# Patient Record
Sex: FEMALE | Race: WHITE | Employment: UNEMPLOYED | URBAN - METROPOLITAN AREA
[De-identification: names, ages, dates, MRNs, and addresses within clinical notes are randomized per-mention and may not be internally consistent; named-entity substitution may affect disease eponyms.]

---

## 2017-06-01 ENCOUNTER — HOSPITAL ENCOUNTER (OUTPATIENT)
Dept: PREADMISSION TESTING | Age: 57
Discharge: HOME | End: 2017-06-01
Payer: COMMERCIAL

## 2017-06-01 ENCOUNTER — HOSPITAL ENCOUNTER (OUTPATIENT)
Dept: GENERAL RADIOLOGY | Age: 57
Discharge: HOME | End: 2017-06-01
Attending: OBSTETRICS & GYNECOLOGY
Payer: COMMERCIAL

## 2017-06-01 ENCOUNTER — ANESTHESIA EVENT (OUTPATIENT)
Dept: SURGERY | Age: 57
End: 2017-06-01
Payer: COMMERCIAL

## 2017-06-01 VITALS
SYSTOLIC BLOOD PRESSURE: 123 MMHG | HEIGHT: 67 IN | TEMPERATURE: 97.9 F | BODY MASS INDEX: 33.43 KG/M2 | WEIGHT: 213 LBS | DIASTOLIC BLOOD PRESSURE: 79 MMHG | HEART RATE: 102 BPM

## 2017-06-01 LAB
ALBUMIN SERPL BCP-MCNC: 3.9 G/DL (ref 3.5–5)
ALBUMIN/GLOB SERPL: 0.9 {RATIO} (ref 1.1–2.2)
ALP SERPL-CCNC: 135 U/L (ref 45–117)
ALT SERPL-CCNC: 28 U/L (ref 12–78)
ANION GAP BLD CALC-SCNC: 9 MMOL/L (ref 5–15)
AST SERPL W P-5'-P-CCNC: 16 U/L (ref 15–37)
ATRIAL RATE: 81 BPM
BASOPHILS # BLD AUTO: 0 K/UL (ref 0–0.1)
BASOPHILS # BLD: 0 % (ref 0–1)
BILIRUB SERPL-MCNC: 0.3 MG/DL (ref 0.2–1)
BUN SERPL-MCNC: 9 MG/DL (ref 6–20)
BUN/CREAT SERPL: 11 (ref 12–20)
CALCIUM SERPL-MCNC: 9.6 MG/DL (ref 8.5–10.1)
CALCULATED P AXIS, ECG09: 56 DEGREES
CALCULATED R AXIS, ECG10: 66 DEGREES
CALCULATED T AXIS, ECG11: 43 DEGREES
CHLORIDE SERPL-SCNC: 104 MMOL/L (ref 97–108)
CO2 SERPL-SCNC: 25 MMOL/L (ref 21–32)
CREAT SERPL-MCNC: 0.79 MG/DL (ref 0.55–1.02)
DIAGNOSIS, 93000: NORMAL
EOSINOPHIL # BLD: 0.2 K/UL (ref 0–0.4)
EOSINOPHIL NFR BLD: 4 % (ref 0–7)
ERYTHROCYTE [DISTWIDTH] IN BLOOD BY AUTOMATED COUNT: 13.1 % (ref 11.5–14.5)
GLOBULIN SER CALC-MCNC: 4.3 G/DL (ref 2–4)
GLUCOSE SERPL-MCNC: 94 MG/DL (ref 65–100)
HCT VFR BLD AUTO: 38.6 % (ref 35–47)
HGB BLD-MCNC: 13.1 G/DL (ref 11.5–16)
LYMPHOCYTES # BLD AUTO: 38 % (ref 12–49)
LYMPHOCYTES # BLD: 2 K/UL (ref 0.8–3.5)
MCH RBC QN AUTO: 31.5 PG (ref 26–34)
MCHC RBC AUTO-ENTMCNC: 33.9 G/DL (ref 30–36.5)
MCV RBC AUTO: 92.8 FL (ref 80–99)
MONOCYTES # BLD: 0.8 K/UL (ref 0–1)
MONOCYTES NFR BLD AUTO: 14 % (ref 5–13)
NEUTS SEG # BLD: 2.3 K/UL (ref 1.8–8)
NEUTS SEG NFR BLD AUTO: 44 % (ref 32–75)
P-R INTERVAL, ECG05: 130 MS
PLATELET # BLD AUTO: 286 K/UL (ref 150–400)
POTASSIUM SERPL-SCNC: 4 MMOL/L (ref 3.5–5.1)
PROT SERPL-MCNC: 8.2 G/DL (ref 6.4–8.2)
Q-T INTERVAL, ECG07: 382 MS
QRS DURATION, ECG06: 102 MS
QTC CALCULATION (BEZET), ECG08: 443 MS
RBC # BLD AUTO: 4.16 M/UL (ref 3.8–5.2)
SODIUM SERPL-SCNC: 138 MMOL/L (ref 136–145)
VENTRICULAR RATE, ECG03: 81 BPM
WBC # BLD AUTO: 5.2 K/UL (ref 3.6–11)

## 2017-06-01 PROCEDURE — 85025 COMPLETE CBC W/AUTO DIFF WBC: CPT | Performed by: OBSTETRICS & GYNECOLOGY

## 2017-06-01 PROCEDURE — 71020 XR CHEST PA LAT: CPT

## 2017-06-01 PROCEDURE — 36415 COLL VENOUS BLD VENIPUNCTURE: CPT | Performed by: OBSTETRICS & GYNECOLOGY

## 2017-06-01 PROCEDURE — 80053 COMPREHEN METABOLIC PANEL: CPT | Performed by: OBSTETRICS & GYNECOLOGY

## 2017-06-01 PROCEDURE — 93005 ELECTROCARDIOGRAM TRACING: CPT

## 2017-06-01 NOTE — PERIOP NOTES
PATIENT GIVEN SURGICAL SITE INFECTION FAQ HANDOUT AND HAND WASHING TIP SHEET. PREOP INSTRUCTIONS REVIEWED AND PATIENT VERBALIZES UNDERSTANDING OF INSTRUCTIONS. PATIENT HAS BEEN GIVEN THE OPPORTUNITY TO ASK ADDITIONAL QUESTIONS. GAVE 2 PACKAGES OF CHG WIPES AND INSTRUCTIONS. PATIENT VERBALIZED UNDERSTANDING.     SENT PATIENT TO [facility] FOR CHEST XRAY

## 2017-06-02 ENCOUNTER — HOSPITAL ENCOUNTER (OUTPATIENT)
Age: 57
Setting detail: OUTPATIENT SURGERY
Discharge: HOME | End: 2017-06-02
Attending: OBSTETRICS & GYNECOLOGY | Admitting: OBSTETRICS & GYNECOLOGY
Payer: COMMERCIAL

## 2017-06-02 ENCOUNTER — ANESTHESIA (OUTPATIENT)
Dept: SURGERY | Age: 57
End: 2017-06-02
Payer: COMMERCIAL

## 2017-06-02 VITALS
TEMPERATURE: 98.3 F | WEIGHT: 213 LBS | HEART RATE: 87 BPM | HEIGHT: 67 IN | SYSTOLIC BLOOD PRESSURE: 128 MMHG | OXYGEN SATURATION: 97 % | BODY MASS INDEX: 33.43 KG/M2 | DIASTOLIC BLOOD PRESSURE: 84 MMHG | RESPIRATION RATE: 18 BRPM

## 2017-06-02 PROCEDURE — 77030010507 HC ADH SKN DERMBND J&J -B: Performed by: OBSTETRICS & GYNECOLOGY

## 2017-06-02 PROCEDURE — 76060000031 HC ANESTHESIA FIRST 0.5 HR: Performed by: OBSTETRICS & GYNECOLOGY

## 2017-06-02 PROCEDURE — 74011250636 HC RX REV CODE- 250/636: Performed by: ANESTHESIOLOGY

## 2017-06-02 PROCEDURE — 76010000154 HC OR TIME FIRST 0.5 HR: Performed by: OBSTETRICS & GYNECOLOGY

## 2017-06-02 PROCEDURE — 74011000250 HC RX REV CODE- 250: Performed by: OBSTETRICS & GYNECOLOGY

## 2017-06-02 PROCEDURE — 74011000250 HC RX REV CODE- 250

## 2017-06-02 PROCEDURE — 74011000258 HC RX REV CODE- 258

## 2017-06-02 PROCEDURE — 76210000021 HC REC RM PH II 0.5 TO 1 HR: Performed by: OBSTETRICS & GYNECOLOGY

## 2017-06-02 PROCEDURE — 74011250636 HC RX REV CODE- 250/636

## 2017-06-02 PROCEDURE — 77030011640 HC PAD GRND REM COVD -A: Performed by: OBSTETRICS & GYNECOLOGY

## 2017-06-02 PROCEDURE — 76210000006 HC OR PH I REC 0.5 TO 1 HR: Performed by: OBSTETRICS & GYNECOLOGY

## 2017-06-02 PROCEDURE — 77030031139 HC SUT VCRL2 J&J -A: Performed by: OBSTETRICS & GYNECOLOGY

## 2017-06-02 PROCEDURE — 77030002933 HC SUT MCRYL J&J -A: Performed by: OBSTETRICS & GYNECOLOGY

## 2017-06-02 RX ORDER — MIDAZOLAM HYDROCHLORIDE 1 MG/ML
INJECTION, SOLUTION INTRAMUSCULAR; INTRAVENOUS AS NEEDED
Status: DISCONTINUED | OUTPATIENT
Start: 2017-06-02 | End: 2017-06-02 | Stop reason: HOSPADM

## 2017-06-02 RX ORDER — MORPHINE SULFATE 10 MG/ML
2 INJECTION, SOLUTION INTRAMUSCULAR; INTRAVENOUS
Status: DISCONTINUED | OUTPATIENT
Start: 2017-06-02 | End: 2017-06-02 | Stop reason: HOSPADM

## 2017-06-02 RX ORDER — FENTANYL CITRATE 50 UG/ML
25 INJECTION, SOLUTION INTRAMUSCULAR; INTRAVENOUS
Status: DISCONTINUED | OUTPATIENT
Start: 2017-06-02 | End: 2017-06-02 | Stop reason: HOSPADM

## 2017-06-02 RX ORDER — LIDOCAINE HYDROCHLORIDE 10 MG/ML
0.1 INJECTION, SOLUTION EPIDURAL; INFILTRATION; INTRACAUDAL; PERINEURAL AS NEEDED
Status: DISCONTINUED | OUTPATIENT
Start: 2017-06-02 | End: 2017-06-02 | Stop reason: HOSPADM

## 2017-06-02 RX ORDER — ONDANSETRON 2 MG/ML
4 INJECTION INTRAMUSCULAR; INTRAVENOUS AS NEEDED
Status: DISCONTINUED | OUTPATIENT
Start: 2017-06-02 | End: 2017-06-02 | Stop reason: HOSPADM

## 2017-06-02 RX ORDER — DEXAMETHASONE SODIUM PHOSPHATE 100 MG/10ML
INJECTION INTRAMUSCULAR; INTRAVENOUS AS NEEDED
Status: DISCONTINUED | OUTPATIENT
Start: 2017-06-02 | End: 2017-06-02 | Stop reason: HOSPADM

## 2017-06-02 RX ORDER — FENTANYL CITRATE 50 UG/ML
INJECTION, SOLUTION INTRAMUSCULAR; INTRAVENOUS AS NEEDED
Status: DISCONTINUED | OUTPATIENT
Start: 2017-06-02 | End: 2017-06-02 | Stop reason: HOSPADM

## 2017-06-02 RX ORDER — PROPOFOL 10 MG/ML
INJECTION, EMULSION INTRAVENOUS AS NEEDED
Status: DISCONTINUED | OUTPATIENT
Start: 2017-06-02 | End: 2017-06-02 | Stop reason: HOSPADM

## 2017-06-02 RX ORDER — SODIUM CHLORIDE 0.9 % (FLUSH) 0.9 %
5-10 SYRINGE (ML) INJECTION AS NEEDED
Status: DISCONTINUED | OUTPATIENT
Start: 2017-06-02 | End: 2017-06-02 | Stop reason: HOSPADM

## 2017-06-02 RX ORDER — SODIUM CHLORIDE 0.9 % (FLUSH) 0.9 %
5-10 SYRINGE (ML) INJECTION EVERY 8 HOURS
Status: DISCONTINUED | OUTPATIENT
Start: 2017-06-02 | End: 2017-06-02 | Stop reason: HOSPADM

## 2017-06-02 RX ORDER — HYDROMORPHONE HYDROCHLORIDE 1 MG/ML
0.2 INJECTION, SOLUTION INTRAMUSCULAR; INTRAVENOUS; SUBCUTANEOUS
Status: DISCONTINUED | OUTPATIENT
Start: 2017-06-02 | End: 2017-06-02 | Stop reason: HOSPADM

## 2017-06-02 RX ORDER — ONDANSETRON 2 MG/ML
INJECTION INTRAMUSCULAR; INTRAVENOUS AS NEEDED
Status: DISCONTINUED | OUTPATIENT
Start: 2017-06-02 | End: 2017-06-02 | Stop reason: HOSPADM

## 2017-06-02 RX ORDER — SODIUM CHLORIDE, SODIUM LACTATE, POTASSIUM CHLORIDE, CALCIUM CHLORIDE 600; 310; 30; 20 MG/100ML; MG/100ML; MG/100ML; MG/100ML
INJECTION, SOLUTION INTRAVENOUS
Status: DISCONTINUED | OUTPATIENT
Start: 2017-06-02 | End: 2017-06-02 | Stop reason: HOSPADM

## 2017-06-02 RX ORDER — OXYCODONE AND ACETAMINOPHEN 5; 325 MG/1; MG/1
1 TABLET ORAL
Qty: 20 TAB | Refills: 0 | Status: ON HOLD | OUTPATIENT
Start: 2017-06-02 | End: 2018-07-30

## 2017-06-02 RX ORDER — SODIUM CHLORIDE, SODIUM LACTATE, POTASSIUM CHLORIDE, CALCIUM CHLORIDE 600; 310; 30; 20 MG/100ML; MG/100ML; MG/100ML; MG/100ML
50 INJECTION, SOLUTION INTRAVENOUS CONTINUOUS
Status: DISCONTINUED | OUTPATIENT
Start: 2017-06-02 | End: 2017-06-02 | Stop reason: HOSPADM

## 2017-06-02 RX ADMIN — SODIUM CHLORIDE, SODIUM LACTATE, POTASSIUM CHLORIDE, AND CALCIUM CHLORIDE 50 ML/HR: 600; 310; 30; 20 INJECTION, SOLUTION INTRAVENOUS at 07:46

## 2017-06-02 RX ADMIN — SODIUM CHLORIDE, SODIUM LACTATE, POTASSIUM CHLORIDE, CALCIUM CHLORIDE: 600; 310; 30; 20 INJECTION, SOLUTION INTRAVENOUS at 08:00

## 2017-06-02 RX ADMIN — MIDAZOLAM HYDROCHLORIDE 2 MG: 1 INJECTION, SOLUTION INTRAMUSCULAR; INTRAVENOUS at 08:25

## 2017-06-02 RX ADMIN — DEXAMETHASONE SODIUM PHOSPHATE 4 MG: 100 INJECTION INTRAMUSCULAR; INTRAVENOUS at 08:46

## 2017-06-02 RX ADMIN — PROPOFOL 50 MG: 10 INJECTION, EMULSION INTRAVENOUS at 08:49

## 2017-06-02 RX ADMIN — ONDANSETRON 4 MG: 2 INJECTION INTRAMUSCULAR; INTRAVENOUS at 08:46

## 2017-06-02 RX ADMIN — FENTANYL CITRATE 100 MCG: 50 INJECTION, SOLUTION INTRAMUSCULAR; INTRAVENOUS at 08:35

## 2017-06-02 RX ADMIN — PROPOFOL 50 MG: 10 INJECTION, EMULSION INTRAVENOUS at 08:37

## 2017-06-02 RX ADMIN — PROPOFOL 50 MG: 10 INJECTION, EMULSION INTRAVENOUS at 08:40

## 2017-06-02 RX ADMIN — PROPOFOL 50 MG: 10 INJECTION, EMULSION INTRAVENOUS at 08:45

## 2017-06-02 RX ADMIN — PROPOFOL 50 MG: 10 INJECTION, EMULSION INTRAVENOUS at 08:35

## 2017-06-02 RX ADMIN — PROPOFOL 30 MG: 10 INJECTION, EMULSION INTRAVENOUS at 08:54

## 2017-06-02 NOTE — H&P
[address] [place] [address] Mathias Moritz [address]52, 0387 [facility]  [phone]  MD Frank Marino MD Jonny Kidd, NP        Patient ID:  Name:  Bharat Nicole  MRN:  [id number]  :  1960/56 y.o. Date:  2017      HISTORY OF PRESENT ILLNESS:  Bharat Nicole is a  female with a diagnosis of stage IV UPSC. She underwent a TLH, BSO, with lymphadenectomy in 2014. She was found to have evidence of intraperitoneal disease with implants in the cul de sac, but no evidence of tyler metastases. She was treated with adjuvant chemotherapy with Taxol and Carboplatin. She then received pelvic radiation therapy.     She presents today for follow-up. She is doing well and is without complaints. She denies any vaginal bleeding or discharge, any pelvic or abdominal pain, or any changes in her bowel or bladder habits.  Her last CA-125 and pap smear were normal.      Problem List:  Patient Active Problem List    Diagnosis Date Noted    Endometrial cancer ([facility] [address] 2014     PMH:  Past Medical History:   Diagnosis Date    Arthritis     Cancer ([facility] [address]     uterine    Chronic pain     LOWER BACK    Depression     Nausea & vomiting     PMB (postmenopausal bleeding) 2014      PSH:  Past Surgical History:   Procedure Laterality Date    HX COLONOSCOPY      HX GYN  14    TLH/BSO/lymph node dissection    HX ORTHOPAEDIC Right      LITTLE toe sx    HX VASCULAR ACCESS Left     PORTACATH - LEFT CHEST      Social History:  Social History   Substance Use Topics    Smoking status: Former Smoker     Packs/day: 0.50     Years: 7.00     Types: Cigarettes     Quit date: 1999    Smokeless tobacco: Never Used    Alcohol use 0.6 oz/week     1 Glasses of wine per week      Comment: rarely      Family History:  Family History   Problem Relation Age of Onset    Cancer Mother 61     liver metastatic disease, unknown primary    Stroke Mother BRAIN ANEURYSM    Cancer Maternal Uncle 61     bone    Cancer Maternal Grandmother 61     unknown primary    Anesth Problems Neg Hx       Medications: (reviewed)  No current facility-administered medications for this encounter. Allergies: (reviewed)  Allergies   Allergen Reactions    Hydrocodone Nausea Only    Oxycodone Nausea and Vomiting          OBJECTIVE:    Physical Exam:  VITAL SIGNS: There were no vitals filed for this visit. There is no height or weight on file to calculate BMI. GENERAL FLOWER: Conversant, alert, oriented. No acute distress. HEENT: HEENT. No thyroid enlargement. No JVD. Neck: Supple without restrictions. RESPIRATORY: Clear to auscultation and percussion to the bases. No CVAT. CARDIOVASC: RRR without murmur/rub. GASTROINT: soft, non-tender, without masses or organomegaly   MUSCULOSKEL: no joint tenderness, deformity or swelling   EXTREMITIES: extremities normal, atraumatic, no cyanosis or edema   PELVIC: Deferred   RECTAL: Deferred   DOMINGUEZ SURVEY: No suspicious lymphadenopathy or edema noted. NEURO: Grossly intact. No acute deficit.          Lab Results   Component Value Date/Time    WBC 5.2 06/01/2017 11:30 AM    HGB 13.1 06/01/2017 11:30 AM    HCT 38.6 06/01/2017 11:30 AM    PLATELET 107 22/87/0477 11:30 AM    MCV 92.8 06/01/2017 11:30 AM     Lab Results   Component Value Date/Time    Sodium 138 06/01/2017 11:30 AM    Potassium 4.0 06/01/2017 11:30 AM    Chloride 104 06/01/2017 11:30 AM    CO2 25 06/01/2017 11:30 AM    Anion gap 9 06/01/2017 11:30 AM    Glucose 94 06/01/2017 11:30 AM    BUN 9 06/01/2017 11:30 AM    Creatinine 0.79 06/01/2017 11:30 AM    BUN/Creatinine ratio 11 06/01/2017 11:30 AM    GFR est AA >60 06/01/2017 11:30 AM    GFR est non-AA >60 06/01/2017 11:30 AM    Calcium 9.6 06/01/2017 11:30 AM         IMPRESSION/PLAN:  62 yo female with a history of uterine papillary serous carcinoma, s/p surgical resection, followed by systemic chemotherapy and pelvic radiation. She has no evidence of disease at this time and desires removal of her IV port. She was counseled on the risks and benefits of the procedure, as well as the possibility of needing another port placed in the future if she experiences recurrent disease.       Signed By: Sonja Sarmiento MD     6/2/2017/7:15 AM

## 2017-06-02 NOTE — IP AVS SNAPSHOT
[address] 
[phone] Patient: Iraj Jean Baptiste MRN: [id number] OBF:4/44/[birth year]25 You are allergic to the following Allergen Reactions Hydrocodone Nausea Only Oxycodone Nausea and Vomiting Recent Documentation Height Weight BMI OB Status Smoking Status 1.702 m 96.6 kg 33.36 kg/m2 Hysterectomy Former Smoker Emergency Contacts Name Discharge Info Relation Home Work Mobile [address] CAREGIVER [3] Spouse [3] [phone] About your hospitalization You were admitted on:  June 2, 2017 You last received care in the:  [facility] PACU You were discharged on:  June 2, 2017 Unit phone number:  [phone] Why you were hospitalized Your primary diagnosis was:  Not on File Providers Seen During Your Hospitalizations Provider Role Specialty Primary office phone Milla Chapman MD Attending Provider Gynecologic Oncology [phone] Your Primary Care Physician (PCP) Primary Care Physician Office Phone Office Fax [address] [phone] Follow-up Information Follow up With Details Comments Contact Info Jaci Cagle MD   [address] [place] [address] 
[phone] Milla Chapman MD  GO TO YOUR SCHEDULED APPOINTMENT [address] [place] [address] 
[phone] Current Discharge Medication List  
  
START taking these medications Dose & Instructions Dispensing Information Comments Morning Noon Evening Bedtime  
 oxyCODONE-acetaminophen 5-325 mg per tablet Commonly known as:  PERCOCET Your last dose was: Your next dose is:    
   
   
 Dose:  1 Tab Take 1 Tab by mouth every four (4) hours as needed for Pain. Max Daily Amount: 6 Tabs. Quantity:  20 Tab Refills:  0 CONTINUE these medications which have NOT CHANGED Dose & Instructions Dispensing Information Comments Morning Noon Evening Bedtime  
 ibuprofen 200 mg tablet Commonly known as:  ADVIL Your last dose was: Your next dose is:    
   
   
 Dose:  800 mg Take 4 tablets by mouth every eight (8) hours as needed. Quantity:  90 tablet Refills:  3 Where to Get Your Medications Information on where to get these meds will be given to you by the nurse or doctor. ! Ask your nurse or doctor about these medications  
  oxyCODONE-acetaminophen 5-325 mg per tablet Discharge Instructions Dr Miguel Robles Discharge Instructions: MAY SHOWER TOMORROW. USE SOUP AND WATER AND PAT YOUR INCISION DRY. DO NOT USE ANY OINTMENTS OR LOTIONS ON THE INCISION SITE. NO BATHS, POOLS OR HOT TUBS UNTIL INCISION HAS HEALED. GO TO YOU SCHEDULED APPOINTMENT WITH DR. KNIGHT. Learning About a Central Venous Catheter What is a central venous catheter? A central venous catheter (CVC), also called a central line, is a long, thin, flexible tube used to give medicines, fluids, nutrients, or blood products over a long period of time, usually several weeks or more. It can also be used to do blood tests. The catheter makes doing these things more comfortable for you because they are put directly into the catheter. You are not stuck with a needle every time. The CVC is inserted in your arm, chest, or neck. It is put through the skin and into a large vein. The catheter is threaded through this vein until it reaches a large vein near the heart. In most cases, the other end of the catheterthe end used to give medicinessticks out of the skin. Some of the common CVCs that are used outside the hospital or for longer periods of time include: · A peripherally inserted central catheter, or PICC line (say \"pick\"), which usually goes into a vein in your arm. · A tunneled catheter, which is surgically inserted into a vein in the neck or chest. 
· An implanted port, which is similar to a tunneled catheter but is left entirely under the skin. Medicines are injected through a \"port\" placed under the skin. Once your CVC is in place, you may stay in the hospital to receive your medicines, or you may get your medicines at home. What problems can occur? Possible problems with a central venous catheter include: · Bleeding. This may happen when the doctor inserts the catheter. It is usually mild and will stop by itself. · Infection. If infection occurs, you will need antibiotics or the catheter will be removed. · Blockage or kinking of the catheter. Regular flushing of the catheter helps reduce blockage. A kinked catheter must be repositioned or replaced. · Pain. You may experience pain at the place where the catheter is inserted or where it lies under your skin. · Collapsed lung (pneumothorax). This is rare. It is most likely to happen during placement of a catheter in the chest. 
· Shifting of the catheter. A catheter that has moved out of place can sometimes be repositioned. If this does not work, it must be replaced. What happens when you get a central venous catheter? Your catheter will be inserted by your doctor. Your doctor may use ultrasound to locate the blood vessel and help with the placement of the catheter. When you are in the hospital, a nursing team will take care of you and your CVC. Insertion and care of the CVC Your nursing team will: · Check the catheter site and dressing regularly. How often this is done depends on the situation. · Wash their hands before and after handling the catheter. · Replace the catheter when needed. · Clean or replace the catheter components when needed. Changing the dressing Your team will: · Use clean and proper materials for the dressing, which covers the catheter site. · Clean the insertion site and area whenever they change the dressing. · Replace the dressing when it is damp, loose, or dirty. It will be changed at least once a week. At home If you go home with a CVC, your team will give you detailed instructions on how to care for it. In general: · Always wash your hands before you touch your CVC. Make sure anyone who touches the catheter also washes his or her hands. · Try to keep the exit site dry. This can help prevent infection. When you shower, cover the site with waterproof material, such as plastic wrap. Be sure you cover both the exit site and the central line cap(s). · Fasten or tape the central line to your body to prevent it from pulling or dangling. Avoid bending or crimping your central line. And wear clothing that doesn't rub or pull on your central line. Follow-up care is a key part of your treatment and safety. Be sure to make and go to all appointments, and call your doctor if you are having problems. It's also a good idea to know your test results and keep a list of the medicines you take. Where can you learn more? Go to http://francis-lima.info/. Enter I714 in the search box to learn more about \"Learning About a Central Venous Catheter. \" Current as of: May 27, 2016 Content Version: 11.2 © 5996-1763 [employer]. Care instructions adapted under license by "[employer] (which disclaims liability or warranty for this information). If you have questions about a medical condition or this instruction, always ask your healthcare professional. [address] any warranty or liability for your use of this information. ______________________________________________________________________ Anesthesia Discharge Instructions After general anesthesia or intervenous sedation, for 24 hours or while taking prescription Narcotics: · Limit your activities · Do not drive or operate hazardous machinery · If you have not urinated within 8 hours after discharge, please contact your surgeon on call. · Do not make important personal or business decisions · Do not drink alcoholic beverages Report the following to your surgeon: 
· Excessive pain, swelling, redness or odor of or around the surgical area · Temperature over 100.5 degrees · Nausea and vomiting lasting longer than 4 hours or if unable to take medication · Any signs of decreased circulation or nerve impairment to extremity:  Change in color, persistent numbness, tingling, coldness or increased pain. · Any questions Discharge Instructions Attachments/References MEFS - NARCOTIC-ANALGESIC/ACETAMINOPHEN (PERCOCET, Lynnda Levo, LORCET HD, LORTAB 10/325) - (BY MOUTH) (ENGLISH) Discharge Orders None Introducing [facility] SERVICES! Dear Judie Mack: 
Thank you for requesting a [employer] account. Our records indicate that you already have an active [employer] account. You can access your account anytime at [email or web address] [email or web address] Did you know that you can access your hospital and ER discharge instructions at any time in [employer]? You can also review all of your test results from your hospital stay or ER visit. Additional Information If you have questions, please visit the Frequently Asked Questions section of the [employer] website at [email or web address] [employer]/. Remember, [employer] is NOT to be used for urgent needs. For medical emergencies, dial 911. Now available from your iPhone and Android! General Information Please provide this summary of care documentation to your next provider. Patient Signature:  ____________________________________________________________ Date:  ____________________________________________________________  
  
Cristobal Hodges  Provider Signature: ____________________________________________________________ Date:  ____________________________________________________________ More Information Narcotic-Analgesic/Acetaminophen (Percocet, Norco, Lorcet HD, Lortab 10/325) - (By mouth) Why this medicine is used:  
Relieves pain. Contact a nurse or doctor right away if you have: 
· Extreme weakness, shallow breathing, slow heartbeat · Severe confusion, lightheadedness, dizziness, fainting · Yellow skin or eyes, dark urine or pale stools · Severe constipation, severe stomach pain, nausea, vomiting, loss of appetite · Sweating or cold, clammy skin Common side effects: · Mild constipation, nausea, vomiting · Sleepiness, tiredness · Itching, rash © 2017 [address] Information is for End User's use only and may not be sold, redistributed or otherwise used for commercial purposes.

## 2017-06-02 NOTE — OP NOTES
Gynecologic Oncology Operative Report    Wayland Fabry  6/2/2017    Pre-operative diagnosis:   1) Endometrial CA      2) No longer requires venous access for chemotherapy     Post-operative diagnosis: 1) Endometrial CA      2) No longer requires venous access for chemotherapy    Procedure:  IV port removal    Surgeon:  Ninoska Bradford MD    Assistant:  N/A    Anesthesia:  MAC, plus local    EBL:  Minimal    Complications:  None    Operative indications:  60 yo WF who has completed chemotherapy and has no evidence of disease. Desires IV port removal.    Procedure in detail:  After the risks, benefits, indications, and alternatives of the procedure were discussed with the patient and informed consent was obtained, the patient was taken to the operating room. She was then correctly identified, administered IV sedation per anesthesia, and then prepped and draped in the supine position in the usual fashion. Her arms were also tucked at each side. The anatomy of the anterior thoracic wall was noted. 1% lidocaine without epinephrine was then injected along the previous incision overlying the port. A skin incision was then made transversely with a #15-blade scalpel in the same location. The incision was then carried down to the the level of the port with electro-cautery. The port was then grasped with a Kocher clamp and manipulated so that the four Proline sutures securing it in place could be cut and removed. The port and catheter were then removed. The catheter tunnel was ligated with a figure-of-eight 3-0 Vicryl suture. The port pocket was then irrigated and made hemostatic with electro-cautery. The incision was then closed in two layers. The subcutaneous fat was reapproximated with a running 3-0 Vicryl. The skin was then reapproximated with a 4-0 Monocryl in a running subcuticular fashion. Steri-strips were then applied, followed by a sterile pressure dressing.   The patient was then awakened from anesthesia and taken to the recovery room in stable condition. All instrument, sponge, and needle counts were correct.       Arpita Cheng MD  6/2/2017  8:57 AM

## 2017-06-02 NOTE — ANESTHESIA PREPROCEDURE EVALUATION
Anesthetic History     PONV          Review of Systems / Medical History  Patient summary reviewed, nursing notes reviewed and pertinent labs reviewed    Pulmonary            Asthma        Neuro/Psych              Cardiovascular                  Exercise tolerance: >4 METS  Comments: Normal sinus rhythm   Normal ECG   When compared with ECG of 19-AUG-2014 16:11,   No significant change was found   Confirmed by Mitzy Jesus MD, [employer] ([id number]) on 6/1/2017 4:02:23 PM    GI/Hepatic/Renal  Within defined limits              Endo/Other        Arthritis     Other Findings   Comments: Endometrial cancer         Physical Exam    Airway  Mallampati: I  TM Distance: > 6 cm  Neck ROM: normal range of motion   Mouth opening: Normal     Cardiovascular    Rhythm: regular  Rate: normal         Dental    Dentition: Edentulous     Pulmonary  Breath sounds clear to auscultation               Abdominal  Abdominal exam normal       Other Findings            Anesthetic Plan    ASA: 2  Anesthesia type: general            Anesthetic plan and risks discussed with: Patient

## 2017-06-02 NOTE — ROUTINE PROCESS
Patient: Bobby Almazan MRN: [id number]  SSN: [id number]   YOB: 1960  Age: 61 y.o. Sex: female     Patient is status post Procedure(s):  PORT A CATH  REMOVAL. Surgeon(s) and Role:     * Mahsa Ackerman MD - Primary                  Venous Access Device 09/29/14 Upper chest (subclavicular area), left (Active)      Peripheral IV 08/22/14 Left Wrist (Active)       Peripheral IV 06/02/17 Left Wrist (Active)   Site Assessment Clean, dry, & intact 6/2/2017  7:44 AM   Phlebitis Assessment 0 6/2/2017  7:44 AM   Infiltration Assessment 0 6/2/2017  7:44 AM   Dressing Status Clean, dry, & intact; New 6/2/2017  7:44 AM   Dressing Type Tape;Transparent 6/2/2017  7:44 AM   Hub Color/Line Status Pink 6/2/2017  7:44 AM            Airway - Endotracheal Tube 09/29/14 (Active)                   Dressing:  Wound Chest Left-DRESSING TYPE: Topical skin adhesive/glue (06/02/17 0700)

## 2017-06-02 NOTE — DISCHARGE INSTRUCTIONS
Dr Jennings [facility] Discharge Instructions:    Sarah Levine. USE SOUP AND WATER AND PAT YOUR INCISION DRY. DO NOT USE ANY OINTMENTS OR LOTIONS ON THE INCISION SITE. NO BATHS, POOLS OR HOT TUBS UNTIL INCISION HAS HEALED. GO TO YOU SCHEDULED APPOINTMENT WITH DR. KNIGHT.         ______________________________________________________________________    Anesthesia Discharge Instructions    After general anesthesia or intervenous sedation, for 24 hours or while taking prescription Narcotics:  · Limit your activities  · Do not drive or operate hazardous machinery  · If you have not urinated within 8 hours after discharge, please contact your surgeon on call. · Do not make important personal or business decisions  · Do not drink alcoholic beverages    Report the following to your surgeon:  · Excessive pain, swelling, redness or odor of or around the surgical area  · Temperature over 100.5 degrees  · Nausea and vomiting lasting longer than 4 hours or if unable to take medication  · Any signs of decreased circulation or nerve impairment to extremity:  Change in color, persistent numbness, tingling, coldness or increased pain.   · Any questions

## 2017-06-02 NOTE — ANESTHESIA POSTPROCEDURE EVALUATION
Post-Anesthesia Evaluation and Assessment    Patient: Bharat Nicole MRN: [id number]  SSN: [id number]    YOB: 1960  Age: 61 y.o. Sex: female       Cardiovascular Function/Vital Signs  Visit Vitals    /75    Pulse 85    Temp 36.4 °C (97.6 °F)    Resp 25    Ht 5' 7\" (1.702 m)    Wt 96.6 kg (213 lb)    SpO2 95%    BMI 33.36 kg/m2       Patient is status post general anesthesia for Procedure(s):  PORT A CATH  REMOVAL. Nausea/Vomiting: None    Postoperative hydration reviewed and adequate. Pain:  Pain Scale 1: Numeric (0 - 10) (06/02/17 0943)  Pain Intensity 1: 0 (06/02/17 0943)   Managed    Neurological Status:   Neuro (WDL): Within Defined Limits (06/02/17 6618)  Neuro  Neurologic State: Alert; Appropriate for age (06/02/17 5936)  Orientation Level: Oriented X4 (06/02/17 0908)  Cognition: Follows commands (06/02/17 0908)  Speech: Appropriate for age;Clear (06/02/17 0908)  LUE Motor Response: Purposeful (06/02/17 0908)  LLE Motor Response: Purposeful (06/02/17 0908)  RUE Motor Response: Purposeful (06/02/17 0908)  RLE Motor Response: Purposeful (06/02/17 0908)   At baseline    Mental Status and Level of Consciousness: Arousable    Pulmonary Status:   O2 Device: Room air (06/02/17 0943)   Adequate oxygenation and airway patent    Complications related to anesthesia: None    Post-anesthesia assessment completed.  No concerns    Signed By: Faheem Irving MD     June 2, 2017

## 2017-06-02 NOTE — BRIEF OP NOTE
BRIEF OPERATIVE NOTE    Date of Procedure: 6/2/2017   Preoperative Diagnosis: ENDOMETRIAL CANCER   Postoperative Diagnosis: ENDOMETRIAL CANCER     Procedure(s):  PORT A CATH  REMOVAL  Surgeon(s) and Role:     * Memo Thacker MD - Primary  Surgical Staff:  Circ-1: Pat Caballero RN  Scrub Tech-1: Adilene Stiles  Event Time In   Incision Start 5718   Incision Close 9686     Anesthesia: General   Estimated Blood Loss: <5 cc  Specimens: * No specimens in log *   Findings: Normal appearing port.    Complications: None    Karena Diallo MD

## 2017-06-02 NOTE — PERIOP NOTES
PATIENT WAS ASKED ABOUT PERCOCET. SHE SAID SHE WAS OK WITH TAKING THAT. THAT IT SOMETIMES MADE HER FEEL \"YUCKY\".

## 2018-01-01 ENCOUNTER — OFFICE VISIT (OUTPATIENT)
Dept: GYNECOLOGY | Age: 58
End: 2018-01-01

## 2018-01-01 ENCOUNTER — APPOINTMENT (OUTPATIENT)
Dept: INFUSION THERAPY | Age: 58
End: 2018-01-01
Payer: COMMERCIAL

## 2018-01-01 ENCOUNTER — HOSPITAL ENCOUNTER (OUTPATIENT)
Dept: INFUSION THERAPY | Age: 58
Discharge: HOME | End: 2018-12-04
Payer: COMMERCIAL

## 2018-01-01 ENCOUNTER — TELEPHONE (OUTPATIENT)
Dept: ONCOLOGY | Age: 58
End: 2018-01-01

## 2018-01-01 ENCOUNTER — HOSPITAL ENCOUNTER (OUTPATIENT)
Dept: CT IMAGING | Age: 58
Discharge: HOME | End: 2018-11-26
Attending: OBSTETRICS & GYNECOLOGY
Payer: MEDICARE

## 2018-01-01 ENCOUNTER — HOSPITAL ENCOUNTER (OUTPATIENT)
Dept: INFUSION THERAPY | Age: 58
Discharge: HOME | End: 2018-11-06
Payer: COMMERCIAL

## 2018-01-01 VITALS
BODY MASS INDEX: 31.99 KG/M2 | WEIGHT: 203.8 LBS | SYSTOLIC BLOOD PRESSURE: 129 MMHG | HEART RATE: 84 BPM | HEIGHT: 67 IN | RESPIRATION RATE: 16 BRPM | OXYGEN SATURATION: 97 % | DIASTOLIC BLOOD PRESSURE: 76 MMHG | TEMPERATURE: 98.1 F

## 2018-01-01 VITALS
WEIGHT: 207.8 LBS | HEART RATE: 101 BPM | BODY MASS INDEX: 32.62 KG/M2 | DIASTOLIC BLOOD PRESSURE: 76 MMHG | SYSTOLIC BLOOD PRESSURE: 147 MMHG | HEIGHT: 67 IN

## 2018-01-01 VITALS
TEMPERATURE: 98.6 F | OXYGEN SATURATION: 97 % | HEIGHT: 67 IN | SYSTOLIC BLOOD PRESSURE: 141 MMHG | RESPIRATION RATE: 16 BRPM | WEIGHT: 205.8 LBS | HEART RATE: 88 BPM | DIASTOLIC BLOOD PRESSURE: 82 MMHG | BODY MASS INDEX: 32.3 KG/M2

## 2018-01-01 VITALS — WEIGHT: 206.8 LBS | HEIGHT: 67 IN | BODY MASS INDEX: 32.46 KG/M2

## 2018-01-01 VITALS
SYSTOLIC BLOOD PRESSURE: 156 MMHG | BODY MASS INDEX: 31.71 KG/M2 | HEART RATE: 93 BPM | HEIGHT: 67 IN | WEIGHT: 202 LBS | DIASTOLIC BLOOD PRESSURE: 84 MMHG

## 2018-01-01 DIAGNOSIS — C54.1 CANCER OF ENDOMETRIUM (HCC): ICD-10-CM

## 2018-01-01 DIAGNOSIS — C54.1 ENDOMETRIAL CANCER (HCC): Primary | ICD-10-CM

## 2018-01-01 DIAGNOSIS — C54.1 ENDOMETRIAL CANCER (HCC): ICD-10-CM

## 2018-01-01 DIAGNOSIS — R59.0 MEDIASTINAL LYMPHADENOPATHY: Primary | ICD-10-CM

## 2018-01-01 LAB
ALBUMIN SERPL-MCNC: 4.2 G/DL (ref 3.5–5.5)
ALBUMIN SERPL-MCNC: 4.2 G/DL (ref 3.5–5.5)
ALBUMIN/GLOB SERPL: 1.4 {RATIO} (ref 1.2–2.2)
ALBUMIN/GLOB SERPL: 1.4 {RATIO} (ref 1.2–2.2)
ALP SERPL-CCNC: 77 IU/L (ref 39–117)
ALP SERPL-CCNC: 83 IU/L (ref 39–117)
ALT SERPL-CCNC: 20 IU/L (ref 0–32)
ALT SERPL-CCNC: 9 IU/L (ref 0–32)
AST SERPL-CCNC: 15 IU/L (ref 0–40)
AST SERPL-CCNC: 18 IU/L (ref 0–40)
BACTERIA SPEC CULT: NORMAL
BASOPHILS # BLD AUTO: 0 X10E3/UL (ref 0–0.2)
BASOPHILS # BLD AUTO: 0 X10E3/UL (ref 0–0.2)
BASOPHILS NFR BLD AUTO: 1 %
BASOPHILS NFR BLD AUTO: 1 %
BILIRUB SERPL-MCNC: 0.2 MG/DL (ref 0–1.2)
BILIRUB SERPL-MCNC: <0.2 MG/DL (ref 0–1.2)
BUN SERPL-MCNC: 10 MG/DL (ref 6–24)
BUN SERPL-MCNC: 12 MG/DL (ref 6–24)
BUN/CREAT SERPL: 13 (ref 9–23)
BUN/CREAT SERPL: 14 (ref 9–23)
CALCIUM SERPL-MCNC: 9.4 MG/DL (ref 8.7–10.2)
CALCIUM SERPL-MCNC: 9.4 MG/DL (ref 8.7–10.2)
CANCER AG125 SERPL-ACNC: 101.1 U/ML (ref 0–38.1)
CANCER AG125 SERPL-ACNC: 132 U/ML (ref 0–38.1)
CC UR VC: NORMAL
CHLORIDE SERPL-SCNC: 102 MMOL/L (ref 96–106)
CHLORIDE SERPL-SCNC: 103 MMOL/L (ref 96–106)
CO2 SERPL-SCNC: 21 MMOL/L (ref 20–29)
CO2 SERPL-SCNC: 24 MMOL/L (ref 20–29)
CREAT SERPL-MCNC: 0.76 MG/DL (ref 0.57–1)
CREAT SERPL-MCNC: 0.84 MG/DL (ref 0.57–1)
EOSINOPHIL # BLD AUTO: 0.1 X10E3/UL (ref 0–0.4)
EOSINOPHIL # BLD AUTO: 0.1 X10E3/UL (ref 0–0.4)
EOSINOPHIL NFR BLD AUTO: 2 %
EOSINOPHIL NFR BLD AUTO: 2 %
ERYTHROCYTE [DISTWIDTH] IN BLOOD BY AUTOMATED COUNT: 15.7 % (ref 12.3–15.4)
ERYTHROCYTE [DISTWIDTH] IN BLOOD BY AUTOMATED COUNT: 16.6 % (ref 12.3–15.4)
GLOBULIN SER CALC-MCNC: 3 G/DL (ref 1.5–4.5)
GLOBULIN SER CALC-MCNC: 3.1 G/DL (ref 1.5–4.5)
GLUCOSE SERPL-MCNC: 102 MG/DL (ref 65–99)
GLUCOSE SERPL-MCNC: 89 MG/DL (ref 65–99)
HCT VFR BLD AUTO: 36.1 % (ref 34–46.6)
HCT VFR BLD AUTO: 36.3 % (ref 34–46.6)
HGB BLD-MCNC: 11.7 G/DL (ref 11.1–15.9)
HGB BLD-MCNC: 12.2 G/DL (ref 11.1–15.9)
IMM GRANULOCYTES # BLD: 0 X10E3/UL (ref 0–0.1)
IMM GRANULOCYTES # BLD: 0 X10E3/UL (ref 0–0.1)
IMM GRANULOCYTES NFR BLD: 0 %
IMM GRANULOCYTES NFR BLD: 0 %
LYMPHOCYTES # BLD AUTO: 1.4 X10E3/UL (ref 0.7–3.1)
LYMPHOCYTES # BLD AUTO: 1.8 X10E3/UL (ref 0.7–3.1)
LYMPHOCYTES NFR BLD AUTO: 31 %
LYMPHOCYTES NFR BLD AUTO: 33 %
MAGNESIUM SERPL-MCNC: 1.8 MG/DL (ref 1.6–2.3)
MAGNESIUM SERPL-MCNC: 1.9 MG/DL (ref 1.6–2.3)
MCH RBC QN AUTO: 31.3 PG (ref 26.6–33)
MCH RBC QN AUTO: 31.7 PG (ref 26.6–33)
MCHC RBC AUTO-ENTMCNC: 32.4 G/DL (ref 31.5–35.7)
MCHC RBC AUTO-ENTMCNC: 33.6 G/DL (ref 31.5–35.7)
MCV RBC AUTO: 93 FL (ref 79–97)
MCV RBC AUTO: 98 FL (ref 79–97)
MONOCYTES # BLD AUTO: 0.9 X10E3/UL (ref 0.1–0.9)
MONOCYTES # BLD AUTO: 1 X10E3/UL (ref 0.1–0.9)
MONOCYTES NFR BLD AUTO: 18 %
MONOCYTES NFR BLD AUTO: 21 %
MORPHOLOGY BLD-IMP: ABNORMAL
NEUTROPHILS # BLD AUTO: 2 X10E3/UL (ref 1.4–7)
NEUTROPHILS # BLD AUTO: 2.5 X10E3/UL (ref 1.4–7)
NEUTROPHILS NFR BLD AUTO: 45 %
NEUTROPHILS NFR BLD AUTO: 46 %
PLATELET # BLD AUTO: 252 X10E3/UL (ref 150–379)
PLATELET # BLD AUTO: 269 X10E3/UL (ref 150–379)
POTASSIUM SERPL-SCNC: 4.4 MMOL/L (ref 3.5–5.2)
POTASSIUM SERPL-SCNC: 4.5 MMOL/L (ref 3.5–5.2)
PROT SERPL-MCNC: 7.2 G/DL (ref 6–8.5)
PROT SERPL-MCNC: 7.3 G/DL (ref 6–8.5)
RBC # BLD AUTO: 3.69 X10E6/UL (ref 3.77–5.28)
RBC # BLD AUTO: 3.9 X10E6/UL (ref 3.77–5.28)
SERVICE CMNT-IMP: NORMAL
SODIUM SERPL-SCNC: 139 MMOL/L (ref 134–144)
SODIUM SERPL-SCNC: 140 MMOL/L (ref 134–144)
WBC # BLD AUTO: 4.4 X10E3/UL (ref 3.4–10.8)
WBC # BLD AUTO: 5.5 X10E3/UL (ref 3.4–10.8)

## 2018-01-01 PROCEDURE — 96375 TX/PRO/DX INJ NEW DRUG ADDON: CPT

## 2018-01-01 PROCEDURE — 77030012965 HC NDL HUBR BBMI -A

## 2018-01-01 PROCEDURE — 74011000258 HC RX REV CODE- 258: Performed by: OBSTETRICS & GYNECOLOGY

## 2018-01-01 PROCEDURE — 74011636320 HC RX REV CODE- 636/320

## 2018-01-01 PROCEDURE — 74177 CT ABD & PELVIS W/CONTRAST: CPT

## 2018-01-01 PROCEDURE — 74011250636 HC RX REV CODE- 250/636: Performed by: OBSTETRICS & GYNECOLOGY

## 2018-01-01 PROCEDURE — 71260 CT THORAX DX C+: CPT

## 2018-01-01 PROCEDURE — 74011000258 HC RX REV CODE- 258: Performed by: RADIOLOGY

## 2018-01-01 PROCEDURE — 74011000250 HC RX REV CODE- 250: Performed by: OBSTETRICS & GYNECOLOGY

## 2018-01-01 PROCEDURE — 96413 CHEMO IV INFUSION 1 HR: CPT

## 2018-01-01 RX ORDER — ONDANSETRON 2 MG/ML
8 INJECTION INTRAMUSCULAR; INTRAVENOUS ONCE
Status: COMPLETED | OUTPATIENT
Start: 2018-01-01 | End: 2018-01-01

## 2018-01-01 RX ORDER — HEPARIN 100 UNIT/ML
300-500 SYRINGE INTRAVENOUS AS NEEDED
Status: ACTIVE | OUTPATIENT
Start: 2018-01-01 | End: 2018-01-01

## 2018-01-01 RX ORDER — SODIUM CHLORIDE 9 MG/ML
500 INJECTION, SOLUTION INTRAVENOUS CONTINUOUS
Status: CANCELLED | OUTPATIENT
Start: 2019-01-01

## 2018-01-01 RX ORDER — ACETAMINOPHEN 325 MG/1
650 TABLET ORAL AS NEEDED
Status: CANCELLED
Start: 2018-01-01

## 2018-01-01 RX ORDER — HEPARIN 100 UNIT/ML
300-500 SYRINGE INTRAVENOUS AS NEEDED
Status: CANCELLED
Start: 2018-01-01

## 2018-01-01 RX ORDER — ONDANSETRON 2 MG/ML
8 INJECTION INTRAMUSCULAR; INTRAVENOUS AS NEEDED
Status: CANCELLED | OUTPATIENT
Start: 2019-01-01

## 2018-01-01 RX ORDER — ACETAMINOPHEN 325 MG/1
650 TABLET ORAL AS NEEDED
Status: CANCELLED
Start: 2019-01-01

## 2018-01-01 RX ORDER — ALBUTEROL SULFATE 0.83 MG/ML
2.5 SOLUTION RESPIRATORY (INHALATION) AS NEEDED
Status: CANCELLED
Start: 2018-01-01

## 2018-01-01 RX ORDER — DEXTROSE MONOHYDRATE 50 MG/ML
500 INJECTION, SOLUTION INTRAVENOUS CONTINUOUS
Status: DISPENSED | OUTPATIENT
Start: 2018-01-01 | End: 2018-01-01

## 2018-01-01 RX ORDER — EPINEPHRINE 1 MG/ML
0.3 INJECTION, SOLUTION, CONCENTRATE INTRAVENOUS AS NEEDED
Status: CANCELLED | OUTPATIENT
Start: 2019-01-01

## 2018-01-01 RX ORDER — SODIUM CHLORIDE 0.9 % (FLUSH) 0.9 %
10 SYRINGE (ML) INJECTION AS NEEDED
Status: CANCELLED
Start: 2018-01-01

## 2018-01-01 RX ORDER — SODIUM CHLORIDE 0.9 % (FLUSH) 0.9 %
10 SYRINGE (ML) INJECTION AS NEEDED
Status: ACTIVE | OUTPATIENT
Start: 2018-01-01 | End: 2018-01-01

## 2018-01-01 RX ORDER — ONDANSETRON 2 MG/ML
8 INJECTION INTRAMUSCULAR; INTRAVENOUS AS NEEDED
Status: CANCELLED | OUTPATIENT
Start: 2018-01-01

## 2018-01-01 RX ORDER — HYDROCORTISONE SODIUM SUCCINATE 100 MG/2ML
100 INJECTION, POWDER, FOR SOLUTION INTRAMUSCULAR; INTRAVENOUS AS NEEDED
Status: CANCELLED | OUTPATIENT
Start: 2019-01-01

## 2018-01-01 RX ORDER — DEXTROSE MONOHYDRATE 50 MG/ML
500 INJECTION, SOLUTION INTRAVENOUS CONTINUOUS
Status: CANCELLED | OUTPATIENT
Start: 2018-01-01

## 2018-01-01 RX ORDER — SODIUM CHLORIDE 9 MG/ML
10 INJECTION INTRAMUSCULAR; INTRAVENOUS; SUBCUTANEOUS AS NEEDED
Status: ACTIVE | OUTPATIENT
Start: 2018-01-01 | End: 2018-01-01

## 2018-01-01 RX ORDER — ONDANSETRON 2 MG/ML
8 INJECTION INTRAMUSCULAR; INTRAVENOUS ONCE
Status: CANCELLED | OUTPATIENT
Start: 2019-01-01 | End: 2019-01-01

## 2018-01-01 RX ORDER — EPINEPHRINE 1 MG/ML
0.3 INJECTION, SOLUTION, CONCENTRATE INTRAVENOUS AS NEEDED
Status: CANCELLED | OUTPATIENT
Start: 2018-01-01

## 2018-01-01 RX ORDER — DIPHENHYDRAMINE HYDROCHLORIDE 50 MG/ML
50 INJECTION, SOLUTION INTRAMUSCULAR; INTRAVENOUS AS NEEDED
Status: CANCELLED
Start: 2018-01-01

## 2018-01-01 RX ORDER — SODIUM CHLORIDE 9 MG/ML
500 INJECTION, SOLUTION INTRAVENOUS CONTINUOUS
Status: CANCELLED | OUTPATIENT
Start: 2018-01-01

## 2018-01-01 RX ORDER — SODIUM CHLORIDE 9 MG/ML
10 INJECTION INTRAMUSCULAR; INTRAVENOUS; SUBCUTANEOUS AS NEEDED
Status: CANCELLED | OUTPATIENT
Start: 2018-01-01

## 2018-01-01 RX ORDER — DIPHENHYDRAMINE HYDROCHLORIDE 50 MG/ML
50 INJECTION, SOLUTION INTRAMUSCULAR; INTRAVENOUS AS NEEDED
Status: CANCELLED
Start: 2019-01-01

## 2018-01-01 RX ORDER — ONDANSETRON 2 MG/ML
8 INJECTION INTRAMUSCULAR; INTRAVENOUS ONCE
Status: CANCELLED | OUTPATIENT
Start: 2018-01-01 | End: 2018-01-01

## 2018-01-01 RX ORDER — SODIUM CHLORIDE 0.9 % (FLUSH) 0.9 %
10 SYRINGE (ML) INJECTION
Status: COMPLETED | OUTPATIENT
Start: 2018-01-01 | End: 2018-01-01

## 2018-01-01 RX ORDER — HEPARIN 100 UNIT/ML
300-500 SYRINGE INTRAVENOUS AS NEEDED
Status: CANCELLED
Start: 2019-01-01

## 2018-01-01 RX ORDER — HYDROCORTISONE SODIUM SUCCINATE 100 MG/2ML
100 INJECTION, POWDER, FOR SOLUTION INTRAMUSCULAR; INTRAVENOUS AS NEEDED
Status: CANCELLED | OUTPATIENT
Start: 2018-01-01

## 2018-01-01 RX ORDER — SODIUM CHLORIDE 9 MG/ML
10 INJECTION INTRAMUSCULAR; INTRAVENOUS; SUBCUTANEOUS AS NEEDED
Status: CANCELLED | OUTPATIENT
Start: 2019-01-01

## 2018-01-01 RX ORDER — SODIUM CHLORIDE 0.9 % (FLUSH) 0.9 %
10 SYRINGE (ML) INJECTION AS NEEDED
Status: CANCELLED
Start: 2019-01-01

## 2018-01-01 RX ORDER — DEXTROSE MONOHYDRATE 50 MG/ML
500 INJECTION, SOLUTION INTRAVENOUS CONTINUOUS
Status: CANCELLED | OUTPATIENT
Start: 2019-01-01

## 2018-01-01 RX ORDER — ALBUTEROL SULFATE 0.83 MG/ML
2.5 SOLUTION RESPIRATORY (INHALATION) AS NEEDED
Status: CANCELLED
Start: 2019-01-01

## 2018-01-01 RX ADMIN — DOXORUBICIN HYDROCHLORIDE 86.8 MG: 2 INJECTION, SUSPENSION, LIPOSOMAL INTRAVENOUS at 11:18

## 2018-01-01 RX ADMIN — SODIUM CHLORIDE, PRESERVATIVE FREE 500 UNITS: 5 INJECTION INTRAVENOUS at 12:26

## 2018-01-01 RX ADMIN — Medication 10 ML: at 13:03

## 2018-01-01 RX ADMIN — Medication 10 ML: at 12:26

## 2018-01-01 RX ADMIN — DEXTROSE MONOHYDRATE 250 ML: 5 INJECTION, SOLUTION INTRAVENOUS at 10:23

## 2018-01-01 RX ADMIN — DEXTROSE MONOHYDRATE 250 ML: 5 INJECTION, SOLUTION INTRAVENOUS at 10:18

## 2018-01-01 RX ADMIN — ONDANSETRON 8 MG: 2 INJECTION INTRAMUSCULAR; INTRAVENOUS at 10:31

## 2018-01-01 RX ADMIN — DOXORUBICIN HYDROCHLORIDE 86.8 MG: 2 INJECTION, SUSPENSION, LIPOSOMAL INTRAVENOUS at 11:56

## 2018-01-01 RX ADMIN — Medication 10 ML: at 11:20

## 2018-01-01 RX ADMIN — SODIUM CHLORIDE 10 ML: 9 INJECTION INTRAMUSCULAR; INTRAVENOUS; SUBCUTANEOUS at 10:18

## 2018-01-01 RX ADMIN — SODIUM CHLORIDE 10 ML: 9 INJECTION, SOLUTION INTRAMUSCULAR; INTRAVENOUS; SUBCUTANEOUS at 10:20

## 2018-01-01 RX ADMIN — SODIUM CHLORIDE, PRESERVATIVE FREE 500 UNITS: 5 INJECTION INTRAVENOUS at 13:03

## 2018-01-01 RX ADMIN — IOPAMIDOL 100 ML: 755 INJECTION, SOLUTION INTRAVENOUS at 12:00

## 2018-01-01 RX ADMIN — SODIUM CHLORIDE 100 ML: 900 INJECTION, SOLUTION INTRAVENOUS at 11:20

## 2018-01-01 RX ADMIN — ONDANSETRON 8 MG: 2 INJECTION, SOLUTION INTRAMUSCULAR; INTRAVENOUS at 11:39

## 2018-05-14 ENCOUNTER — TELEPHONE (OUTPATIENT)
Dept: GYNECOLOGY | Age: 58
End: 2018-05-14

## 2018-05-14 NOTE — TELEPHONE ENCOUNTER
Patient last seen June 2017 for port removal with no follow up. She's wanting to know should she be seen?

## 2018-06-26 ENCOUNTER — OFFICE VISIT (OUTPATIENT)
Dept: GYNECOLOGY | Age: 58
End: 2018-06-26

## 2018-06-26 ENCOUNTER — HOSPITAL ENCOUNTER (OUTPATIENT)
Dept: LAB | Age: 58
Discharge: HOME | End: 2018-06-26
Payer: COMMERCIAL

## 2018-06-26 VITALS
HEART RATE: 81 BPM | DIASTOLIC BLOOD PRESSURE: 69 MMHG | HEIGHT: 67 IN | WEIGHT: 222.6 LBS | BODY MASS INDEX: 34.94 KG/M2 | SYSTOLIC BLOOD PRESSURE: 126 MMHG

## 2018-06-26 DIAGNOSIS — C54.1 ENDOMETRIAL CANCER (HCC): Primary | ICD-10-CM

## 2018-06-26 PROCEDURE — 88175 CYTOPATH C/V AUTO FLUID REDO: CPT | Performed by: OBSTETRICS & GYNECOLOGY

## 2018-06-26 PROCEDURE — 87624 HPV HI-RISK TYP POOLED RSLT: CPT | Performed by: OBSTETRICS & GYNECOLOGY

## 2018-06-26 NOTE — PROGRESS NOTES
[address], [address], [address]  [phone]  MD Junito Saunders MD [place], [place]  Office Visit    Patient ID:  Name: Daryl Bro  MRN: [id number]  : 1960/58 y.o. Visit date: 2018    Primary Diagnosis:     ICD-10-CM ICD-9-CM    1. Endometrial cancer (HCC) C54.1 182.0 CANCER ANTIGEN 125      PAP, IG, RFX HPV ASCUS ([id number])        Problem List:    Patient Active Problem List   Diagnosis Code    [facility] ([facility] C54.1       INTERVAL HISTORY:  Daryl Bro is a  female with a diagnosis of stage IV UPSC. She underwent a TLH, BSO, with lymphadenectomy in 2014. She was found to have evidence of intraperitoneal disease with implants in the cul de sac, but no evidence of tyler metastases. She was treated with adjuvant chemotherapy with Taxol and Carboplatin. She then received pelvic radiation therapy. She presents today for follow-up. She hasn't followed up since 2016. She is doing well and is without complaints. She denies any vaginal bleeding or discharge, any pelvic or abdominal pain, or any changes in her bowel or bladder habits. Her last CA-125 and pap smear were normal.      PMH:  Past Medical History:   Diagnosis Date    Arthritis     Cancer ([facility]     uterine    Chronic pain     LOWER BACK    Depression     Nausea & vomiting     PMB (postmenopausal bleeding) 2014     PSH:  Past Surgical History:   Procedure Laterality Date    HX COLONOSCOPY      HX GYN  14    TLH/BSO/lymph node dissection    HX ORTHOPAEDIC Right      LITTLE toe sx    HX VASCULAR ACCESS Left     PORTACATH - LEFT CHEST     SOC:  Social History     Social History    Marital status:      Spouse name: N/A    Number of children: N/A    Years of education: N/A     Occupational History    Not on file.      Social History Main Topics    Smoking status: Former Smoker Packs/day: 0.50     Years: 7.00     Types: Cigarettes     Quit date: 8/19/1999    Smokeless tobacco: Never Used    Alcohol use 0.6 oz/week     1 Glasses of wine per week      Comment: rarely    Drug use: No    Sexual activity: No     Other Topics Concern    Not on file     Social History Narrative     Family History  Family History   Problem Relation Age of Onset    Cancer Mother 61     liver metastatic disease, unknown primary    Stroke Mother      BRAIN ANEURYSM    Cancer Maternal Uncle 61     bone    Cancer Maternal Grandmother 61     unknown primary    Anesth Problems Neg Hx      Medications: (reviewed)  Current Outpatient Prescriptions on File Prior to Visit   Medication Sig Dispense Refill    oxyCODONE-acetaminophen (PERCOCET) 5-325 mg per tablet Take 1 Tab by mouth every four (4) hours as needed for Pain. Max Daily Amount: 6 Tabs. 20 Tab 0    ibuprofen (ADVIL) 200 mg tablet Take 4 tablets by mouth every eight (8) hours as needed. 90 tablet 3     No current facility-administered medications on file prior to visit. Allergies: (reviewed)  Allergies   Allergen Reactions    Hydrocodone Nausea Only    Oxycodone Nausea and Vomiting       ROS:  Negative     OBJECTIVE:    PHYSICAL EXAM  VITAL SIGNS: Vitals:    06/26/18 0957   BP: 126/69   Pulse: 81   Weight: 222 lb 9.6 oz (101 kg)   Height: 5' 7.01\" (1.702 m)     Body mass index is 34.86 kg/(m^2). GENERAL FLOWER: Conversant, alert, oriented. NAD   HEENT: Normocephalic. Oropharynx clear. Neck: Trachea midline, no JVD, no thyroid enlargement   RESPIRATORY: Lung fields clear to auscultation and percussion. Adequate respiratory effort   CARDIOVASC: RRR without murmur   GASTROINT: soft, non-tender, without masses or organomegaly. No hernia noted   MUSCULOSKEL: FROM. No focal tenderness. EXTREMITIES: extremities normal, atraumatic, no cyanosis or edema. Pulses appreciated. PELVIC: Normal external genitalia. Normal vagina.   Uterus, cervix, and adnexa absent. No masses. RECTAL: Deferred   DOMINGUEZ SURVEY: Negative throughout---neck, axillae, inguina. NEURO: Grossly intact, no acute deficit. Oriented. Not depressed. Not agitated. CT of the chest/abdomen/pelvis (2/16/15)  CHEST:  There is a left subclavian Port-A-Cath with tip in the superior vena cava. Lungs: The lungs are clear of mass, nodule, airspace disease or edema. Pleura: There is no pleural effusion or pneumothorax. Aorta: The aorta enhances normally with no evidence of aneurysm or   dissection. Mediastinum: There is no mediastinal or hilar adenopathy or mass. Bones and soft tissues: The bones and soft tissues of the chest wall are   normal.  ABDOMEN:  Liver: The liver is normal in size and contour. There is a small 12 x 11 mm   cyst in the right lobe (segment 6). No other focal abnormalities are   identified. Gallbladder and bile ducts: There is no calcified gallstone or biliary   dilatation. Spleen: No abnormality. Pancreas: No abnormality. Adrenal glands: The right adrenal gland measures 4 x 2.2 cm and measures 33   Hounsfield units on the dynamic contrast-enhanced images and 10 Hounsfield   units on the delayed images. There is a small 13 x 13 mm left adrenal nodule   which measures 74 Hounsfield units on the dynamic contrast-enhanced images   and 32 Hounsfield units on the delayed images. These are consistent with   adenomas. Kidneys: There is a small 10 mm left lower pole renal cyst. There is no   parenchymal abnormality of the right kidney. The delayed images demonstrate   bilateral excretion of contrast into the renal collecting systems and   proximal ureters with no defect or obstruction. PELVIS:  Reproductive organs: The uterus is absent. Bladder: No abnormality. The delayed images demonstrate excretion of   contrast into the distal ureters and bladder with no filling defect or   obstruction.    There is a 3.2 x 3.1 x 4 cm cystic structure adjacent to the right femoral vessels. BOWEL AND MESENTERY: The small bowel is normal. There is no mesenteric mass   or adenopathy. The appendix . PERITONEUM: There is no ascites or free intraperitoneal air. RETROPERITONEUM: The aorta is minimally atherosclerotic and tapers without   evidence of aneurysm. There is no retroperitoneal adenopathy or mass. There   is no pelvic mass or adenopathy. BONES AND SOFT TISSUES: There are degenerative disc changes at L5-S1. IMPRESSION:   1. Status post hysterectomy. 2. No evidence of recurrent or metastatic disease within the abdomen or   pelvis. 3. Cystic structure adjacent to the right femoral vessels possibly a   lymphocele. 4. Small hepatic cyst.  5. Small left renal cyst.  6. Bilateral adrenal masses consistent with adenomas. 7. Mild atherosclerosis of the aorta. 8. L5-S1 degenerative disc disease. IMPRESSION AND PLAN:  Parth Ramos has a history of uterine papillary serous carcinoma. She is s/p surgical resection and was recommended adjuvant chemotherapy with Taxol and Carboplatin and she completed 6 cycles. She then received pelvic radiation therapy. She is doing well and has no evidence of disease at this time. We will repeat a CA-125 today.       Dennis Morales MD  6/26/2018/2:54 PM

## 2018-06-26 NOTE — PROGRESS NOTES
Check up. Last follow up was 4/2016. Patient states no abnormal spotting or bleeding. Pt c/o pain in her lower abdomen at times. 1. Have you been to the ER, urgent care clinic since your last visit? Hospitalized since your last visit? No    2. Have you seen or consulted any other health care providers outside of the [address] [place] [address] since your last visit? Include any pap smears or colon screening. Pt has been treated for respiratory infections.

## 2018-06-27 LAB — CANCER AG125 SERPL-ACNC: 138.8 U/ML (ref 0–38.1)

## 2018-06-29 DIAGNOSIS — C54.1 ENDOMETRIAL CANCER (HCC): Primary | ICD-10-CM

## 2018-07-02 ENCOUNTER — TELEPHONE (OUTPATIENT)
Dept: GYNECOLOGY | Age: 58
End: 2018-07-02

## 2018-07-02 NOTE — TELEPHONE ENCOUNTER
The patient needs a ct scan due to elevated CA-125. I have called the pt and left a message to call me.

## 2018-07-02 NOTE — TELEPHONE ENCOUNTER
Called the pt back and advised of the elevated CA-125. Meldon Osler is recommending a ct scan to rule out recurrent disease. I advised her that Page will be calling her back to schedule.

## 2018-07-05 ENCOUNTER — TELEPHONE (OUTPATIENT)
Dept: GYNECOLOGY | Age: 58
End: 2018-07-05

## 2018-07-05 NOTE — TELEPHONE ENCOUNTER
Called the pt, advised of atypical pap results and not suspicious for carcinoma. Pt is going for her ct scan tomorrow and is scheduled for follow up next week.

## 2018-07-06 ENCOUNTER — HOSPITAL ENCOUNTER (OUTPATIENT)
Dept: CT IMAGING | Age: 58
Discharge: HOME | End: 2018-07-06
Attending: OBSTETRICS & GYNECOLOGY
Payer: COMMERCIAL

## 2018-07-06 DIAGNOSIS — C54.1 ENDOMETRIAL CANCER (HCC): ICD-10-CM

## 2018-07-06 PROCEDURE — 71260 CT THORAX DX C+: CPT

## 2018-07-06 PROCEDURE — 74011636320 HC RX REV CODE- 636/320: Performed by: OBSTETRICS & GYNECOLOGY

## 2018-07-06 PROCEDURE — 74011000258 HC RX REV CODE- 258: Performed by: OBSTETRICS & GYNECOLOGY

## 2018-07-06 PROCEDURE — 74177 CT ABD & PELVIS W/CONTRAST: CPT

## 2018-07-06 RX ORDER — SODIUM CHLORIDE 0.9 % (FLUSH) 0.9 %
10 SYRINGE (ML) INJECTION
Status: COMPLETED | OUTPATIENT
Start: 2018-07-06 | End: 2018-07-06

## 2018-07-06 RX ADMIN — Medication 10 ML: at 11:10

## 2018-07-06 RX ADMIN — SODIUM CHLORIDE 100 ML: 900 INJECTION, SOLUTION INTRAVENOUS at 11:10

## 2018-07-06 RX ADMIN — IOPAMIDOL 100 ML: 755 INJECTION, SOLUTION INTRAVENOUS at 11:10

## 2018-07-06 RX ADMIN — IOHEXOL 50 ML: 240 INJECTION, SOLUTION INTRATHECAL; INTRAVASCULAR; INTRAVENOUS; ORAL at 11:10

## 2018-07-12 ENCOUNTER — OFFICE VISIT (OUTPATIENT)
Dept: GYNECOLOGY | Age: 58
End: 2018-07-12

## 2018-07-12 VITALS
DIASTOLIC BLOOD PRESSURE: 74 MMHG | SYSTOLIC BLOOD PRESSURE: 137 MMHG | WEIGHT: 223.6 LBS | HEART RATE: 94 BPM | BODY MASS INDEX: 35.09 KG/M2 | HEIGHT: 67 IN

## 2018-07-12 DIAGNOSIS — C54.1 ENDOMETRIAL CANCER (HCC): Primary | ICD-10-CM

## 2018-07-12 PROBLEM — E66.01 SEVERE OBESITY (BMI 35.0-39.9): Status: ACTIVE | Noted: 2018-07-12

## 2018-07-12 NOTE — PROGRESS NOTES
[address] [place] [address] [address], [address] [facility]  [phone]  MD Winifred Dumont MD  [place], [place]  Office Visit    Patient ID:  Name: Tacos Beaulieu  MRN: [id number]  : 1960/58 y.o. Visit date: 2018    Primary Diagnosis:     ICD-10-CM ICD-9-CM    1. Endometrial cancer (HCC) C54.1 182.0 PET/CT TUMOR IMAGE SKULL THIGH (SUB)        Problem List:    Patient Active Problem List   Diagnosis Code    Endometrial cancer ([facility] C54.1    Severe obesity (BMI 35.0-39.9) ([facility] E66.01       INTERVAL HISTORY:  Tacos Beaulieu is a  female with a diagnosis of stage IV UPSC. She underwent a TLH, BSO, with lymphadenectomy in 2014. She was found to have evidence of intraperitoneal disease with implants in the cul de sac, but no evidence of tyler metastases. She was treated with adjuvant chemotherapy with Taxol and Carboplatin. She then received pelvic radiation therapy. She presented recently for follow-up. Prior to that she hadn't followed up since 2016. She is doing well and is without complaints. She denies any vaginal bleeding or discharge, any pelvic or abdominal pain, or any changes in her bowel or bladder habits. Her last  pap smear was normal.  Her most recent CA-125 was up to 138. I ordered a CT of the chest/abdomen/pelvis to evaluate for recurrence.         PMH:  Past Medical History:   Diagnosis Date    Arthritis     Cancer ([facility]     uterine    Chronic pain     LOWER BACK    Depression     Nausea & vomiting     PMB (postmenopausal bleeding) 2014     PSH:  Past Surgical History:   Procedure Laterality Date    HX COLONOSCOPY      HX GYN  14    TLH/BSO/lymph node dissection    HX ORTHOPAEDIC Right      LITTLE toe sx    HX VASCULAR ACCESS Left     PORTACATH - LEFT CHEST     SOC:  Social History     Social History    Marital status:      Spouse name: N/A    Number of children: N/A    Years of education: N/A     Occupational History    Not on file. Social History Main Topics    Smoking status: Former Smoker     Packs/day: 0.50     Years: 7.00     Types: Cigarettes     Quit date: 8/19/1999    Smokeless tobacco: Never Used    Alcohol use 0.6 oz/week     1 Glasses of wine per week      Comment: rarely    Drug use: No    Sexual activity: No     Other Topics Concern    Not on file     Social History Narrative     Family History  Family History   Problem Relation Age of Onset    Cancer Mother 61     liver metastatic disease, unknown primary    Stroke Mother      BRAIN ANEURYSM    Cancer Maternal Uncle 61     bone    Cancer Maternal Grandmother 61     unknown primary    Anesth Problems Neg Hx      Medications: (reviewed)  Current Outpatient Prescriptions on File Prior to Visit   Medication Sig Dispense Refill    oxyCODONE-acetaminophen (PERCOCET) 5-325 mg per tablet Take 1 Tab by mouth every four (4) hours as needed for Pain. Max Daily Amount: 6 Tabs. 20 Tab 0    ibuprofen (ADVIL) 200 mg tablet Take 4 tablets by mouth every eight (8) hours as needed. 90 tablet 3     No current facility-administered medications on file prior to visit. Allergies: (reviewed)  Allergies   Allergen Reactions    Hydrocodone Nausea Only    Oxycodone Nausea and Vomiting       ROS:  Negative     OBJECTIVE:    PHYSICAL EXAM  VITAL SIGNS: Vitals:    07/12/18 1423   BP: 137/74   Pulse: 94   Weight: 223 lb 9.6 oz (101.4 kg)   Height: 5' 7\" (1.702 m)     Body mass index is 35.02 kg/(m^2). GENERAL FLOWER: Conversant, alert, oriented. NAD   HEENT: Normocephalic. Oropharynx clear. Neck: Trachea midline, no JVD, no thyroid enlargement   RESPIRATORY: Lung fields clear to auscultation and percussion. Adequate respiratory effort   CARDIOVASC: RRR without murmur   GASTROINT: soft, non-tender, without masses or organomegaly. No hernia noted   MUSCULOSKEL: FROM. No focal tenderness.    EXTREMITIES: extremities normal, atraumatic, no cyanosis or edema. Pulses appreciated. PELVIC: Normal external genitalia. Normal vagina. Uterus, cervix, and adnexa absent. No masses. RECTAL: Deferred   DOMINGUEZ SURVEY: Negative throughout---neck, axillae, inguina. NEURO: Grossly intact, no acute deficit. Oriented. Not depressed. Not agitated. CT of the chest/abdomen/pelvis (7/6/18)  THYROID: No nodule. MEDIASTINUM: Mediastinal lymph nodes are stable.     There is a left diaphragmatic lymph node measuring 1.7 cm which has increased in  size.     DELORES: No mass or lymphadenopathy. THORACIC AORTA: No dissection or aneurysm. MAIN PULMONARY ARTERY: Normal in caliber. TRACHEA/BRONCHI: Patent. ESOPHAGUS: No wall thickening or dilatation. HEART: Normal in size. PLEURA: No effusion or pneumothorax. LUNGS: No nodule, mass, or airspace disease. LIVER: 1.5 cm cyst in the liver is stable. GALLBLADDER: Unremarkable. SPLEEN: There is a mass in the splenic hilus infiltrating the spleen measuring  5.6 x 2 cm and there is an additional mass in the lower pole of the spleen  measuring 2.7 x 1.9 cm and these are new.     There are peritoneal implants in the left upper quadrant with the largest  measuring 3.6 x 1.6 cm.      There is a 0.9 cm implant along the splenic flexure.      There is a 1.9 cm implant gastrocolic ligament.     There is a 1.9 cm nodule posterior to the body of the pancreas which is  unchanged.     PANCREAS: No mass or ductal dilatation. ADRENALS: 3.7 x 2 cm low-density nodule in the right adrenal gland is stable. 1.1 cm nodule left adrenal gland is stable. KIDNEYS: No mass, calculus, or hydronephrosis. There is a small cyst in the left  kidney  STOMACH: Unremarkable. SMALL BOWEL: No dilatation or wall thickening. COLON: No dilatation or wall thickening. APPENDIX: Unremarkable. PERITONEUM: No ascites or pneumoperitoneum. RETROPERITONEUM: No aortic aneurysm is identified.   REPRODUCTIVE ORGANS: Patient status post hysterectomy and bilateral  salpingo-oophorectomy.     There is 1.3 cm left pelvic sidewall nodule. URINARY BLADDER: No mass or calculus. BONES: No destructive bone lesion. ADDITIONAL COMMENTS: N/A     IMPRESSION:  1. CT of the chest demonstrates a 1.7 cm left diaphragmatic lymph node which has  increased in size. 2. CT of the abdomen and pelvis demonstrates a mass in the splenic hilus which  is infiltrating the spleen. There is also peritoneal implants in the left upper  quadrant. There is a peritoneal implant splenic flexure and gastrocolic ligament  and these are new findings. There is a 1.4 cm left pelvic sidewall nodule which is new. These findings are suspicious for current/metastatic disease. 3. Bilateral adrenal nodules are stable. Nodule posterior to this pancreatic  body is stable. IMPRESSION AND PLAN:  Elta Halsted has a history of uterine papillary serous carcinoma. She is s/p surgical resection and was recommended adjuvant chemotherapy with Taxol and Carboplatin and she completed 6 cycles. She then received pelvic radiation therapy. Her CA-125 is now elevated. Her CT suggests metastatic disease. I explained the images to her. I am going to send her for a PET to get a better feeling for her disease volume. I don't think she is a candidate for surgery. I briefly discussed chemotherapy, specifically Doxil, with her. We will discuss it more at her next visit when we review her PET/CT.       Michelle Valadez MD  7/12/2018/2:54 PM

## 2018-07-12 NOTE — PROGRESS NOTES
CT scan results, pt complains of fatigue, exhaustion, nausea and abdominal cramping    1. Have you been to the ER, urgent care clinic since your last visit? Hospitalized since your last visit?  no    2. Have you seen or consulted any other health care providers outside of the [address] [place] [address] since your last visit? Include any pap smears or colon screening.    no

## 2018-07-17 ENCOUNTER — HOSPITAL ENCOUNTER (OUTPATIENT)
Dept: PET IMAGING | Age: 58
Discharge: HOME | End: 2018-07-17
Attending: OBSTETRICS & GYNECOLOGY
Payer: COMMERCIAL

## 2018-07-17 VITALS — WEIGHT: 223 LBS | BODY MASS INDEX: 35 KG/M2 | HEIGHT: 67 IN

## 2018-07-17 DIAGNOSIS — C54.1 ENDOMETRIAL CANCER (HCC): ICD-10-CM

## 2018-07-17 PROCEDURE — A9552 F18 FDG: HCPCS

## 2018-07-17 RX ORDER — SODIUM CHLORIDE 0.9 % (FLUSH) 0.9 %
10 SYRINGE (ML) INJECTION
Status: COMPLETED | OUTPATIENT
Start: 2018-07-17 | End: 2018-07-17

## 2018-07-17 RX ADMIN — Medication 10 ML: at 08:05

## 2018-07-24 ENCOUNTER — OFFICE VISIT (OUTPATIENT)
Dept: GYNECOLOGY | Age: 58
End: 2018-07-24

## 2018-07-24 VITALS
BODY MASS INDEX: 34.53 KG/M2 | HEART RATE: 95 BPM | HEIGHT: 67 IN | WEIGHT: 220 LBS | SYSTOLIC BLOOD PRESSURE: 146 MMHG | DIASTOLIC BLOOD PRESSURE: 76 MMHG

## 2018-07-24 DIAGNOSIS — C54.1 ENDOMETRIAL CANCER (HCC): Primary | ICD-10-CM

## 2018-07-24 DIAGNOSIS — E66.01 SEVERE OBESITY (BMI 35.0-39.9): ICD-10-CM

## 2018-07-24 NOTE — PROGRESS NOTES
Lab req printed to give to pt to have labs drawn prior to 7/30/18 to use for chemotherapy and port insertion per vo Dr. Swapnil Bell today at [phone].

## 2018-07-24 NOTE — PROGRESS NOTES
2D echo ordered per vo Dr. Violet Patten today at [phone] to evaluate EJ prior to starting Doxil.

## 2018-07-24 NOTE — PROGRESS NOTES
[address] [place] [address] [address]ias Mor[address], [address]  [phone]  MD Luis Simmons MD  [place], [place]  Office Visit    Patient ID:  Name: Lavern Cook  MRN: [id number]  : 1960/58 y.o. Visit date: 2018    Primary Diagnosis:     ICD-10-CM ICD-9-CM    1. Endometrial cancer ([facility] C54.1 182.0         Problem List:    Patient Active Problem List   Diagnosis Code    Endometrial cancer ([address] C54.1    Severe obesity (BMI 35.0-39.9) ([facility] E66.01       INTERVAL HISTORY:  Lavern Cook is a  female with a diagnosis of stage IV UPSC. She underwent a TLH, BSO, with lymphadenectomy in 2014. She was found to have evidence of intraperitoneal disease with implants in the cul de sac, but no evidence of tyler metastases. She was treated with adjuvant chemotherapy with Taxol and Carboplatin. She then received pelvic radiation therapy. She presented recently for follow-up. Prior to that she hadn't followed up since 2016. She is doing well and is without complaints. She denies any vaginal bleeding or discharge, any pelvic or abdominal pain, or any changes in her bowel or bladder habits. Her last  pap smear was normal.  Her most recent CA-125 was up to 138. I ordered a CT of the chest/abdomen/pelvis to evaluate for recurrence. It demonstrated peritoneal carcinomatosis. I then sent her for a PET/CT to better evaluate.       PMH:  Past Medical History:   Diagnosis Date    Arthritis     Cancer ([address]     uterine    Chronic pain     LOWER BACK    Depression     Nausea & vomiting     PMB (postmenopausal bleeding) 2014     PSH:  Past Surgical History:   Procedure Laterality Date    HX COLONOSCOPY      HX GYN  14    TLH/BSO/lymph node dissection    HX ORTHOPAEDIC Right      LITTLE toe sx    HX VASCULAR ACCESS Left     PORTACATH - LEFT CHEST     SOC:  Social History     Social History    Marital status:      Spouse name: N/A    Number of children: N/A    Years of education: N/A     Occupational History    Not on file. Social History Main Topics    Smoking status: Former Smoker     Packs/day: 0.50     Years: 7.00     Types: Cigarettes     Quit date: 8/19/1999    Smokeless tobacco: Never Used    Alcohol use 0.6 oz/week     1 Glasses of wine per week      Comment: rarely    Drug use: No    Sexual activity: No     Other Topics Concern    Not on file     Social History Narrative     Family History  Family History   Problem Relation Age of Onset    Cancer Mother 61     liver metastatic disease, unknown primary    Stroke Mother      BRAIN ANEURYSM    Cancer Maternal Uncle 61     bone    Cancer Maternal Grandmother 61     unknown primary    Anesth Problems Neg Hx      Medications: (reviewed)  Current Outpatient Prescriptions on File Prior to Visit   Medication Sig Dispense Refill    ibuprofen (ADVIL) 200 mg tablet Take 4 tablets by mouth every eight (8) hours as needed. 90 tablet 3    oxyCODONE-acetaminophen (PERCOCET) 5-325 mg per tablet Take 1 Tab by mouth every four (4) hours as needed for Pain. Max Daily Amount: 6 Tabs. 20 Tab 0     No current facility-administered medications on file prior to visit. Allergies: (reviewed)  Allergies   Allergen Reactions    Hydrocodone Nausea Only    Oxycodone Nausea and Vomiting       ROS:  Negative     OBJECTIVE:    PHYSICAL EXAM  VITAL SIGNS: Vitals:    07/24/18 1442   BP: 146/76   Pulse: 95   Weight: 220 lb (99.8 kg)   Height: 5' 7.01\" (1.702 m)     Body mass index is 34.45 kg/(m^2). GENERAL FLOWER: Conversant, alert, oriented. NAD   HEENT: Normocephalic. Oropharynx clear. Neck: Trachea midline, no JVD, no thyroid enlargement   RESPIRATORY: Lung fields clear to auscultation and percussion. Adequate respiratory effort   CARDIOVASC: RRR without murmur   GASTROINT: soft, non-tender, without masses or organomegaly.  No hernia noted MUSCULOSKEL: FROM. No focal tenderness. EXTREMITIES: extremities normal, atraumatic, no cyanosis or edema. Pulses appreciated. PELVIC: Deferred   RECTAL: Deferred   DOMINGUEZ SURVEY: Negative throughout---neck, axillae, inguina. NEURO: Grossly intact, no acute deficit. Oriented. Not depressed. Not agitated. CT of the chest/abdomen/pelvis (7/6/18)  THYROID: No nodule. MEDIASTINUM: Mediastinal lymph nodes are stable.     There is a left diaphragmatic lymph node measuring 1.7 cm which has increased in  size.     DELORES: No mass or lymphadenopathy. THORACIC AORTA: No dissection or aneurysm. MAIN PULMONARY ARTERY: Normal in caliber. TRACHEA/BRONCHI: Patent. ESOPHAGUS: No wall thickening or dilatation. HEART: Normal in size. PLEURA: No effusion or pneumothorax. LUNGS: No nodule, mass, or airspace disease. LIVER: 1.5 cm cyst in the liver is stable. GALLBLADDER: Unremarkable. SPLEEN: There is a mass in the splenic hilus infiltrating the spleen measuring  5.6 x 2 cm and there is an additional mass in the lower pole of the spleen  measuring 2.7 x 1.9 cm and these are new.     There are peritoneal implants in the left upper quadrant with the largest  measuring 3.6 x 1.6 cm.      There is a 0.9 cm implant along the splenic flexure.      There is a 1.9 cm implant gastrocolic ligament.     There is a 1.9 cm nodule posterior to the body of the pancreas which is  unchanged.     PANCREAS: No mass or ductal dilatation. ADRENALS: 3.7 x 2 cm low-density nodule in the right adrenal gland is stable. 1.1 cm nodule left adrenal gland is stable. KIDNEYS: No mass, calculus, or hydronephrosis. There is a small cyst in the left  kidney  STOMACH: Unremarkable. SMALL BOWEL: No dilatation or wall thickening. COLON: No dilatation or wall thickening. APPENDIX: Unremarkable. PERITONEUM: No ascites or pneumoperitoneum. RETROPERITONEUM: No aortic aneurysm is identified.   REPRODUCTIVE ORGANS: Patient status post hysterectomy and bilateral  salpingo-oophorectomy.     There is 1.3 cm left pelvic sidewall nodule. URINARY BLADDER: No mass or calculus. BONES: No destructive bone lesion. ADDITIONAL COMMENTS: N/A     IMPRESSION:  1. CT of the chest demonstrates a 1.7 cm left diaphragmatic lymph node which has  increased in size. 2. CT of the abdomen and pelvis demonstrates a mass in the splenic hilus which  is infiltrating the spleen. There is also peritoneal implants in the left upper  quadrant. There is a peritoneal implant splenic flexure and gastrocolic ligament  and these are new findings. There is a 1.4 cm left pelvic sidewall nodule which is new. These findings are suspicious for current/metastatic disease. 3. Bilateral adrenal nodules are stable. Nodule posterior to this pancreatic  body is stable. PET/CT (7/17/18)  HEAD/NECK: No apparent foci of abnormal hypermetabolism. Cerebral evaluation is  limited by normal intense activity.     CHEST: No foci of abnormal hypermetabolism.     ABDOMEN/PELVIS: There is significant increased tracer activity in the splenic  hilum and in the perisplenic region. Hypermetabolic left cardiophrenic soft  tissue nodule is noted. There are small bilateral hypermetabolic internal iliac  lymph nodes and a hypermetabolic left obturator lymph node.     SKELETON: No foci of abnormal hypermetabolism in the axial and visualized  appendicular skeleton.     IMPRESSION: Hypermetabolic peritoneal carcinomatosis predominantly in the left  upper quadrant. Small but hypermetabolic bilateral internal iliac and left  obturator lymph nodes.         IMPRESSION AND PLAN:  Larissa Sheppard has a history of uterine papillary serous carcinoma. She is s/p surgical resection and was recommended adjuvant chemotherapy with Taxol and Carboplatin and she completed 6 cycles. She then received pelvic radiation therapy. Her CA-125 is now elevated. Her CT suggested metastatic disease.   I sent her for a PET/CT to get a better feeling for her disease volume. She is not a candidate for surgery. I discussed chemotherapy, specifically Doxil, plus the addition of Avastin. She will also need an IV port. We will schedule that ASAP. She was counseled on the toxicities of treatment and she wishes to proceed.         Annika Lewis MD  7/24/2018/2:54 PM

## 2018-07-27 ENCOUNTER — TELEPHONE (OUTPATIENT)
Dept: GYNECOLOGY | Age: 58
End: 2018-07-27

## 2018-07-30 ENCOUNTER — ANESTHESIA (OUTPATIENT)
Dept: SURGERY | Age: 58
End: 2018-07-30
Payer: COMMERCIAL

## 2018-07-30 ENCOUNTER — ANESTHESIA EVENT (OUTPATIENT)
Dept: SURGERY | Age: 58
End: 2018-07-30
Payer: COMMERCIAL

## 2018-07-30 ENCOUNTER — APPOINTMENT (OUTPATIENT)
Dept: GENERAL RADIOLOGY | Age: 58
End: 2018-07-30
Attending: OBSTETRICS & GYNECOLOGY
Payer: COMMERCIAL

## 2018-07-30 ENCOUNTER — HOSPITAL ENCOUNTER (OUTPATIENT)
Age: 58
Setting detail: OUTPATIENT SURGERY
Discharge: HOME | End: 2018-07-30
Attending: OBSTETRICS & GYNECOLOGY | Admitting: OBSTETRICS & GYNECOLOGY
Payer: COMMERCIAL

## 2018-07-30 VITALS
DIASTOLIC BLOOD PRESSURE: 61 MMHG | HEIGHT: 67 IN | TEMPERATURE: 96.9 F | OXYGEN SATURATION: 96 % | WEIGHT: 220 LBS | BODY MASS INDEX: 34.53 KG/M2 | SYSTOLIC BLOOD PRESSURE: 120 MMHG | HEART RATE: 70 BPM | RESPIRATION RATE: 14 BRPM

## 2018-07-30 DIAGNOSIS — G89.18 ACUTE POSTOPERATIVE PAIN: Primary | ICD-10-CM

## 2018-07-30 LAB
ALBUMIN SERPL-MCNC: 3.6 G/DL (ref 3.5–5)
ALBUMIN/GLOB SERPL: 0.8 {RATIO} (ref 1.1–2.2)
ALP SERPL-CCNC: 107 U/L (ref 45–117)
ALT SERPL-CCNC: 27 U/L (ref 12–78)
ANION GAP SERPL CALC-SCNC: 8 MMOL/L (ref 5–15)
AST SERPL-CCNC: 16 U/L (ref 15–37)
BASOPHILS # BLD: 0 K/UL (ref 0–0.1)
BASOPHILS NFR BLD: 1 % (ref 0–1)
BILIRUB SERPL-MCNC: 0.3 MG/DL (ref 0.2–1)
BUN SERPL-MCNC: 8 MG/DL (ref 6–20)
BUN/CREAT SERPL: 11 (ref 12–20)
CALCIUM SERPL-MCNC: 9.3 MG/DL (ref 8.5–10.1)
CANCER AG125 SERPL-ACNC: 166 U/ML (ref 1.5–35)
CHLORIDE SERPL-SCNC: 106 MMOL/L (ref 97–108)
CO2 SERPL-SCNC: 25 MMOL/L (ref 21–32)
CREAT SERPL-MCNC: 0.74 MG/DL (ref 0.55–1.02)
DIFFERENTIAL METHOD BLD: ABNORMAL
EOSINOPHIL # BLD: 0.2 K/UL (ref 0–0.4)
EOSINOPHIL NFR BLD: 3 % (ref 0–7)
ERYTHROCYTE [DISTWIDTH] IN BLOOD BY AUTOMATED COUNT: 12.8 % (ref 11.5–14.5)
GLOBULIN SER CALC-MCNC: 4.3 G/DL (ref 2–4)
GLUCOSE SERPL-MCNC: 84 MG/DL (ref 65–100)
HCT VFR BLD AUTO: 36.5 % (ref 35–47)
HGB BLD-MCNC: 11.9 G/DL (ref 11.5–16)
IMM GRANULOCYTES # BLD: 0 K/UL (ref 0–0.04)
IMM GRANULOCYTES NFR BLD AUTO: 0 % (ref 0–0.5)
LYMPHOCYTES # BLD: 1.8 K/UL (ref 0.8–3.5)
LYMPHOCYTES NFR BLD: 25 % (ref 12–49)
MCH RBC QN AUTO: 31.2 PG (ref 26–34)
MCHC RBC AUTO-ENTMCNC: 32.6 G/DL (ref 30–36.5)
MCV RBC AUTO: 95.8 FL (ref 80–99)
MONOCYTES # BLD: 1 K/UL (ref 0–1)
MONOCYTES NFR BLD: 14 % (ref 5–13)
NEUTS SEG # BLD: 4 K/UL (ref 1.8–8)
NEUTS SEG NFR BLD: 57 % (ref 32–75)
NRBC # BLD: 0 K/UL (ref 0–0.01)
NRBC BLD-RTO: 0 PER 100 WBC
PLATELET # BLD AUTO: 264 K/UL (ref 150–400)
PMV BLD AUTO: 10.8 FL (ref 8.9–12.9)
POTASSIUM SERPL-SCNC: 4 MMOL/L (ref 3.5–5.1)
PROT SERPL-MCNC: 7.9 G/DL (ref 6.4–8.2)
RBC # BLD AUTO: 3.81 M/UL (ref 3.8–5.2)
SODIUM SERPL-SCNC: 139 MMOL/L (ref 136–145)
WBC # BLD AUTO: 7.1 K/UL (ref 3.6–11)

## 2018-07-30 PROCEDURE — 71045 X-RAY EXAM CHEST 1 VIEW: CPT

## 2018-07-30 PROCEDURE — 76060000032 HC ANESTHESIA 0.5 TO 1 HR: Performed by: OBSTETRICS & GYNECOLOGY

## 2018-07-30 PROCEDURE — 77030020782 HC GWN BAIR PAWS FLX 3M -B

## 2018-07-30 PROCEDURE — 74011000250 HC RX REV CODE- 250

## 2018-07-30 PROCEDURE — 76210000016 HC OR PH I REC 1 TO 1.5 HR: Performed by: OBSTETRICS & GYNECOLOGY

## 2018-07-30 PROCEDURE — 85025 COMPLETE CBC W/AUTO DIFF WBC: CPT | Performed by: OBSTETRICS & GYNECOLOGY

## 2018-07-30 PROCEDURE — 74011250636 HC RX REV CODE- 250/636: Performed by: ANESTHESIOLOGY

## 2018-07-30 PROCEDURE — 77030002933 HC SUT MCRYL J&J -A: Performed by: OBSTETRICS & GYNECOLOGY

## 2018-07-30 PROCEDURE — 74011250636 HC RX REV CODE- 250/636

## 2018-07-30 PROCEDURE — 86304 IMMUNOASSAY TUMOR CA 125: CPT | Performed by: OBSTETRICS & GYNECOLOGY

## 2018-07-30 PROCEDURE — 76010000138 HC OR TIME 0.5 TO 1 HR: Performed by: OBSTETRICS & GYNECOLOGY

## 2018-07-30 PROCEDURE — C1788 PORT, INDWELLING, IMP: HCPCS | Performed by: OBSTETRICS & GYNECOLOGY

## 2018-07-30 PROCEDURE — 77030011640 HC PAD GRND REM COVD -A: Performed by: OBSTETRICS & GYNECOLOGY

## 2018-07-30 PROCEDURE — 74011250636 HC RX REV CODE- 250/636: Performed by: OBSTETRICS & GYNECOLOGY

## 2018-07-30 PROCEDURE — 76210000020 HC REC RM PH II FIRST 0.5 HR: Performed by: OBSTETRICS & GYNECOLOGY

## 2018-07-30 PROCEDURE — 80053 COMPREHEN METABOLIC PANEL: CPT | Performed by: OBSTETRICS & GYNECOLOGY

## 2018-07-30 PROCEDURE — 77030020256 HC SOL INJ NACL 0.9%  500ML: Performed by: OBSTETRICS & GYNECOLOGY

## 2018-07-30 PROCEDURE — 76000 FLUOROSCOPY <1 HR PHYS/QHP: CPT

## 2018-07-30 PROCEDURE — 36415 COLL VENOUS BLD VENIPUNCTURE: CPT | Performed by: OBSTETRICS & GYNECOLOGY

## 2018-07-30 PROCEDURE — 74011000250 HC RX REV CODE- 250: Performed by: OBSTETRICS & GYNECOLOGY

## 2018-07-30 PROCEDURE — 77030031139 HC SUT VCRL2 J&J -A: Performed by: OBSTETRICS & GYNECOLOGY

## 2018-07-30 DEVICE — SYSTEM INFUS PRT CATH 8FR L66CM INTRO 8FR CHST TI SGL LUMN: Type: IMPLANTABLE DEVICE | Site: CHEST | Status: FUNCTIONAL

## 2018-07-30 RX ORDER — DEXMEDETOMIDINE HYDROCHLORIDE 4 UG/ML
INJECTION, SOLUTION INTRAVENOUS
Status: DISCONTINUED | OUTPATIENT
Start: 2018-07-30 | End: 2018-07-31 | Stop reason: HOSPADM

## 2018-07-30 RX ORDER — SODIUM CHLORIDE, SODIUM LACTATE, POTASSIUM CHLORIDE, CALCIUM CHLORIDE 600; 310; 30; 20 MG/100ML; MG/100ML; MG/100ML; MG/100ML
25 INJECTION, SOLUTION INTRAVENOUS CONTINUOUS
Status: DISCONTINUED | OUTPATIENT
Start: 2018-07-30 | End: 2018-07-30 | Stop reason: HOSPADM

## 2018-07-30 RX ORDER — MIDAZOLAM HYDROCHLORIDE 1 MG/ML
INJECTION, SOLUTION INTRAMUSCULAR; INTRAVENOUS AS NEEDED
Status: DISCONTINUED | OUTPATIENT
Start: 2018-07-30 | End: 2018-07-31 | Stop reason: HOSPADM

## 2018-07-30 RX ORDER — PROPOFOL 10 MG/ML
INJECTION, EMULSION INTRAVENOUS
Status: DISCONTINUED | OUTPATIENT
Start: 2018-07-30 | End: 2018-07-31 | Stop reason: HOSPADM

## 2018-07-30 RX ORDER — LIDOCAINE HYDROCHLORIDE 10 MG/ML
INJECTION INFILTRATION; PERINEURAL AS NEEDED
Status: DISCONTINUED | OUTPATIENT
Start: 2018-07-30 | End: 2018-07-30 | Stop reason: HOSPADM

## 2018-07-30 RX ORDER — PROPOFOL 10 MG/ML
INJECTION, EMULSION INTRAVENOUS AS NEEDED
Status: DISCONTINUED | OUTPATIENT
Start: 2018-07-30 | End: 2018-07-31 | Stop reason: HOSPADM

## 2018-07-30 RX ORDER — DEXMEDETOMIDINE HYDROCHLORIDE 4 UG/ML
INJECTION, SOLUTION INTRAVENOUS AS NEEDED
Status: DISCONTINUED | OUTPATIENT
Start: 2018-07-30 | End: 2018-07-31 | Stop reason: HOSPADM

## 2018-07-30 RX ORDER — HEPARIN SODIUM,PORCINE/PF 10 UNIT/ML
SYRINGE (ML) INTRAVENOUS AS NEEDED
Status: DISCONTINUED | OUTPATIENT
Start: 2018-07-30 | End: 2018-07-30 | Stop reason: HOSPADM

## 2018-07-30 RX ORDER — SODIUM CHLORIDE, SODIUM LACTATE, POTASSIUM CHLORIDE, CALCIUM CHLORIDE 600; 310; 30; 20 MG/100ML; MG/100ML; MG/100ML; MG/100ML
INJECTION, SOLUTION INTRAVENOUS
Status: DISCONTINUED | OUTPATIENT
Start: 2018-07-30 | End: 2018-07-31 | Stop reason: HOSPADM

## 2018-07-30 RX ORDER — OXYCODONE AND ACETAMINOPHEN 5; 325 MG/1; MG/1
1 TABLET ORAL
Qty: 20 TAB | Refills: 0 | Status: SHIPPED | OUTPATIENT
Start: 2018-07-30 | End: 2019-01-01

## 2018-07-30 RX ORDER — PROMETHAZINE HYDROCHLORIDE 25 MG/1
25 TABLET ORAL
Qty: 30 TAB | Refills: 3 | Status: SHIPPED | OUTPATIENT
Start: 2018-07-30 | End: 2019-01-01 | Stop reason: SDUPTHER

## 2018-07-30 RX ORDER — ONDANSETRON 2 MG/ML
INJECTION INTRAMUSCULAR; INTRAVENOUS AS NEEDED
Status: DISCONTINUED | OUTPATIENT
Start: 2018-07-30 | End: 2018-07-31 | Stop reason: HOSPADM

## 2018-07-30 RX ADMIN — DEXMEDETOMIDINE HYDROCHLORIDE 4 MCG: 4 INJECTION, SOLUTION INTRAVENOUS at 14:42

## 2018-07-30 RX ADMIN — PROPOFOL 40 MG: 10 INJECTION, EMULSION INTRAVENOUS at 14:56

## 2018-07-30 RX ADMIN — PROPOFOL 40 MG: 10 INJECTION, EMULSION INTRAVENOUS at 14:59

## 2018-07-30 RX ADMIN — DEXMEDETOMIDINE HYDROCHLORIDE 4 MCG: 4 INJECTION, SOLUTION INTRAVENOUS at 14:47

## 2018-07-30 RX ADMIN — PROPOFOL 50 MCG/KG/MIN: 10 INJECTION, EMULSION INTRAVENOUS at 14:42

## 2018-07-30 RX ADMIN — PROPOFOL 40 MG: 10 INJECTION, EMULSION INTRAVENOUS at 15:02

## 2018-07-30 RX ADMIN — DEXMEDETOMIDINE HYDROCHLORIDE 4 MCG: 4 INJECTION, SOLUTION INTRAVENOUS at 14:46

## 2018-07-30 RX ADMIN — MIDAZOLAM HYDROCHLORIDE 2 MG: 1 INJECTION, SOLUTION INTRAMUSCULAR; INTRAVENOUS at 14:40

## 2018-07-30 RX ADMIN — SODIUM CHLORIDE, SODIUM LACTATE, POTASSIUM CHLORIDE, AND CALCIUM CHLORIDE 25 ML/HR: 600; 310; 30; 20 INJECTION, SOLUTION INTRAVENOUS at 13:31

## 2018-07-30 RX ADMIN — ONDANSETRON 4 MG: 2 INJECTION INTRAMUSCULAR; INTRAVENOUS at 15:11

## 2018-07-30 RX ADMIN — SODIUM CHLORIDE, SODIUM LACTATE, POTASSIUM CHLORIDE, CALCIUM CHLORIDE: 600; 310; 30; 20 INJECTION, SOLUTION INTRAVENOUS at 14:40

## 2018-07-30 RX ADMIN — PROPOFOL 40 MG: 10 INJECTION, EMULSION INTRAVENOUS at 14:52

## 2018-07-30 RX ADMIN — DEXMEDETOMIDINE HYDROCHLORIDE 0.5 MCG/KG/HR: 4 INJECTION, SOLUTION INTRAVENOUS at 14:50

## 2018-07-30 RX ADMIN — DEXMEDETOMIDINE HYDROCHLORIDE 4 MCG: 4 INJECTION, SOLUTION INTRAVENOUS at 14:43

## 2018-07-30 RX ADMIN — PROPOFOL 40 MG: 10 INJECTION, EMULSION INTRAVENOUS at 15:05

## 2018-07-30 RX ADMIN — DEXMEDETOMIDINE HYDROCHLORIDE 4 MCG: 4 INJECTION, SOLUTION INTRAVENOUS at 14:44

## 2018-07-30 NOTE — OP NOTES
Gynecologic Oncology Operative Report    Louise Calderon  7/30/2018    Pre-operative diagnosis:   1) Endometrial CA      2) Requires venous access for chemotherapy     Post-operative diagnosis: 1) Endometrial CA      2) Requires venous access for chemotherapy    Procedure:  port placement    Surgeon:  Prabhu Rosario MD    Assistant:  N/A    Anesthesia:  MAC, plus local    EBL:  Minimal    Complications:  None    Implants:    Implant Name Type Inv. Item Serial No.  Lot No. LRB No. Used Action   PORT VASC INFUS SET 8FR TI -- SMART PORT CT - [id number]   PORT VASC INFUS SET 8FR TI -- SMART PORT CT [id number] [id number] Left 1 Implanted     Operative indications:  61 yo WF with recurrent endometrial cancer. Operative findings:  Normal anatomy. Procedure in detail:  After the risks, benefits, indications, and alternatives of the procedure were discussed with the patient and informed consent was obtained, the patient was taken to the operating room. She was then correctly identified, administered IV sedation per anesthesia, and then prepped and draped in the supine position in the usual fashion. Her arms were also tucked at each side. The patient was then placed in slight Trendelenburg tilt and the anatomy of the anterior thoracic wall was noted. 1% lidocaine without epinephrine was then injected just underneath the left mid clavicle to provide local anesthesia. The left subclavian vein was then located and entered with an 18-gauge needle. A guidewire was then inserted through the needle and proper placement was confirmed by fluoroscopy. A small incision was made with an #11-blade scalpel at the insertion site and the catheter introducer and dilator were inserted over the guidewire. The guidewire and dilator were then removed. The 8-Mohawk catheter was then inserted through the introducer to a depth of 20 cm at skin level.   Proper placement was confirmed by fluoroscopy with the catheter tip positioned in the superior vena cava just above the right atrium. The peel-away catheter introducer was then removed. Good flow and return were noted through the catheter. Attention was then directed to creation of the port pocket. The location of the pocket was selected and 1% lidocaine was injected. The site was approximately 4 cm caudad to the catheter insertion site. An  4 cm skin incision was made transversely with a #15-blade scalpel. The incision was then carried down to the pectoralis fascia with electro-cautery. A port pocket was then bluntly created, large enough to accommodate the standard-size [employer] Smartport. The port was then secured to the pectoralis fascia with 2-0 Proline suture at three locations. The catheter was then tunneled from the insertion site to the port site using the tunneling device. The catheter was cut to fit and then attached to the port. The catheter was secured to the port using the locking device. The port was then accessed with a Gómez needle and good glow and returned were noted. The port was then flushed with concentrated Heparin solution. The port pocket was then irrigated and made hemostatic with electro-cautery. The incision was then closed in two layers. The subcutaneous fat was reapproximated with a running 3-0 Vicryl. The skin was then reapproximated with a 4-0 Monocryl in a running subcuticular fashion. Steri-strips were then applied, followed by a sterile pressure dressing. The patient was then awakened from anesthesia and taken to the recovery room in stable condition. All instrument, sponge, and needle counts were correct. A chest X-ray was ordered for the recovery room, but the results are pending at the time of this dictation.       Sonia Pringle MD  7/30/2018  3:26 PM

## 2018-07-30 NOTE — ANESTHESIA PREPROCEDURE EVALUATION
Anesthetic History No history of anesthetic complications Review of Systems / Medical History Patient summary reviewed, nursing notes reviewed and pertinent labs reviewed Pulmonary Within defined limits Neuro/Psych Within defined limits Cardiovascular Within defined limits GI/Hepatic/Renal 
Within defined limits Endo/Other Obesity and arthritis Pertinent negatives: No morbid obesity Other Findings Physical Exam 
 
Airway Mallampati: II 
TM Distance: > 6 cm Neck ROM: normal range of motion Mouth opening: Normal 
 
 Cardiovascular Regular rate and rhythm,  S1 and S2 normal,  no murmur, click, rub, or gallop Dental 
No notable dental hx Pulmonary Breath sounds clear to auscultation Abdominal 
GI exam deferred Other Findings Anesthetic Plan ASA: 2 Anesthesia type: general 
 
 
 
 
Induction: Intravenous Anesthetic plan and risks discussed with: Patient

## 2018-07-30 NOTE — DISCHARGE INSTRUCTIONS
Venous Access Device: What to Expect at [address] [place] [address] have had a procedure to give you an access device. You will now be able to get medicine, blood, nutrients, or other fluids with more comfort. You won't have to be stuck by a needle every time. For some people, blood for lab tests can be drawn from the access device. You will have an incision. The incision will leave a scar that fades with time. The site may be sore for a day or two. You may need to take 1 or 2 days off from work. You may have this access device for weeks or months. This care sheet gives you a general idea about how long it will take for you to recover. But each person recovers at a different pace. Follow the steps below to feel better as quickly as possible. How can you care for yourself at home? Activity    · Talk to your doctor about what activities you can do. You may not be able to do sports or exercises that use the upper body, such as tennis or weight lifting.     · You may not be able to go swimming. Check with your doctor. Incision care    · You may have a dressing over the cut (incision) the doctor made. A dressing helps the cut heal and protects it. Your doctor or nurse will tell you how to take care of this.     · If you have strips of tape on the incision, leave the tape on for a week or until it falls off.     · Clean the area around the line with soap and water at least once a day.     · Flush the device if your doctor tells you to do so.    Medicine    · Your doctor will tell you if and when you can restart your medicines. He or she will also give you instructions about taking any new medicines.     · If you take blood thinners, such as warfarin (Coumadin), clopidogrel (Plavix), or aspirin, be sure to talk to your doctor. He or she will tell you if and when to start taking those medicines again. Make sure that you understand exactly what your doctor wants you to do.    Other instructions    · Avoid clothing that rubs or pulls on any part of the device.     · To help prevent infection, take a shower instead of a bath.     · When you shower, cover the area with waterproof material, such as plastic wrap.    Devices with catheters    · If the catheter (tubing) breaks, follow the instructions your doctor gave you. If you have no instructions, clamp or tie off the catheter. Then, see a doctor as soon as possible.     · Never touch the open end of the catheter if the cap is off.     · Never use scissors, knives, pins, or other sharp objects near the catheter or other tubing.     · If your catheter has a clamp, keep it clamped when you are not using it.     · Fasten or tape the catheter to your body to prevent pulling or dangling.     · Avoid bending or crimping your catheter.     · Always wash your hands before you touch your catheter. Follow-up care is a key part of your treatment and safety. Be sure to make and go to all appointments, and call your doctor if you are having problems. It's also a good idea to know your test results and keep a list of the medicines you take. When should you call for help? Call your doctor now or seek immediate medical care if:    · You have signs of infection, such as:  ¨ Increased pain, swelling, warmth, or redness around the line. ¨ Red streaks leading from the area around the line. ¨ Pus draining from the area around the line. ¨ A fever or chills.     · You have liquid leaking from around the line.     · There are cracks or leaks in the tube.     · You have pain or swelling in your neck or arm.     · The line becomes clogged.    Watch closely for any changes in your health, and be sure to contact your doctor if you have any problems. Where can you learn more? Go to http://francis-lima.info/. Enter H413 in the search box to learn more about \"Venous Access Device: What to Expect at Home. \"  Current as of: November 20, 2017  Content Version: 11.7  © 2470-5207 [employer]. Care instructions adapted under license by [employer] (which disclaims liability or warranty for this information). If you have questions about a medical condition or this instruction, always ask your healthcare professional. [address] any warranty or liability for your use of this information.

## 2018-07-30 NOTE — PERIOP NOTES
Called by Beatrice Luciano RN in holding to draw a red top tube for  that was not obtained this morning. Instructed to put patient label on it for lab. Chago Hobbs blood and labelled it at bedside. Also sent computer printout of lab order to lab.

## 2018-07-30 NOTE — IP AVS SNAPSHOT
[address] 
[phone] Patient: Linda Kaufman MRN: [id number] KCE:[id number] About your hospitalization You were admitted on:  July 30, 2018 You last received care in the:  [facility] PACU You were discharged on:  July 30, 2018 Why you were hospitalized Your primary diagnosis was:  Not on File Follow-up Information Follow up With Details Comments Contact Info Lexa Maldonado MD   [address] [facility] [address] 1007 [facility] 
[phone] Migdalia Dukes MD Schedule an appointment as soon as possible for a visit in 1 month(s)  [address] [place] [address] [address]7 [address] 
[phone] Your Scheduled Appointments Tuesday July 31, 2018  5:00 PM EDT  
(Arrive by 4:30 PM) ECHO with ECHO LAB 3 [facility] NON-INVASIVE CARD ([address]) [address]  
[phone] Please be prepared to remove everything from the waist up and put on a gown. Please report to [facility] lobby to [employer] at [address] [facility], [phone]. Please arrive 30 mintues prior to appointment unless appointment prep instructions indicate otherwise. Please fax any paper scripts or orders to [phone]. Tuesday August 14, 2018 10:00 AM EDT Infusion with BREMO INFUSION NURSE 6  
[facility] BREMO ([address]) [address] [facility] [address]  
[phone] Go to [address], ground floor. Tuesday August 28, 2018 10:00 AM EDT Infusion with BREMO INFUSION NURSE 6  
[facility] BRE[facility] ([address]) [address] [facility] [address]  
[phone] Go to [address], ground floor.   
  
    
 Thursday September 06, 2018  2:45 PM EDT  
 CHEMOTHERAPY with Rambo Solis MD  
1210 \[facility] [place] [facility]"" [address] [facility] [place]) 200 [facility] [address] [place] [address] [id number] [phone] Tuesday September 11, 2018 10:00 AM EDT Infusion with STEPHEN INFUSION NURSE 6  
[facility] STEPHEN ([address]) [address] [facility] [address] [address] [phone] Go to [address] [facility] [address], ground floor. Discharge Orders None A check felecia indicates which time of day the medication should be taken. My Medications START taking these medications Instructions Each Dose to Equal  
 Morning Noon Evening Bedtime  
 promethazine 25 mg tablet Commonly known as:  PHENERGAN Your last dose was: Your next dose is: Take 1 Tab by mouth every six (6) hours as needed for Nausea. 25 mg CONTINUE taking these medications Instructions Each Dose to Equal  
 Morning Noon Evening Bedtime  
 ibuprofen 200 mg tablet Commonly known as:  ADVIL Your last dose was: Your next dose is: Take 4 tablets by mouth every eight (8) hours as needed. 800 mg  
    
   
   
   
  
 oxyCODONE-acetaminophen 5-325 mg per tablet Commonly known as:  PERCOCET Your last dose was: Your next dose is: Take 1 Tab by mouth every four (4) hours as needed for Pain. Max Daily Amount: 6 Tabs. 1 Tab Where to Get Your Medications Information on where to get these meds will be given to you by the nurse or doctor. ! Ask your nurse or doctor about these medications  
  oxyCODONE-acetaminophen 5-325 mg per tablet  
 promethazine 25 mg tablet Opioid Education  Prescription Opioids: What You Need to Know: 
 
Prescription opioids can be used to help relieve moderate-to-severe pain and are often prescribed following a surgery or injury, or for certain health conditions. These medications can be an important part of treatment but also come with serious risks. Opioids are strong pain medicines. Examples include hydrocodone, oxycodone, fentanyl, and morphine. Heroin is an example of an illegal opioid. It is important to work with your health care provider to make sure you are getting the safest, most effective care. WHAT ARE THE RISKS AND SIDE EFFECTS OF OPIOID USE? Prescription opioids carry serious risks of addiction and overdose, especially with prolonged use. An opioid overdose, often marked by slow breathing, can cause sudden death. The use of prescription opioids can have a number of side effects as well, even when taken as directed. · Tolerance-meaning you might need to take more of a medication for the same pain relief · Physical dependence-meaning you have symptoms of withdrawal when the medication is stopped. Withdrawal symptoms can include nausea, sweating, chills, diarrhea, stomach cramps, and muscle aches. Withdrawal can last up to several weeks, depending on which drug you took and how long you took it. · Increased sensitivity to pain · Constipation · Nausea, vomiting, and dry mouth · Sleepiness and dizziness · Confusion · Depression · Low levels of testosterone that can result in lower sex drive, energy, and strength · Itching and sweating RISKS ARE GREATER WITH:      
· History of drug misuse, substance use disorder, or overdose · Mental health conditions (such as depression or anxiety) · Sleep apnea · Older age (72 years or older) · Pregnancy Avoid alcohol while taking prescription opioids. Also, unless specifically advised by your health care provider, medications to avoid include: · Benzodiazepines (such as Xanax or Valium) · Muscle relaxants (such as Soma or Flexeril) · Hypnotics (such as Ambien or Lunesta) · Other prescription opioids KNOW YOUR OPTIONS Talk to your health care provider about ways to manage your pain that don't involve prescription opioids. Some of these options may actually work better and have fewer risks and side effects. Options may include: 
· Pain relievers such as acetaminophen, ibuprofen, and naproxen · Some medications that are also used for depression or seizures · Physical therapy and exercise · Counseling to help patients learn how to cope better with triggers of pain and stress. · Application of heat or cold compress · Massage therapy · Relaxation techniques Be Informed Make sure you know the name of your medication, how much and how often to take it, and its potential risks & side effects. IF YOU ARE PRESCRIBED OPIOIDS FOR PAIN: 
· Never take opioids in greater amounts or more often than prescribed. Remember the goal is not to be pain-free but to manage your pain at a tolerable level. · Follow up with your primary care provider to: · Work together to create a plan on how to manage your pain. · Talk about ways to help manage your pain that don't involve prescription opioids. · Talk about any and all concerns and side effects. · Help prevent misuse and abuse. · Never sell or share prescription opioids · Help prevent misuse and abuse. · Store prescription opioids in a secure place and out of reach of others (this may include visitors, children, friends, and family). · Safely dispose of unused/unwanted prescription opioids: Find your community drug take-back program or your pharmacy mail-back program, or flush them down the toilet, following guidance from the Food and Drug Administration (www.fda.gov/Drugs/ResourcesForYou). · Visit www.cdc.gov/drugoverdose to learn about the risks of opioid abuse and overdose. · If you believe you may be struggling with addiction, tell your health care provider and ask for guidance or call [employer] at 8-967-737-CLZS. Discharge Instructions Venous Access Device: What to Expect at Home Your Recovery You have had a procedure to give you an access device. You will now be able to get medicine, blood, nutrients, or other fluids with more comfort. You won't have to be stuck by a needle every time. For some people, blood for lab tests can be drawn from the access device. You will have an incision. The incision will leave a scar that fades with time. The site may be sore for a day or two. You may need to take 1 or 2 days off from work. You may have this access device for weeks or months. This care sheet gives you a general idea about how long it will take for you to recover. But each person recovers at a different pace. Follow the steps below to feel better as quickly as possible. How can you care for yourself at home? Activity 
  · Talk to your doctor about what activities you can do. You may not be able to do sports or exercises that use the upper body, such as tennis or weight lifting.  
  · You may not be able to go swimming. Check with your doctor. Incision care 
  · You may have a dressing over the cut (incision) the doctor made. A dressing helps the cut heal and protects it. Your doctor or nurse will tell you how to take care of this.  
  · If you have strips of tape on the incision, leave the tape on for a week or until it falls off.  
  · Clean the area around the line with soap and water at least once a day.  
  · Flush the device if your doctor tells you to do so.  
 Medicine 
  · Your doctor will tell you if and when you can restart your medicines. He or she will also give you instructions about taking any new medicines.  
  · If you take blood thinners, such as warfarin (Coumadin), clopidogrel (Plavix), or aspirin, be sure to talk to your doctor. He or she will tell you if and when to start taking those medicines again.  Make sure that you understand exactly what your doctor wants you to do. Other instructions 
  · Avoid clothing that rubs or pulls on any part of the device.  
  · To help prevent infection, take a shower instead of a bath.  
  · When you shower, cover the area with waterproof material, such as plastic wrap.  
 Devices with catheters 
  · If the catheter (tubing) breaks, follow the instructions your doctor gave you. If you have no instructions, clamp or tie off the catheter. Then, see a doctor as soon as possible.  
  · Never touch the open end of the catheter if the cap is off.  
  · Never use scissors, knives, pins, or other sharp objects near the catheter or other tubing.  
  · If your catheter has a clamp, keep it clamped when you are not using it.  
  · Fasten or tape the catheter to your body to prevent pulling or dangling.  
  · Avoid bending or crimping your catheter.  
  · Always wash your hands before you touch your catheter. Follow-up care is a key part of your treatment and safety. Be sure to make and go to all appointments, and call your doctor if you are having problems. It's also a good idea to know your test results and keep a list of the medicines you take. When should you call for help? Call your doctor now or seek immediate medical care if: 
  · You have signs of infection, such as: 
¨ Increased pain, swelling, warmth, or redness around the line. ¨ Red streaks leading from the area around the line. ¨ Pus draining from the area around the line. ¨ A fever or chills.  
  · You have liquid leaking from around the line.  
  · There are cracks or leaks in the tube.  
  · You have pain or swelling in your neck or arm.  
  · The line becomes clogged.  
 Watch closely for any changes in your health, and be sure to contact your doctor if you have any problems. Where can you learn more? Go to http://francis-lima.info/.  
Enter H413 in the search box to learn more about \"Venous Access Device: What to Expect at Home. \" Current as of: November 20, 2017 Content Version: 11.7 © 4417-5936 [employer], [facility]. Care instructions adapted under license by [employer] (which disclaims liability or warranty for this information). If you have questions about a medical condition or this instruction, always ask your healthcare professional. [address] any warranty or liability for your use of this information. Introducing [facility]! Dear Eamon Gann: 
Thank you for requesting a [employer] account. Our records indicate that you already have an active [employer] account. You can access your account anytime at [email or web address] Did you know that you can access your hospital and ER discharge instructions at any time in [employer]? You can also review all of your test results from your hospital stay or ER visit. Additional Information If you have questions, please visit the Frequently Asked Questions section of the [employer] website at [email or web address] Remember, [employer] is NOT to be used for urgent needs. For medical emergencies, dial 911. Now available from your iPhone and Android! Introducing Raj Bonilla As a [facility] patient, I wanted to make you aware of our electronic visit tool called Raj Bonilla. [facility] 24/7 allows you to connect within minutes with a medical provider 24 hours a day, seven days a week via a mobile device or tablet or logging into a secure website from your computer. You can access Raj Bonilla from anywhere in the [place].  
 
A virtual visit might be right for you when you have a simple condition and feel like you just dont want to get out of bed, or cant get away from work for an appointment, when your regular [facility] provider is not available (evenings, weekends or holidays), or when youre out of town and need minor care. Electronic visits cost only $49 and if the [facility] 24/7 provider determines a prescription is needed to treat your condition, one can be electronically transmitted to a nearby pharmacy*. Please take a moment to enroll today if you have not already done so. The enrollment process is free and takes just a few minutes. To enroll, please download the [facility] 24/[employer] lenora to your tablet or phone, or visit [email or web address] to enroll on your computer. And, as an [address] [place] [address] patient with a [employer] account, the results of your visits will be scanned into your electronic medical record and your primary care provider will be able to view the scanned results. We urge you to continue to see your regular [facility] provider for your ongoing medical care. And while your primary care provider may not be the one available when you seek a [facility] virtual visit, the peace of mind you get from getting a real diagnosis real time can be priceless. For more information on [facility], view our Frequently Asked Questions (FAQs) at [email or web address]. Sincerely, 
 
Julieta Patel MD 
Chief Medical Officer [employer] Lyndsay Garcia *:  certain medications cannot be prescribed via [facility] Unresulted Labs-Please follow up with your PCP about these lab tests Order Current Status CANCER ANTIGEN 125 In process METABOLIC PANEL, COMPREHENSIVE In process XR FLUOROSCOPY UNDER 60 MINUTES In process Providers Seen During Your Hospitalization Provider Specialty Primary office phone Judy Leon MD Gynecologic Oncology [phone] Your Primary Care Physician (PCP) Primary Care Physician Office Phone Office Fax Jean Jones [phone] You are allergic to the following Allergen Reactions Hydrocodone Nausea Only Oxycodone Nausea and Vomiting Recent Documentation Height Weight BMI OB Status Smoking Status 1.702 m 99.8 kg 34.46 kg/m2 Hysterectomy Former Smoker Emergency Contacts Name Discharge Info Relation Home Work Mobile Amber Banda CAREGIVER [3] Spouse [3] [phone]  [phone] Patient Belongings The following personal items are in your possession at time of discharge: 
  Dental Appliances: None                Clothing:  (clothes and glasses to pacu) Discharge Instructions Attachments/References MEFS - PROMETHAZINE (PHENERGAN, PROMACOT) - (BY MOUTH) (ENGLISH) MEFS - OXYCODONE/ACETAMINOPHEN (PERCOCET, ROXICET) - (BY MOUTH) (ENGLISH) Patient Handouts Promethazine (Phenergan, Promacot) - (By mouth) Why this medicine is used:  
Treats allergies and motion sickness. Also helps control nausea and vomiting. Contact a nurse or doctor right away if you have: 
· Fast, pounding, or uneven heartbeat; lightheadedness or fainting · Slow heartbeat, trouble breathing or speaking · Extreme tiredness or weakness · Fever, sweating, confusion, uneven heartbeat, muscle stiffness · Twitching, muscle movements you cannot control Common side effects: 
· Drowsiness · Nausea, vomiting, constipation, dry mouth © 2017 [employer] Information is for End User's use only and may not be sold, redistributed or otherwise used for commercial purposes. Oxycodone/Acetaminophen (Percocet, Roxicet) - (By mouth) Why this medicine is used:  
Treats pain. This medicine contains a narcotic pain reliever. Contact a nurse or doctor right away if you have: 
· Extreme weakness, shallow breathing, slow heartbeat · Sweating or cold, clammy skin · Skin blisters, rash, or peeling Common side effects: 
· Constipation · Nausea, vomiting · Tiredness © 2017 [employer] Information is for End User's use only and may not be sold, redistributed or otherwise used for commercial purposes. Please provide this summary of care documentation to your next provider. Signatures-by signing, you are acknowledging that this After Visit Summary has been reviewed with you and you have received a copy. Patient Signature:  ____________________________________________________________ Date:  ____________________________________________________________  
  
Orbie Lackey Provider Signature:  ____________________________________________________________ Date:  ____________________________________________________________

## 2018-07-30 NOTE — BRIEF OP NOTE
BRIEF OPERATIVE NOTE    Date of Procedure: 7/30/2018   Preoperative Diagnosis: ENDOMETRIAL CANCER  Postoperative Diagnosis: ENDOMETRIAL CANCER    Procedure(s):  PORT A CATH INSERTION  Surgeon(s) and Role:     * Kerry Tran MD - Primary         Surgical Assistant: n/a    Surgical Staff:  Circ-1: Nemo Trung: Alex Logan  Radiology Technician: Taniya Herrera RT  Scrub RN-1: Flaco Go RN  Float Staff: Tamanna Vines  Event Time In   Incision Start 1457   Incision Close      Anesthesia: MAC   Estimated Blood Loss: <5 cc  Specimens: * No specimens in log *   Findings: Normal anatomy   Complications: None  Implants:   Implant Name Type Inv.  Item Serial No.  Lot No. LRB No. Used Action   PORT VASC INFUS SET 8FR TI -- SMART PORT CT - [id number]   PORT VASC INFUS SET 8FR TI -- SMART PORT CT [id number] [employer] [id number] 1 Implanted       Kerry Tran MD

## 2018-07-30 NOTE — H&P
[address] [place] [address] [address], [address]  P [phone]  F [phone]        Patient ID:  Name:  Debo Holden  MRN:  [id number]  :  1960/58 y.o. Date:  2018      HISTORY OF PRESENT ILLNESS:  Debo Holden is a 60 y.o.  female with a diagnosis of stage IV UPSC. She underwent a TLH, BSO, with lymphadenectomy in 2014. She was found to have evidence of intraperitoneal disease with implants in the cul de sac, but no evidence of tyler metastases. She was treated with adjuvant chemotherapy with Taxol and Carboplatin. She then received pelvic radiation therapy.     She presented recently for follow-up. Prior to that she hadn't followed up since 2016. She is doing well and is without complaints. She denies any vaginal bleeding or discharge, any pelvic or abdominal pain, or any changes in her bowel or bladder habits. Her last  pap smear was normal.  Her most recent CA-125 was up to 138. I ordered a CT of the chest/abdomen/pelvis to evaluate for recurrence. It demonstrated peritoneal carcinomatosis. I then sent her for a PET/CT to better evaluate. ROS:   and GI review:  Negative  Cardiopulmonary review:  Negative   Musculoskeletal:  Negative    A comprehensive review of systems was negative except for that written in the History of Present Illness.  , 10 point ROS      Problem List:  Patient Active Problem List    Diagnosis Date Noted    Severe obesity (BMI 35.0-39.9) ([address] 2018    Endometrial cancer ([address] 2014     PMH:  Past Medical History:   Diagnosis Date    Arthritis     Cancer ([address]     uterine    Chronic pain     LOWER BACK    Depression     Nausea & vomiting     PMB (postmenopausal bleeding) 2014      PSH:  Past Surgical History:   Procedure Laterality Date    HX COLONOSCOPY      HX GYN  14    TLH/BSO/lymph node dissection    HX ORTHOPAEDIC Right 1982     LITTLE toe sx    HX VASCULAR ACCESS Left 2014    PORTACATH - LEFT CHEST      Social History:  Social History   Substance Use Topics    Smoking status: Former Smoker     Packs/day: 0.50     Years: 7.00     Types: Cigarettes     Quit date: 8/19/1999    Smokeless tobacco: Never Used    Alcohol use 0.6 oz/week     1 Glasses of wine per week      Comment: rarely      Family History:  Family History   Problem Relation Age of Onset    Cancer Mother 61     liver metastatic disease, unknown primary    Stroke Mother      BRAIN ANEURYSM    Cancer Maternal Uncle 61     bone    Cancer Maternal Grandmother 61     unknown primary    Anesth Problems Neg Hx       Medications: (reviewed)  No current facility-administered medications for this encounter. Current Outpatient Prescriptions   Medication Sig    oxyCODONE-acetaminophen (PERCOCET) 5-325 mg per tablet Take 1 Tab by mouth every four (4) hours as needed for Pain. Max Daily Amount: 6 Tabs.  ibuprofen (ADVIL) 200 mg tablet Take 4 tablets by mouth every eight (8) hours as needed. Allergies: (reviewed)  Allergies   Allergen Reactions    Hydrocodone Nausea Only    Oxycodone Nausea and Vomiting          OBJECTIVE:    Physical Exam:  VITAL SIGNS: There were no vitals filed for this visit. There is no height or weight on file to calculate BMI. GENERAL FLOWER: Conversant, alert, oriented. No acute distress. HEENT: HEENT. No thyroid enlargement. No JVD. Neck: Supple without restrictions. RESPIRATORY: Clear to auscultation and percussion to the bases. No CVAT. CARDIOVASC: RRR without murmur/rub. GASTROINT: soft, non-tender, without masses or organomegaly   MUSCULOSKEL: no joint tenderness, deformity or swelling   EXTREMITIES: extremities normal, atraumatic, no cyanosis or edema   PELVIC: Deferred   RECTAL: Deferred   DOMINGUEZ SURVEY: No suspicious lymphadenopathy or edema noted. NEURO: Grossly intact. No acute deficit.          Lab Results   Component Value Date/Time    WBC 5.2 06/01/2017 11:30 AM    HGB 13.1 06/01/2017 11:30 AM    HCT 38.6 06/01/2017 11:30 AM    PLATELET 833 56/55/2311 11:30 AM    MCV 92.8 06/01/2017 11:30 AM     Lab Results   Component Value Date/Time    Sodium 138 06/01/2017 11:30 AM    Potassium 4.0 06/01/2017 11:30 AM    Chloride 104 06/01/2017 11:30 AM    CO2 25 06/01/2017 11:30 AM    Anion gap 9 06/01/2017 11:30 AM    Glucose 94 06/01/2017 11:30 AM    BUN 9 06/01/2017 11:30 AM    Creatinine 0.79 06/01/2017 11:30 AM    BUN/Creatinine ratio 11 (L) 06/01/2017 11:30 AM    GFR est AA >60 06/01/2017 11:30 AM    GFR est non-AA >60 06/01/2017 11:30 AM    Calcium 9.6 06/01/2017 11:30 AM       CT of the chest/abdomen/pelvis (7/6/18)  THYROID: No nodule. MEDIASTINUM: Mediastinal lymph nodes are stable.      There is a left diaphragmatic lymph node measuring 1.7 cm which has increased in  size.      DELORES: No mass or lymphadenopathy. THORACIC AORTA: No dissection or aneurysm. MAIN PULMONARY ARTERY: Normal in caliber. TRACHEA/BRONCHI: Patent. ESOPHAGUS: No wall thickening or dilatation. HEART: Normal in size. PLEURA: No effusion or pneumothorax. LUNGS: No nodule, mass, or airspace disease. LIVER: 1.5 cm cyst in the liver is stable. GALLBLADDER: Unremarkable. SPLEEN: There is a mass in the splenic hilus infiltrating the spleen measuring  5.6 x 2 cm and there is an additional mass in the lower pole of the spleen  measuring 2.7 x 1.9 cm and these are new.      There are peritoneal implants in the left upper quadrant with the largest  measuring 3.6 x 1.6 cm.       There is a 0.9 cm implant along the splenic flexure.       There is a 1.9 cm implant gastrocolic ligament.      There is a 1.9 cm nodule posterior to the body of the pancreas which is  unchanged.      PANCREAS: No mass or ductal dilatation. ADRENALS: 3.7 x 2 cm low-density nodule in the right adrenal gland is stable. 1.1 cm nodule left adrenal gland is stable. KIDNEYS: No mass, calculus, or hydronephrosis. There is a small cyst in the left  kidney  STOMACH: Unremarkable. SMALL BOWEL: No dilatation or wall thickening. COLON: No dilatation or wall thickening. APPENDIX: Unremarkable. PERITONEUM: No ascites or pneumoperitoneum. RETROPERITONEUM: No aortic aneurysm is identified. REPRODUCTIVE ORGANS: Patient status post hysterectomy and bilateral  salpingo-oophorectomy.      There is 1.3 cm left pelvic sidewall nodule. URINARY BLADDER: No mass or calculus. BONES: No destructive bone lesion. ADDITIONAL COMMENTS: N/A      IMPRESSION:  1. CT of the chest demonstrates a 1.7 cm left diaphragmatic lymph node which has  increased in size. 2. CT of the abdomen and pelvis demonstrates a mass in the splenic hilus which  is infiltrating the spleen. There is also peritoneal implants in the left upper  quadrant. There is a peritoneal implant splenic flexure and gastrocolic ligament  and these are new findings. There is a 1.4 cm left pelvic sidewall nodule which is new. These findings are suspicious for current/metastatic disease. 3. Bilateral adrenal nodules are stable. Nodule posterior to this pancreatic  body is stable.         PET/CT (7/17/18)  HEAD/NECK: No apparent foci of abnormal hypermetabolism. Cerebral evaluation is  limited by normal intense activity.      CHEST: No foci of abnormal hypermetabolism.      ABDOMEN/PELVIS: There is significant increased tracer activity in the splenic  hilum and in the perisplenic region. Hypermetabolic left cardiophrenic soft  tissue nodule is noted. There are small bilateral hypermetabolic internal iliac  lymph nodes and a hypermetabolic left obturator lymph node.      SKELETON: No foci of abnormal hypermetabolism in the axial and visualized  appendicular skeleton.      IMPRESSION: Hypermetabolic peritoneal carcinomatosis predominantly in the left  upper quadrant.  Small but hypermetabolic bilateral internal iliac and left  obturator lymph nodes.           IMPRESSION AND PLAN:  Franc Cruz has a history of uterine papillary serous carcinoma. She is s/p surgical resection and was recommended adjuvant chemotherapy with Taxol and Carboplatin and she completed 6 cycles. She then received pelvic radiation therapy. Her CA-125 is now elevated. Her CT suggested metastatic disease. I sent her for a PET/CT to get a better feeling for her disease volume. She is not a candidate for surgery. I discussed chemotherapy, specifically Doxil, plus the addition of Avastin. She will also need an IV port. We will schedule that ASAP. She was counseled on the toxicities of treatment and she wishes to proceed.        Signed By: George Shukla MD     7/30/2018/7:34 AM

## 2018-07-30 NOTE — ROUTINE PROCESS
Patient: Alfred Shane MRN: [id number]  SSN: [id number]   YOB: 1960  Age: 60 y.o. Sex: female     Patient is status post Procedure(s):  PORT A CATH INSERTION.     Surgeon(s) and Role:     * Darnell Oglesby MD - Primary    Local/Dose/Irrigation: LIDOCAINE 1% PLAIN MIXED WITH 2ML SODIUM BICARBONATE--28 ML                Venous Access Device SmartPort 07/30/18 Upper chest (subclavicular area), left (Active)      Peripheral IV 07/30/18 Left Hand (Active)                           Dressing/Packing:  Wound Chest Left-DRESSING TYPE: 2 x 2;Adhesive wound closure strips (Steri-Strips) (07/30/18 1500)  Splint/Cast:  ]    Other:SCDS IN PLACE

## 2018-07-31 ENCOUNTER — HOSPITAL ENCOUNTER (OUTPATIENT)
Dept: NON INVASIVE DIAGNOSTICS | Age: 58
Discharge: HOME | End: 2018-07-31
Attending: OBSTETRICS & GYNECOLOGY
Payer: COMMERCIAL

## 2018-07-31 DIAGNOSIS — E66.01 SEVERE OBESITY (BMI 35.0-39.9): ICD-10-CM

## 2018-07-31 DIAGNOSIS — C54.1 ENDOMETRIAL CANCER (HCC): ICD-10-CM

## 2018-07-31 PROCEDURE — 93306 TTE W/DOPPLER COMPLETE: CPT

## 2018-08-01 NOTE — ANESTHESIA POSTPROCEDURE EVALUATION
Post-Anesthesia Evaluation and Assessment Patient: Stephan Deras MRN: [id number]  SSN: [id number] YOB: 1960  Age: 60 y.o. Sex: female Cardiovascular Function/Vital Signs Visit Vitals  /61  Pulse 70  Temp 36.1 °C (96.9 °F)  Resp 14  
 Ht 5' 7\" (1.702 m)  Wt 99.8 kg (220 lb)  SpO2 96%  BMI 34.46 kg/m2 Patient is status post No value filed. anesthesia for Procedure(s): PORT A CATH INSERTION. Nausea/Vomiting: None Postoperative hydration reviewed and adequate. Pain: 
Pain Scale 1: Numeric (0 - 10) (07/30/18 1632) Pain Intensity 1: 0 (07/30/18 1632) Managed Neurological Status:  
Neuro (WDL): Within Defined Limits (07/30/18 1632) At baseline Mental Status and Level of Consciousness: Arousable Pulmonary Status:  
O2 Device: Room air (07/30/18 1632) Adequate oxygenation and airway patent Complications related to anesthesia: None Post-anesthesia assessment completed. No concerns Signed By: Francisca Agarwal MD   
 August 1, 2018

## 2018-08-09 ENCOUNTER — DOCUMENTATION ONLY (OUTPATIENT)
Dept: GYNECOLOGY | Age: 58
End: 2018-08-09

## 2018-08-09 NOTE — PROGRESS NOTES
Pt in to office for chemotherapy education for Doxil and Avastin. Schedule given for lab/MD/chemo through [facility]. Chemotherapy and you, and eating tips before, during, and after chemo given. Information given on services provided in the [facility], and encourage her and her  to visit today. Lab req given to have labs drawn at lab [facility] at [facility] today. Consent signed and scanned into CC. Pt has had 2D Echo and results were given to her.

## 2018-08-10 LAB
ALBUMIN SERPL-MCNC: 4.6 G/DL (ref 3.5–5.5)
ALBUMIN/GLOB SERPL: 1.4 {RATIO} (ref 1.2–2.2)
ALP SERPL-CCNC: 111 IU/L (ref 39–117)
ALT SERPL-CCNC: 16 IU/L (ref 0–32)
AST SERPL-CCNC: 18 IU/L (ref 0–40)
BASOPHILS # BLD AUTO: 0 X10E3/UL (ref 0–0.2)
BASOPHILS NFR BLD AUTO: 0 %
BILIRUB SERPL-MCNC: <0.2 MG/DL (ref 0–1.2)
BUN SERPL-MCNC: 14 MG/DL (ref 6–24)
BUN/CREAT SERPL: 16 (ref 9–23)
CALCIUM SERPL-MCNC: 9.7 MG/DL (ref 8.7–10.2)
CANCER AG125 SERPL-ACNC: 371.2 U/ML (ref 0–38.1)
CHLORIDE SERPL-SCNC: 104 MMOL/L (ref 96–106)
CO2 SERPL-SCNC: 21 MMOL/L (ref 20–29)
CREAT SERPL-MCNC: 0.89 MG/DL (ref 0.57–1)
EOSINOPHIL # BLD AUTO: 0.2 X10E3/UL (ref 0–0.4)
EOSINOPHIL NFR BLD AUTO: 3 %
ERYTHROCYTE [DISTWIDTH] IN BLOOD BY AUTOMATED COUNT: 14.1 % (ref 12.3–15.4)
GLOBULIN SER CALC-MCNC: 3.2 G/DL (ref 1.5–4.5)
GLUCOSE SERPL-MCNC: 133 MG/DL (ref 65–99)
HCT VFR BLD AUTO: 38.6 % (ref 34–46.6)
HGB BLD-MCNC: 12.8 G/DL (ref 11.1–15.9)
IMM GRANULOCYTES # BLD: 0 X10E3/UL (ref 0–0.1)
IMM GRANULOCYTES NFR BLD: 0 %
LYMPHOCYTES # BLD AUTO: 1.6 X10E3/UL (ref 0.7–3.1)
LYMPHOCYTES NFR BLD AUTO: 22 %
MAGNESIUM SERPL-MCNC: 1.9 MG/DL (ref 1.6–2.3)
MCH RBC QN AUTO: 31.2 PG (ref 26.6–33)
MCHC RBC AUTO-ENTMCNC: 33.2 G/DL (ref 31.5–35.7)
MCV RBC AUTO: 94 FL (ref 79–97)
MONOCYTES # BLD AUTO: 0.9 X10E3/UL (ref 0.1–0.9)
MONOCYTES NFR BLD AUTO: 13 %
NEUTROPHILS # BLD AUTO: 4.5 X10E3/UL (ref 1.4–7)
NEUTROPHILS NFR BLD AUTO: 62 %
PLATELET # BLD AUTO: 308 X10E3/UL (ref 150–379)
POTASSIUM SERPL-SCNC: 4.8 MMOL/L (ref 3.5–5.2)
PROT SERPL-MCNC: 7.8 G/DL (ref 6–8.5)
RBC # BLD AUTO: 4.1 X10E6/UL (ref 3.77–5.28)
SODIUM SERPL-SCNC: 142 MMOL/L (ref 134–144)
WBC # BLD AUTO: 7.3 X10E3/UL (ref 3.4–10.8)

## 2018-08-10 RX ORDER — DIPHENHYDRAMINE HYDROCHLORIDE 50 MG/ML
50 INJECTION, SOLUTION INTRAMUSCULAR; INTRAVENOUS AS NEEDED
Status: CANCELLED
Start: 2018-08-28

## 2018-08-10 RX ORDER — SODIUM CHLORIDE 0.9 % (FLUSH) 0.9 %
10 SYRINGE (ML) INJECTION AS NEEDED
Status: CANCELLED
Start: 2018-08-28

## 2018-08-10 RX ORDER — ACETAMINOPHEN 325 MG/1
650 TABLET ORAL AS NEEDED
Status: CANCELLED
Start: 2018-08-14

## 2018-08-10 RX ORDER — ONDANSETRON 2 MG/ML
8 INJECTION INTRAMUSCULAR; INTRAVENOUS AS NEEDED
Status: CANCELLED | OUTPATIENT
Start: 2018-08-28

## 2018-08-10 RX ORDER — SODIUM CHLORIDE 0.9 % (FLUSH) 0.9 %
10 SYRINGE (ML) INJECTION AS NEEDED
Status: CANCELLED
Start: 2018-08-14

## 2018-08-10 RX ORDER — SODIUM CHLORIDE 9 MG/ML
500 INJECTION, SOLUTION INTRAVENOUS CONTINUOUS
Status: CANCELLED | OUTPATIENT
Start: 2018-08-14

## 2018-08-10 RX ORDER — HEPARIN 100 UNIT/ML
300-500 SYRINGE INTRAVENOUS AS NEEDED
Status: CANCELLED
Start: 2018-08-14

## 2018-08-10 RX ORDER — ALBUTEROL SULFATE 0.83 MG/ML
2.5 SOLUTION RESPIRATORY (INHALATION) AS NEEDED
Status: CANCELLED
Start: 2018-08-28

## 2018-08-10 RX ORDER — ALBUTEROL SULFATE 0.83 MG/ML
2.5 SOLUTION RESPIRATORY (INHALATION) AS NEEDED
Status: CANCELLED
Start: 2018-08-14

## 2018-08-10 RX ORDER — EPINEPHRINE 1 MG/ML
0.3 INJECTION, SOLUTION, CONCENTRATE INTRAVENOUS AS NEEDED
Status: CANCELLED | OUTPATIENT
Start: 2018-08-14

## 2018-08-10 RX ORDER — HEPARIN 100 UNIT/ML
300-500 SYRINGE INTRAVENOUS AS NEEDED
Status: CANCELLED
Start: 2018-08-28

## 2018-08-10 RX ORDER — DEXTROSE MONOHYDRATE 50 MG/ML
500 INJECTION, SOLUTION INTRAVENOUS CONTINUOUS
Status: CANCELLED | OUTPATIENT
Start: 2018-08-14

## 2018-08-10 RX ORDER — SODIUM CHLORIDE 9 MG/ML
10 INJECTION INTRAMUSCULAR; INTRAVENOUS; SUBCUTANEOUS AS NEEDED
Status: CANCELLED | OUTPATIENT
Start: 2018-08-28

## 2018-08-10 RX ORDER — DIPHENHYDRAMINE HYDROCHLORIDE 50 MG/ML
50 INJECTION, SOLUTION INTRAMUSCULAR; INTRAVENOUS AS NEEDED
Status: CANCELLED
Start: 2018-08-14

## 2018-08-10 RX ORDER — SODIUM CHLORIDE 9 MG/ML
500 INJECTION, SOLUTION INTRAVENOUS CONTINUOUS
Status: CANCELLED | OUTPATIENT
Start: 2018-08-28

## 2018-08-10 RX ORDER — SODIUM CHLORIDE 9 MG/ML
10 INJECTION INTRAMUSCULAR; INTRAVENOUS; SUBCUTANEOUS AS NEEDED
Status: CANCELLED | OUTPATIENT
Start: 2018-08-14

## 2018-08-10 RX ORDER — ONDANSETRON 2 MG/ML
8 INJECTION INTRAMUSCULAR; INTRAVENOUS AS NEEDED
Status: CANCELLED | OUTPATIENT
Start: 2018-08-14

## 2018-08-10 RX ORDER — ONDANSETRON 2 MG/ML
8 INJECTION INTRAMUSCULAR; INTRAVENOUS ONCE
Status: CANCELLED | OUTPATIENT
Start: 2018-08-14 | End: 2018-08-14

## 2018-08-10 RX ORDER — HYDROCORTISONE SODIUM SUCCINATE 100 MG/2ML
100 INJECTION, POWDER, FOR SOLUTION INTRAMUSCULAR; INTRAVENOUS AS NEEDED
Status: CANCELLED | OUTPATIENT
Start: 2018-08-28

## 2018-08-10 RX ORDER — HYDROCORTISONE SODIUM SUCCINATE 100 MG/2ML
100 INJECTION, POWDER, FOR SOLUTION INTRAMUSCULAR; INTRAVENOUS AS NEEDED
Status: CANCELLED | OUTPATIENT
Start: 2018-08-14

## 2018-08-10 RX ORDER — EPINEPHRINE 1 MG/ML
0.3 INJECTION, SOLUTION, CONCENTRATE INTRAVENOUS AS NEEDED
Status: CANCELLED | OUTPATIENT
Start: 2018-08-28

## 2018-08-10 RX ORDER — ACETAMINOPHEN 325 MG/1
650 TABLET ORAL AS NEEDED
Status: CANCELLED
Start: 2018-08-28

## 2018-08-14 ENCOUNTER — HOSPITAL ENCOUNTER (OUTPATIENT)
Dept: INFUSION THERAPY | Age: 58
Discharge: HOME | End: 2018-08-14
Payer: COMMERCIAL

## 2018-08-14 VITALS
TEMPERATURE: 97.9 F | RESPIRATION RATE: 16 BRPM | OXYGEN SATURATION: 97 % | SYSTOLIC BLOOD PRESSURE: 136 MMHG | BODY MASS INDEX: 34.37 KG/M2 | HEART RATE: 79 BPM | DIASTOLIC BLOOD PRESSURE: 67 MMHG | HEIGHT: 67 IN | WEIGHT: 219 LBS

## 2018-08-14 DIAGNOSIS — C54.1 ENDOMETRIAL CANCER (HCC): ICD-10-CM

## 2018-08-14 PROBLEM — R14.0 ABDOMINAL BLOATING: Status: ACTIVE | Noted: 2018-08-14

## 2018-08-14 PROBLEM — R68.81 EARLY SATIETY: Status: ACTIVE | Noted: 2018-08-14

## 2018-08-14 LAB
ALBUMIN SERPL-MCNC: 3.3 G/DL (ref 3.5–5)
ALBUMIN/GLOB SERPL: 0.8 {RATIO} (ref 1.1–2.2)
ALP SERPL-CCNC: 93 U/L (ref 45–117)
ALT SERPL-CCNC: 22 U/L (ref 12–78)
ANION GAP SERPL CALC-SCNC: 8 MMOL/L (ref 5–15)
APPEARANCE UR: CLEAR
AST SERPL-CCNC: 18 U/L (ref 15–37)
BACTERIA URNS QL MICRO: NEGATIVE /HPF
BASOPHILS # BLD: 0 K/UL (ref 0–0.1)
BASOPHILS NFR BLD: 0 % (ref 0–1)
BILIRUB SERPL-MCNC: 0.2 MG/DL (ref 0.2–1)
BILIRUB UR QL: NEGATIVE
BUN SERPL-MCNC: 9 MG/DL (ref 6–20)
BUN/CREAT SERPL: 11 (ref 12–20)
CALCIUM SERPL-MCNC: 8.7 MG/DL (ref 8.5–10.1)
CANCER AG125 SERPL-ACNC: 229 U/ML (ref 1.5–35)
CHLORIDE SERPL-SCNC: 107 MMOL/L (ref 97–108)
CO2 SERPL-SCNC: 25 MMOL/L (ref 21–32)
COLOR UR: NORMAL
CREAT SERPL-MCNC: 0.83 MG/DL (ref 0.55–1.02)
DIFFERENTIAL METHOD BLD: ABNORMAL
EOSINOPHIL # BLD: 0.1 K/UL (ref 0–0.4)
EOSINOPHIL NFR BLD: 2 % (ref 0–7)
EPITH CASTS URNS QL MICRO: NORMAL /LPF
ERYTHROCYTE [DISTWIDTH] IN BLOOD BY AUTOMATED COUNT: 12.6 % (ref 11.5–14.5)
GLOBULIN SER CALC-MCNC: 4.1 G/DL (ref 2–4)
GLUCOSE SERPL-MCNC: 119 MG/DL (ref 65–100)
GLUCOSE UR STRIP.AUTO-MCNC: NEGATIVE MG/DL
HCT VFR BLD AUTO: 33.9 % (ref 35–47)
HGB BLD-MCNC: 11.5 G/DL (ref 11.5–16)
HGB UR QL STRIP: NEGATIVE
IMM GRANULOCYTES # BLD: 0 K/UL (ref 0–0.04)
IMM GRANULOCYTES NFR BLD AUTO: 0 % (ref 0–0.5)
KETONES UR QL STRIP.AUTO: NEGATIVE MG/DL
LEUKOCYTE ESTERASE UR QL STRIP.AUTO: NEGATIVE
LYMPHOCYTES # BLD: 1.5 K/UL (ref 0.8–3.5)
LYMPHOCYTES NFR BLD: 21 % (ref 12–49)
MAGNESIUM SERPL-MCNC: 1.8 MG/DL (ref 1.6–2.4)
MCH RBC QN AUTO: 31.8 PG (ref 26–34)
MCHC RBC AUTO-ENTMCNC: 33.9 G/DL (ref 30–36.5)
MCV RBC AUTO: 93.6 FL (ref 80–99)
MONOCYTES # BLD: 0.7 K/UL (ref 0–1)
MONOCYTES NFR BLD: 10 % (ref 5–13)
NEUTS SEG # BLD: 4.8 K/UL (ref 1.8–8)
NEUTS SEG NFR BLD: 67 % (ref 32–75)
NITRITE UR QL STRIP.AUTO: NEGATIVE
NRBC # BLD: 0 K/UL (ref 0–0.01)
NRBC BLD-RTO: 0 PER 100 WBC
PH UR STRIP: 6.5 [PH] (ref 5–8)
PLATELET # BLD AUTO: 268 K/UL (ref 150–400)
PMV BLD AUTO: 10.5 FL (ref 8.9–12.9)
POTASSIUM SERPL-SCNC: 4 MMOL/L (ref 3.5–5.1)
PROT SERPL-MCNC: 7.4 G/DL (ref 6.4–8.2)
PROT UR STRIP-MCNC: NEGATIVE MG/DL
RBC # BLD AUTO: 3.62 M/UL (ref 3.8–5.2)
RBC #/AREA URNS HPF: NORMAL /HPF (ref 0–5)
SODIUM SERPL-SCNC: 140 MMOL/L (ref 136–145)
SP GR UR REFRACTOMETRY: 1.02 (ref 1–1.03)
UA: UC IF INDICATED,UAUC: NORMAL
UROBILINOGEN UR QL STRIP.AUTO: 0.2 EU/DL (ref 0.2–1)
WBC # BLD AUTO: 7.2 K/UL (ref 3.6–11)
WBC URNS QL MICRO: NORMAL /HPF (ref 0–4)

## 2018-08-14 PROCEDURE — 86304 IMMUNOASSAY TUMOR CA 125: CPT | Performed by: OBSTETRICS & GYNECOLOGY

## 2018-08-14 PROCEDURE — 85025 COMPLETE CBC W/AUTO DIFF WBC: CPT | Performed by: OBSTETRICS & GYNECOLOGY

## 2018-08-14 PROCEDURE — 96375 TX/PRO/DX INJ NEW DRUG ADDON: CPT

## 2018-08-14 PROCEDURE — 81001 URINALYSIS AUTO W/SCOPE: CPT | Performed by: OBSTETRICS & GYNECOLOGY

## 2018-08-14 PROCEDURE — 74011000258 HC RX REV CODE- 258: Performed by: OBSTETRICS & GYNECOLOGY

## 2018-08-14 PROCEDURE — 96413 CHEMO IV INFUSION 1 HR: CPT

## 2018-08-14 PROCEDURE — 80053 COMPREHEN METABOLIC PANEL: CPT | Performed by: OBSTETRICS & GYNECOLOGY

## 2018-08-14 PROCEDURE — 83735 ASSAY OF MAGNESIUM: CPT | Performed by: OBSTETRICS & GYNECOLOGY

## 2018-08-14 PROCEDURE — 96417 CHEMO IV INFUS EACH ADDL SEQ: CPT

## 2018-08-14 PROCEDURE — 74011250636 HC RX REV CODE- 250/636: Performed by: OBSTETRICS & GYNECOLOGY

## 2018-08-14 PROCEDURE — 36415 COLL VENOUS BLD VENIPUNCTURE: CPT | Performed by: OBSTETRICS & GYNECOLOGY

## 2018-08-14 PROCEDURE — 77030012965 HC NDL HUBR BBMI -A

## 2018-08-14 RX ORDER — SODIUM CHLORIDE 9 MG/ML
500 INJECTION, SOLUTION INTRAVENOUS CONTINUOUS
Status: DISPENSED | OUTPATIENT
Start: 2018-08-14 | End: 2018-08-15

## 2018-08-14 RX ORDER — DEXTROSE MONOHYDRATE 50 MG/ML
500 INJECTION, SOLUTION INTRAVENOUS CONTINUOUS
Status: DISPENSED | OUTPATIENT
Start: 2018-08-14 | End: 2018-08-15

## 2018-08-14 RX ORDER — ONDANSETRON 2 MG/ML
8 INJECTION INTRAMUSCULAR; INTRAVENOUS ONCE
Status: COMPLETED | OUTPATIENT
Start: 2018-08-14 | End: 2018-08-14

## 2018-08-14 RX ORDER — SODIUM CHLORIDE 0.9 % (FLUSH) 0.9 %
10 SYRINGE (ML) INJECTION AS NEEDED
Status: ACTIVE | OUTPATIENT
Start: 2018-08-14 | End: 2018-08-15

## 2018-08-14 RX ORDER — SODIUM CHLORIDE 9 MG/ML
10 INJECTION INTRAMUSCULAR; INTRAVENOUS; SUBCUTANEOUS AS NEEDED
Status: ACTIVE | OUTPATIENT
Start: 2018-08-14 | End: 2018-08-15

## 2018-08-14 RX ORDER — HEPARIN 100 UNIT/ML
300-500 SYRINGE INTRAVENOUS AS NEEDED
Status: ACTIVE | OUTPATIENT
Start: 2018-08-14 | End: 2018-08-15

## 2018-08-14 RX ADMIN — SODIUM CHLORIDE 10 ML: 9 INJECTION INTRAMUSCULAR; INTRAVENOUS; SUBCUTANEOUS at 13:00

## 2018-08-14 RX ADMIN — SODIUM CHLORIDE, PRESERVATIVE FREE 500 UNITS: 5 INJECTION INTRAVENOUS at 17:06

## 2018-08-14 RX ADMIN — BEVACIZUMAB 1000 MG: 400 INJECTION, SOLUTION INTRAVENOUS at 15:15

## 2018-08-14 RX ADMIN — Medication 10 ML: at 15:10

## 2018-08-14 RX ADMIN — SODIUM CHLORIDE 500 ML: 900 INJECTION, SOLUTION INTRAVENOUS at 15:15

## 2018-08-14 RX ADMIN — DEXTROSE MONOHYDRATE 250 ML: 5 INJECTION, SOLUTION INTRAVENOUS at 12:57

## 2018-08-14 RX ADMIN — Medication 10 ML: at 17:05

## 2018-08-14 RX ADMIN — ONDANSETRON 8 MG: 2 INJECTION, SOLUTION INTRAMUSCULAR; INTRAVENOUS at 12:46

## 2018-08-14 RX ADMIN — DOXORUBICIN HYDROCHLORIDE 86.8 MG: 2 INJECTION, SUSPENSION, LIPOSOMAL INTRAVENOUS at 14:07

## 2018-08-14 NOTE — PROGRESS NOTES
[facility] - Chemotherapy Progress Note    0567 Pt admit to [facility] for Doxil/Avastin ambulatory in stable condition. Assessment completed. Port accessed with positive blood return. First dose education reviewed; reading material provided; opportunity for questions and concerns provided. Pt verbalized understanding. Chemotherapy Flowsheet 8/14/2018   Cycle C1   Date 8/14/2018   Drug / Regimen Doxil/Avastin   Pre Meds given   Notes given         Patient Vitals for the past 12 hrs:   Temp Pulse Resp BP SpO2   08/14/18 1655 97.9 °F (36.6 °C) 79 16 136/67 -   08/14/18 1035 99.2 °F (37.3 °C) (!) 108 18 154/73 97 %     Recent Results (from the past 24 hour(s))   CBC WITH AUTOMATED DIFF    Collection Time: 08/14/18 11:23 AM   Result Value Ref Range    WBC 7.2 3.6 - 11.0 K/uL    RBC 3.62 (L) 3.80 - 5.20 M/uL    HGB 11.5 11.5 - 16.0 g/dL    HCT 33.9 (L) 35.0 - 47.0 %    MCV 93.6 80.0 - 99.0 FL    MCH 31.8 26.0 - 34.0 PG    MCHC 33.9 30.0 - 36.5 g/dL    RDW 12.6 11.5 - 14.5 %    PLATELET 391 827 - 313 K/uL    MPV 10.5 8.9 - 12.9 FL    NRBC 0.0 0  WBC    ABSOLUTE NRBC 0.00 0.00 - 0.01 K/uL    NEUTROPHILS 67 32 - 75 %    LYMPHOCYTES 21 12 - 49 %    MONOCYTES 10 5 - 13 %    EOSINOPHILS 2 0 - 7 %    BASOPHILS 0 0 - 1 %    IMMATURE GRANULOCYTES 0 0.0 - 0.5 %    ABS. NEUTROPHILS 4.8 1.8 - 8.0 K/UL    ABS. LYMPHOCYTES 1.5 0.8 - 3.5 K/UL    ABS. MONOCYTES 0.7 0.0 - 1.0 K/UL    ABS. EOSINOPHILS 0.1 0.0 - 0.4 K/UL    ABS. BASOPHILS 0.0 0.0 - 0.1 K/UL    ABS. IMM.  GRANS. 0.0 0.00 - 0.04 K/UL    DF AUTOMATED     METABOLIC PANEL, COMPREHENSIVE    Collection Time: 08/14/18 11:23 AM   Result Value Ref Range    Sodium 140 136 - 145 mmol/L    Potassium 4.0 3.5 - 5.1 mmol/L    Chloride 107 97 - 108 mmol/L    CO2 25 21 - 32 mmol/L    Anion gap 8 5 - 15 mmol/L    Glucose 119 (H) 65 - 100 mg/dL    BUN 9 6 - 20 MG/DL    Creatinine 0.83 0.55 - 1.02 MG/DL    BUN/Creatinine ratio 11 (L) 12 - 20      GFR est AA >60 >60 ml/min/1.73m2    GFR est non-AA >60 >60 ml/min/1.73m2    Calcium 8.7 8.5 - 10.1 MG/DL    Bilirubin, total 0.2 0.2 - 1.0 MG/DL    ALT (SGPT) 22 12 - 78 U/L    AST (SGOT) 18 15 - 37 U/L    Alk. phosphatase 93 45 - 117 U/L    Protein, total 7.4 6.4 - 8.2 g/dL    Albumin 3.3 (L) 3.5 - 5.0 g/dL    Globulin 4.1 (H) 2.0 - 4.0 g/dL    A-G Ratio 0.8 (L) 1.1 - 2.2     MAGNESIUM    Collection Time: 08/14/18 11:23 AM   Result Value Ref Range    Magnesium 1.8 1.6 - 2.4 mg/dL   URINALYSIS W/ REFLEX CULTURE    Collection Time: 08/14/18 11:23 AM   Result Value Ref Range    Color YELLOW/STRAW      Appearance CLEAR CLEAR      Specific gravity 1.020 1.003 - 1.030      pH (UA) 6.5 5.0 - 8.0      Protein NEGATIVE  NEG mg/dL    Glucose NEGATIVE  NEG mg/dL    Ketone NEGATIVE  NEG mg/dL    Bilirubin NEGATIVE  NEG      Blood NEGATIVE  NEG      Urobilinogen 0.2 0.2 - 1.0 EU/dL    Nitrites NEGATIVE  NEG      Leukocyte Esterase NEGATIVE  NEG      WBC 0-4 0 - 4 /hpf    RBC 0-5 0 - 5 /hpf    Epithelial cells FEW FEW /lpf    Bacteria NEGATIVE  NEG /hpf    UA:UC IF INDICATED CULTURE NOT INDICATED BY UA RESULT CNI     CANCER ANTIGEN 125    Collection Time: 08/14/18 11:23 AM   Result Value Ref Range    CA-125 229 (H) 1.5 - 35.0 U/mL     Medications:  Zofran  Liposomal Doxorubicin  Avastin   NS   D5   .v  1700 Pt tolerated treatment well. Port maintained positive blood return throughout treatment. Flushed, heparinized and de-accessed per protocol. D/c home ambulatory in no distress. Pt aware of next appointment scheduled for 8/28/18@ 1000.

## 2018-08-14 NOTE — PROGRESS NOTES
[facility] Gynecological Oncology  [address], [place]  Phone: [phone]  Fax: [phone]      Date of visit: 8/14/2018   HISTORY OF PRESENT ILLNESS:  Buddy Mayers is a 60 y.o.  female with a diagnosis of stage IV UPSC.  She underwent a TLH, BSO, with lymphadenectomy in August 2014.  She was found to have evidence of intraperitoneal disease with implants in the cul de sac, but no evidence of tyler metastases.  She was treated with adjuvant chemotherapy with Taxol and Carboplatin.  She then received pelvic radiation therapy.      She presented recently for follow-up. Tammie Morley to that she hadn't followed up since April 2016.  She is doing well and is without complaints.  She denies any vaginal bleeding or discharge, any pelvic or abdominal pain, or any changes in her bowel or bladder habits.  Her last  pap smear was normal.  Her most recent CA-125 was up to 138. She was sent for CT to evaluate disease  Her CT suggested metastatic disease and she was sent her for a PET/CT to get a better feeling for her disease volume.  She is not a candidate for surgery.    At this point, Dr. Mariza Fournier discussed chemotherapy, specifically Doxil, plus the addition of Avastin. Amy Uriosteguiginolilly was counseled on the toxicities of treatment and she wishes to proceed. She had her port placed on 7/30        Oncology Tx History  8/22/2014: TOTAL LAPAROSCOPIC HYSTERECTOMY, BILATERAL SALPINGO-OOPHERECTOMY, STAGING LYMPHADENECTOMY, OMENTECTOMY (pathology below)   10/8/2014-12/31/2014: 5 cycles of Carbo/Taxol ( 6th cycle held due to significant, persistent cytopenia)   3/14-5/14: 5 week course of radiation delivered once daily to the pelvis. Whole pelvic external beam radiation therapy.  No vaginal cuff brachy therapy   6/26/18: elevated CA-125   7/6/2018: CT abd/pelvis: Showed recurrence (see report below)  8/8/2018: port placed for chemotherapy  8/14/2018: began Doxorubicin Q28 days/Avastin D1 &D15 for planned 6 cycles    Subjective: Pt presents today for her first C1D1 of Doxorubicin/Avastin. She presents with her . She is nervous about starting a new drug feeling she was used to the old one. Currently only complaint is increasing abd girth. Current Outpatient Prescriptions   Medication Sig    oxyCODONE-acetaminophen (PERCOCET) 5-325 mg per tablet Take 1 Tab by mouth every four (4) hours as needed for Pain. Max Daily Amount: 6 Tabs.  promethazine (PHENERGAN) 25 mg tablet Take 1 Tab by mouth every six (6) hours as needed for Nausea.  ibuprofen (ADVIL) 200 mg tablet Take 4 tablets by mouth every eight (8) hours as needed. Current Facility-Administered Medications   Medication Dose Route Frequency    0.9% sodium chloride infusion 500 mL  500 mL IntraVENous CONTINUOUS    dextrose 5% infusion 500 mL  500 mL IntraVENous CONTINUOUS    bevacizumab (AVASTIN) 1,000 mg in 0.9% sodium chloride 100 mL, overfill volume 10 mL IVPB  10 mg/kg (Order-Specific) IntraVENous ONCE    saline peripheral flush soln 10 mL  10 mL InterCATHeter PRN    sodium chloride 0.9% injection 10 mL  10 mL IntraVENous PRN    heparin (porcine) pf 300-500 Units  300-500 Units InterCATHeter PRN        Review of Systems:  General: Acknowledges increase in fatigue lately with some wt gain  HEENT: Denies visual changes, dysphagia or headache  Resp: Denies SOB, MALONE, wheezing or cough  CV: Denies CP, palpitations  GI/:Acknowledges feeling bloated and decrease in appetite. Denies N/V,diarrhea, constipation or dysuria  MuskSkel: Denies muscle ache or joint pain  Neuro: Denies neuropathy, dizzyness or syncope  Psych: Denies depression or feelings of sadness    Objective:     Patient Vitals for the past 8 hrs:   Height Weight   08/14/18 1046 5' 7\" (1.702 m) 219 lb (99.3 kg)       Physical Exam:  General: A&O X3 in NAD  HEENT: Sclera anicteric, Mucosa pink, moist. No teeth present. No lesions on gums. Pupils equal and reactive to light.     Neck: No JVD or cervical adenopathy appreciated. Port Site: without redness, swelling or tenderness. Well healed and accessed without difficulty  Heart: Regular without M/R/G  Lungs: CTA Bilat without wheezing or rales. No cough noted  Abd: Soft, NT/ND without obvious fluid wave.  + BS throughout  Ext: Without edema and + pedal pulses bilat  Neuro: grossly intact        Recent Results (from the past 12 hour(s))   CBC WITH AUTOMATED DIFF    Collection Time: 08/14/18 11:23 AM   Result Value Ref Range    WBC 7.2 3.6 - 11.0 K/uL    RBC 3.62 (L) 3.80 - 5.20 M/uL    HGB 11.5 11.5 - 16.0 g/dL    HCT 33.9 (L) 35.0 - 47.0 %    MCV 93.6 80.0 - 99.0 FL    MCH 31.8 26.0 - 34.0 PG    MCHC 33.9 30.0 - 36.5 g/dL    RDW 12.6 11.5 - 14.5 %    PLATELET 123 484 - 909 K/uL    MPV 10.5 8.9 - 12.9 FL    NRBC 0.0 0  WBC    ABSOLUTE NRBC 0.00 0.00 - 0.01 K/uL    NEUTROPHILS 67 32 - 75 %    LYMPHOCYTES 21 12 - 49 %    MONOCYTES 10 5 - 13 %    EOSINOPHILS 2 0 - 7 %    BASOPHILS 0 0 - 1 %    IMMATURE GRANULOCYTES 0 0.0 - 0.5 %    ABS. NEUTROPHILS 4.8 1.8 - 8.0 K/UL    ABS. LYMPHOCYTES 1.5 0.8 - 3.5 K/UL    ABS. MONOCYTES 0.7 0.0 - 1.0 K/UL    ABS. EOSINOPHILS 0.1 0.0 - 0.4 K/UL    ABS. BASOPHILS 0.0 0.0 - 0.1 K/UL    ABS. IMM. GRANS. 0.0 0.00 - 0.04 K/UL    DF AUTOMATED     METABOLIC PANEL, COMPREHENSIVE    Collection Time: 08/14/18 11:23 AM   Result Value Ref Range    Sodium 140 136 - 145 mmol/L    Potassium 4.0 3.5 - 5.1 mmol/L    Chloride 107 97 - 108 mmol/L    CO2 25 21 - 32 mmol/L    Anion gap 8 5 - 15 mmol/L    Glucose 119 (H) 65 - 100 mg/dL    BUN 9 6 - 20 MG/DL    Creatinine 0.83 0.55 - 1.02 MG/DL    BUN/Creatinine ratio 11 (L) 12 - 20      GFR est AA >60 >60 ml/min/1.73m2    GFR est non-AA >60 >60 ml/min/1.73m2    Calcium 8.7 8.5 - 10.1 MG/DL    Bilirubin, total 0.2 0.2 - 1.0 MG/DL    ALT (SGPT) 22 12 - 78 U/L    AST (SGOT) 18 15 - 37 U/L    Alk.  phosphatase 93 45 - 117 U/L    Protein, total 7.4 6.4 - 8.2 g/dL    Albumin 3.3 (L) 3.5 - 5.0 g/dL    Globulin 4.1 (H) 2.0 - 4.0 g/dL    A-G Ratio 0.8 (L) 1.1 - 2.2     MAGNESIUM    Collection Time: 08/14/18 11:23 AM   Result Value Ref Range    Magnesium 1.8 1.6 - 2.4 mg/dL   URINALYSIS W/ REFLEX CULTURE    Collection Time: 08/14/18 11:23 AM   Result Value Ref Range    Color YELLOW/STRAW      Appearance CLEAR CLEAR      Specific gravity 1.020 1.003 - 1.030      pH (UA) 6.5 5.0 - 8.0      Protein NEGATIVE  NEG mg/dL    Glucose NEGATIVE  NEG mg/dL    Ketone NEGATIVE  NEG mg/dL    Bilirubin NEGATIVE  NEG      Blood NEGATIVE  NEG      Urobilinogen 0.2 0.2 - 1.0 EU/dL    Nitrites NEGATIVE  NEG      Leukocyte Esterase NEGATIVE  NEG      WBC 0-4 0 - 4 /hpf    RBC 0-5 0 - 5 /hpf    Epithelial cells FEW FEW /lpf    Bacteria NEGATIVE  NEG /hpf    UA:UC IF INDICATED CULTURE NOT INDICATED BY UA RESULT CNI     CANCER ANTIGEN 125    Collection Time: 08/14/18 11:23 AM   Result Value Ref Range    CA-125 229 (H) 1.5 - 35.0 U/mL           IMAGING:  PET/CT 7/17/2018:  FINDINGS:     HEAD/NECK: No apparent foci of abnormal hypermetabolism. Cerebral evaluation is  limited by normal intense activity.     CHEST: No foci of abnormal hypermetabolism.     ABDOMEN/PELVIS: There is significant increased tracer activity in the splenic  hilum and in the perisplenic region. Hypermetabolic left cardiophrenic soft  tissue nodule is noted. There are small bilateral hypermetabolic internal iliac  lymph nodes and a hypermetabolic left obturator lymph node.     SKELETON: No foci of abnormal hypermetabolism in the axial and visualized  appendicular skeleton.     IMPRESSION: Hypermetabolic peritoneal carcinomatosis predominantly in the left  upper quadrant. Small but hypermetabolic bilateral internal iliac and left  obturator lymph nodes. CT abd/pelvis 7/6/2018  FINDINGS:      THYROID: No nodule.   MEDIASTINUM: Mediastinal lymph nodes are stable.     There is a left diaphragmatic lymph node measuring 1.7 cm which has increased in  size.        DELORES: No mass or lymphadenopathy. THORACIC AORTA: No dissection or aneurysm. MAIN PULMONARY ARTERY: Normal in caliber. TRACHEA/BRONCHI: Patent. ESOPHAGUS: No wall thickening or dilatation. HEART: Normal in size. PLEURA: No effusion or pneumothorax. LUNGS: No nodule, mass, or airspace disease. LIVER: 1.5 cm cyst in the liver is stable. GALLBLADDER: Unremarkable. SPLEEN: There is a mass in the splenic hilus infiltrating the spleen measuring  5.6 x 2 cm and there is an additional mass in the lower pole of the spleen  measuring 2.7 x 1.9 cm and these are new.     There are peritoneal implants in the left upper quadrant with the largest  measuring 3.6 x 1.6 cm.      There is a 0.9 cm implant along the splenic flexure.      There is a 1.9 cm implant gastrocolic ligament.     There is a 1.9 cm nodule posterior to the body of the pancreas which is  unchanged.        PANCREAS: No mass or ductal dilatation. ADRENALS: 3.7 x 2 cm low-density nodule in the right adrenal gland is stable. 1.1 cm nodule left adrenal gland is stable. KIDNEYS: No mass, calculus, or hydronephrosis. There is a small cyst in the left  kidney  STOMACH: Unremarkable. SMALL BOWEL: No dilatation or wall thickening. COLON: No dilatation or wall thickening. APPENDIX: Unremarkable. PERITONEUM: No ascites or pneumoperitoneum. RETROPERITONEUM: No aortic aneurysm is identified. REPRODUCTIVE ORGANS: Patient status post hysterectomy and bilateral  salpingo-oophorectomy.     There is 1.3 cm left pelvic sidewall nodule. URINARY BLADDER: No mass or calculus. BONES: No destructive bone lesion. ADDITIONAL COMMENTS: N/A    IMPRESSION:  1. CT of the chest demonstrates a 1.7 cm left diaphragmatic lymph node which has  increased in size. 2. CT of the abdomen and pelvis demonstrates a mass in the splenic hilus which  is infiltrating the spleen. There is also peritoneal implants in the left upper  quadrant.  There is a peritoneal implant splenic flexure and gastrocolic ligament  and these are new findings. There is a 1.4 cm left pelvic sidewall nodule which is new. These findings are suspicious for current/metastatic disease. 3. Bilateral adrenal nodules are stable. Nodule posterior to this pancreatic  body is stable. 23X           Pathology:  8/22/2014:  FINAL PATHOLOGIC DIAGNOSIS  1. Soft tissue, posterior uterine serosa, biopsy:  Invasive papillary serous carcinoma. 2. Soft tissue cul-de-sac, cul-de-sac, biopsy:  Metastatic carcinoma. 3. Uterus, cervix, fallopian tubes, ovaries, radical hysterectomy and salpingo-oophorectomy:  Cervix: No evidence for dysplasia and malignancy. Endometrium: Poorly differentiated adenocarcinoma, favor FIGO grade 3 endometrioid type. See synoptic report. Myometrium: Invasive differentiated adenocarcinoma. Fallopian tubes, bilateral: No histopathologic abnormality. Ovaries, bilateral: Endosalpingosis. Synoptic Report:  Specimen: Cervix, uterus, ovaries, fallopian tubes, omentum  Procedure: Radical hysterectomy and salpingo-oophorectomy  Lymph node sampling: Bilateral pelvic lymph nodes  Specimen integrity: Intact hysterectomy specimen  Tumor size: 4.0 cm in greatest dimension  Histologic type: Poorly differentiated adenocarcinoma, favor endometrioid type  Histologic grade: FIGO grade 3  Myometrial invasion:  Depth of invasion: 1.2 cm  Myometrial thickness: To 0.4 cm  Involvement of cervix: Not involved  Extent of involvement of other organs:  Right ovary: Not involved  Left ovary: Not involved  Right fallopian tube: Not involved  Left fallopian tube: Not involved  Lymphovascular invasion: Not identified  Additional pathologic findings: Endosalpingosis    4. Omentum, resection:  No evidence for malignancy. 5. Lymph nodes, right pelvic, dissection:  No evidence for malignancy nine nodes (0/9). 6. Lymph nodes, left pelvic, dissection:  No evidence for malignancy in fourteen nodes (0/14).   Pathologic staging (pTNM):  Primary tumor (pT): pT1b  Regional lymph nodes (pN): pN0  Pelvic lymph nodes:  Number examined: 23  Number involved: 0  Distant metastasis (M): pM1, see comment    Comment  The tumor in the uterus is high grade with features favoring endometrioid type; however, the presence of a subclone  demonstrating papillary serous features has not been excluded. Further tissue is submitted for evaluation and the results will be  issued in an addendum. Despite the different histologic appearance between the uterine tumor and the serosal and peritoneal biopsies, a consensus opinion  between Rocky Rubio and Heather considers this to be one process rather than two separate primaries. Assessment:     Patient Active Problem List   Diagnosis Code    Endometrial cancer ([facility]) C54.1    Severe obesity (BMI 35.0-39.9) ([facility]) E66.01    Early satiety R68.81    Abdominal bloating R14.0         Plan:   Proceed with first cycle of treatment. Reviewed at length with pt and her  management of side effects, offerings of CRC, importance of hydration and good nutrition, exercise daily as tolerated and keeping a calendar of her symptoms to be able to anticipate and prevent next time. Stressed eating small, frequent meals would be better for her and if her appetite or early satiety or abd bloating worsen, to call and let us know. Nutritional supplements with protein encouraged  Hydration encouraged  Encouraged to call for any concerns or questions    Greater than 60 minutes spent with pt with greater than 50% spent in face to face counseling.    Iron Meyers NP

## 2018-08-28 ENCOUNTER — HOSPITAL ENCOUNTER (OUTPATIENT)
Dept: INFUSION THERAPY | Age: 58
Discharge: HOME | End: 2018-08-28
Payer: COMMERCIAL

## 2018-08-28 VITALS
TEMPERATURE: 97.2 F | RESPIRATION RATE: 18 BRPM | HEIGHT: 67 IN | BODY MASS INDEX: 33.59 KG/M2 | SYSTOLIC BLOOD PRESSURE: 121 MMHG | DIASTOLIC BLOOD PRESSURE: 74 MMHG | WEIGHT: 214 LBS | HEART RATE: 95 BPM

## 2018-08-28 DIAGNOSIS — C54.1 ENDOMETRIAL CANCER (HCC): ICD-10-CM

## 2018-08-28 LAB
APPEARANCE UR: CLEAR
BACTERIA URNS QL MICRO: NEGATIVE /HPF
BASOPHILS # BLD: 0 K/UL (ref 0–0.1)
BASOPHILS NFR BLD: 1 % (ref 0–1)
BILIRUB UR QL: NEGATIVE
COLOR UR: ABNORMAL
DIFFERENTIAL METHOD BLD: ABNORMAL
EOSINOPHIL # BLD: 0.2 K/UL (ref 0–0.4)
EOSINOPHIL NFR BLD: 3 % (ref 0–7)
EPITH CASTS URNS QL MICRO: ABNORMAL /LPF
ERYTHROCYTE [DISTWIDTH] IN BLOOD BY AUTOMATED COUNT: 12.8 % (ref 11.5–14.5)
GLUCOSE UR STRIP.AUTO-MCNC: NEGATIVE MG/DL
HCT VFR BLD AUTO: 35.8 % (ref 35–47)
HGB BLD-MCNC: 11.7 G/DL (ref 11.5–16)
HGB UR QL STRIP: ABNORMAL
HYALINE CASTS URNS QL MICRO: ABNORMAL /LPF (ref 0–5)
IMM GRANULOCYTES # BLD: 0 K/UL (ref 0–0.04)
IMM GRANULOCYTES NFR BLD AUTO: 0 % (ref 0–0.5)
KETONES UR QL STRIP.AUTO: NEGATIVE MG/DL
LEUKOCYTE ESTERASE UR QL STRIP.AUTO: NEGATIVE
LYMPHOCYTES # BLD: 1.8 K/UL (ref 0.8–3.5)
LYMPHOCYTES NFR BLD: 34 % (ref 12–49)
MCH RBC QN AUTO: 31.3 PG (ref 26–34)
MCHC RBC AUTO-ENTMCNC: 32.7 G/DL (ref 30–36.5)
MCV RBC AUTO: 95.7 FL (ref 80–99)
MONOCYTES # BLD: 0.6 K/UL (ref 0–1)
MONOCYTES NFR BLD: 12 % (ref 5–13)
NEUTS SEG # BLD: 2.8 K/UL (ref 1.8–8)
NEUTS SEG NFR BLD: 51 % (ref 32–75)
NITRITE UR QL STRIP.AUTO: NEGATIVE
NRBC # BLD: 0 K/UL (ref 0–0.01)
NRBC BLD-RTO: 0 PER 100 WBC
PH UR STRIP: 6 [PH] (ref 5–8)
PLATELET # BLD AUTO: 259 K/UL (ref 150–400)
PMV BLD AUTO: 10.5 FL (ref 8.9–12.9)
PROT UR STRIP-MCNC: NEGATIVE MG/DL
RBC # BLD AUTO: 3.74 M/UL (ref 3.8–5.2)
RBC #/AREA URNS HPF: ABNORMAL /HPF (ref 0–5)
SP GR UR REFRACTOMETRY: 1.02 (ref 1–1.03)
UA: UC IF INDICATED,UAUC: ABNORMAL
UROBILINOGEN UR QL STRIP.AUTO: 0.2 EU/DL (ref 0.2–1)
WBC # BLD AUTO: 5.4 K/UL (ref 3.6–11)
WBC URNS QL MICRO: ABNORMAL /HPF (ref 0–4)

## 2018-08-28 PROCEDURE — 96413 CHEMO IV INFUSION 1 HR: CPT

## 2018-08-28 PROCEDURE — 85025 COMPLETE CBC W/AUTO DIFF WBC: CPT | Performed by: OBSTETRICS & GYNECOLOGY

## 2018-08-28 PROCEDURE — 74011250636 HC RX REV CODE- 250/636: Performed by: OBSTETRICS & GYNECOLOGY

## 2018-08-28 PROCEDURE — 74011000258 HC RX REV CODE- 258: Performed by: OBSTETRICS & GYNECOLOGY

## 2018-08-28 PROCEDURE — 81001 URINALYSIS AUTO W/SCOPE: CPT | Performed by: OBSTETRICS & GYNECOLOGY

## 2018-08-28 PROCEDURE — 36415 COLL VENOUS BLD VENIPUNCTURE: CPT | Performed by: OBSTETRICS & GYNECOLOGY

## 2018-08-28 PROCEDURE — 74011000250 HC RX REV CODE- 250: Performed by: OBSTETRICS & GYNECOLOGY

## 2018-08-28 PROCEDURE — 96366 THER/PROPH/DIAG IV INF ADDON: CPT

## 2018-08-28 PROCEDURE — 77030012965 HC NDL HUBR BBMI -A

## 2018-08-28 RX ORDER — SODIUM CHLORIDE 9 MG/ML
10 INJECTION INTRAMUSCULAR; INTRAVENOUS; SUBCUTANEOUS AS NEEDED
Status: ACTIVE | OUTPATIENT
Start: 2018-08-28 | End: 2018-08-28

## 2018-08-28 RX ORDER — HEPARIN 100 UNIT/ML
300-500 SYRINGE INTRAVENOUS AS NEEDED
Status: ACTIVE | OUTPATIENT
Start: 2018-08-28 | End: 2018-08-28

## 2018-08-28 RX ORDER — SODIUM CHLORIDE 9 MG/ML
500 INJECTION, SOLUTION INTRAVENOUS CONTINUOUS
Status: DISPENSED | OUTPATIENT
Start: 2018-08-28 | End: 2018-08-28

## 2018-08-28 RX ORDER — SODIUM CHLORIDE 0.9 % (FLUSH) 0.9 %
10 SYRINGE (ML) INJECTION AS NEEDED
Status: ACTIVE | OUTPATIENT
Start: 2018-08-28 | End: 2018-08-28

## 2018-08-28 RX ADMIN — BEVACIZUMAB 1000 MG: 400 INJECTION, SOLUTION INTRAVENOUS at 12:45

## 2018-08-28 RX ADMIN — Medication 10 ML: at 14:05

## 2018-08-28 RX ADMIN — SODIUM CHLORIDE, PRESERVATIVE FREE 500 UNITS: 5 INJECTION INTRAVENOUS at 14:05

## 2018-08-28 RX ADMIN — SODIUM CHLORIDE 10 ML: 9 INJECTION, SOLUTION INTRAMUSCULAR; INTRAVENOUS; SUBCUTANEOUS at 14:05

## 2018-08-28 NOTE — PROGRESS NOTES
[facility] Outpatient Infusion Center Note:  1000Pt arrived at [facility] ambulatory and in no distress for C2. Assessment stable, no new complaints voiced except fatigue. Medications received: Avastin    1400 Tolerated treatment well, no adverse reaction noted. D/Cd from [facility] ambulatory and in no distress accompanied by spoyse  Next appt 9/11 1000  Visit Vitals    /75    Pulse (!) 106    Temp 97.7 °F (36.5 °C)    Resp 18    Ht 5' 7\" (1.702 m)    Wt 97.1 kg (214 lb)    BMI 33.52 kg/m2     No results found for this or any previous visit (from the past 12 hour(s)).

## 2018-08-28 NOTE — PROGRESS NOTES
Problem: Patient Education:  Go to Education Activity  Goal: Patient/Family Education  Outcome: Progressing Towards Goal  Chemo regimen

## 2018-08-29 DIAGNOSIS — C54.1 ENDOMETRIAL CANCER (HCC): Primary | ICD-10-CM

## 2018-08-29 NOTE — PROGRESS NOTES
Labs ordered to be drawn 9/6/18 to be used for chemo on 9/11/18 per vo Dr. Chioma Chan on 8/29/18 at [phone]

## 2018-09-04 RX ORDER — EPINEPHRINE 1 MG/ML
0.3 INJECTION, SOLUTION, CONCENTRATE INTRAVENOUS AS NEEDED
Status: CANCELLED | OUTPATIENT
Start: 2018-09-25

## 2018-09-04 RX ORDER — SODIUM CHLORIDE 9 MG/ML
500 INJECTION, SOLUTION INTRAVENOUS CONTINUOUS
Status: CANCELLED | OUTPATIENT
Start: 2018-09-25

## 2018-09-04 RX ORDER — HYDROCORTISONE SODIUM SUCCINATE 100 MG/2ML
100 INJECTION, POWDER, FOR SOLUTION INTRAMUSCULAR; INTRAVENOUS AS NEEDED
Status: CANCELLED | OUTPATIENT
Start: 2018-09-25

## 2018-09-04 RX ORDER — SODIUM CHLORIDE 9 MG/ML
500 INJECTION, SOLUTION INTRAVENOUS CONTINUOUS
Status: CANCELLED | OUTPATIENT
Start: 2018-09-11

## 2018-09-04 RX ORDER — HEPARIN 100 UNIT/ML
300-500 SYRINGE INTRAVENOUS AS NEEDED
Status: CANCELLED
Start: 2018-09-25

## 2018-09-04 RX ORDER — SODIUM CHLORIDE 0.9 % (FLUSH) 0.9 %
10 SYRINGE (ML) INJECTION AS NEEDED
Status: CANCELLED
Start: 2018-09-11

## 2018-09-04 RX ORDER — DIPHENHYDRAMINE HYDROCHLORIDE 50 MG/ML
50 INJECTION, SOLUTION INTRAMUSCULAR; INTRAVENOUS AS NEEDED
Status: CANCELLED
Start: 2018-09-25

## 2018-09-04 RX ORDER — ONDANSETRON 2 MG/ML
8 INJECTION INTRAMUSCULAR; INTRAVENOUS ONCE
Status: CANCELLED | OUTPATIENT
Start: 2018-09-11 | End: 2018-09-11

## 2018-09-04 RX ORDER — ALBUTEROL SULFATE 0.83 MG/ML
2.5 SOLUTION RESPIRATORY (INHALATION) AS NEEDED
Status: CANCELLED
Start: 2018-09-25

## 2018-09-04 RX ORDER — ALBUTEROL SULFATE 0.83 MG/ML
2.5 SOLUTION RESPIRATORY (INHALATION) AS NEEDED
Status: CANCELLED
Start: 2018-09-11

## 2018-09-04 RX ORDER — HYDROCORTISONE SODIUM SUCCINATE 100 MG/2ML
100 INJECTION, POWDER, FOR SOLUTION INTRAMUSCULAR; INTRAVENOUS AS NEEDED
Status: CANCELLED | OUTPATIENT
Start: 2018-09-11

## 2018-09-04 RX ORDER — ONDANSETRON 2 MG/ML
8 INJECTION INTRAMUSCULAR; INTRAVENOUS AS NEEDED
Status: CANCELLED | OUTPATIENT
Start: 2018-09-25

## 2018-09-04 RX ORDER — SODIUM CHLORIDE 9 MG/ML
10 INJECTION INTRAMUSCULAR; INTRAVENOUS; SUBCUTANEOUS AS NEEDED
Status: CANCELLED | OUTPATIENT
Start: 2018-09-11

## 2018-09-04 RX ORDER — DIPHENHYDRAMINE HYDROCHLORIDE 50 MG/ML
50 INJECTION, SOLUTION INTRAMUSCULAR; INTRAVENOUS AS NEEDED
Status: CANCELLED
Start: 2018-09-11

## 2018-09-04 RX ORDER — ACETAMINOPHEN 325 MG/1
650 TABLET ORAL AS NEEDED
Status: CANCELLED
Start: 2018-09-25

## 2018-09-04 RX ORDER — SODIUM CHLORIDE 0.9 % (FLUSH) 0.9 %
10 SYRINGE (ML) INJECTION AS NEEDED
Status: CANCELLED
Start: 2018-09-25

## 2018-09-04 RX ORDER — EPINEPHRINE 1 MG/ML
0.3 INJECTION, SOLUTION, CONCENTRATE INTRAVENOUS AS NEEDED
Status: CANCELLED | OUTPATIENT
Start: 2018-09-11

## 2018-09-04 RX ORDER — HEPARIN 100 UNIT/ML
300-500 SYRINGE INTRAVENOUS AS NEEDED
Status: CANCELLED
Start: 2018-09-11

## 2018-09-04 RX ORDER — DEXTROSE MONOHYDRATE 50 MG/ML
500 INJECTION, SOLUTION INTRAVENOUS CONTINUOUS
Status: CANCELLED | OUTPATIENT
Start: 2018-09-11

## 2018-09-04 RX ORDER — ACETAMINOPHEN 325 MG/1
650 TABLET ORAL AS NEEDED
Status: CANCELLED
Start: 2018-09-11

## 2018-09-04 RX ORDER — SODIUM CHLORIDE 9 MG/ML
10 INJECTION INTRAMUSCULAR; INTRAVENOUS; SUBCUTANEOUS AS NEEDED
Status: CANCELLED | OUTPATIENT
Start: 2018-09-25

## 2018-09-04 RX ORDER — ONDANSETRON 2 MG/ML
8 INJECTION INTRAMUSCULAR; INTRAVENOUS AS NEEDED
Status: CANCELLED | OUTPATIENT
Start: 2018-09-11

## 2018-09-06 ENCOUNTER — OFFICE VISIT (OUTPATIENT)
Dept: GYNECOLOGY | Age: 58
End: 2018-09-06

## 2018-09-06 VITALS
HEIGHT: 67 IN | WEIGHT: 214.4 LBS | HEART RATE: 105 BPM | DIASTOLIC BLOOD PRESSURE: 72 MMHG | SYSTOLIC BLOOD PRESSURE: 134 MMHG | BODY MASS INDEX: 33.65 KG/M2

## 2018-09-06 DIAGNOSIS — C54.1 ENDOMETRIAL CANCER (HCC): Primary | ICD-10-CM

## 2018-09-06 NOTE — PROGRESS NOTES
[address] [facility] [address], [address] [facility]  [phone]  MD Mandeep Aguilera MD Darrin Lace, [place]  Office Visit    Patient ID:  Name: Monique Patel  MRN: [id number]  : 1960/58 y.o. Visit date: 2018    Primary Diagnosis:     ICD-10-CM ICD-9-CM    1. Endometrial cancer ([facility] C54.1 182.0         Problem List:    Patient Active Problem List   Diagnosis Code    Endometrial cancer ([facility] C54.1    Severe obesity (BMI 35.0-39.9) ([facility]) E66.01    Early satiety R68.81    Abdominal bloating R14.0       INTERVAL HISTORY:  Monique Patel is a  female with a diagnosis of stage IV UPSC. She underwent a TLH, BSO, with lymphadenectomy in 2014. She was found to have evidence of intraperitoneal disease with implants in the cul de sac, but no evidence of tyler metastases. She was treated with adjuvant chemotherapy with Taxol and Carboplatin. She then received pelvic radiation therapy. In the summer of 2018 she was noted to have an elevated CA-125. She had not been seen in the office for a couple of years prior to that visit. I ordered a CT of the chest/abdomen/pelvis to evaluate for recurrence. It demonstrated peritoneal carcinomatosis. I then sent her for a PET/CT to better evaluate. I recommended combination chemotherapy with Doxil/Avastin. She has completed one cycle so far. She is tolerating treatment well. Her only complaint is fatigue.         PMH:  Past Medical History:   Diagnosis Date    Arthritis     Cancer ([address]     uterine    Chronic pain     LOWER BACK    Depression     Nausea & vomiting     PMB (postmenopausal bleeding) 2014     PSH:  Past Surgical History:   Procedure Laterality Date    HX COLONOSCOPY      HX GYN  14    TLH/BSO/lymph node dissection    HX ORTHOPAEDIC Right      LITTLE toe sx    HX VASCULAR ACCESS Left     PORTACATH - LEFT CHEST     SOC:  Social History Social History    Marital status:      Spouse name: N/A    Number of children: N/A    Years of education: N/A     Occupational History    Not on file. Social History Main Topics    Smoking status: Former Smoker     Packs/day: 0.50     Years: 7.00     Types: Cigarettes     Quit date: 8/19/1999    Smokeless tobacco: Never Used    Alcohol use 0.6 oz/week     1 Glasses of wine per week      Comment: rarely    Drug use: No    Sexual activity: No     Other Topics Concern    Not on file     Social History Narrative     Family History  Family History   Problem Relation Age of Onset    Cancer Mother 61     liver metastatic disease, unknown primary    Stroke Mother      BRAIN ANEURYSM    Cancer Maternal Uncle 61     bone    Cancer Maternal Grandmother 61     unknown primary    Anesth Problems Neg Hx      Medications: (reviewed)  Current Outpatient Prescriptions on File Prior to Visit   Medication Sig Dispense Refill    oxyCODONE-acetaminophen (PERCOCET) 5-325 mg per tablet Take 1 Tab by mouth every four (4) hours as needed for Pain. Max Daily Amount: 6 Tabs. 20 Tab 0    promethazine (PHENERGAN) 25 mg tablet Take 1 Tab by mouth every six (6) hours as needed for Nausea. 30 Tab 3    ibuprofen (ADVIL) 200 mg tablet Take 4 tablets by mouth every eight (8) hours as needed. 90 tablet 3     No current facility-administered medications on file prior to visit. Allergies: (reviewed)  Allergies   Allergen Reactions    Hydrocodone Nausea Only    Oxycodone Nausea and Vomiting       ROS:  Negative     OBJECTIVE:    PHYSICAL EXAM  VITAL SIGNS: Vitals:    09/06/18 1445   BP: 134/72   Pulse: (!) 105   Weight: 214 lb 6.4 oz (97.3 kg)   Height: 5' 7.01\" (1.702 m)     Body mass index is 33.57 kg/(m^2). GENERAL FLOWER: Conversant, alert, oriented. NAD   HEENT: Normocephalic. Oropharynx clear.    Neck: Trachea midline, no JVD, no thyroid enlargement   RESPIRATORY: Lung fields clear to auscultation and percussion. Adequate respiratory effort   CARDIOVASC: RRR without murmur   GASTROINT: soft, non-tender, without masses or organomegaly. No hernia noted   MUSCULOSKEL: FROM. No focal tenderness. EXTREMITIES: extremities normal, atraumatic, no cyanosis or edema. Pulses appreciated. PELVIC: Deferred   RECTAL: Deferred   DOMINGUEZ SURVEY: Negative throughout---neck, axillae, inguina. NEURO: Grossly intact, no acute deficit. Oriented. Not depressed. Not agitated. CT of the chest/abdomen/pelvis (7/6/18)  THYROID: No nodule. MEDIASTINUM: Mediastinal lymph nodes are stable.     There is a left diaphragmatic lymph node measuring 1.7 cm which has increased in  size.     DELORES: No mass or lymphadenopathy. THORACIC AORTA: No dissection or aneurysm. MAIN PULMONARY ARTERY: Normal in caliber. TRACHEA/BRONCHI: Patent. ESOPHAGUS: No wall thickening or dilatation. HEART: Normal in size. PLEURA: No effusion or pneumothorax. LUNGS: No nodule, mass, or airspace disease. LIVER: 1.5 cm cyst in the liver is stable. GALLBLADDER: Unremarkable. SPLEEN: There is a mass in the splenic hilus infiltrating the spleen measuring  5.6 x 2 cm and there is an additional mass in the lower pole of the spleen  measuring 2.7 x 1.9 cm and these are new.     There are peritoneal implants in the left upper quadrant with the largest  measuring 3.6 x 1.6 cm.      There is a 0.9 cm implant along the splenic flexure.      There is a 1.9 cm implant gastrocolic ligament.     There is a 1.9 cm nodule posterior to the body of the pancreas which is  unchanged.     PANCREAS: No mass or ductal dilatation. ADRENALS: 3.7 x 2 cm low-density nodule in the right adrenal gland is stable. 1.1 cm nodule left adrenal gland is stable. KIDNEYS: No mass, calculus, or hydronephrosis. There is a small cyst in the left  kidney  STOMACH: Unremarkable. SMALL BOWEL: No dilatation or wall thickening. COLON: No dilatation or wall thickening.   APPENDIX: Unremarkable. PERITONEUM: No ascites or pneumoperitoneum. RETROPERITONEUM: No aortic aneurysm is identified. REPRODUCTIVE ORGANS: Patient status post hysterectomy and bilateral  salpingo-oophorectomy.     There is 1.3 cm left pelvic sidewall nodule. URINARY BLADDER: No mass or calculus. BONES: No destructive bone lesion. ADDITIONAL COMMENTS: N/A     IMPRESSION:  1. CT of the chest demonstrates a 1.7 cm left diaphragmatic lymph node which has  increased in size. 2. CT of the abdomen and pelvis demonstrates a mass in the splenic hilus which  is infiltrating the spleen. There is also peritoneal implants in the left upper  quadrant. There is a peritoneal implant splenic flexure and gastrocolic ligament  and these are new findings. There is a 1.4 cm left pelvic sidewall nodule which is new. These findings are suspicious for current/metastatic disease. 3. Bilateral adrenal nodules are stable. Nodule posterior to this pancreatic  body is stable. PET/CT (7/17/18)  HEAD/NECK: No apparent foci of abnormal hypermetabolism. Cerebral evaluation is  limited by normal intense activity.     CHEST: No foci of abnormal hypermetabolism.     ABDOMEN/PELVIS: There is significant increased tracer activity in the splenic  hilum and in the perisplenic region. Hypermetabolic left cardiophrenic soft  tissue nodule is noted. There are small bilateral hypermetabolic internal iliac  lymph nodes and a hypermetabolic left obturator lymph node.     SKELETON: No foci of abnormal hypermetabolism in the axial and visualized  appendicular skeleton.     IMPRESSION: Hypermetabolic peritoneal carcinomatosis predominantly in the left  upper quadrant. Small but hypermetabolic bilateral internal iliac and left  obturator lymph nodes.         IMPRESSION AND PLAN:  Louise Calderon has a history of uterine papillary serous carcinoma.   She is s/p surgical resection and was recommended adjuvant chemotherapy with Taxol and Carboplatin and she completed 6 cycles. She then received pelvic radiation therapy. Her CA-125 is now elevated and her CT suggested metastatic disease. She is not a candidate for surgery. I discussed chemotherapy, specifically Doxil, plus the addition of Avastin. She has completed one cycle so far. She appears to be tolerating well. We will continue with the current regimen at this time.       Kerry Tran MD  9/6/2018/2:54 PM

## 2018-09-07 LAB
ALBUMIN SERPL-MCNC: 4.1 G/DL (ref 3.5–5.5)
ALBUMIN/GLOB SERPL: 1.2 {RATIO} (ref 1.2–2.2)
ALP SERPL-CCNC: 101 IU/L (ref 39–117)
ALT SERPL-CCNC: 14 IU/L (ref 0–32)
APPEARANCE UR: CLEAR
AST SERPL-CCNC: 17 IU/L (ref 0–40)
BACTERIA #/AREA URNS HPF: ABNORMAL /[HPF]
BASOPHILS # BLD AUTO: 0 X10E3/UL (ref 0–0.2)
BASOPHILS NFR BLD AUTO: 1 %
BILIRUB SERPL-MCNC: <0.2 MG/DL (ref 0–1.2)
BILIRUB UR QL STRIP: NEGATIVE
BUN SERPL-MCNC: 10 MG/DL (ref 6–24)
BUN/CREAT SERPL: 13 (ref 9–23)
CALCIUM SERPL-MCNC: 9.3 MG/DL (ref 8.7–10.2)
CANCER AG125 SERPL-ACNC: 197 U/ML (ref 0–38.1)
CASTS URNS MICRO: ABNORMAL
CASTS URNS QL MICRO: PRESENT /LPF
CHLORIDE SERPL-SCNC: 103 MMOL/L (ref 96–106)
CO2 SERPL-SCNC: 23 MMOL/L (ref 20–29)
COLOR UR: YELLOW
CREAT SERPL-MCNC: 0.8 MG/DL (ref 0.57–1)
EOSINOPHIL # BLD AUTO: 0.2 X10E3/UL (ref 0–0.4)
EOSINOPHIL NFR BLD AUTO: 4 %
EPI CELLS #/AREA URNS HPF: ABNORMAL /HPF
ERYTHROCYTE [DISTWIDTH] IN BLOOD BY AUTOMATED COUNT: 14.4 % (ref 12.3–15.4)
GLOBULIN SER CALC-MCNC: 3.4 G/DL (ref 1.5–4.5)
GLUCOSE SERPL-MCNC: 77 MG/DL (ref 65–99)
GLUCOSE UR QL: NEGATIVE
HCT VFR BLD AUTO: 36.9 % (ref 34–46.6)
HGB BLD-MCNC: 12.7 G/DL (ref 11.1–15.9)
HGB UR QL STRIP: ABNORMAL
IMM GRANULOCYTES # BLD: 0 X10E3/UL (ref 0–0.1)
IMM GRANULOCYTES NFR BLD: 0 %
KETONES UR QL STRIP: ABNORMAL
LEUKOCYTE ESTERASE UR QL STRIP: NEGATIVE
LYMPHOCYTES # BLD AUTO: 1.9 X10E3/UL (ref 0.7–3.1)
LYMPHOCYTES NFR BLD AUTO: 38 %
MAGNESIUM SERPL-MCNC: 2.1 MG/DL (ref 1.6–2.3)
MCH RBC QN AUTO: 30.8 PG (ref 26.6–33)
MCHC RBC AUTO-ENTMCNC: 34.4 G/DL (ref 31.5–35.7)
MCV RBC AUTO: 90 FL (ref 79–97)
MICRO URNS: ABNORMAL
MONOCYTES # BLD AUTO: 1 X10E3/UL (ref 0.1–0.9)
MONOCYTES NFR BLD AUTO: 20 %
MORPHOLOGY BLD-IMP: ABNORMAL
MUCOUS THREADS URNS QL MICRO: PRESENT
NEUTROPHILS # BLD AUTO: 1.8 X10E3/UL (ref 1.4–7)
NEUTROPHILS NFR BLD AUTO: 37 %
NITRITE UR QL STRIP: NEGATIVE
PH UR STRIP: 5 [PH] (ref 5–7.5)
PLATELET # BLD AUTO: 277 X10E3/UL (ref 150–379)
POTASSIUM SERPL-SCNC: 4.7 MMOL/L (ref 3.5–5.2)
PROT SERPL-MCNC: 7.5 G/DL (ref 6–8.5)
PROT UR QL STRIP: ABNORMAL
RBC # BLD AUTO: 4.12 X10E6/UL (ref 3.77–5.28)
RBC #/AREA URNS HPF: ABNORMAL /HPF
SODIUM SERPL-SCNC: 141 MMOL/L (ref 134–144)
SP GR UR: 1.03 (ref 1–1.03)
UROBILINOGEN UR STRIP-MCNC: 0.2 MG/DL (ref 0.2–1)
WBC # BLD AUTO: 5 X10E3/UL (ref 3.4–10.8)
WBC #/AREA URNS HPF: ABNORMAL /HPF

## 2018-09-11 ENCOUNTER — HOSPITAL ENCOUNTER (OUTPATIENT)
Dept: INFUSION THERAPY | Age: 58
Discharge: HOME | End: 2018-09-11
Payer: MEDICARE

## 2018-09-11 ENCOUNTER — TELEPHONE (OUTPATIENT)
Dept: GYNECOLOGY | Age: 58
End: 2018-09-11

## 2018-09-11 VITALS
HEART RATE: 86 BPM | WEIGHT: 215 LBS | TEMPERATURE: 98.3 F | RESPIRATION RATE: 16 BRPM | SYSTOLIC BLOOD PRESSURE: 154 MMHG | BODY MASS INDEX: 33.74 KG/M2 | HEIGHT: 67 IN | DIASTOLIC BLOOD PRESSURE: 81 MMHG

## 2018-09-11 DIAGNOSIS — C54.1 ENDOMETRIAL CANCER (HCC): ICD-10-CM

## 2018-09-11 PROBLEM — R10.11 RIGHT UPPER QUADRANT ABDOMINAL PAIN: Status: ACTIVE | Noted: 2018-09-11

## 2018-09-11 PROBLEM — E66.01 SEVERE OBESITY (BMI 35.0-39.9): Status: RESOLVED | Noted: 2018-07-12 | Resolved: 2018-09-11

## 2018-09-11 PROBLEM — E66.9 OBESITY (BMI 30.0-34.9): Status: ACTIVE | Noted: 2018-09-11

## 2018-09-11 PROCEDURE — 74011250636 HC RX REV CODE- 250/636: Performed by: OBSTETRICS & GYNECOLOGY

## 2018-09-11 PROCEDURE — 74011000258 HC RX REV CODE- 258: Performed by: OBSTETRICS & GYNECOLOGY

## 2018-09-11 PROCEDURE — 77030012965 HC NDL HUBR BBMI -A

## 2018-09-11 PROCEDURE — 96417 CHEMO IV INFUS EACH ADDL SEQ: CPT

## 2018-09-11 PROCEDURE — 74011250636 HC RX REV CODE- 250/636: Performed by: NURSE PRACTITIONER

## 2018-09-11 PROCEDURE — 96413 CHEMO IV INFUSION 1 HR: CPT

## 2018-09-11 PROCEDURE — 96375 TX/PRO/DX INJ NEW DRUG ADDON: CPT

## 2018-09-11 RX ORDER — LORAZEPAM 2 MG/ML
0.5 INJECTION INTRAMUSCULAR ONCE
Status: COMPLETED | OUTPATIENT
Start: 2018-09-11 | End: 2018-09-11

## 2018-09-11 RX ORDER — SODIUM CHLORIDE 9 MG/ML
10 INJECTION INTRAMUSCULAR; INTRAVENOUS; SUBCUTANEOUS AS NEEDED
Status: ACTIVE | OUTPATIENT
Start: 2018-09-11 | End: 2018-09-11

## 2018-09-11 RX ORDER — SODIUM CHLORIDE 0.9 % (FLUSH) 0.9 %
10 SYRINGE (ML) INJECTION AS NEEDED
Status: ACTIVE | OUTPATIENT
Start: 2018-09-11 | End: 2018-09-11

## 2018-09-11 RX ORDER — SODIUM CHLORIDE 9 MG/ML
500 INJECTION, SOLUTION INTRAVENOUS CONTINUOUS
Status: DISPENSED | OUTPATIENT
Start: 2018-09-11 | End: 2018-09-11

## 2018-09-11 RX ORDER — HEPARIN 100 UNIT/ML
300-500 SYRINGE INTRAVENOUS AS NEEDED
Status: ACTIVE | OUTPATIENT
Start: 2018-09-11 | End: 2018-09-11

## 2018-09-11 RX ORDER — ONDANSETRON 2 MG/ML
8 INJECTION INTRAMUSCULAR; INTRAVENOUS ONCE
Status: COMPLETED | OUTPATIENT
Start: 2018-09-11 | End: 2018-09-11

## 2018-09-11 RX ORDER — DEXTROSE MONOHYDRATE 50 MG/ML
500 INJECTION, SOLUTION INTRAVENOUS CONTINUOUS
Status: DISPENSED | OUTPATIENT
Start: 2018-09-11 | End: 2018-09-11

## 2018-09-11 RX ADMIN — ONDANSETRON 8 MG: 2 INJECTION, SOLUTION INTRAMUSCULAR; INTRAVENOUS at 11:10

## 2018-09-11 RX ADMIN — BEVACIZUMAB 1000 MG: 400 INJECTION, SOLUTION INTRAVENOUS at 11:26

## 2018-09-11 RX ADMIN — DOXORUBICIN HYDROCHLORIDE 86.8 MG: 2 INJECTION, SUSPENSION, LIPOSOMAL INTRAVENOUS at 12:08

## 2018-09-11 RX ADMIN — DEXTROSE MONOHYDRATE 250 ML: 5 INJECTION, SOLUTION INTRAVENOUS at 12:00

## 2018-09-11 RX ADMIN — LORAZEPAM 0.5 MG: 2 INJECTION INTRAMUSCULAR; INTRAVENOUS at 10:40

## 2018-09-11 NOTE — PROGRESS NOTES
Outpatient Infusion Center - Chemotherapy Progress Note 
 
1000 Pt admit to [facility] for Avastin/Doxil C2D1 ambulatory in stable condition accompanied by spouse. Assessment completed. Pt reports R sided abd pain rated 10/10 that started last night; stated had n/v episodes d/t pain; took nausea meds w/o relief. No new concerns voiced. Port accessed with positive blood return. Labs drawn on 09/06, line flushed, NS infusing. [address] Allan, NP in with pt; orders to proceed w/ treatment today, Ativan ordered Visit Vitals  /81  Pulse 86  Temp 98.3 °F (36.8 °C)  Resp 16  
 Ht 5' 7\" (1.702 m)  Wt 97.5 kg (215 lb)  Breastfeeding No  
 BMI 33.67 kg/m2 Medications: 
NS 
Zofran 8 mg Ativan 0.5 mg Avastin D5 Doxil 1330 Pt tolerated treatment well. Port maintained positive blood return throughout treatment, flushed with positive blood return at conclusion, heparinized and de-accessed. D/c home ambulatory in no distress. Pt aware of next [facility] appointment scheduled for 09/25/2018 for Avastin/Doxil C2D15. Please see CC for labs from 09/06/2018.

## 2018-09-11 NOTE — PROGRESS NOTES
[facility] Gynecological Oncology [address], [place] Phone: [phone] Fax: [phone] Date of visit: 9/11/2018 HISTORY OF PRESENT ILLNESS: 
Derek Davis is a 60 y.o.  female with a diagnosis of stage IV UPSC.  She underwent a TLH, BSO, with lymphadenectomy in August 2014.  She was found to have evidence of intraperitoneal disease with implants in the cul de sac, but no evidence of tyler metastases.  She was treated with adjuvant chemotherapy with Taxol and Carboplatin.  She then received pelvic radiation therapy. 
   
She presented recently for follow-up. Katie Crigler to that she hadn't followed up since April 2016.  She is doing well and is without complaints.  She denies any vaginal bleeding or discharge, any pelvic or abdominal pain, or any changes in her bowel or bladder habits.  Her last  pap smear was normal.  Her most recent CA-125 was up to 138. She was sent for CT to evaluate disease Her CT suggested metastatic disease and she was sent her for a PET/CT to get a better feeling for her disease volume.  She is not a candidate for surgery.   
At this point, Dr. Lars Freeman discussed chemotherapy, specifically Doxil, plus the addition of Avastin. Marla Odonnell was counseled on the toxicities of treatment and she wishes to proceed. She had her port placed on 7/30 Oncology Tx History 8/22/2014: TOTAL LAPAROSCOPIC HYSTERECTOMY, BILATERAL SALPINGO-OOPHERECTOMY, STAGING LYMPHADENECTOMY, OMENTECTOMY (pathology below)  
10/8/2014-12/31/2014: 5 cycles of Carbo/Taxol ( 6th cycle held due to significant, persistent cytopenia) 3/14-5/14: 5 week course of radiation delivered once daily to the pelvis. Whole pelvic external beam radiation therapy. No vaginal cuff brachy therapy 6/26/18: elevated CA-125  
7/6/2018: CT abd/pelvis: Showed recurrence (see report below) 8/8/2018: port placed for chemotherapy 8/14/2018: began Doxorubicin Q28 days/Avastin D1 &D15 for planned 6 cycles Subjective: Pt presents today for her first C2D1 of Doxorubicin/Avastin. She began with new pain to RLQ that began yesterday after she ate a salad. Teresa Moe it goes around to her lower back. She said she had one episode of nausea with small amount of vomit that was yellow last evening, but none since. No change in bowls. Said her appetite is down, but no more than before the pain began. She acknowledged that the pain had begun to improve this morning and denies nausea at present. Denies fevers, chills or other symptoms of infections Her  is supportive with her again today Jian Talavera Current Outpatient Prescriptions Medication Sig  
 oxyCODONE-acetaminophen (PERCOCET) 5-325 mg per tablet Take 1 Tab by mouth every four (4) hours as needed for Pain. Max Daily Amount: 6 Tabs.  promethazine (PHENERGAN) 25 mg tablet Take 1 Tab by mouth every six (6) hours as needed for Nausea.  ibuprofen (ADVIL) 200 mg tablet Take 4 tablets by mouth every eight (8) hours as needed. Current Facility-Administered Medications Medication Dose Route Frequency  saline peripheral flush soln 10 mL  10 mL InterCATHeter PRN  
 sodium chloride 0.9% injection 10 mL  10 mL IntraVENous PRN  
 heparin (porcine) pf 300-500 Units  300-500 Units InterCATHeter PRN  
 0.9% sodium chloride infusion 500 mL  500 mL IntraVENous CONTINUOUS  
 dextrose 5% infusion 500 mL  500 mL IntraVENous CONTINUOUS Review Generalized fatigue unchanged. HEENT: Denies visual changes, dysphagia or headache Resp: Denies SOB, MALONE, wheezing or cough CV: Denies CP, palpitations GI/:See subjective above for abd pain Denies N/V,diarrhea, constipation or dysuria MuskSkel: Denies muscle ache or joint pain Neuro: Denies neuropathy, dizzyness or syncope Psych: Denies depression or feelings of sadness Objective:  
 
Patient Vitals for the past 8 hrs: 
 BP Temp Pulse Resp Height Weight 09/11/18 1010 154/81 98.3 °F (36.8 °C) 86 16 5' 7\" (1.702 m) 215 lb (97.5 kg) Physical Exam: 
General: A&O X3 in NAD HEENT: Sclera anicteric, mucosa pink, moist. Without teeth normally. No lesions noted  Pupils equal and reactive to light. Neck: No JVD or cervical adenopathy appreciated. Port Site: without redness, swelling or tenderness; accessed without difficulty Heart: Regular without M/R/G Lungs: CTA Bilat without wheezing or rales. No cough noted Abd: Soft, faint tenderness to RUQ, otherwise, no tenderness throughout abd. No CVA tenderness. Remains without obvious fluid wave.  + BS throughout Ext: Without edema and + pedal pulses bilat Neuro: grossly intact LABS: 
1959: 
WBC's 5.0; HGB 12.7; HCT 36.9; Plt's 277; ANC 1.8; Na 141; K+ 4.7; Cl 103; CO2 23; BUN 10; Creat 0.80; Mag 2.1 Urinalysis: Protein trace; Blood trace (unchanged from 8/28) CA-125: 9/6/18= 197 (8/14/18=229; 8/9/18= 371.2) IMAGING: 
PET/CT 7/17/2018: 
FINDINGS: 
  
HEAD/NECK: No apparent foci of abnormal hypermetabolism. Cerebral evaluation is 
limited by normal intense activity. 
  
CHEST: No foci of abnormal hypermetabolism. 
  
ABDOMEN/PELVIS: There is significant increased tracer activity in the splenic 
hilum and in the perisplenic region. Hypermetabolic left cardiophrenic soft 
tissue nodule is noted. There are small bilateral hypermetabolic internal iliac 
lymph nodes and a hypermetabolic left obturator lymph node. 
  
SKELETON: No foci of abnormal hypermetabolism in the axial and visualized 
appendicular skeleton. 
  
IMPRESSION: Hypermetabolic peritoneal carcinomatosis predominantly in the left 
upper quadrant. Small but hypermetabolic bilateral internal iliac and left 
obturator lymph nodes. CT abd/pelvis 7/6/2018FINDINGS:  
  
THYROID: No nodule.  
MEDIASTINUM: Mediastinal lymph nodes are stable. 
  
There is a left diaphragmatic lymph node measuring 1.7 cm which has increased in 
size. 
  
  
 DELORES: No mass or lymphadenopathy. THORACIC AORTA: No dissection or aneurysm. MAIN PULMONARY ARTERY: Normal in caliber. TRACHEA/BRONCHI: Patent. ESOPHAGUS: No wall thickening or dilatation. HEART: Normal in size. PLEURA: No effusion or pneumothorax. LUNGS: No nodule, mass, or airspace disease. LIVER: 1.5 cm cyst in the liver is stable. GALLBLADDER: Unremarkable. SPLEEN: There is a mass in the splenic hilus infiltrating the spleen measuring 5.6 x 2 cm and there is an additional mass in the lower pole of the spleen 
measuring 2.7 x 1.9 cm and these are new. 
  
There are peritoneal implants in the left upper quadrant with the largest 
measuring 3.6 x 1.6 cm. 
  
 There is a 0.9 cm implant along the splenic flexure.  
  
There is a 1.9 cm implant gastrocolic ligament. 
  
There is a 1.9 cm nodule posterior to the body of the pancreas which is 
unchanged. 
  
  
PANCREAS: No mass or ductal dilatation. ADRENALS: 3.7 x 2 cm low-density nodule in the right adrenal gland is stable. 1.1 cm nodule left adrenal gland is stable. KIDNEYS: No mass, calculus, or hydronephrosis. There is a small cyst in the left 
kidney STOMACH: Unremarkable. SMALL BOWEL: No dilatation or wall thickening. COLON: No dilatation or wall thickening. APPENDIX: Unremarkable. PERITONEUM: No ascites or pneumoperitoneum. RETROPERITONEUM: No aortic aneurysm is identified. REPRODUCTIVE ORGANS: Patient status post hysterectomy and bilateral 
salpingo-oophorectomy. 
  
There is 1.3 cm left pelvic sidewall nodule. URINARY BLADDER: No mass or calculus. BONES: No destructive bone lesion. ADDITIONAL COMMENTS: N/A IMPRESSION: 
1. CT of the chest demonstrates a 1.7 cm left diaphragmatic lymph node which has 
increased in size. 2. CT of the abdomen and pelvis demonstrates a mass in the splenic hilus which 
is infiltrating the spleen. There is also peritoneal implants in the left upper quadrant. There is a peritoneal implant splenic flexure and gastrocolic ligament 
and these are new findings. There is a 1.4 cm left pelvic sidewall nodule which is new. These findings are suspicious for current/metastatic disease. 3. Bilateral adrenal nodules are stable. Nodule posterior to this pancreatic 
body is stable. 23X 
  
 
Pathology: 
8/22/2014: 
FINAL PATHOLOGIC DIAGNOSIS 1. Soft tissue, posterior uterine serosa, biopsy: 
Invasive papillary serous carcinoma. 2. Soft tissue cul-de-sac, cul-de-sac, biopsy: 
Metastatic carcinoma. 3. Uterus, cervix, fallopian tubes, ovaries, radical hysterectomy and salpingo-oophorectomy: 
Cervix: No evidence for dysplasia and malignancy. Endometrium: Poorly differentiated adenocarcinoma, favor FIGO grade 3 endometrioid type. See synoptic report. Myometrium: Invasive differentiated adenocarcinoma. Fallopian tubes, bilateral: No histopathologic abnormality. Ovaries, bilateral: Endosalpingosis. Synoptic Report: 
Specimen: Cervix, uterus, ovaries, fallopian tubes, omentum Procedure: Radical hysterectomy and salpingo-oophorectomy Lymph node sampling: Bilateral pelvic lymph nodes Specimen integrity: Intact hysterectomy specimen Tumor size: 4.0 cm in greatest dimension Histologic type: Poorly differentiated adenocarcinoma, favor endometrioid type Histologic grade: FIGO grade 3 Myometrial invasion: 
Depth of invasion: 1.2 cm Myometrial thickness: To 0.4 cm Involvement of cervix: Not involved Extent of involvement of other organs: 
Right ovary: Not involved Left ovary: Not involved Right fallopian tube: Not involved Left fallopian tube: Not involved Lymphovascular invasion: Not identified Additional pathologic findings: Endosalpingosis 4. Omentum, resection: No evidence for malignancy. 5. Lymph nodes, right pelvic, dissection: No evidence for malignancy nine nodes (0/9). 6. Lymph nodes, left pelvic, dissection: No evidence for malignancy in fourteen nodes (0/14). Pathologic staging (pTNM): 
Primary tumor (pT): pT1b Regional lymph nodes (pN): pN0 Pelvic lymph nodes: 
Number examined: 23 Number involved: 0 Distant metastasis (M): pM1, see comment Comment The tumor in the uterus is high grade with features favoring endometrioid type; however, the presence of a subclone 
demonstrating papillary serous features has not been excluded. Further tissue is submitted for evaluation and the results will be 
issued in an addendum. Despite the different histologic appearance between the uterine tumor and the serosal and peritoneal biopsies, a consensus opinion 
between Rocky Jackson and Heather considers this to be one process rather than two separate primaries. Assessment:  
 
Patient Active Problem List  
Diagnosis Code  Endometrial cancer ([facility] [address] C54.1  Early satiety R68.81  
 Abdominal bloating R14.0  Obesity (BMI 30.0-34. 9) E66.9  Right upper quadrant abdominal pain R10.11 Plan: Will go ahead and proceed with second cycle of treatment. She will monitor her pain and let us know if it does not continue to improve. If it worsens or she has other symptoms, she will call immediately or proceed to the ER. Stressed eating small, frequent meals would be better for her and if her appetite or early satiety or abd bloating worsen, to call and let us know. Nutritional supplements with protein encouraged as she is able to tolerate. Hydration encouraged Encouraged to call for any concerns or questions Greater than 40 minutes spent with pt with greater than 50% spent in face to face counseling.   
Kathy Martinez, MARINA

## 2018-09-13 ENCOUNTER — APPOINTMENT (OUTPATIENT)
Dept: CT IMAGING | Age: 58
End: 2018-09-13
Attending: EMERGENCY MEDICINE
Payer: MEDICARE

## 2018-09-13 ENCOUNTER — HOSPITAL ENCOUNTER (EMERGENCY)
Age: 58
Discharge: HOME | End: 2018-09-13
Attending: EMERGENCY MEDICINE
Payer: MEDICARE

## 2018-09-13 VITALS
HEIGHT: 67 IN | RESPIRATION RATE: 20 BRPM | WEIGHT: 210.1 LBS | TEMPERATURE: 98.6 F | DIASTOLIC BLOOD PRESSURE: 86 MMHG | SYSTOLIC BLOOD PRESSURE: 199 MMHG | HEART RATE: 88 BPM | OXYGEN SATURATION: 96 % | BODY MASS INDEX: 32.98 KG/M2

## 2018-09-13 DIAGNOSIS — M54.9 BACK PAIN WITHOUT RADIATION: Primary | ICD-10-CM

## 2018-09-13 DIAGNOSIS — R11.2 NON-INTRACTABLE VOMITING WITH NAUSEA, UNSPECIFIED VOMITING TYPE: ICD-10-CM

## 2018-09-13 LAB
ALBUMIN SERPL-MCNC: 3.8 G/DL (ref 3.5–5)
ALBUMIN/GLOB SERPL: 0.7 {RATIO} (ref 1.1–2.2)
ALP SERPL-CCNC: 105 U/L (ref 45–117)
ALT SERPL-CCNC: 28 U/L (ref 12–78)
ANION GAP SERPL CALC-SCNC: 11 MMOL/L (ref 5–15)
APPEARANCE UR: CLEAR
AST SERPL-CCNC: 24 U/L (ref 15–37)
BACTERIA URNS QL MICRO: NEGATIVE /HPF
BASOPHILS # BLD: 0.1 K/UL (ref 0–0.1)
BASOPHILS NFR BLD: 1 % (ref 0–1)
BILIRUB SERPL-MCNC: 0.4 MG/DL (ref 0.2–1)
BILIRUB UR QL: NEGATIVE
BUN SERPL-MCNC: 12 MG/DL (ref 6–20)
BUN/CREAT SERPL: 13 (ref 12–20)
CALCIUM SERPL-MCNC: 9.5 MG/DL (ref 8.5–10.1)
CHLORIDE SERPL-SCNC: 101 MMOL/L (ref 97–108)
CO2 SERPL-SCNC: 23 MMOL/L (ref 21–32)
COLOR UR: ABNORMAL
COMMENT, HOLDF: NORMAL
CREAT SERPL-MCNC: 0.9 MG/DL (ref 0.55–1.02)
DIFFERENTIAL METHOD BLD: ABNORMAL
EOSINOPHIL # BLD: 0 K/UL (ref 0–0.4)
EOSINOPHIL NFR BLD: 0 % (ref 0–7)
EPITH CASTS URNS QL MICRO: ABNORMAL /LPF
ERYTHROCYTE [DISTWIDTH] IN BLOOD BY AUTOMATED COUNT: 13.2 % (ref 11.5–14.5)
GLOBULIN SER CALC-MCNC: 5.5 G/DL (ref 2–4)
GLUCOSE SERPL-MCNC: 125 MG/DL (ref 65–100)
GLUCOSE UR STRIP.AUTO-MCNC: NEGATIVE MG/DL
HCT VFR BLD AUTO: 40.9 % (ref 35–47)
HGB BLD-MCNC: 13.5 G/DL (ref 11.5–16)
HGB UR QL STRIP: ABNORMAL
HYALINE CASTS URNS QL MICRO: ABNORMAL /LPF (ref 0–5)
IMM GRANULOCYTES # BLD: 0 K/UL (ref 0–0.04)
IMM GRANULOCYTES NFR BLD AUTO: 0 % (ref 0–0.5)
KETONES UR QL STRIP.AUTO: ABNORMAL MG/DL
LEUKOCYTE ESTERASE UR QL STRIP.AUTO: NEGATIVE
LIPASE SERPL-CCNC: 309 U/L (ref 73–393)
LYMPHOCYTES # BLD: 1.7 K/UL (ref 0.8–3.5)
LYMPHOCYTES NFR BLD: 22 % (ref 12–49)
MCH RBC QN AUTO: 31 PG (ref 26–34)
MCHC RBC AUTO-ENTMCNC: 33 G/DL (ref 30–36.5)
MCV RBC AUTO: 93.8 FL (ref 80–99)
MONOCYTES # BLD: 1.2 K/UL (ref 0–1)
MONOCYTES NFR BLD: 15 % (ref 5–13)
NEUTS SEG # BLD: 4.9 K/UL (ref 1.8–8)
NEUTS SEG NFR BLD: 63 % (ref 32–75)
NITRITE UR QL STRIP.AUTO: NEGATIVE
NRBC # BLD: 0 K/UL (ref 0–0.01)
NRBC BLD-RTO: 0 PER 100 WBC
PH UR STRIP: 6 [PH] (ref 5–8)
PLATELET # BLD AUTO: 256 K/UL (ref 150–400)
PMV BLD AUTO: 10.6 FL (ref 8.9–12.9)
POTASSIUM SERPL-SCNC: 3.7 MMOL/L (ref 3.5–5.1)
PROT SERPL-MCNC: 9.3 G/DL (ref 6.4–8.2)
PROT UR STRIP-MCNC: NEGATIVE MG/DL
RBC # BLD AUTO: 4.36 M/UL (ref 3.8–5.2)
RBC #/AREA URNS HPF: ABNORMAL /HPF (ref 0–5)
SAMPLES BEING HELD,HOLD: NORMAL
SODIUM SERPL-SCNC: 135 MMOL/L (ref 136–145)
SP GR UR REFRACTOMETRY: <1.005 (ref 1–1.03)
UR CULT HOLD, URHOLD: NORMAL
UROBILINOGEN UR QL STRIP.AUTO: 0.2 EU/DL (ref 0.2–1)
WBC # BLD AUTO: 7.8 K/UL (ref 3.6–11)
WBC URNS QL MICRO: ABNORMAL /HPF (ref 0–4)

## 2018-09-13 PROCEDURE — 96374 THER/PROPH/DIAG INJ IV PUSH: CPT

## 2018-09-13 PROCEDURE — 96375 TX/PRO/DX INJ NEW DRUG ADDON: CPT

## 2018-09-13 PROCEDURE — 96376 TX/PRO/DX INJ SAME DRUG ADON: CPT

## 2018-09-13 PROCEDURE — 74011250636 HC RX REV CODE- 250/636: Performed by: EMERGENCY MEDICINE

## 2018-09-13 PROCEDURE — 36415 COLL VENOUS BLD VENIPUNCTURE: CPT | Performed by: EMERGENCY MEDICINE

## 2018-09-13 PROCEDURE — 85025 COMPLETE CBC W/AUTO DIFF WBC: CPT | Performed by: EMERGENCY MEDICINE

## 2018-09-13 PROCEDURE — 74011000258 HC RX REV CODE- 258: Performed by: EMERGENCY MEDICINE

## 2018-09-13 PROCEDURE — 83690 ASSAY OF LIPASE: CPT | Performed by: EMERGENCY MEDICINE

## 2018-09-13 PROCEDURE — 72131 CT LUMBAR SPINE W/O DYE: CPT

## 2018-09-13 PROCEDURE — 99285 EMERGENCY DEPT VISIT HI MDM: CPT

## 2018-09-13 PROCEDURE — 74011636320 HC RX REV CODE- 636/320: Performed by: EMERGENCY MEDICINE

## 2018-09-13 PROCEDURE — 74177 CT ABD & PELVIS W/CONTRAST: CPT

## 2018-09-13 PROCEDURE — 81001 URINALYSIS AUTO W/SCOPE: CPT | Performed by: EMERGENCY MEDICINE

## 2018-09-13 PROCEDURE — 80053 COMPREHEN METABOLIC PANEL: CPT | Performed by: EMERGENCY MEDICINE

## 2018-09-13 RX ORDER — FENTANYL CITRATE 50 UG/ML
50 INJECTION, SOLUTION INTRAMUSCULAR; INTRAVENOUS
Status: COMPLETED | OUTPATIENT
Start: 2018-09-13 | End: 2018-09-13

## 2018-09-13 RX ORDER — SODIUM CHLORIDE 0.9 % (FLUSH) 0.9 %
10 SYRINGE (ML) INJECTION
Status: COMPLETED | OUTPATIENT
Start: 2018-09-13 | End: 2018-09-13

## 2018-09-13 RX ORDER — ONDANSETRON 4 MG/1
4 TABLET, ORALLY DISINTEGRATING ORAL
Qty: 10 TAB | Refills: 0 | Status: SHIPPED | OUTPATIENT
Start: 2018-09-13 | End: 2019-01-01 | Stop reason: SDUPTHER

## 2018-09-13 RX ORDER — ONDANSETRON 2 MG/ML
4 INJECTION INTRAMUSCULAR; INTRAVENOUS
Status: COMPLETED | OUTPATIENT
Start: 2018-09-13 | End: 2018-09-13

## 2018-09-13 RX ORDER — HYDROMORPHONE HYDROCHLORIDE 2 MG/1
2 TABLET ORAL
Qty: 20 TAB | Refills: 0 | Status: SHIPPED | OUTPATIENT
Start: 2018-09-13 | End: 2019-01-01 | Stop reason: SDUPTHER

## 2018-09-13 RX ADMIN — ONDANSETRON 4 MG: 2 INJECTION INTRAMUSCULAR; INTRAVENOUS at 11:01

## 2018-09-13 RX ADMIN — FENTANYL CITRATE 50 MCG: 50 INJECTION, SOLUTION INTRAMUSCULAR; INTRAVENOUS at 08:07

## 2018-09-13 RX ADMIN — Medication 10 ML: at 09:49

## 2018-09-13 RX ADMIN — SODIUM CHLORIDE 100 ML: 900 INJECTION, SOLUTION INTRAVENOUS at 09:49

## 2018-09-13 RX ADMIN — IOPAMIDOL 100 ML: 755 INJECTION, SOLUTION INTRAVENOUS at 09:49

## 2018-09-13 RX ADMIN — FENTANYL CITRATE 50 MCG: 50 INJECTION, SOLUTION INTRAMUSCULAR; INTRAVENOUS at 11:03

## 2018-09-13 NOTE — DISCHARGE INSTRUCTIONS
Back Pain: Care Instructions  Your Care Instructions    Back pain has many possible causes. It is often related to problems with muscles and ligaments of the back. It may also be related to problems with the nerves, discs, or bones of the back. Moving, lifting, standing, sitting, or sleeping in an awkward way can strain the back. Sometimes you don't notice the injury until later. Arthritis is another common cause of back pain. Although it may hurt a lot, back pain usually improves on its own within several weeks. Most people recover in 12 weeks or less. Using good home treatment and being careful not to stress your back can help you feel better sooner. Follow-up care is a key part of your treatment and safety. Be sure to make and go to all appointments, and call your doctor if you are having problems. It's also a good idea to know your test results and keep a list of the medicines you take. How can you care for yourself at home? · Sit or lie in positions that are most comfortable and reduce your pain. Try one of these positions when you lie down:  ¨ Lie on your back with your knees bent and supported by large pillows. ¨ Lie on the floor with your legs on the seat of a sofa or chair. Clevester Sanes on your side with your knees and hips bent and a pillow between your legs. ¨ Lie on your stomach if it does not make pain worse. · Do not sit up in bed, and avoid soft couches and twisted positions. Bed rest can help relieve pain at first, but it delays healing. Avoid bed rest after the first day of back pain. · Change positions every 30 minutes. If you must sit for long periods of time, take breaks from sitting. Get up and walk around, or lie in a comfortable position. · Try using a heating pad on a low or medium setting for 15 to 20 minutes every 2 or 3 hours. Try a warm shower in place of one session with the heating pad. · You can also try an ice pack for 10 to 15 minutes every 2 to 3 hours.  Put a thin cloth between the ice pack and your skin. · Take pain medicines exactly as directed. ¨ If the doctor gave you a prescription medicine for pain, take it as prescribed. ¨ If you are not taking a prescription pain medicine, ask your doctor if you can take an over-the-counter medicine. · Take short walks several times a day. You can start with 5 to 10 minutes, 3 or 4 times a day, and work up to longer walks. Walk on level surfaces and avoid hills and stairs until your back is better. · Return to work and other activities as soon as you can. Continued rest without activity is usually not good for your back. · To prevent future back pain, do exercises to stretch and strengthen your back and stomach. Learn how to use good posture, safe lifting techniques, and proper body mechanics. When should you call for help? Call your doctor now or seek immediate medical care if:    · You have new or worsening numbness in your legs.     · You have new or worsening weakness in your legs. (This could make it hard to stand up.)     · You lose control of your bladder or bowels.    Watch closely for changes in your health, and be sure to contact your doctor if:    · You have a fever, lose weight, or don't feel well.     · You do not get better as expected. Where can you learn more? Go to http://francis-lima.info/. Enter M224 in the search box to learn more about \"Back Pain: Care Instructions. \"  Current as of: November 29, 2017  Content Version: 11.7  © 4454-0353 [employer]. Care instructions adapted under license by [employer] (which disclaims liability or warranty for this information). If you have questions about a medical condition or this instruction, always ask your healthcare professional. [place] [address] any warranty or liability for your use of this information.

## 2018-09-13 NOTE — ED NOTES
Bedside and Verbal shift change report given to Juancarlos Najera RN (oncoming nurse) by Mariaelena Melo RN (offgoing nurse). Report included the following information SBAR, ED Summary, MAR and Recent Results.

## 2018-09-13 NOTE — ED NOTES
Verbal shift change report given to Armand Chaudhari (oncoming nurse) by Lotus Anderson (offgoing nurse). Report included the following information SBAR and ED Summary.

## 2018-09-13 NOTE — ED TRIAGE NOTES
Triage note: Patient c/o lower back pain and RLQ abdominal pain accompanied by vomiting beginning on Monday. Patient is currently receiving chemo for ovarian CA, last chemo infusion on Tuesday.

## 2018-09-13 NOTE — ED PROVIDER NOTES
Patient is a 60 y.o. female presenting with back pain, vomiting, and abdominal pain. Back Pain Associated symptoms include abdominal pain. Vomiting Associated symptoms include abdominal pain. Abdominal Pain Associated symptoms include vomiting and back pain. History of present illness: Patient presents with right greater than left sacral area pain for the last 3 or 4 days. Once in a while it radiates down the right proximal leg and buttocks. Pain is constant except for brief episodes of relief. Pain is worse with lying down. Pain is throbbing. Patient has some frequency but no dysuria. Patient is walking okay. She's had some brief shivering episodes. He has vomited 3-4 times. She had some brief right flank pain. Patient has stopped eating. She's had some degenerative joint disease in her lower back. Patient gets chemotherapy every 2 weeks for her ovarian cancer. So far she has not had bone metastases. Past Medical History:  
Diagnosis Date  Arthritis  Cancer [facility]) 2014  
 uterine  Chronic pain LOWER BACK  Depression  Nausea & vomiting  PMB (postmenopausal bleeding) 4/2014 Past Surgical History:  
Procedure Laterality Date  HX COLONOSCOPY    
 HX GYN  8/22/14 TLH/BSO/lymph node dissection  HX ORTHOPAEDIC Right [id number] LITTLE toe sx  HX VASCULAR ACCESS Left 2014 PORTACATH - LEFT CHEST Family History:  
Problem Relation Age of Onset  Cancer Mother 61  
  liver metastatic disease, unknown primary  Stroke Mother BRAIN ANEURYSM  
 Cancer Maternal Uncle 60  
  bone  Cancer Maternal Grandmother 61  
  unknown primary  Anesth Problems Neg Hx Social History Social History  Marital status:  Spouse name: N/A  
 Number of children: N/A  
 Years of education: N/A Occupational History  Not on file. Social History Main Topics  Smoking status: Former Smoker Packs/day: 0.50   Years: 7.00  
 Types: Cigarettes Quit date: 8/19/1999  Smokeless tobacco: Never Used  Alcohol use 0.6 oz/week 1 Glasses of wine per week Comment: rarely  Drug use: No  
 Sexual activity: No  
 
Other Topics Concern  Not on file Social History Narrative ALLERGIES: Hydrocodone and Oxycodone Review of Systems Gastrointestinal: Positive for abdominal pain and vomiting. Musculoskeletal: Positive for back pain. All other systems reviewed and are negative. Vitals:  
 09/13/18 0930 09/13/18 1052 09/13/18 1115 09/13/18 1145 BP: 172/83 154/47 158/66 156/68 Pulse:      
Resp:      
Temp:      
SpO2: 95%  98% 94% Weight:      
Height:      
      
 
Physical Exam  
Constitutional: She appears well-developed and well-nourished. HENT:  
Head: Normocephalic and atraumatic. Mouth/Throat: Oropharynx is clear and moist.  
Eyes: EOM are normal. Pupils are equal, round, and reactive to light. Neck: Normal range of motion. Neck supple. Cardiovascular: Normal rate, regular rhythm, normal heart sounds and intact distal pulses. Exam reveals no gallop and no friction rub. No murmur heard. Pulmonary/Chest: Effort normal. No respiratory distress. She has no wheezes. She has no rales. L gina cath Abdominal: Soft. There is no tenderness. There is no rebound. Musculoskeletal: Normal range of motion. She exhibits no tenderness. Neurological: She is alert. No cranial nerve deficit. Motor; symmetric Skin: No erythema. Psychiatric: She has a normal mood and affect. Her behavior is normal.  
Nursing note and vitals reviewed. [facility] 
 
 
ED Course Procedures Note: Patient reports having ovarian cancer; she gets chemotherapy infusion every 2 weeks; patient has a lot of pain around her sacrum; I thought she would have bony metastasis but CT scan of the lumbar sacral spine does not show bone metastases; patient has been vomiting her oxycodone; she thinks she may have food poisoning; patient is having a hard time holding down Phenergan; she will be given sublingual Zofran; oxycodone is not controlling her pain; she will be given some Dilaudid p.o. to see if that works better; 
Mohsen Garcia MD 
12:15 PM

## 2018-09-13 NOTE — ED NOTES
Verbal shift change report given to Jocelyn (oncoming nurse) by Alfred Nugent (offgoing nurse). Report included the following information SBAR, ED Summary and MAR. Patient reminded of necessity of urine specimen.

## 2018-09-13 NOTE — ED NOTES
Patient discharged home by Dr. Gera Rocha. Prescriptions and discharge instructions given. Patient verbalized understanding of discharge instructions.

## 2018-09-24 ENCOUNTER — TELEPHONE (OUTPATIENT)
Dept: GYNECOLOGY | Age: 58
End: 2018-09-24

## 2018-09-24 NOTE — TELEPHONE ENCOUNTER
I cancelled D15 Avastin with Ronnie at [facility], changed orders in [place], and spoke with pt, and she states she is having back pain from a bulging disc, and will see her back doctor. She will resume schedule on 10/4/18 with pre chemo appt with Dr. Renuka Perkins.

## 2018-09-24 NOTE — TELEPHONE ENCOUNTER
Patient called to let you know that she is not going to come to her chemo appt tomorrow due to her lower back pain.

## 2018-09-25 ENCOUNTER — HOSPITAL ENCOUNTER (OUTPATIENT)
Dept: INFUSION THERAPY | Age: 58
End: 2018-09-25
Payer: MEDICARE

## 2018-10-04 DIAGNOSIS — C54.1 ENDOMETRIAL CANCER (HCC): Primary | ICD-10-CM

## 2018-10-04 NOTE — PROGRESS NOTES
Labs ordered to be drawn 10/4/18 to be used for chemo on 10/9/18 per vo Dr. Vineet Grijalva today at 4125.

## 2018-10-05 ENCOUNTER — OFFICE VISIT (OUTPATIENT)
Dept: GYNECOLOGY | Age: 58
End: 2018-10-05

## 2018-10-05 VITALS
DIASTOLIC BLOOD PRESSURE: 91 MMHG | HEART RATE: 96 BPM | SYSTOLIC BLOOD PRESSURE: 155 MMHG | WEIGHT: 208 LBS | BODY MASS INDEX: 32.65 KG/M2 | HEIGHT: 67 IN

## 2018-10-05 DIAGNOSIS — C54.1 ENDOMETRIAL CANCER (HCC): Primary | ICD-10-CM

## 2018-10-05 NOTE — PROGRESS NOTES
Pre chemo, labs today and for C3D1 doxil/avastin on 10/9. This cycle was re-scheduled from 9/25 d/t ER visit for back, abdominal pain and vomiting. Pt states she is feeling much better. She would like to discuss changing her treatment. She has an appointment with a neurologist to evaluate the back pain.

## 2018-10-05 NOTE — PROGRESS NOTES
[facility] GYNECOLOGIC ONCOLOGY  [address] [facility], [address], [address]  [phone] [facility] [phone]    Office Visit    Patient ID:  Name: Luisana Muhammad  MRN: [id number]  : 1960/58 y.o. Visit date: 10/5/2018    Fariba Delgado MD: Ton Villavicencio MD    Primary Diagnosis:     ICD-10-CM ICD-9-CM    1. Endometrial cancer ([facility] [address] C54.1 182.0         Problem List:    Patient Active Problem List   Diagnosis Code    Endometrial cancer ([facility] [address] C54.1    Early satiety R68.81    Abdominal bloating R14.0    Obesity (BMI 30.0-34. 9) E66.9    Right upper quadrant abdominal pain R10.11       INTERVAL HISTORY:  Luisana Muhammad is a  female with a diagnosis of stage IV UPSC. She underwent a TLH, BSO, with lymphadenectomy in 2014. She was found to have evidence of intraperitoneal disease with implants in the cul de sac, but no evidence of tyler metastases. She was treated with adjuvant chemotherapy with Taxol and Carboplatin (5 cycles d/t thrombocytopenia). She then received pelvic radiation therapy, completed 2015. In the summer of  she was noted to have an elevated CA-125. She had not been seen in the office for a couple of years prior to that visit. A CT of the chest/abdomen/pelvis demonstrated peritoneal carcinomatosis, confirmed on PET/CT showing predominate disease in LUQ. She is recommended combination chemotherapy with Doxil/Avastin. During her second cycle day 15 was cancelled d/t lumbar disc herniation, neuropathy and generalized burning/acid feeling patient described by patient. She cancelled and is refusing add'l Avastin. She did note 2 days of rectal bleeding last month she felt d/t hemorrhoids after an episode of constipation from her back pain and immobilization. She is scheduled to see a back surgeon to discuss PT and therapy. She otherwise is well, notes fatigue and abdominal discomfort. +reflux that resolved since stoppin.   No dermal/oral lesions, epistaxis, bruising, edema, skin changes. PMH:  Past Medical History:   Diagnosis Date    Arthritis     Cancer ([address] 2014    uterine    Chronic pain     LOWER BACK    Depression     Nausea & vomiting     PMB (postmenopausal bleeding) 4/2014     PSH:  Past Surgical History:   Procedure Laterality Date    HX COLONOSCOPY      HX GYN  8/22/14    TLH/BSO/lymph node dissection    HX ORTHOPAEDIC Right 1982     LITTLE toe sx    HX VASCULAR ACCESS Left 2014    PORTACATH - LEFT CHEST     SOC:  Social History     Social History    Marital status:      Spouse name: N/A    Number of children: N/A    Years of education: N/A     Occupational History    Not on file. Social History Main Topics    Smoking status: Former Smoker     Packs/day: 0.50     Years: 7.00     Types: Cigarettes     Quit date: 8/19/1999    Smokeless tobacco: Never Used    Alcohol use 0.6 oz/week     1 Glasses of wine per week      Comment: rarely    Drug use: No    Sexual activity: No     Other Topics Concern    Not on file     Social History Narrative     Family History  Family History   Problem Relation Age of Onset    Cancer Mother 61     liver metastatic disease, unknown primary    Stroke Mother      BRAIN ANEURYSM    Cancer Maternal Uncle 61     bone    Cancer Maternal Grandmother 61     unknown primary    Anesth Problems Neg Hx      Medications: (reviewed)  Current Outpatient Prescriptions on File Prior to Visit   Medication Sig Dispense Refill    HYDROmorphone (DILAUDID) 2 mg tablet Take 1 Tab by mouth every four (4) hours as needed for Pain. Max Daily Amount: 12 mg. 20 Tab 0    promethazine (PHENERGAN) 25 mg tablet Take 1 Tab by mouth every six (6) hours as needed for Nausea. 30 Tab 3    ibuprofen (ADVIL) 200 mg tablet Take 4 tablets by mouth every eight (8) hours as needed. 90 tablet 3    ondansetron (ZOFRAN ODT) 4 mg disintegrating tablet Take 1 Tab by mouth every eight (8) hours as needed for Nausea.  10 Tab 0    oxyCODONE-acetaminophen (PERCOCET) 5-325 mg per tablet Take 1 Tab by mouth every four (4) hours as needed for Pain. Max Daily Amount: 6 Tabs. 20 Tab 0     No current facility-administered medications on file prior to visit. Allergies: (reviewed)  Allergies   Allergen Reactions    Hydrocodone Nausea Only    Oxycodone Nausea and Vomiting       ROS:  Negative except as per above    OBJECTIVE:    PHYSICAL EXAM  VITAL SIGNS: Vitals:    10/05/18 1315   BP: (!) 155/91   Pulse: 96   Weight: 208 lb (94.3 kg)   Height: 5' 7.01\" (1.702 m)     Body mass index is 32.57 kg/(m^2). GENERAL FLOWER: Conversant, alert, oriented. NAD   HEENT: Normocephalic. Oropharynx clear. Neck: Trachea midline, no JVD   RESPIRATORY: Lung fields clear to auscultation and percussion. Adequate respiratory effort   CARDIOVASC: RRR without murmur   GASTROINT: soft, non-tender, without masses or organomegaly. No hernia noted   MUSCULOSKEL: FROM. No focal tenderness. EXTREMITIES: extremities normal, atraumatic, no cyanosis or edema. Pulses appreciated. PELVIC: Deferred   RECTAL: Deferred   DOMINGUEZ SURVEY: Negative throughout---neck, inguina. NEURO: Grossly intact, no acute deficit. Oriented. Not depressed. Not agitated. CT of the chest/abdomen/pelvis (7/6/18)  THYROID: No nodule. MEDIASTINUM: Mediastinal lymph nodes are stable.     There is a left diaphragmatic lymph node measuring 1.7 cm which has increased in  size.     DELORES: No mass or lymphadenopathy. THORACIC AORTA: No dissection or aneurysm. MAIN PULMONARY ARTERY: Normal in caliber. TRACHEA/BRONCHI: Patent. ESOPHAGUS: No wall thickening or dilatation. HEART: Normal in size. PLEURA: No effusion or pneumothorax. LUNGS: No nodule, mass, or airspace disease. LIVER: 1.5 cm cyst in the liver is stable. GALLBLADDER: Unremarkable.   SPLEEN: There is a mass in the splenic hilus infiltrating the spleen measuring  5.6 x 2 cm and there is an additional mass in the lower pole of the spleen  measuring 2.7 x 1.9 cm and these are new.     There are peritoneal implants in the left upper quadrant with the largest  measuring 3.6 x 1.6 cm.      There is a 0.9 cm implant along the splenic flexure.      There is a 1.9 cm implant gastrocolic ligament.     There is a 1.9 cm nodule posterior to the body of the pancreas which is  unchanged.     PANCREAS: No mass or ductal dilatation. ADRENALS: 3.7 x 2 cm low-density nodule in the right adrenal gland is stable. 1.1 cm nodule left adrenal gland is stable. KIDNEYS: No mass, calculus, or hydronephrosis. There is a small cyst in the left  kidney  STOMACH: Unremarkable. SMALL BOWEL: No dilatation or wall thickening. COLON: No dilatation or wall thickening. APPENDIX: Unremarkable. PERITONEUM: No ascites or pneumoperitoneum. RETROPERITONEUM: No aortic aneurysm is identified. REPRODUCTIVE ORGANS: Patient status post hysterectomy and bilateral  salpingo-oophorectomy.     There is 1.3 cm left pelvic sidewall nodule. URINARY BLADDER: No mass or calculus. BONES: No destructive bone lesion. ADDITIONAL COMMENTS: N/A     IMPRESSION:  1. CT of the chest demonstrates a 1.7 cm left diaphragmatic lymph node which has  increased in size. 2. CT of the abdomen and pelvis demonstrates a mass in the splenic hilus which  is infiltrating the spleen. There is also peritoneal implants in the left upper  quadrant. There is a peritoneal implant splenic flexure and gastrocolic ligament  and these are new findings. There is a 1.4 cm left pelvic sidewall nodule which is new. These findings are suspicious for current/metastatic disease. 3. Bilateral adrenal nodules are stable. Nodule posterior to this pancreatic  body is stable. PET/CT (7/17/18)  HEAD/NECK: No apparent foci of abnormal hypermetabolism.  Cerebral evaluation is  limited by normal intense activity.     CHEST: No foci of abnormal hypermetabolism.     ABDOMEN/PELVIS: There is significant increased tracer activity in the splenic  hilum and in the perisplenic region. Hypermetabolic left cardiophrenic soft  tissue nodule is noted. There are small bilateral hypermetabolic internal iliac  lymph nodes and a hypermetabolic left obturator lymph node.     SKELETON: No foci of abnormal hypermetabolism in the axial and visualized  appendicular skeleton.     IMPRESSION: Hypermetabolic peritoneal carcinomatosis predominantly in the left  upper quadrant. Small but hypermetabolic bilateral internal iliac and left  obturator lymph nodes.         IMPRESSION AND PLAN:  Violet Adam has a history of stage IV uterine papillary serous carcinoma s/p surgical resection and adjuvant Taxol and Carboplatin for 5/6 cycles d/t thrombocytopenia. She then received pelvic radiation therapy completed 4/2015. Her CA-125 is now elevated and her CT/PET suggested metastatic disease. She is not a candidate for surgery based on assessment. She is recommended doxil/avastin and has completed 1.5cycles, refusing further avastin. She will continue with Doxil as anticipated. Further treatment options TBD on next visit with Dr. Leni Espino toxicity monitoring and care.   Labs prechemo provided      PHILIP Brunson  10/5/2018/2:54 PM

## 2018-10-06 LAB
ALBUMIN SERPL-MCNC: 4.5 G/DL (ref 3.5–5.5)
ALBUMIN/GLOB SERPL: 1.5 {RATIO} (ref 1.2–2.2)
ALP SERPL-CCNC: 82 IU/L (ref 39–117)
ALT SERPL-CCNC: 23 IU/L (ref 0–32)
APPEARANCE UR: CLEAR
AST SERPL-CCNC: 18 IU/L (ref 0–40)
BASOPHILS # BLD AUTO: 0 X10E3/UL (ref 0–0.2)
BASOPHILS NFR BLD AUTO: 1 %
BILIRUB SERPL-MCNC: 0.3 MG/DL (ref 0–1.2)
BILIRUB UR QL STRIP: NEGATIVE
BUN SERPL-MCNC: 12 MG/DL (ref 6–24)
BUN/CREAT SERPL: 14 (ref 9–23)
CALCIUM SERPL-MCNC: 9.5 MG/DL (ref 8.7–10.2)
CANCER AG125 SERPL-ACNC: 117.6 U/ML (ref 0–38.1)
CHLORIDE SERPL-SCNC: 98 MMOL/L (ref 96–106)
CO2 SERPL-SCNC: 25 MMOL/L (ref 20–29)
COLOR UR: YELLOW
CREAT SERPL-MCNC: 0.86 MG/DL (ref 0.57–1)
EOSINOPHIL # BLD AUTO: 0.2 X10E3/UL (ref 0–0.4)
EOSINOPHIL NFR BLD AUTO: 3 %
ERYTHROCYTE [DISTWIDTH] IN BLOOD BY AUTOMATED COUNT: 15.2 % (ref 12.3–15.4)
GLOBULIN SER CALC-MCNC: 3.1 G/DL (ref 1.5–4.5)
GLUCOSE SERPL-MCNC: 103 MG/DL (ref 65–99)
GLUCOSE UR QL: NEGATIVE
HCT VFR BLD AUTO: 35.8 % (ref 34–46.6)
HGB BLD-MCNC: 12.2 G/DL (ref 11.1–15.9)
HGB UR QL STRIP: NEGATIVE
IMM GRANULOCYTES # BLD: 0 X10E3/UL (ref 0–0.1)
IMM GRANULOCYTES NFR BLD: 0 %
KETONES UR QL STRIP: ABNORMAL
LEUKOCYTE ESTERASE UR QL STRIP: NEGATIVE
LYMPHOCYTES # BLD AUTO: 1.9 X10E3/UL (ref 0.7–3.1)
LYMPHOCYTES NFR BLD AUTO: 34 %
MAGNESIUM SERPL-MCNC: 2 MG/DL (ref 1.6–2.3)
MCH RBC QN AUTO: 31 PG (ref 26.6–33)
MCHC RBC AUTO-ENTMCNC: 34.1 G/DL (ref 31.5–35.7)
MCV RBC AUTO: 91 FL (ref 79–97)
MICRO URNS: ABNORMAL
MONOCYTES # BLD AUTO: 1.1 X10E3/UL (ref 0.1–0.9)
MONOCYTES NFR BLD AUTO: 20 %
MORPHOLOGY BLD-IMP: ABNORMAL
NEUTROPHILS # BLD AUTO: 2.3 X10E3/UL (ref 1.4–7)
NEUTROPHILS NFR BLD AUTO: 42 %
NITRITE UR QL STRIP: NEGATIVE
PH UR STRIP: 5.5 [PH] (ref 5–7.5)
PLATELET # BLD AUTO: 309 X10E3/UL (ref 150–379)
POTASSIUM SERPL-SCNC: 4.5 MMOL/L (ref 3.5–5.2)
PROT SERPL-MCNC: 7.6 G/DL (ref 6–8.5)
PROT UR QL STRIP: ABNORMAL
RBC # BLD AUTO: 3.93 X10E6/UL (ref 3.77–5.28)
SODIUM SERPL-SCNC: 138 MMOL/L (ref 134–144)
SP GR UR: 1.03 (ref 1–1.03)
UROBILINOGEN UR STRIP-MCNC: 0.2 MG/DL (ref 0.2–1)
WBC # BLD AUTO: 5.5 X10E3/UL (ref 3.4–10.8)

## 2018-10-08 ENCOUNTER — TELEPHONE (OUTPATIENT)
Dept: GYNECOLOGY | Age: 58
End: 2018-10-08

## 2018-10-09 ENCOUNTER — HOSPITAL ENCOUNTER (OUTPATIENT)
Dept: INFUSION THERAPY | Age: 58
Discharge: HOME | End: 2018-10-09
Payer: COMMERCIAL

## 2018-10-09 VITALS
TEMPERATURE: 97.5 F | BODY MASS INDEX: 32.77 KG/M2 | HEIGHT: 67 IN | WEIGHT: 208.8 LBS | HEART RATE: 90 BPM | RESPIRATION RATE: 16 BRPM | DIASTOLIC BLOOD PRESSURE: 86 MMHG | SYSTOLIC BLOOD PRESSURE: 136 MMHG | OXYGEN SATURATION: 99 %

## 2018-10-09 DIAGNOSIS — C54.1 ENDOMETRIAL CANCER (HCC): ICD-10-CM

## 2018-10-09 LAB
ALBUMIN SERPL-MCNC: 3.2 G/DL (ref 3.5–5)
ALBUMIN/GLOB SERPL: 0.7 {RATIO} (ref 1.1–2.2)
ALP SERPL-CCNC: 89 U/L (ref 45–117)
ALT SERPL-CCNC: 22 U/L (ref 12–78)
ANION GAP SERPL CALC-SCNC: 8 MMOL/L (ref 5–15)
APPEARANCE UR: CLEAR
AST SERPL-CCNC: 16 U/L (ref 15–37)
BACTERIA URNS QL MICRO: ABNORMAL /HPF
BASOPHILS # BLD: 0 K/UL (ref 0–0.1)
BASOPHILS NFR BLD: 1 % (ref 0–1)
BILIRUB SERPL-MCNC: 0.3 MG/DL (ref 0.2–1)
BILIRUB UR QL: NEGATIVE
BUN SERPL-MCNC: 10 MG/DL (ref 6–20)
BUN/CREAT SERPL: 12 (ref 12–20)
CALCIUM SERPL-MCNC: 9.3 MG/DL (ref 8.5–10.1)
CANCER AG125 SERPL-ACNC: 90 U/ML (ref 1.5–35)
CHLORIDE SERPL-SCNC: 105 MMOL/L (ref 97–108)
CO2 SERPL-SCNC: 26 MMOL/L (ref 21–32)
COLOR UR: ABNORMAL
CREAT SERPL-MCNC: 0.81 MG/DL (ref 0.55–1.02)
DIFFERENTIAL METHOD BLD: ABNORMAL
EOSINOPHIL # BLD: 0.1 K/UL (ref 0–0.4)
EOSINOPHIL NFR BLD: 2 % (ref 0–7)
EPITH CASTS URNS QL MICRO: ABNORMAL /LPF
ERYTHROCYTE [DISTWIDTH] IN BLOOD BY AUTOMATED COUNT: 13.8 % (ref 11.5–14.5)
GLOBULIN SER CALC-MCNC: 4.6 G/DL (ref 2–4)
GLUCOSE SERPL-MCNC: 106 MG/DL (ref 65–100)
GLUCOSE UR STRIP.AUTO-MCNC: NEGATIVE MG/DL
HCT VFR BLD AUTO: 35.3 % (ref 35–47)
HGB BLD-MCNC: 11.6 G/DL (ref 11.5–16)
HGB UR QL STRIP: ABNORMAL
IMM GRANULOCYTES # BLD: 0 K/UL (ref 0–0.04)
IMM GRANULOCYTES NFR BLD AUTO: 0 % (ref 0–0.5)
KETONES UR QL STRIP.AUTO: NEGATIVE MG/DL
LEUKOCYTE ESTERASE UR QL STRIP.AUTO: NEGATIVE
LYMPHOCYTES # BLD: 1.5 K/UL (ref 0.8–3.5)
LYMPHOCYTES NFR BLD: 28 % (ref 12–49)
MAGNESIUM SERPL-MCNC: 2 MG/DL (ref 1.6–2.4)
MCH RBC QN AUTO: 31.5 PG (ref 26–34)
MCHC RBC AUTO-ENTMCNC: 32.9 G/DL (ref 30–36.5)
MCV RBC AUTO: 95.9 FL (ref 80–99)
MONOCYTES # BLD: 1 K/UL (ref 0–1)
MONOCYTES NFR BLD: 20 % (ref 5–13)
NEUTS SEG # BLD: 2.6 K/UL (ref 1.8–8)
NEUTS SEG NFR BLD: 50 % (ref 32–75)
NITRITE UR QL STRIP.AUTO: NEGATIVE
NRBC # BLD: 0 K/UL (ref 0–0.01)
NRBC BLD-RTO: 0 PER 100 WBC
PH UR STRIP: 5.5 [PH] (ref 5–8)
PLATELET # BLD AUTO: 255 K/UL (ref 150–400)
PMV BLD AUTO: 10 FL (ref 8.9–12.9)
POTASSIUM SERPL-SCNC: 4 MMOL/L (ref 3.5–5.1)
PROT SERPL-MCNC: 7.8 G/DL (ref 6.4–8.2)
PROT UR STRIP-MCNC: NEGATIVE MG/DL
RBC # BLD AUTO: 3.68 M/UL (ref 3.8–5.2)
RBC #/AREA URNS HPF: ABNORMAL /HPF (ref 0–5)
SODIUM SERPL-SCNC: 139 MMOL/L (ref 136–145)
SP GR UR REFRACTOMETRY: 1.02 (ref 1–1.03)
TROPONIN I SERPL-MCNC: <0.05 NG/ML
UA: UC IF INDICATED,UAUC: ABNORMAL
UROBILINOGEN UR QL STRIP.AUTO: 0.2 EU/DL (ref 0.2–1)
WBC # BLD AUTO: 5.2 K/UL (ref 3.6–11)
WBC URNS QL MICRO: ABNORMAL /HPF (ref 0–4)
YEAST BUDDING URNS QL: PRESENT

## 2018-10-09 PROCEDURE — 74011000250 HC RX REV CODE- 250: Performed by: OBSTETRICS & GYNECOLOGY

## 2018-10-09 PROCEDURE — 36415 COLL VENOUS BLD VENIPUNCTURE: CPT | Performed by: OBSTETRICS & GYNECOLOGY

## 2018-10-09 PROCEDURE — 96375 TX/PRO/DX INJ NEW DRUG ADDON: CPT

## 2018-10-09 PROCEDURE — 74011250636 HC RX REV CODE- 250/636: Performed by: OBSTETRICS & GYNECOLOGY

## 2018-10-09 PROCEDURE — 83735 ASSAY OF MAGNESIUM: CPT | Performed by: OBSTETRICS & GYNECOLOGY

## 2018-10-09 PROCEDURE — 81001 URINALYSIS AUTO W/SCOPE: CPT | Performed by: OBSTETRICS & GYNECOLOGY

## 2018-10-09 PROCEDURE — 87086 URINE CULTURE/COLONY COUNT: CPT | Performed by: OBSTETRICS & GYNECOLOGY

## 2018-10-09 PROCEDURE — 85025 COMPLETE CBC W/AUTO DIFF WBC: CPT | Performed by: OBSTETRICS & GYNECOLOGY

## 2018-10-09 PROCEDURE — 77030012965 HC NDL HUBR BBMI -A

## 2018-10-09 PROCEDURE — 74011000258 HC RX REV CODE- 258: Performed by: OBSTETRICS & GYNECOLOGY

## 2018-10-09 PROCEDURE — 86304 IMMUNOASSAY TUMOR CA 125: CPT | Performed by: OBSTETRICS & GYNECOLOGY

## 2018-10-09 PROCEDURE — 96413 CHEMO IV INFUSION 1 HR: CPT

## 2018-10-09 PROCEDURE — 84484 ASSAY OF TROPONIN QUANT: CPT | Performed by: OBSTETRICS & GYNECOLOGY

## 2018-10-09 PROCEDURE — 80053 COMPREHEN METABOLIC PANEL: CPT | Performed by: OBSTETRICS & GYNECOLOGY

## 2018-10-09 RX ORDER — DIPHENHYDRAMINE HYDROCHLORIDE 50 MG/ML
50 INJECTION, SOLUTION INTRAMUSCULAR; INTRAVENOUS AS NEEDED
Status: CANCELLED
Start: 2018-10-09

## 2018-10-09 RX ORDER — SODIUM CHLORIDE 0.9 % (FLUSH) 0.9 %
10 SYRINGE (ML) INJECTION AS NEEDED
Status: ACTIVE | OUTPATIENT
Start: 2018-10-09 | End: 2018-10-09

## 2018-10-09 RX ORDER — SODIUM CHLORIDE 9 MG/ML
10 INJECTION INTRAMUSCULAR; INTRAVENOUS; SUBCUTANEOUS AS NEEDED
Status: CANCELLED | OUTPATIENT
Start: 2018-10-09

## 2018-10-09 RX ORDER — EPINEPHRINE 1 MG/ML
0.3 INJECTION, SOLUTION, CONCENTRATE INTRAVENOUS AS NEEDED
Status: CANCELLED | OUTPATIENT
Start: 2018-10-09

## 2018-10-09 RX ORDER — SODIUM CHLORIDE 9 MG/ML
500 INJECTION, SOLUTION INTRAVENOUS CONTINUOUS
Status: DISPENSED | OUTPATIENT
Start: 2018-10-09 | End: 2018-01-01

## 2018-10-09 RX ORDER — SODIUM CHLORIDE 9 MG/ML
10 INJECTION INTRAMUSCULAR; INTRAVENOUS; SUBCUTANEOUS AS NEEDED
Status: DISPENSED | OUTPATIENT
Start: 2018-10-09 | End: 2018-10-09

## 2018-10-09 RX ORDER — HYDROCORTISONE SODIUM SUCCINATE 100 MG/2ML
100 INJECTION, POWDER, FOR SOLUTION INTRAMUSCULAR; INTRAVENOUS AS NEEDED
Status: CANCELLED | OUTPATIENT
Start: 2018-10-09

## 2018-10-09 RX ORDER — SODIUM CHLORIDE 0.9 % (FLUSH) 0.9 %
10 SYRINGE (ML) INJECTION AS NEEDED
Status: CANCELLED
Start: 2018-10-09

## 2018-10-09 RX ORDER — ACETAMINOPHEN 325 MG/1
650 TABLET ORAL AS NEEDED
Status: CANCELLED
Start: 2018-10-09

## 2018-10-09 RX ORDER — ONDANSETRON 2 MG/ML
8 INJECTION INTRAMUSCULAR; INTRAVENOUS ONCE
Status: COMPLETED | OUTPATIENT
Start: 2018-10-09 | End: 2018-10-09

## 2018-10-09 RX ORDER — HEPARIN 100 UNIT/ML
300-500 SYRINGE INTRAVENOUS AS NEEDED
Status: ACTIVE | OUTPATIENT
Start: 2018-10-09 | End: 2018-10-09

## 2018-10-09 RX ORDER — HEPARIN 100 UNIT/ML
300-500 SYRINGE INTRAVENOUS AS NEEDED
Status: CANCELLED
Start: 2018-10-09

## 2018-10-09 RX ORDER — ONDANSETRON 2 MG/ML
8 INJECTION INTRAMUSCULAR; INTRAVENOUS AS NEEDED
Status: CANCELLED | OUTPATIENT
Start: 2018-10-09

## 2018-10-09 RX ORDER — DEXTROSE MONOHYDRATE 50 MG/ML
500 INJECTION, SOLUTION INTRAVENOUS CONTINUOUS
Status: DISPENSED | OUTPATIENT
Start: 2018-10-09 | End: 2018-01-01

## 2018-10-09 RX ORDER — DEXTROSE MONOHYDRATE 50 MG/ML
500 INJECTION, SOLUTION INTRAVENOUS CONTINUOUS
Status: CANCELLED | OUTPATIENT
Start: 2018-10-09

## 2018-10-09 RX ORDER — SODIUM CHLORIDE 9 MG/ML
500 INJECTION, SOLUTION INTRAVENOUS CONTINUOUS
Status: CANCELLED | OUTPATIENT
Start: 2018-10-09

## 2018-10-09 RX ORDER — ALBUTEROL SULFATE 0.83 MG/ML
2.5 SOLUTION RESPIRATORY (INHALATION) AS NEEDED
Status: CANCELLED
Start: 2018-10-09

## 2018-10-09 RX ORDER — ONDANSETRON 2 MG/ML
8 INJECTION INTRAMUSCULAR; INTRAVENOUS ONCE
Status: CANCELLED | OUTPATIENT
Start: 2018-10-09 | End: 2018-10-09

## 2018-10-09 RX ADMIN — DEXTROSE MONOHYDRATE 500 ML: 5 INJECTION, SOLUTION INTRAVENOUS at 12:20

## 2018-10-09 RX ADMIN — DOXORUBICIN HYDROCHLORIDE 86.8 MG: 2 INJECTION, SUSPENSION, LIPOSOMAL INTRAVENOUS at 12:58

## 2018-10-09 RX ADMIN — SODIUM CHLORIDE 500 ML: 900 INJECTION, SOLUTION INTRAVENOUS at 12:11

## 2018-10-09 RX ADMIN — ONDANSETRON HYDROCHLORIDE 8 MG: 2 INJECTION INTRAMUSCULAR; INTRAVENOUS at 12:17

## 2018-10-09 RX ADMIN — Medication 10 ML: at 10:25

## 2018-10-09 RX ADMIN — SODIUM CHLORIDE 10 ML: 9 INJECTION INTRAMUSCULAR; INTRAVENOUS; SUBCUTANEOUS at 10:25

## 2018-10-09 RX ADMIN — Medication 10 ML: at 14:14

## 2018-10-09 RX ADMIN — SODIUM CHLORIDE, PRESERVATIVE FREE 500 UNITS: 5 INJECTION INTRAVENOUS at 14:08

## 2018-10-09 NOTE — PROGRESS NOTES
Problem: Chemotherapy Treatment  Goal: *Chemotherapy regimen followed  Outcome: Progressing Towards Goal  Patient here for C3D1 Doxil

## 2018-10-09 NOTE — PROGRESS NOTES
[facility] Progress Note    Date: 2018    Name: Tim Gaitan    MRN: [id number]         : 1960    Ms. Vicki Mccormack arrived (ambulation) at 1000 and in no distress for C3D1 Doxil. Assessment was completed, no acute issues at this time, no new complaints voiced. LCW port accessed without difficulty with 0.75 silverio needle; + blood return noted, labs drawn and sent. Ms. Odette Norman vitals were reviewed. There were no vitals taken for this visit. Lab results were obtained and reviewed. Recent Results (from the past 12 hour(s))   CBC WITH AUTOMATED DIFF    Collection Time: 10/09/18 10:23 AM   Result Value Ref Range    WBC 5.2 3.6 - 11.0 K/uL    RBC 3.68 (L) 3.80 - 5.20 M/uL    HGB 11.6 11.5 - 16.0 g/dL    HCT 35.3 35.0 - 47.0 %    MCV 95.9 80.0 - 99.0 FL    MCH 31.5 26.0 - 34.0 PG    MCHC 32.9 30.0 - 36.5 g/dL    RDW 13.8 11.5 - 14.5 %    PLATELET 439 805 - 068 K/uL    MPV 10.0 8.9 - 12.9 FL    NRBC 0.0 0  WBC    ABSOLUTE NRBC 0.00 0.00 - 0.01 K/uL    NEUTROPHILS 50 32 - 75 %    LYMPHOCYTES 28 12 - 49 %    MONOCYTES 20 (H) 5 - 13 %    EOSINOPHILS 2 0 - 7 %    BASOPHILS 1 0 - 1 %    IMMATURE GRANULOCYTES 0 0.0 - 0.5 %    ABS. NEUTROPHILS 2.6 1.8 - 8.0 K/UL    ABS. LYMPHOCYTES 1.5 0.8 - 3.5 K/UL    ABS. MONOCYTES 1.0 0.0 - 1.0 K/UL    ABS. EOSINOPHILS 0.1 0.0 - 0.4 K/UL    ABS. BASOPHILS 0.0 0.0 - 0.1 K/UL    ABS. IMM.  GRANS. 0.0 0.00 - 0.04 K/UL    DF AUTOMATED     METABOLIC PANEL, COMPREHENSIVE    Collection Time: 10/09/18 10:23 AM   Result Value Ref Range    Sodium 139 136 - 145 mmol/L    Potassium 4.0 3.5 - 5.1 mmol/L    Chloride 105 97 - 108 mmol/L    CO2 26 21 - 32 mmol/L    Anion gap 8 5 - 15 mmol/L    Glucose 106 (H) 65 - 100 mg/dL    BUN 10 6 - 20 MG/DL    Creatinine 0.81 0.55 - 1.02 MG/DL    BUN/Creatinine ratio 12 12 - 20      GFR est AA >60 >60 ml/min/1.73m2    GFR est non-AA >60 >60 ml/min/1.73m2    Calcium 9.3 8.5 - 10.1 MG/DL    Bilirubin, total 0.3 0.2 - 1.0 MG/DL    ALT (SGPT) 22 12 - 78 U/L    AST (SGOT) 16 15 - 37 U/L    Alk. phosphatase 89 45 - 117 U/L    Protein, total 7.8 6.4 - 8.2 g/dL    Albumin 3.2 (L) 3.5 - 5.0 g/dL    Globulin 4.6 (H) 2.0 - 4.0 g/dL    A-G Ratio 0.7 (L) 1.1 - 2.2     MAGNESIUM    Collection Time: 10/09/18 10:23 AM   Result Value Ref Range    Magnesium 2.0 1.6 - 2.4 mg/dL   URINALYSIS W/ REFLEX CULTURE    Collection Time: 10/09/18 10:23 AM   Result Value Ref Range    Color YELLOW/STRAW      Appearance CLEAR CLEAR      Specific gravity 1.021 1.003 - 1.030      pH (UA) 5.5 5.0 - 8.0      Protein NEGATIVE  NEG mg/dL    Glucose NEGATIVE  NEG mg/dL    Ketone NEGATIVE  NEG mg/dL    Bilirubin NEGATIVE  NEG      Blood SMALL (A) NEG      Urobilinogen 0.2 0.2 - 1.0 EU/dL    Nitrites NEGATIVE  NEG      Leukocyte Esterase NEGATIVE  NEG      WBC 0-4 0 - 4 /hpf    RBC 0-5 0 - 5 /hpf    Epithelial cells MODERATE (A) FEW /lpf    Bacteria 1+ (A) NEG /hpf    UA:UC IF INDICATED URINE CULTURE ORDERED (A) CNI      Budding yeast PRESENT (A) NEG     CANCER ANTIGEN 125    Collection Time: 10/09/18 10:23 AM   Result Value Ref Range    CA-125 90 (H) 1.5 - 35.0 U/mL     Patient labs within parameters for treatment; troponin remains in processing at time of this note. Pre-medications  were administered as ordered and chemotherapy was initiated. Medication:  Zofran IVP  Liposomal-DOXOrubicin IV    Patient port flushed; heparinized; and de-accessed per protocol. Ms. Mirella Burris tolerated treatment well and was discharged from [place] [id number] in stable condition at 1415. She is to return on 11/06/2018 at 10:00am for her next appointment.     Raul Dykes  October 9, 2018

## 2018-10-31 NOTE — PROGRESS NOTES
[address] [facility] [address], [address]  [phone]  MD Hector Edwards MD Kathyanne [place], [place]  Office Visit    Patient ID:  Name: Miracle Zepeda  MRN: [id number]  : 1960/58 y.o. Visit date: 10/31/2018    Primary Diagnosis:     ICD-10-CM ICD-9-CM    1. Endometrial cancer ([address] C54.1 182.0         Problem List:    Patient Active Problem List   Diagnosis Code    Endometrial cancer ([address] C54.1    Early satiety R68.81    Abdominal bloating R14.0    Obesity (BMI 30.0-34. 9) E66.9    Right upper quadrant abdominal pain R10.11       INTERVAL HISTORY:  Miracle Zepeda is a  female with a diagnosis of stage IV UPSC. She underwent a TLH, BSO, with lymphadenectomy in 2014. She was found to have evidence of intraperitoneal disease with implants in the cul de sac, but no evidence of tyler metastases. She was treated with adjuvant chemotherapy with Taxol and Carboplatin. She then received pelvic radiation therapy. In the summer of 2018 she was noted to have an elevated CA-125. She had not been seen in the office for a couple of years prior to that visit. I ordered a CT of the chest/abdomen/pelvis to evaluate for recurrence. It demonstrated peritoneal carcinomatosis. I then sent her for a PET/CT to better evaluate. I recommended combination chemotherapy with Doxil/Avastin. She has completed 3 cycles so far, but is refusing further Avastin after the second cycle. She had a lot of vague complaints at that time. Felt like it was the Avastin causing it. Is doing much better now.        PMH:  Past Medical History:   Diagnosis Date    Arthritis     Cancer ([address]     uterine    Chronic pain     LOWER BACK    Depression     Nausea & vomiting     PMB (postmenopausal bleeding) 2014     PSH:  Past Surgical History:   Procedure Laterality Date    HX COLONOSCOPY      HX GYN  14    TLH/BSO/lymph node dissection    HX ORTHOPAEDIC Right      LITTLE toe sx    HX VASCULAR ACCESS Left     PORTACATH - LEFT CHEST     SOC:  Social History     Socioeconomic History    Marital status:      Spouse name: Not on file    Number of children: Not on file    Years of education: Not on file    Highest education level: Not on file   Social Needs    Financial resource strain: Not on file    Food insecurity - worry: Not on file    Food insecurity - inability: Not on file   [employer] needs - medical: Not on file   [employer] needs - non-medical: Not on file   Occupational History    Not on file   Tobacco Use    Smoking status: Former Smoker     Packs/day: 0.50     Years: 7.00     Pack years: 3.50     Types: Cigarettes     Last attempt to quit: 1999     Years since quittin.2    Smokeless tobacco: Never Used   Substance and Sexual Activity    Alcohol use: Yes     Alcohol/week: 0.6 oz     Types: 1 Glasses of wine per week     Comment: rarely    Drug use: No    Sexual activity: No   Other Topics Concern    Not on file   Social History Narrative    Not on file     Family History  Family History   Problem Relation Age of Onset    Cancer Mother 61        liver metastatic disease, unknown primary    Stroke Mother         BRAIN ANEURYSM    Cancer Maternal Uncle 61        bone    Cancer Maternal Grandmother 61        unknown primary    Anesth Problems Neg Hx      Medications: (reviewed)  Current Outpatient Medications on File Prior to Visit   Medication Sig Dispense Refill    ondansetron (ZOFRAN ODT) 4 mg disintegrating tablet Take 1 Tab by mouth every eight (8) hours as needed for Nausea. 10 Tab 0    HYDROmorphone (DILAUDID) 2 mg tablet Take 1 Tab by mouth every four (4) hours as needed for Pain. Max Daily Amount: 12 mg. 20 Tab 0    oxyCODONE-acetaminophen (PERCOCET) 5-325 mg per tablet Take 1 Tab by mouth every four (4) hours as needed for Pain. Max Daily Amount: 6 Tabs.  20 Tab 0  promethazine (PHENERGAN) 25 mg tablet Take 1 Tab by mouth every six (6) hours as needed for Nausea. 30 Tab 3    ibuprofen (ADVIL) 200 mg tablet Take 4 tablets by mouth every eight (8) hours as needed. 90 tablet 3     No current facility-administered medications on file prior to visit. Allergies: (reviewed)  Allergies   Allergen Reactions    Hydrocodone Nausea Only    Oxycodone Nausea and Vomiting       ROS:  Negative     OBJECTIVE:    PHYSICAL EXAM  VITAL SIGNS: There were no vitals filed for this visit. There is no height or weight on file to calculate BMI. GENERAL FLOWER: Conversant, alert, oriented. NAD   HEENT: Normocephalic. Oropharynx clear. Neck: Trachea midline, no JVD, no thyroid enlargement   RESPIRATORY: Lung fields clear to auscultation and percussion. Adequate respiratory effort   CARDIOVASC: RRR without murmur   GASTROINT: soft, non-tender, without masses or organomegaly. No hernia noted   MUSCULOSKEL: FROM. No focal tenderness. EXTREMITIES: extremities normal, atraumatic, no cyanosis or edema. Pulses appreciated. PELVIC: Deferred   RECTAL: Deferred   DOMINGUEZ SURVEY: Negative throughout---neck, axillae, inguina. NEURO: Grossly intact, no acute deficit. Oriented. Not depressed. Not agitated. CT of the chest/abdomen/pelvis (7/6/18)  THYROID: No nodule. MEDIASTINUM: Mediastinal lymph nodes are stable.     There is a left diaphragmatic lymph node measuring 1.7 cm which has increased in  size.     DELORES: No mass or lymphadenopathy. THORACIC AORTA: No dissection or aneurysm. MAIN PULMONARY ARTERY: Normal in caliber. TRACHEA/BRONCHI: Patent. ESOPHAGUS: No wall thickening or dilatation. HEART: Normal in size. PLEURA: No effusion or pneumothorax. LUNGS: No nodule, mass, or airspace disease. LIVER: 1.5 cm cyst in the liver is stable. GALLBLADDER: Unremarkable.   SPLEEN: There is a mass in the splenic hilus infiltrating the spleen measuring  5.6 x 2 cm and there is an additional mass in the lower pole of the spleen  measuring 2.7 x 1.9 cm and these are new.     There are peritoneal implants in the left upper quadrant with the largest  measuring 3.6 x 1.6 cm.      There is a 0.9 cm implant along the splenic flexure.      There is a 1.9 cm implant gastrocolic ligament.     There is a 1.9 cm nodule posterior to the body of the pancreas which is  unchanged.     PANCREAS: No mass or ductal dilatation. ADRENALS: 3.7 x 2 cm low-density nodule in the right adrenal gland is stable. 1.1 cm nodule left adrenal gland is stable. KIDNEYS: No mass, calculus, or hydronephrosis. There is a small cyst in the left  kidney  STOMACH: Unremarkable. SMALL BOWEL: No dilatation or wall thickening. COLON: No dilatation or wall thickening. APPENDIX: Unremarkable. PERITONEUM: No ascites or pneumoperitoneum. RETROPERITONEUM: No aortic aneurysm is identified. REPRODUCTIVE ORGANS: Patient status post hysterectomy and bilateral  salpingo-oophorectomy.     There is 1.3 cm left pelvic sidewall nodule. URINARY BLADDER: No mass or calculus. BONES: No destructive bone lesion. ADDITIONAL COMMENTS: N/A     IMPRESSION:  1. CT of the chest demonstrates a 1.7 cm left diaphragmatic lymph node which has  increased in size. 2. CT of the abdomen and pelvis demonstrates a mass in the splenic hilus which  is infiltrating the spleen. There is also peritoneal implants in the left upper  quadrant. There is a peritoneal implant splenic flexure and gastrocolic ligament  and these are new findings. There is a 1.4 cm left pelvic sidewall nodule which is new. These findings are suspicious for current/metastatic disease. 3. Bilateral adrenal nodules are stable. Nodule posterior to this pancreatic  body is stable. PET/CT (7/17/18)  HEAD/NECK: No apparent foci of abnormal hypermetabolism.  Cerebral evaluation is  limited by normal intense activity.     CHEST: No foci of abnormal hypermetabolism.     ABDOMEN/PELVIS: There is significant increased tracer activity in the splenic  hilum and in the perisplenic region. Hypermetabolic left cardiophrenic soft  tissue nodule is noted. There are small bilateral hypermetabolic internal iliac  lymph nodes and a hypermetabolic left obturator lymph node.     SKELETON: No foci of abnormal hypermetabolism in the axial and visualized  appendicular skeleton.     IMPRESSION: Hypermetabolic peritoneal carcinomatosis predominantly in the left  upper quadrant. Small but hypermetabolic bilateral internal iliac and left  obturator lymph nodes. CT of abdomen/pelvis (9/13/18)   FINDINGS: There is a 1.5 cm left diaphragmatic lymph node. 1.7 g Peritoneal  implants in the left upper quadrant 3 x 1 cm and are smaller and previously  measured 3.5 x 1.7 cm. There is slight soft tissue prominence in the lesser omentum which may be  minimally progressed. .  Nodule posterior to the pancreatic tail measures 1.8 cm and is stable. LUNG BASES: Clear. INCIDENTALLY IMAGED HEART AND MEDIASTINUM: Unremarkable. LIVER: 1.5 cm low-density most likely a cyst the liver is stable. There is  hepatic steatosis. GALLBLADDER: Unremarkable. SPLEEN: Mass infiltrating into the spleen is stable measuring 5.7 x 2 cm and  previously measured 5.6 x 2 cm. Low-density lesion more inferiorly in the spleen  measures 1.5 x 3 cm and previously measured 2.7 x 1.9 cm. PANCREAS: No mass or ductal dilatation. ADRENALS: Bilateral adrenal nodules are stable. KIDNEYS: No mass, calculus, or hydronephrosis. STOMACH: Unremarkable. SMALL BOWEL: No dilatation or wall thickening. COLON: No dilatation or wall thickening. APPENDIX: Normal  PERITONEUM: No ascites or pneumoperitoneum. RETROPERITONEUM: 1.3 cm lymph node left pelvic sidewall is stable. Postoperative  changes in the pelvis are stable. REPRODUCTIVE ORGANS:  URINARY BLADDER: No mass or calculus. BONES: No destructive bone lesion.   ADDITIONAL COMMENTS: N/A     IMPRESSION:  1.. The mass infiltrating the spleen and a mass more inferiorly in the spleen  are stable in size but are more low density when compared to the most recent  study. Peritoneal implants in the left upper quadrant are slightly improved. Slight soft tissue haziness in the lesser omentum is minimally progressed. Left diaphragmatic lymph node, nodule posterior to the pancreatic body/tail and  1.3 cm left pelvic sidewall lymph node are stable. Adrenal nodules are stable. No acute process is identified. IMPRESSION AND PLAN:  Denys Saint has a history of uterine papillary serous carcinoma. She is s/p surgical resection and was recommended adjuvant chemotherapy with Taxol and Carboplatin and she completed 6 cycles. She then received pelvic radiation therapy. Her CA-125 is now elevated and her CT suggested metastatic disease. She is not a candidate for surgery. I discussed chemotherapy, specifically Doxil, plus the addition of Avastin. She has completed 3 cycles, but has refused any further Avastin after the second cycle. She appears to be responding. We will continue with the current regimen and repeat a CT after this cycle.       Lita Lange MD  10/31/2018/2:54 PM

## 2018-11-06 PROBLEM — R59.0 MEDIASTINAL LYMPHADENOPATHY: Status: ACTIVE | Noted: 2018-01-01

## 2018-11-06 PROBLEM — R63.4 WEIGHT LOSS, NON-INTENTIONAL: Status: ACTIVE | Noted: 2018-01-01

## 2018-11-06 PROBLEM — R97.8 ELEVATED TUMOR MARKERS: Status: ACTIVE | Noted: 2018-01-01

## 2018-11-06 NOTE — PROGRESS NOTES
[facility] Gynecological Oncology  [address], [place]  Phone: [phone]  Fax: [phone]      Date of visit: 11/6/2018   HISTORY OF PRESENT ILLNESS:  Uri Fleming is a 60 y.o.  female with a diagnosis of stage IV UPSC.  She underwent a TLH, BSO, with lymphadenectomy in August 2014.  She was found to have evidence of intraperitoneal disease with implants in the cul de sac, but no evidence of tyler metastases.  She was treated with adjuvant chemotherapy with Taxol and Carboplatin.  She then received pelvic radiation therapy.      She presented recently for follow-up. Michael Ram to that she hadn't followed up since April 2016.  She is doing well and is without complaints.  She denies any vaginal bleeding or discharge, any pelvic or abdominal pain, or any changes in her bowel or bladder habits.  Her last  pap smear was normal.  Her most recent CA-125 was up to 138. She was sent for CT to evaluate disease  Her CT suggested metastatic disease and she was sent her for a PET/CT to get a better feeling for her disease volume.  She is not a candidate for surgery.    At this point, Dr. Renita Morrissey discussed chemotherapy, specifically Doxil, plus the addition of Avastin. Britneydahlia Anil was counseled on the toxicities of treatment and she wishes to proceed. She had her port placed on 7/30        Oncology Tx History  8/22/2014: TOTAL LAPAROSCOPIC HYSTERECTOMY, BILATERAL SALPINGO-OOPHERECTOMY, STAGING LYMPHADENECTOMY, OMENTECTOMY (pathology below)   10/8/2014-12/31/2014: 5 cycles of Carbo/Taxol ( 6th cycle held due to significant, persistent cytopenia)   3/14-5/14: 5 week course of radiation delivered once daily to the pelvis. Whole pelvic external beam radiation therapy.  No vaginal cuff brachy therapy   6/26/18: elevated CA-125   7/6/2018: CT abd/pelvis: Showed recurrence (see report below)  8/8/2018: port placed for chemotherapy  8/14/2018: began Doxorubicin Q28 days/Avastin D1 &D15 for planned 6 cycles    Subjective: Pt presents today for her first C4 of Doxorubicin. Avastin has been held due to side effects   She said she no longer has any nausea, but some mild persistence of her RLQ discomfort. States not bad enough to take pain paill most of the time, but \"annoy\" her. No change in bowls. Said her weight is down a little (total of 14 lbs since 8/15)  and appetite is down, but \"stable\" feels she is eating small frequent meals \"best I can\"   Denies fevers, chills or other symptoms of infections  Her  is supportive with her again today   . Current Outpatient Medications   Medication Sig    ondansetron (ZOFRAN ODT) 4 mg disintegrating tablet Take 1 Tab by mouth every eight (8) hours as needed for Nausea.  HYDROmorphone (DILAUDID) 2 mg tablet Take 1 Tab by mouth every four (4) hours as needed for Pain. Max Daily Amount: 12 mg.    oxyCODONE-acetaminophen (PERCOCET) 5-325 mg per tablet Take 1 Tab by mouth every four (4) hours as needed for Pain. Max Daily Amount: 6 Tabs.  promethazine (PHENERGAN) 25 mg tablet Take 1 Tab by mouth every six (6) hours as needed for Nausea.  ibuprofen (ADVIL) 200 mg tablet Take 4 tablets by mouth every eight (8) hours as needed. Current Facility-Administered Medications   Medication Dose Route Frequency    saline peripheral flush soln 10 mL  10 mL InterCATHeter PRN    sodium chloride 0.9% injection 10 mL  10 mL IntraVENous PRN    heparin (porcine) pf 300-500 Units  300-500 Units InterCATHeter PRN    dextrose 5% infusion 500 mL  500 mL IntraVENous CONTINUOUS        Review Generalized fatigue unchanged and not inhibiting.    HEENT: Denies visual changes, dysphagia or headache  Resp: Denies SOB, MALONE, wheezing or cough  CV: Denies CP, palpitations  GI/:See subjective above for abd pain Denies N/V,diarrhea, constipation or dysuria  MuskSkel: Denies muscle ache or joint pain  Neuro: Denies neuropathy, dizzyness or syncope  Psych: Denies depression or feelings of sadness    Objective:     Patient Vitals for the past 8 hrs:   BP Temp Pulse Resp SpO2 Height Weight   11/06/18 1221 141/82 -- 88 -- -- -- --   11/06/18 1011 145/85 98.6 °F (37 °C) (!) 115 16 97 % 5' 7\" (1.702 m) 205 lb 12.8 oz (93.4 kg)       Physical Exam:  General: A&O X3 in NAD  HEENT: Sclera anicteric, mucosa pink, moist. Without teeth normally. No oral lesions noted  Pupils equal and reactive to light. Neck: No JVD or cervical adenopathy appreciated. Port Site: without redness, swelling or tenderness; accessed without difficulty  Heart: Regular, slightly tachy, without M/R/G  Lungs: CTA Bilat without wheezing or rales, non-labored  Abd: Soft, faint tenderness remains to RUQ, otherwise, no tenderness throughout abd. No CVA tenderness. Remains without obvious fluid wave.  + BS throughout  Ext: Without edema and + pedal pulses bilat  Neuro: grossly intact      LABS:  11/1/2018  WBC's 4.4; HGB 12.2; HCT 36.3; Plt's 269; ANC 2.0;   10/9/2018  Na 139; K+ 4.0; Cl 105; CO2 26; BUN 10; Creat 0.81; Mag 2.0    CA-125:11/1/18= 101 (10/9/18= 90:9/6/18= 197; 8/14/18=229; 8/9/18= 371.2)        IMAGING:  CT Abd/Pelvis 9/13/2018:  FINDINGS: There is a 1.5 cm left diaphragmatic lymph node. 1.7 g Peritoneal  implants in the left upper quadrant 3 x 1 cm and are smaller and previously  measured 3.5 x 1.7 cm. There is slight soft tissue prominence in the lesser omentum which may be  minimally progressed. .  Nodule posterior to the pancreatic tail measures 1.8 cm and is stable. LUNG BASES: Clear. INCIDENTALLY IMAGED HEART AND MEDIASTINUM: Unremarkable. LIVER: 1.5 cm low-density most likely a cyst the liver is stable. There is  hepatic steatosis. GALLBLADDER: Unremarkable. SPLEEN: Mass infiltrating into the spleen is stable measuring 5.7 x 2 cm and  previously measured 5.6 x 2 cm. Low-density lesion more inferiorly in the spleen  measures 1.5 x 3 cm and previously measured 2.7 x 1.9 cm.   PANCREAS: No mass or ductal dilatation. ADRENALS: Bilateral adrenal nodules are stable. KIDNEYS: No mass, calculus, or hydronephrosis. STOMACH: Unremarkable. SMALL BOWEL: No dilatation or wall thickening. COLON: No dilatation or wall thickening. APPENDIX: Normal  PERITONEUM: No ascites or pneumoperitoneum. RETROPERITONEUM: 1.3 cm lymph node left pelvic sidewall is stable. Postoperative  changes in the pelvis are stable. REPRODUCTIVE ORGANS:  URINARY BLADDER: No mass or calculus. BONES: No destructive bone lesion. ADDITIONAL COMMENTS: N/A    IMPRESSION:     1. The mass infiltrating the spleen and a mass more inferiorly in the spleen  are stable in size but are more low density when compared to the most recent  study. Peritoneal implants in the left upper quadrant are slightly improved. Slight soft tissue haziness in the lesser omentum is minimally progressed. Left diaphragmatic lymph node, nodule posterior to the pancreatic body/tail and  1.3 cm left pelvic sidewall lymph node are stable. Adrenal nodules are stable. No acute process is identified. PET/CT 7/17/2018:  FINDINGS:     HEAD/NECK: No apparent foci of abnormal hypermetabolism. Cerebral evaluation is  limited by normal intense activity.     CHEST: No foci of abnormal hypermetabolism.     ABDOMEN/PELVIS: There is significant increased tracer activity in the splenic  hilum and in the perisplenic region. Hypermetabolic left cardiophrenic soft  tissue nodule is noted. There are small bilateral hypermetabolic internal iliac  lymph nodes and a hypermetabolic left obturator lymph node.     SKELETON: No foci of abnormal hypermetabolism in the axial and visualized  appendicular skeleton.     IMPRESSION: Hypermetabolic peritoneal carcinomatosis predominantly in the left  upper quadrant. Small but hypermetabolic bilateral internal iliac and left  obturator lymph nodes. CT abd/pelvis 7/6/2018  FINDINGS:      THYROID: No nodule.   MEDIASTINUM: Mediastinal lymph nodes are stable.     There is a left diaphragmatic lymph node measuring 1.7 cm which has increased in  size.        DELORES: No mass or lymphadenopathy. THORACIC AORTA: No dissection or aneurysm. MAIN PULMONARY ARTERY: Normal in caliber. TRACHEA/BRONCHI: Patent. ESOPHAGUS: No wall thickening or dilatation. HEART: Normal in size. PLEURA: No effusion or pneumothorax. LUNGS: No nodule, mass, or airspace disease. LIVER: 1.5 cm cyst in the liver is stable. GALLBLADDER: Unremarkable. SPLEEN: There is a mass in the splenic hilus infiltrating the spleen measuring  5.6 x 2 cm and there is an additional mass in the lower pole of the spleen  measuring 2.7 x 1.9 cm and these are new.     There are peritoneal implants in the left upper quadrant with the largest  measuring 3.6 x 1.6 cm.      There is a 0.9 cm implant along the splenic flexure.      There is a 1.9 cm implant gastrocolic ligament.     There is a 1.9 cm nodule posterior to the body of the pancreas which is  unchanged.        PANCREAS: No mass or ductal dilatation. ADRENALS: 3.7 x 2 cm low-density nodule in the right adrenal gland is stable. 1.1 cm nodule left adrenal gland is stable. KIDNEYS: No mass, calculus, or hydronephrosis. There is a small cyst in the left  kidney  STOMACH: Unremarkable. SMALL BOWEL: No dilatation or wall thickening. COLON: No dilatation or wall thickening. APPENDIX: Unremarkable. PERITONEUM: No ascites or pneumoperitoneum. RETROPERITONEUM: No aortic aneurysm is identified. REPRODUCTIVE ORGANS: Patient status post hysterectomy and bilateral  salpingo-oophorectomy.     There is 1.3 cm left pelvic sidewall nodule. URINARY BLADDER: No mass or calculus. BONES: No destructive bone lesion. ADDITIONAL COMMENTS: N/A    IMPRESSION:  1. CT of the chest demonstrates a 1.7 cm left diaphragmatic lymph node which has  increased in size.   2. CT of the abdomen and pelvis demonstrates a mass in the splenic hilus which  is infiltrating the spleen. There is also peritoneal implants in the left upper  quadrant. There is a peritoneal implant splenic flexure and gastrocolic ligament  and these are new findings. There is a 1.4 cm left pelvic sidewall nodule which is new. These findings are suspicious for current/metastatic disease. 3. Bilateral adrenal nodules are stable. Nodule posterior to this pancreatic  body is stable. 23X           Pathology:  8/22/2014:  FINAL PATHOLOGIC DIAGNOSIS  1. Soft tissue, posterior uterine serosa, biopsy:  Invasive papillary serous carcinoma. 2. Soft tissue cul-de-sac, cul-de-sac, biopsy:  Metastatic carcinoma. 3. Uterus, cervix, fallopian tubes, ovaries, radical hysterectomy and salpingo-oophorectomy:  Cervix: No evidence for dysplasia and malignancy. Endometrium: Poorly differentiated adenocarcinoma, favor FIGO grade 3 endometrioid type. See synoptic report. Myometrium: Invasive differentiated adenocarcinoma. Fallopian tubes, bilateral: No histopathologic abnormality. Ovaries, bilateral: Endosalpingosis. Synoptic Report:  Specimen: Cervix, uterus, ovaries, fallopian tubes, omentum  Procedure: Radical hysterectomy and salpingo-oophorectomy  Lymph node sampling: Bilateral pelvic lymph nodes  Specimen integrity: Intact hysterectomy specimen  Tumor size: 4.0 cm in greatest dimension  Histologic type: Poorly differentiated adenocarcinoma, favor endometrioid type  Histologic grade: FIGO grade 3  Myometrial invasion:  Depth of invasion: 1.2 cm  Myometrial thickness: To 0.4 cm  Involvement of cervix: Not involved  Extent of involvement of other organs:  Right ovary: Not involved  Left ovary: Not involved  Right fallopian tube: Not involved  Left fallopian tube: Not involved  Lymphovascular invasion: Not identified  Additional pathologic findings: Endosalpingosis    4. Omentum, resection:  No evidence for malignancy. 5. Lymph nodes, right pelvic, dissection:  No evidence for malignancy nine nodes (0/9).     6. Lymph nodes, left pelvic, dissection:  No evidence for malignancy in fourteen nodes (0/14). Pathologic staging (pTNM):  Primary tumor (pT): pT1b  Regional lymph nodes (pN): pN0  Pelvic lymph nodes:  Number examined: 23  Number involved: 0  Distant metastasis (M): pM1, see comment    Comment  The tumor in the uterus is high grade with features favoring endometrioid type; however, the presence of a subclone  demonstrating papillary serous features has not been excluded. Further tissue is submitted for evaluation and the results will be  issued in an addendum. Despite the different histologic appearance between the uterine tumor and the serosal and peritoneal biopsies, a consensus opinion  between Rocky Cardoza and Heather considers this to be one process rather than two separate primaries. Assessment:     Patient Active Problem List   Diagnosis Code    Endometrial cancer ([facility] [address] C54.1    Early satiety R68.81    Abdominal bloating R14.0    Obesity (BMI 30.0-34. 9) E66.9    Right upper quadrant abdominal pain R10.11    Mediastinal lymphadenopathy R59.0    Elevated tumor markers R97.8    Weight loss, non-intentional R63.4         Plan: Will go ahead and proceed with fourth cycle of treatment today. Follow up with CT C/A/P on 11l/26 as scheduled  Will continue to monitor slightly rising CA-125   Again, stressed eating small, frequent meals would be better for her and if her appetite or early satiety or abd bloating worsen, to call and let us know. We discussed continued, gradual wt loss and nutritional supplements with protein again encouraged as she is able to tolerate. Will place consult for oncology nutritionist to reach out to pt. Hydration encouraged  Encouraged to call for any concerns or questions    Greater than 40 minutes spent with pt with greater than 50% spent in face to face counseling.    Marcial Polk NP

## 2018-11-06 NOTE — PROGRESS NOTES
[facility] - Chemotherapy Progress Note    1010 Pt admit to [facility] for Cycle 4 Liposomal Doxorubicin ambulatory in stable condition. Assessment completed. No new concerns voiced. Port accessed with positive blood return. No labs needed today, labs done on 11/1/18. Port flushed and D5 started at [facility]. Chemotherapy Flowsheet 11/6/2018   Cycle C4   Date 11/6/2018   Drug / Regimen Liposomal Doxorubicin   Pre Meds given   Notes given         Visit Vitals  /82 (BP 1 Location: Right arm, BP Patient Position: Sitting)   Pulse 88   Temp 98.6 °F (37 °C)   Resp 16   Ht 5' 7\" (1.702 m)   Wt 93.4 kg (205 lb 12.8 oz)   SpO2 97%   Breastfeeding? No   BMI 32.23 kg/m²       Medications:  D5 KVO  Zofran  Liposomal Doxorubicin  NS flushes  Heparin     1230 Pt tolerated treatment well. Port maintained positive blood return throughout treatment. Flushed, heparinized and de-accessed per protocol. D/c home ambulatory in no distress. Pt aware of next appointment scheduled for 12/4/18 at 10:00. Please see lab results for 11/1/18.

## 2018-11-28 NOTE — PROGRESS NOTES
Pre chemo, labs today and for C5 doxil on 12/4/18. 1. Have you been to the ER, urgent care clinic since your last visit? Hospitalized since your last visit? No    2. Have you seen or consulted any other health care providers outside of the [address] [place] [address] since your last visit? Include any pap smears or colon screening.  No

## 2018-11-28 NOTE — PROGRESS NOTES
[address] [place] [address] Mathias Moritz [address], [address]  [phone]  MD Joleen Matute MD Katherleen Cash, [place]  Office Visit    Patient ID:  Name: Any Vargas  MRN: [id number]  : 1960/58 y.o. Visit date: 2018    Primary Diagnosis:     ICD-10-CM ICD-9-CM    1. Endometrial cancer ([facility] [address] C54.1 182.0         Problem List:    Patient Active Problem List   Diagnosis Code    Endometrial cancer ([facility] [address] C54.1    Early satiety R68.81    Abdominal bloating R14.0    Obesity (BMI 30.0-34. 9) E66.9    Right upper quadrant abdominal pain R10.11    Mediastinal lymphadenopathy R59.0    Elevated tumor markers R97.8    Weight loss, non-intentional R63.4       INTERVAL HISTORY:  Any Vargas is a  female with a diagnosis of stage IV UPSC. She underwent a TLH, BSO, with lymphadenectomy in 2014. She was found to have evidence of intraperitoneal disease with implants in the cul de sac, but no evidence of tyler metastases. She was treated with adjuvant chemotherapy with Taxol and Carboplatin. She then received pelvic radiation therapy. In the summer of 2018 she was noted to have an elevated CA-125. She had not been seen in the office for a couple of years prior to that visit. I ordered a CT of the chest/abdomen/pelvis to evaluate for recurrence. It demonstrated peritoneal carcinomatosis. I then sent her for a PET/CT to better evaluate. I recommended combination chemotherapy with Doxil/Avastin. She has completed 4 cycles so far, but has refused further Avastin after the second cycle. She had a lot of vague complaints at that time and was convinced it was the Avastin causing it. She is doing much better now. Her CA-125 had decreased at the last check.       PMH:  Past Medical History:   Diagnosis Date    Arthritis     Cancer ([facility] [address]     uterine    Chronic pain     LOWER BACK    Depression     Nausea & vomiting     PMB (postmenopausal bleeding) 2014     PSH:  Past Surgical History:   Procedure Laterality Date    HX COLONOSCOPY      HX GYN  14    TLH/BSO/lymph node dissection    HX ORTHOPAEDIC Right      LITTLE toe sx    HX VASCULAR ACCESS Left     PORTACATH - LEFT CHEST     SOC:  Social History     Socioeconomic History    Marital status:      Spouse name: Not on file    Number of children: Not on file    Years of education: Not on file    Highest education level: Not on file   Social Needs    Financial resource strain: Not on file    Food insecurity - worry: Not on file    Food insecurity - inability: Not on file   [place]ie needs - medical: Not on file   Jack [place]ie needs - non-medical: Not on file   Occupational History    Not on file   Tobacco Use    Smoking status: Former Smoker     Packs/day: 0.50     Years: 7.00     Pack years: 3.50     Types: Cigarettes     Last attempt to quit: 1999     Years since quittin.2    Smokeless tobacco: Never Used   Substance and Sexual Activity    Alcohol use: Yes     Alcohol/week: 0.6 oz     Types: 1 Glasses of wine per week     Comment: rarely    Drug use: No    Sexual activity: No   Other Topics Concern    Not on file   Social History Narrative    Not on file     Family History  Family History   Problem Relation Age of Onset    Cancer Mother 61        liver metastatic disease, unknown primary    Stroke Mother         BRAIN ANEURYSM    Cancer Maternal Uncle 61        bone    Cancer Maternal Grandmother 61        unknown primary    Anesth Problems Neg Hx      Medications: (reviewed)  Current Outpatient Medications on File Prior to Visit   Medication Sig Dispense Refill    ondansetron (ZOFRAN ODT) 4 mg disintegrating tablet Take 1 Tab by mouth every eight (8) hours as needed for Nausea. 10 Tab 0    HYDROmorphone (DILAUDID) 2 mg tablet Take 1 Tab by mouth every four (4) hours as needed for Pain.  Max Daily Amount: 12 mg. 20 Tab 0    oxyCODONE-acetaminophen (PERCOCET) 5-325 mg per tablet Take 1 Tab by mouth every four (4) hours as needed for Pain. Max Daily Amount: 6 Tabs. 20 Tab 0    promethazine (PHENERGAN) 25 mg tablet Take 1 Tab by mouth every six (6) hours as needed for Nausea. 30 Tab 3    ibuprofen (ADVIL) 200 mg tablet Take 4 tablets by mouth every eight (8) hours as needed. 90 tablet 3     No current facility-administered medications on file prior to visit. Allergies: (reviewed)  Allergies   Allergen Reactions    Hydrocodone Nausea Only    Oxycodone Nausea and Vomiting       ROS:  Negative     OBJECTIVE:    PHYSICAL EXAM  VITAL SIGNS: Vitals:    11/29/18 1030   Weight: 206 lb 12.8 oz (93.8 kg)   Height: 5' 7.01\" (1.702 m)     Body mass index is 32.38 kg/m². GENERAL FLOWER: Conversant, alert, oriented. NAD   HEENT: Normocephalic. Oropharynx clear. Neck: Trachea midline, no JVD, no thyroid enlargement   RESPIRATORY: Lung fields clear to auscultation and percussion. Adequate respiratory effort   CARDIOVASC: RRR without murmur   GASTROINT: soft, non-tender, without masses or organomegaly. No hernia noted   MUSCULOSKEL: FROM. No focal tenderness. EXTREMITIES: extremities normal, atraumatic, no cyanosis or edema. Pulses appreciated. PELVIC: Deferred   RECTAL: Deferred   DOMINGUEZ SURVEY: Negative throughout---neck, axillae, inguina. NEURO: Grossly intact, no acute deficit. Oriented. Not depressed. Not agitated. CT of the chest/abdomen/pelvis (7/6/18)  THYROID: No nodule. MEDIASTINUM: Mediastinal lymph nodes are stable.     There is a left diaphragmatic lymph node measuring 1.7 cm which has increased in  size.     DELORES: No mass or lymphadenopathy. THORACIC AORTA: No dissection or aneurysm. MAIN PULMONARY ARTERY: Normal in caliber. TRACHEA/BRONCHI: Patent. ESOPHAGUS: No wall thickening or dilatation. HEART: Normal in size. PLEURA: No effusion or pneumothorax.   LUNGS: No nodule, mass, or airspace disease. LIVER: 1.5 cm cyst in the liver is stable. GALLBLADDER: Unremarkable. SPLEEN: There is a mass in the splenic hilus infiltrating the spleen measuring  5.6 x 2 cm and there is an additional mass in the lower pole of the spleen  measuring 2.7 x 1.9 cm and these are new.     There are peritoneal implants in the left upper quadrant with the largest  measuring 3.6 x 1.6 cm.      There is a 0.9 cm implant along the splenic flexure.      There is a 1.9 cm implant gastrocolic ligament.     There is a 1.9 cm nodule posterior to the body of the pancreas which is  unchanged.     PANCREAS: No mass or ductal dilatation. ADRENALS: 3.7 x 2 cm low-density nodule in the right adrenal gland is stable. 1.1 cm nodule left adrenal gland is stable. KIDNEYS: No mass, calculus, or hydronephrosis. There is a small cyst in the left  kidney  STOMACH: Unremarkable. SMALL BOWEL: No dilatation or wall thickening. COLON: No dilatation or wall thickening. APPENDIX: Unremarkable. PERITONEUM: No ascites or pneumoperitoneum. RETROPERITONEUM: No aortic aneurysm is identified. REPRODUCTIVE ORGANS: Patient status post hysterectomy and bilateral  salpingo-oophorectomy.     There is 1.3 cm left pelvic sidewall nodule. URINARY BLADDER: No mass or calculus. BONES: No destructive bone lesion. ADDITIONAL COMMENTS: N/A     IMPRESSION:  1. CT of the chest demonstrates a 1.7 cm left diaphragmatic lymph node which has  increased in size. 2. CT of the abdomen and pelvis demonstrates a mass in the splenic hilus which  is infiltrating the spleen. There is also peritoneal implants in the left upper  quadrant. There is a peritoneal implant splenic flexure and gastrocolic ligament  and these are new findings. There is a 1.4 cm left pelvic sidewall nodule which is new. These findings are suspicious for current/metastatic disease. 3. Bilateral adrenal nodules are stable. Nodule posterior to this pancreatic  body is stable.        PET/CT (7/17/18)  HEAD/NECK: No apparent foci of abnormal hypermetabolism. Cerebral evaluation is  limited by normal intense activity.     CHEST: No foci of abnormal hypermetabolism.     ABDOMEN/PELVIS: There is significant increased tracer activity in the splenic  hilum and in the perisplenic region. Hypermetabolic left cardiophrenic soft  tissue nodule is noted. There are small bilateral hypermetabolic internal iliac  lymph nodes and a hypermetabolic left obturator lymph node.     SKELETON: No foci of abnormal hypermetabolism in the axial and visualized  appendicular skeleton.     IMPRESSION: Hypermetabolic peritoneal carcinomatosis predominantly in the left  upper quadrant. Small but hypermetabolic bilateral internal iliac and left  obturator lymph nodes. CT of abdomen/pelvis (9/13/18)   FINDINGS: There is a 1.5 cm left diaphragmatic lymph node. 1.7 g Peritoneal  implants in the left upper quadrant 3 x 1 cm and are smaller and previously  measured 3.5 x 1.7 cm. There is slight soft tissue prominence in the lesser omentum which may be  minimally progressed. .  Nodule posterior to the pancreatic tail measures 1.8 cm and is stable. LUNG BASES: Clear. INCIDENTALLY IMAGED HEART AND MEDIASTINUM: Unremarkable. LIVER: 1.5 cm low-density most likely a cyst the liver is stable. There is  hepatic steatosis. GALLBLADDER: Unremarkable. SPLEEN: Mass infiltrating into the spleen is stable measuring 5.7 x 2 cm and  previously measured 5.6 x 2 cm. Low-density lesion more inferiorly in the spleen  measures 1.5 x 3 cm and previously measured 2.7 x 1.9 cm. PANCREAS: No mass or ductal dilatation. ADRENALS: Bilateral adrenal nodules are stable. KIDNEYS: No mass, calculus, or hydronephrosis. STOMACH: Unremarkable. SMALL BOWEL: No dilatation or wall thickening. COLON: No dilatation or wall thickening. APPENDIX: Normal  PERITONEUM: No ascites or pneumoperitoneum.   RETROPERITONEUM: 1.3 cm lymph node left pelvic sidewall is stable. Postoperative  changes in the pelvis are stable. REPRODUCTIVE ORGANS:  URINARY BLADDER: No mass or calculus. BONES: No destructive bone lesion. ADDITIONAL COMMENTS: N/A     IMPRESSION:  1.. The mass infiltrating the spleen and a mass more inferiorly in the spleen  are stable in size but are more low density when compared to the most recent  study. Peritoneal implants in the left upper quadrant are slightly improved. Slight soft tissue haziness in the lesser omentum is minimally progressed. Left diaphragmatic lymph node, nodule posterior to the pancreatic body/tail and  1.3 cm left pelvic sidewall lymph node are stable. Adrenal nodules are stable. No acute process is identified. CT of abdomen/pelvis (11/26/18)  THYROID: No nodule. MEDIASTINUM: Borderline enlarged mediastinal lymph nodes are stable. DELORES: No mass or lymphadenopathy. THORACIC AORTA: No dissection or aneurysm. MAIN PULMONARY ARTERY: Normal in caliber. TRACHEA/BRONCHI: Patent. ESOPHAGUS: No wall thickening or dilatation. HEART: Normal in size. PLEURA: No effusion or pneumothorax. Left diaphragmatic lymph node series 3  image 57 measuring 1.4 cm is stable. LUNGS: No nodule, mass, or airspace disease. LIVER: Tiny low-density left lobe of the liver are stable. 1.2 cm low-density  segment 5 is stable. GALLBLADDER: Unremarkable. SPLEEN: Low-density masses in the spleen measure 5.3 x 1.9 cm and previously  measured 5.7 x 2.1 cm. Low-density lesion more medially in the spleen measures 3  x 1.6 cm and previously measured 2.9 x 1.5 cm. PANCREAS: No mass or ductal dilatation. ADRENALS: Bilateral adrenal nodules are stable. There is a 3.9 x 2.1 cm right  adrenal nodule and a 1.1 cm left adrenal nodule which are stable. KIDNEYS: Small low-density left kidney measuring 1.2 cm is stable. STOMACH: Unremarkable. SMALL BOWEL: No dilatation or wall thickening. COLON: No dilatation or wall thickening.   APPENDIX: Unremarkable. PERITONEUM: Peritoneal carcinomatosis is again noted. The most prominent soft  tissue nodules in the left upper quadrant measuring 3.4 x 1 cm and previously  measured 2.9 x 1 cm. There is a nodule posterior to the tail of the pancreas  measuring 1.7 cm and previously measured 1.9 cm. Soft tissue stranding  transverse mesocolon is stable. Soft tissue stranding in the pelvis is stable. Left pelvic sidewall lymph node measuring 1.3 cm series 3 image 113 is stable. RETROPERITONEUM: No lymphadenopathy or aortic aneurysm. REPRODUCTIVE ORGANS: Patient is status post hysterectomy and oophorectomy  URINARY BLADDER: No mass or calculus. BONES: No destructive bone lesion. ADDITIONAL COMMENTS: N/A    IMPRESSION:  1. CT of the chest demonstrates a left diaphragmatic lymph node which is stable. 2. CT of the abdomen and pelvis has not changed significantly. Peritoneal  carcinomatosis has not changed significantly. . Low-density lesions in the spleen  are stable. Bilateral adrenal nodules are stable. Nodule posterior to the tail  of the pancreas is stable. Lymph node left pelvic sidewall is stable. IMPRESSION AND PLAN:  gS Irving has a history of uterine papillary serous carcinoma. She is s/p surgical resection and was recommended adjuvant chemotherapy with Taxol and Carboplatin and she completed 6 cycles. She then received pelvic radiation therapy. She subsequently developed metastatic disease. She was not a candidate for surgery. I discussed chemotherapy, specifically Doxil, plus Avastin. She has completed 4 cycles, but has refused any further Avastin after the second cycle. Her most recent CT demonstrates stable disease. We will continue with the current regimen for now and repeat a scan after the next couple of cycles.       Quang Dunlap MD  11/29/2018/2:54 PM

## 2018-12-04 NOTE — PROGRESS NOTES
[facility] [address] [place], [place] Phone: [phone] Fax: [phone] Date of visit: 12/4/2018 HISTORY OF PRESENT ILLNESS: 
Johnny Jha is a 60 y.o.  female with a diagnosis of stage IV UPSC.  She underwent a TLH, BSO, with lymphadenectomy in August 2014.  She was found to have evidence of intraperitoneal disease with implants in the cul de sac, but no evidence of tyler metastases.  She was treated with adjuvant chemotherapy with Taxol and Carboplatin.  She then received pelvic radiation therapy. 
   
She presented recently for follow-up. Stacy Deep to that she hadn't followed up since April 2016.  She is doing well and is without complaints.  She denies any vaginal bleeding or discharge, any pelvic or abdominal pain, or any changes in her bowel or bladder habits.  Her last  pap smear was normal.  Her most recent CA-125 was up to 138. She was sent for CT to evaluate disease Her CT suggested metastatic disease and she was sent her for a PET/CT to get a better feeling for her disease volume.  She is not a candidate for surgery.   
At this point, Dr. Marleny Oates discussed chemotherapy, specifically Doxil, plus the addition of Avastin. Andry Haynes was counseled on the toxicities of treatment and she wishes to proceed. She had her port placed on 7/30 Oncology Tx History 8/22/2014: TOTAL LAPAROSCOPIC HYSTERECTOMY, BILATERAL SALPINGO-OOPHERECTOMY, STAGING LYMPHADENECTOMY, OMENTECTOMY (pathology below)  
10/8/2014-12/31/2014: 5 cycles of Carbo/Taxol ( 6th cycle held due to significant, persistent cytopenia) 3/14-5/14: 5 week course of radiation delivered once daily to the pelvis. Whole pelvic external beam radiation therapy. No vaginal cuff brachy therapy 6/26/18: elevated CA-125  
7/6/2018: CT abd/pelvis: Showed recurrence (see report below) 8/8/2018: port placed for chemotherapy 8/14/2018: began Doxorubicin Q28 days/Avastin D1 &D15 for planned 6 cycles Subjective: Pt presents today for her first C5 of Doxorubicin. Avastin has been held due to side effects (esophageal pain/burning that has stopped since d/c'ing Avastin) Pt had CT of C/A/P on 11/26 and showed essentially, stable disease. Noted increase in CA-125 this cycle again. She said she no longer has any nausea,States she occassionally gets a \"twinge\" of discomfort/pain to her left lateral abdomen. Says it last ~5 minutes or less, then goes away. Denies need of pain medication, or any medication for it. No change in bowels. Her weight is down a little again today and appetite is down, but \"stable\" feels she is eating small frequent meals \"best I can\". Denies fevers, chills or other symptoms of infections Her  is supportive with her again today [facility] Current Outpatient Medications Medication Sig  
 ondansetron (ZOFRAN ODT) 4 mg disintegrating tablet Take 1 Tab by mouth every eight (8) hours as needed for Nausea.  HYDROmorphone (DILAUDID) 2 mg tablet Take 1 Tab by mouth every four (4) hours as needed for Pain. Max Daily Amount: 12 mg.  
 oxyCODONE-acetaminophen (PERCOCET) 5-325 mg per tablet Take 1 Tab by mouth every four (4) hours as needed for Pain. Max Daily Amount: 6 Tabs.  promethazine (PHENERGAN) 25 mg tablet Take 1 Tab by mouth every six (6) hours as needed for Nausea.  ibuprofen (ADVIL) 200 mg tablet Take 4 tablets by mouth every eight (8) hours as needed. Current Facility-Administered Medications Medication Dose Route Frequency  saline peripheral flush soln 10 mL  10 mL InterCATHeter PRN  
 sodium chloride 0.9% injection 10 mL  10 mL IntraVENous PRN  
 heparin (porcine) pf 300-500 Units  300-500 Units InterCATHeter PRN  
 dextrose 5% infusion 500 mL  500 mL IntraVENous CONTINUOUS Review Generalized fatigue unchanged and not inhibiting. Down 2 more lbs again today HEENT: Denies visual changes, dysphagia or headache Resp: Denies SOB, MALONE, wheezing or cough CV: Denies CP, palpitations GI/:See subjective above for abd discomfort/pain Denies N/V,diarrhea, constipation or dysuria MuskSkel: Denies muscle ache or joint pain Neuro: Denies neuropathy, dizzyness or syncope Psych: Denies depression or feelings of sadness Objective:  
 
Patient Vitals for the past 8 hrs: 
 BP Temp Pulse Resp SpO2 Height Weight 12/04/18 1259 129/76  84      
12/04/18 1006 142/80 98.1 °F (36.7 °C) (!) 108 16 97 % 5' 7\" (1.702 m) 203 lb 12.8 oz (92.4 kg) Physical Exam: 
General: A&O X3 in NAD HEENT: Sclera anicteric, mucosa pink, moist. Continues without teeth as normal.  No oral lesions noted  Pupils equal and reactive to light. Neck: No JVD or cervical adenopathy appreciated. Port Site: without redness, swelling or tenderness; accessed without difficulty Heart: Regular, slightly tachy, without M/R/G Lungs: CTA Bilat without wheezing or rales, non-labored Abd: Soft, without tenderness to palpation throughout abd. No CVA tenderness. Remains without obvious fluid wave.  + BS throughout Ext: Without edema and + pedal pulses bilat Neuro: grossly intact LABS: 
11/29/2018 WBC's 5.5; HGB 11.7; HCT 36.1; Plt's 252; ANC 2.5;  
Na 140; K+ 4.4; Cl 103; CO2 21; BUN 12; Creat 0.84;  Mag 1.9  LFT's-WNL 
 
CA-125:11/29/18=132  (11/1/18= 101;10/9/18= 90:9/6/18= 197; 8/14/18=229; 8/9/18= 371.2) IMAGING: 
CT Chest/Abd/Pelvis 11/26/2018 FINDINGS:  
  
THYROID: No nodule. MEDIASTINUM: Borderline enlarged mediastinal lymph nodes are stable. DELORES: No mass or lymphadenopathy. THORACIC AORTA: No dissection or aneurysm. MAIN PULMONARY ARTERY: Normal in caliber. TRACHEA/BRONCHI: Patent. ESOPHAGUS: No wall thickening or dilatation. HEART: Normal in size. PLEURA: No effusion or pneumothorax. Left diaphragmatic lymph node series 3 
image 57 measuring 1.4 cm is stable. LUNGS: No nodule, mass, or airspace disease. LIVER: Tiny low-density left lobe of the liver are stable. 1.2 cm low-density 
segment 5 is stable. GALLBLADDER: Unremarkable. SPLEEN: Low-density masses in the spleen measure 5.3 x 1.9 cm and previously 
measured 5.7 x 2.1 cm. Low-density lesion more medially in the spleen measures 3 
x 1.6 cm and previously measured 2.9 x 1.5 cm. PANCREAS: No mass or ductal dilatation. ADRENALS: Bilateral adrenal nodules are stable. There is a 3.9 x 2.1 cm right 
adrenal nodule and a 1.1 cm left adrenal nodule which are stable. KIDNEYS: Small low-density left kidney measuring 1.2 cm is stable. STOMACH: Unremarkable. SMALL BOWEL: No dilatation or wall thickening. COLON: No dilatation or wall thickening. APPENDIX: Unremarkable. PERITONEUM: Peritoneal carcinomatosis is again noted. The most prominent soft 
tissue nodules in the left upper quadrant measuring 3.4 x 1 cm and previously 
measured 2.9 x 1 cm. There is a nodule posterior to the tail of the pancreas 
measuring 1.7 cm and previously measured 1.9 cm. Soft tissue stranding 
transverse mesocolon is stable. Soft tissue stranding in the pelvis is stable. Left pelvic sidewall lymph node measuring 1.3 cm series 3 image 113 is stable. RETROPERITONEUM: No lymphadenopathy or aortic aneurysm. REPRODUCTIVE ORGANS: Patient is status post hysterectomy and oophorectomy URINARY BLADDER: No mass or calculus. BONES: No destructive bone lesion. ADDITIONAL COMMENTS: N/A IMPRESSION: 
  
1. CT of the chest demonstrates a left diaphragmatic lymph node which is stable. 2. CT of the abdomen and pelvis has not changed significantly. Peritoneal 
carcinomatosis has not changed significantly. . Low-density lesions in the spleen 
are stable. Bilateral adrenal nodules are stable. Nodule posterior to the tail 
of the pancreas is stable. Lymph node left pelvic sidewall is stable CT Abd/Pelvis 9/13/2018: 
FINDINGS: There is a 1.5 cm left diaphragmatic lymph node.  1.7 g Peritoneal 
 implants in the left upper quadrant 3 x 1 cm and are smaller and previously 
measured 3.5 x 1.7 cm. There is slight soft tissue prominence in the lesser omentum which may be 
minimally progressed. Cathy Nims Nodule posterior to the pancreatic tail measures 1.8 cm and is stable. LUNG BASES: Clear. INCIDENTALLY IMAGED HEART AND MEDIASTINUM: Unremarkable. LIVER: 1.5 cm low-density most likely a cyst the liver is stable. There is 
hepatic steatosis. GALLBLADDER: Unremarkable. SPLEEN: Mass infiltrating into the spleen is stable measuring 5.7 x 2 cm and 
previously measured 5.6 x 2 cm. Low-density lesion more inferiorly in the spleen 
measures 1.5 x 3 cm and previously measured 2.7 x 1.9 cm. PANCREAS: No mass or ductal dilatation. ADRENALS: Bilateral adrenal nodules are stable. KIDNEYS: No mass, calculus, or hydronephrosis. STOMACH: Unremarkable. SMALL BOWEL: No dilatation or wall thickening. COLON: No dilatation or wall thickening. APPENDIX: Normal 
PERITONEUM: No ascites or pneumoperitoneum. RETROPERITONEUM: 1.3 cm lymph node left pelvic sidewall is stable. Postoperative 
changes in the pelvis are stable. REPRODUCTIVE ORGANS: 
URINARY BLADDER: No mass or calculus. BONES: No destructive bone lesion. ADDITIONAL COMMENTS: N/A IMPRESSION: 
  
1. The mass infiltrating the spleen and a mass more inferiorly in the spleen 
are stable in size but are more low density when compared to the most recent 
study. Peritoneal implants in the left upper quadrant are slightly improved. Slight soft tissue haziness in the lesser omentum is minimally progressed. Left diaphragmatic lymph node, nodule posterior to the pancreatic body/tail and 
1.3 cm left pelvic sidewall lymph node are stable. Adrenal nodules are stable. No acute process is identified. PET/CT 7/17/2018: 
FINDINGS: 
  
HEAD/NECK: No apparent foci of abnormal hypermetabolism.  Cerebral evaluation is 
limited by normal intense activity. 
  
 CHEST: No foci of abnormal hypermetabolism. 
  
ABDOMEN/PELVIS: There is significant increased tracer activity in the splenic 
hilum and in the perisplenic region. Hypermetabolic left cardiophrenic soft 
tissue nodule is noted. There are small bilateral hypermetabolic internal iliac 
lymph nodes and a hypermetabolic left obturator lymph node. 
  
SKELETON: No foci of abnormal hypermetabolism in the axial and visualized 
appendicular skeleton. 
  
IMPRESSION: Hypermetabolic peritoneal carcinomatosis predominantly in the left 
upper quadrant. Small but hypermetabolic bilateral internal iliac and left 
obturator lymph nodes. CT abd/pelvis 7/6/2018FINDINGS:  
  
THYROID: No nodule. MEDIASTINUM: Mediastinal lymph nodes are stable. There is a left diaphragmatic lymph node measuring 1.7 cm which has increased in 
size. DELORES: No mass or lymphadenopathy. THORACIC AORTA: No dissection or aneurysm. MAIN PULMONARY ARTERY: Normal in caliber. TRACHEA/BRONCHI: Patent. ESOPHAGUS: No wall thickening or dilatation. HEART: Normal in size. PLEURA: No effusion or pneumothorax. LUNGS: No nodule, mass, or airspace disease. LIVER: 1.5 cm cyst in the liver is stable. GALLBLADDER: Unremarkable. SPLEEN: There is a mass in the splenic hilus infiltrating the spleen measuring 5.6 x 2 cm and there is an additional mass in the lower pole of the spleen 
measuring 2.7 x 1.9 cm and these are new. 
  
There are peritoneal implants in the left upper quadrant with the largest 
measuring 3.6 x 1.6 cm. 
  
 There is a 0.9 cm implant along the splenic flexure.  
  
There is a 1.9 cm implant gastrocolic ligament. 
  
There is a 1.9 cm nodule posterior to the body of the pancreas which is 
unchanged. 
  
  
PANCREAS: No mass or ductal dilatation. ADRENALS: 3.7 x 2 cm low-density nodule in the right adrenal gland is stable. 1.1 cm nodule left adrenal gland is stable. KIDNEYS: No mass, calculus, or hydronephrosis.  There is a small cyst in the left 
kidney STOMACH: Unremarkable. SMALL BOWEL: No dilatation or wall thickening. COLON: No dilatation or wall thickening. APPENDIX: Unremarkable. PERITONEUM: No ascites or pneumoperitoneum. RETROPERITONEUM: No aortic aneurysm is identified. REPRODUCTIVE ORGANS: Patient status post hysterectomy and bilateral 
salpingo-oophorectomy. 
  
There is 1.3 cm left pelvic sidewall nodule. URINARY BLADDER: No mass or calculus. BONES: No destructive bone lesion. ADDITIONAL COMMENTS: N/A IMPRESSION: 
1. CT of the chest demonstrates a 1.7 cm left diaphragmatic lymph node which has 
increased in size. 2. CT of the abdomen and pelvis demonstrates a mass in the splenic hilus which 
is infiltrating the spleen. There is also peritoneal implants in the left upper 
quadrant. There is a peritoneal implant splenic flexure and gastrocolic ligament 
and these are new findings. There is a 1.4 cm left pelvic sidewall nodule which is new. These findings are suspicious for current/metastatic disease. 3. Bilateral adrenal nodules are stable. Nodule posterior to this pancreatic 
body is stable. 23X 
  
 
Pathology: 
8/22/2014: 
FINAL PATHOLOGIC DIAGNOSIS 1. Soft tissue, posterior uterine serosa, biopsy: 
Invasive papillary serous carcinoma. 2. Soft tissue cul-de-sac, cul-de-sac, biopsy: 
Metastatic carcinoma. 3. Uterus, cervix, fallopian tubes, ovaries, radical hysterectomy and salpingo-oophorectomy: 
Cervix: No evidence for dysplasia and malignancy. Endometrium: Poorly differentiated adenocarcinoma, favor FIGO grade 3 endometrioid type. See synoptic report. Myometrium: Invasive differentiated adenocarcinoma. Fallopian tubes, bilateral: No histopathologic abnormality. Ovaries, bilateral: Endosalpingosis. Synoptic Report: 
Specimen: Cervix, uterus, ovaries, fallopian tubes, omentum Procedure: Radical hysterectomy and salpingo-oophorectomy Lymph node sampling: Bilateral pelvic lymph nodes Specimen integrity: Intact hysterectomy specimen Tumor size: 4.0 cm in greatest dimension Histologic type: Poorly differentiated adenocarcinoma, favor endometrioid type Histologic grade: FIGO grade 3 Myometrial invasion: 
Depth of invasion: 1.2 cm Myometrial thickness: To 0.4 cm Involvement of cervix: Not involved Extent of involvement of other organs: 
Right ovary: Not involved Left ovary: Not involved Right fallopian tube: Not involved Left fallopian tube: Not involved Lymphovascular invasion: Not identified Additional pathologic findings: Endosalpingosis 4. Omentum, resection: No evidence for malignancy. 5. Lymph nodes, right pelvic, dissection: No evidence for malignancy nine nodes (0/9). 6. Lymph nodes, left pelvic, dissection: No evidence for malignancy in fourteen nodes (0/14). Pathologic staging (pTNM): 
Primary tumor (pT): pT1b Regional lymph nodes (pN): pN0 Pelvic lymph nodes: 
Number examined: 23 Number involved: 0 Distant metastasis (M): pM1, see comment Comment The tumor in the uterus is high grade with features favoring endometrioid type; however, the presence of a subclone 
demonstrating papillary serous features has not been excluded. Further tissue is submitted for evaluation and the results will be 
issued in an addendum. Despite the different histologic appearance between the uterine tumor and the serosal and peritoneal biopsies, a consensus opinion 
between Rocky Rubio and Heather considers this to be one process rather than two separate primaries. Assessment:  
 
Patient Active Problem List  
Diagnosis Code  Endometrial cancer ([facility] [address] C54.1  Early satiety R68.81  
 Abdominal bloating R14.0  Obesity (BMI 30.0-34. 9) E66.9  Right upper quadrant abdominal pain R10.11  
 Mediastinal lymphadenopathy R59.0  Elevated tumor markers R97.8  Weight loss, non-intentional R63.4 Plan: Will go ahead and proceed with 5th cycle of Doxil today. Dr. Ayan Mo plans on repeat CT after 6th cycle. We will continue to monitor CA-125 Again, stressed eating small, frequent meals would be better for her and if her appetite or early satiety or abd bloating worsen, to call and let us know. I will discuss other options with Oncology nutritionist.  
We reviewed nutritional supplements with protein again encouraged as she is able to tolerate. Hydration encouraged Encouraged to call for any concerns or questions Greater than 40 minutes spent with pt with greater than 50% spent in face to face counseling.   
Abbie Alberto NP

## 2018-12-04 NOTE — PROGRESS NOTES
[facility] - Chemotherapy Progress Note 1005 Pt admit to [facility] for Doxil. Chemotherapy Flowsheet 12/4/2018 Cycle C5 Date 12/4/2018 Drug / Regimen Doxil Pre Meds given Notes given Pt ambulatory in stable condition. Assessment completed. No new concerns voiced. Port accessed with positive blood return. Labs drawn at previous appointment. Line connected to D5 at [facility]. Visit Vitals /76 (BP 1 Location: Right arm, BP Patient Position: Sitting) Pulse 84 Temp 98.1 °F (36.7 °C) Resp 16 Ht 5' 7\" (1.702 m) Wt 92.4 kg (203 lb 12.8 oz) SpO2 97% Breastfeeding? No  
BMI 31.92 kg/m² Medications: 
D5 at [facility] Zofran 8 MG IVP Doxil 1300 Pt tolerated treatment well. Port maintained positive blood return throughout treatment, flushed with positive blood return at conclusion and de-accessed. D/c home ambulatory in no distress. Pt aware of next [facility] appointment scheduled for 01/02/2019 at 1000.

## 2018-12-05 NOTE — TELEPHONE ENCOUNTER
[facility] [place] at [address] [id number]  Chema Orosco, [address] [place]   W: [phone]  F: [phone]    Medical Nutrition Therapy    Nutrition Referral:    Referral received from [facility], NP. Called and left a message, explained that RD is available to address nutrition throughout the spectrum of care. Provided contact information and days available at oncology office.     Signed By: Eben Rodriguez, [address] [place] [address], [address] [place] [address] , [address] [facility] [address]

## 2018-12-27 NOTE — PROGRESS NOTES
[facility] GYNECOLOGIC ONCOLOGY  [address] [place] [address], [address]  [phone] [address] [phone]    Office Visit    Patient ID:  Name: Gatito Tirado  MRN: [id number]  : 1960/58 y.o. Visit date: 2018    Primary Diagnosis:     ICD-10-CM ICD-9-CM    1. Endometrial cancer ([facility] [address] C54.1 182.0 CT ABD PELV W CONT      CT CHEST W CONT        Problem List:    Patient Active Problem List   Diagnosis Code    Endometrial cancer ([facility] [address] C54.1    Early satiety R68.81    Abdominal bloating R14.0    Obesity (BMI 30.0-34. 9) E66.9    Right upper quadrant abdominal pain R10.11    Mediastinal lymphadenopathy R59.0    Elevated tumor markers R97.8    Weight loss, non-intentional R63.4       INTERVAL HISTORY:  Gatito Tirado is a  female with a diagnosis of stage IV UPSC. She underwent a TLH, BSO, with lymphadenectomy in 2014. She was found to have evidence of intraperitoneal disease with implants in the cul de sac, but no evidence of tyler metastases. She was treated with adjuvant chemotherapy with Taxol and Carboplatin. She then received pelvic radiation therapy. In the summer of 2018 she was noted to have an elevated CA-125. She had not been seen in the office for a couple of years prior to that visit. I ordered a CT of the chest/abdomen/pelvis to evaluate for recurrence. It demonstrated peritoneal carcinomatosis. I then sent her for a PET/CT to better evaluate. I recommended combination chemotherapy with Doxil/Avastin. She has completed 5 cycles so far of the Doxil, but has refused further Avastin after the second cycle. She had a lot of vague complaints at that time and was convinced it was the Avastin causing it. She is doing much better now. Her CA-125 has fluctuated up and down.       PMH:  Past Medical History:   Diagnosis Date    Arthritis     Cancer ([facility] [address]     uterine    Chronic pain     LOWER BACK    Depression     Nausea & vomiting     PMB (postmenopausal bleeding) 2014     PSH:  Past Surgical History:   Procedure Laterality Date    HX COLONOSCOPY      HX GYN  14    TLH/BSO/lymph node dissection    HX ORTHOPAEDIC Right      LITTLE toe sx    HX VASCULAR ACCESS Left     PORTACATH - LEFT CHEST     SOC:  Social History     Socioeconomic History    Marital status:      Spouse name: Not on file    Number of children: Not on file    Years of education: Not on file    Highest education level: Not on file   Social Needs    Financial resource strain: Not on file    Food insecurity - worry: Not on file    Food insecurity - inability: Not on file   Optinuity needs - medical: Not on file   Optinuity needs - non-medical: Not on file   Occupational History    Not on file   Tobacco Use    Smoking status: Former Smoker     Packs/day: 0.50     Years: 7.00     Pack years: 3.50     Types: Cigarettes     Last attempt to quit: 1999     Years since quittin.3    Smokeless tobacco: Never Used   Substance and Sexual Activity    Alcohol use: Yes     Alcohol/week: 0.6 oz     Types: 1 Glasses of wine per week     Comment: rarely    Drug use: No    Sexual activity: No   Other Topics Concern    Not on file   Social History Narrative    Not on file     Family History  Family History   Problem Relation Age of Onset    Cancer Mother 61        liver metastatic disease, unknown primary    Stroke Mother         BRAIN ANEURYSM    Cancer Maternal Uncle 61        bone    Cancer Maternal Grandmother 61        unknown primary    Anesth Problems Neg Hx      Medications: (reviewed)  Current Outpatient Medications on File Prior to Visit   Medication Sig Dispense Refill    ondansetron (ZOFRAN ODT) 4 mg disintegrating tablet Take 1 Tab by mouth every eight (8) hours as needed for Nausea. 10 Tab 0    HYDROmorphone (DILAUDID) 2 mg tablet Take 1 Tab by mouth every four (4) hours as needed for Pain.  Max Daily Amount: 12 mg. 20 Tab 0    oxyCODONE-acetaminophen (PERCOCET) 5-325 mg per tablet Take 1 Tab by mouth every four (4) hours as needed for Pain. Max Daily Amount: 6 Tabs. 20 Tab 0    promethazine (PHENERGAN) 25 mg tablet Take 1 Tab by mouth every six (6) hours as needed for Nausea. 30 Tab 3    ibuprofen (ADVIL) 200 mg tablet Take 4 tablets by mouth every eight (8) hours as needed. 90 tablet 3     No current facility-administered medications on file prior to visit. Allergies: (reviewed)  Allergies   Allergen Reactions    Hydrocodone Nausea Only    Oxycodone Nausea and Vomiting       ROS:  Negative     OBJECTIVE:    PHYSICAL EXAM  VITAL SIGNS: Vitals:    12/27/18 1006   BP: 156/84   Pulse: 93   Weight: 202 lb (91.6 kg)   Height: 5' 7.01\" (1.702 m)     Body mass index is 31.63 kg/m². GENERAL FLOWER: Conversant, alert, oriented. NAD   HEENT: Normocephalic. Oropharynx clear. Neck: Trachea midline, no JVD, no thyroid enlargement   RESPIRATORY: Lung fields clear to auscultation and percussion. Adequate respiratory effort   CARDIOVASC: RRR without murmur   GASTROINT: soft, non-tender, without masses or organomegaly. No hernia noted   MUSCULOSKEL: FROM. No focal tenderness. EXTREMITIES: extremities normal, atraumatic, no cyanosis or edema. Pulses appreciated. PELVIC: Deferred   RECTAL: Deferred   DOMINGUEZ SURVEY: Negative throughout---neck, axillae, inguina. NEURO: Grossly intact, no acute deficit. Oriented. Not depressed. Not agitated. CT of the chest/abdomen/pelvis (7/6/18)  THYROID: No nodule. MEDIASTINUM: Mediastinal lymph nodes are stable.     There is a left diaphragmatic lymph node measuring 1.7 cm which has increased in  size.     DELORES: No mass or lymphadenopathy. THORACIC AORTA: No dissection or aneurysm. MAIN PULMONARY ARTERY: Normal in caliber. TRACHEA/BRONCHI: Patent. ESOPHAGUS: No wall thickening or dilatation. HEART: Normal in size. PLEURA: No effusion or pneumothorax.   LUNGS: No nodule, mass, or airspace disease. LIVER: 1.5 cm cyst in the liver is stable. GALLBLADDER: Unremarkable. SPLEEN: There is a mass in the splenic hilus infiltrating the spleen measuring  5.6 x 2 cm and there is an additional mass in the lower pole of the spleen  measuring 2.7 x 1.9 cm and these are new.     There are peritoneal implants in the left upper quadrant with the largest  measuring 3.6 x 1.6 cm.      There is a 0.9 cm implant along the splenic flexure.      There is a 1.9 cm implant gastrocolic ligament.     There is a 1.9 cm nodule posterior to the body of the pancreas which is  unchanged.     PANCREAS: No mass or ductal dilatation. ADRENALS: 3.7 x 2 cm low-density nodule in the right adrenal gland is stable. 1.1 cm nodule left adrenal gland is stable. KIDNEYS: No mass, calculus, or hydronephrosis. There is a small cyst in the left  kidney  STOMACH: Unremarkable. SMALL BOWEL: No dilatation or wall thickening. COLON: No dilatation or wall thickening. APPENDIX: Unremarkable. PERITONEUM: No ascites or pneumoperitoneum. RETROPERITONEUM: No aortic aneurysm is identified. REPRODUCTIVE ORGANS: Patient status post hysterectomy and bilateral  salpingo-oophorectomy.     There is 1.3 cm left pelvic sidewall nodule. URINARY BLADDER: No mass or calculus. BONES: No destructive bone lesion. ADDITIONAL COMMENTS: N/A     IMPRESSION:  1. CT of the chest demonstrates a 1.7 cm left diaphragmatic lymph node which has  increased in size. 2. CT of the abdomen and pelvis demonstrates a mass in the splenic hilus which  is infiltrating the spleen. There is also peritoneal implants in the left upper  quadrant. There is a peritoneal implant splenic flexure and gastrocolic ligament  and these are new findings. There is a 1.4 cm left pelvic sidewall nodule which is new. These findings are suspicious for current/metastatic disease. 3. Bilateral adrenal nodules are stable. Nodule posterior to this pancreatic  body is stable.        PET/CT (7/17/18)  HEAD/NECK: No apparent foci of abnormal hypermetabolism. Cerebral evaluation is  limited by normal intense activity.     CHEST: No foci of abnormal hypermetabolism.     ABDOMEN/PELVIS: There is significant increased tracer activity in the splenic  hilum and in the perisplenic region. Hypermetabolic left cardiophrenic soft  tissue nodule is noted. There are small bilateral hypermetabolic internal iliac  lymph nodes and a hypermetabolic left obturator lymph node.     SKELETON: No foci of abnormal hypermetabolism in the axial and visualized  appendicular skeleton.     IMPRESSION: Hypermetabolic peritoneal carcinomatosis predominantly in the left  upper quadrant. Small but hypermetabolic bilateral internal iliac and left  obturator lymph nodes. CT of abdomen/pelvis (9/13/18)   FINDINGS: There is a 1.5 cm left diaphragmatic lymph node. 1.7 g Peritoneal  implants in the left upper quadrant 3 x 1 cm and are smaller and previously  measured 3.5 x 1.7 cm. There is slight soft tissue prominence in the lesser omentum which may be  minimally progressed. .  Nodule posterior to the pancreatic tail measures 1.8 cm and is stable. LUNG BASES: Clear. INCIDENTALLY IMAGED HEART AND MEDIASTINUM: Unremarkable. LIVER: 1.5 cm low-density most likely a cyst the liver is stable. There is  hepatic steatosis. GALLBLADDER: Unremarkable. SPLEEN: Mass infiltrating into the spleen is stable measuring 5.7 x 2 cm and  previously measured 5.6 x 2 cm. Low-density lesion more inferiorly in the spleen  measures 1.5 x 3 cm and previously measured 2.7 x 1.9 cm. PANCREAS: No mass or ductal dilatation. ADRENALS: Bilateral adrenal nodules are stable. KIDNEYS: No mass, calculus, or hydronephrosis. STOMACH: Unremarkable. SMALL BOWEL: No dilatation or wall thickening. COLON: No dilatation or wall thickening. APPENDIX: Normal  PERITONEUM: No ascites or pneumoperitoneum.   RETROPERITONEUM: 1.3 cm lymph node left pelvic sidewall is stable. Postoperative  changes in the pelvis are stable. REPRODUCTIVE ORGANS:  URINARY BLADDER: No mass or calculus. BONES: No destructive bone lesion. ADDITIONAL COMMENTS: N/A     IMPRESSION:  1.. The mass infiltrating the spleen and a mass more inferiorly in the spleen  are stable in size but are more low density when compared to the most recent  study. Peritoneal implants in the left upper quadrant are slightly improved. Slight soft tissue haziness in the lesser omentum is minimally progressed. Left diaphragmatic lymph node, nodule posterior to the pancreatic body/tail and  1.3 cm left pelvic sidewall lymph node are stable. Adrenal nodules are stable. No acute process is identified. CT of abdomen/pelvis (11/26/18)  THYROID: No nodule. MEDIASTINUM: Borderline enlarged mediastinal lymph nodes are stable. DELORES: No mass or lymphadenopathy. THORACIC AORTA: No dissection or aneurysm. MAIN PULMONARY ARTERY: Normal in caliber. TRACHEA/BRONCHI: Patent. ESOPHAGUS: No wall thickening or dilatation. HEART: Normal in size. PLEURA: No effusion or pneumothorax. Left diaphragmatic lymph node series 3  image 57 measuring 1.4 cm is stable. LUNGS: No nodule, mass, or airspace disease. LIVER: Tiny low-density left lobe of the liver are stable. 1.2 cm low-density  segment 5 is stable. GALLBLADDER: Unremarkable. SPLEEN: Low-density masses in the spleen measure 5.3 x 1.9 cm and previously  measured 5.7 x 2.1 cm. Low-density lesion more medially in the spleen measures 3  x 1.6 cm and previously measured 2.9 x 1.5 cm. PANCREAS: No mass or ductal dilatation. ADRENALS: Bilateral adrenal nodules are stable. There is a 3.9 x 2.1 cm right  adrenal nodule and a 1.1 cm left adrenal nodule which are stable. KIDNEYS: Small low-density left kidney measuring 1.2 cm is stable. STOMACH: Unremarkable. SMALL BOWEL: No dilatation or wall thickening. COLON: No dilatation or wall thickening.   APPENDIX: Unremarkable. PERITONEUM: Peritoneal carcinomatosis is again noted. The most prominent soft  tissue nodules in the left upper quadrant measuring 3.4 x 1 cm and previously  measured 2.9 x 1 cm. There is a nodule posterior to the tail of the pancreas  measuring 1.7 cm and previously measured 1.9 cm. Soft tissue stranding  transverse mesocolon is stable. Soft tissue stranding in the pelvis is stable. Left pelvic sidewall lymph node measuring 1.3 cm series 3 image 113 is stable. RETROPERITONEUM: No lymphadenopathy or aortic aneurysm. REPRODUCTIVE ORGANS: Patient is status post hysterectomy and oophorectomy  URINARY BLADDER: No mass or calculus. BONES: No destructive bone lesion. ADDITIONAL COMMENTS: N/A    IMPRESSION:  1. CT of the chest demonstrates a left diaphragmatic lymph node which is stable. 2. CT of the abdomen and pelvis has not changed significantly. Peritoneal  carcinomatosis has not changed significantly. . Low-density lesions in the spleen  are stable. Bilateral adrenal nodules are stable. Nodule posterior to the tail  of the pancreas is stable. Lymph node left pelvic sidewall is stable. IMPRESSION AND PLAN:  Giulia Palma has a history of uterine papillary serous carcinoma. She is s/p surgical resection and was recommended adjuvant chemotherapy with Taxol and Carboplatin and she completed 6 cycles. She then received pelvic radiation therapy. She subsequently developed metastatic disease. She was not a candidate for surgery. I discussed chemotherapy, specifically Doxil, plus Avastin. She has completed 5 cycles, but has refused any further Avastin after the second cycle. Her most recent CT demonstrates stable disease. We will continue with the current regimen for now and repeat a scan after this cycle.       Kourtney Aranda MD  12/27/2018/2:54 PM

## 2019-01-01 ENCOUNTER — OFFICE VISIT (OUTPATIENT)
Dept: GYNECOLOGY | Age: 59
End: 2019-01-01

## 2019-01-01 ENCOUNTER — HOSPITAL ENCOUNTER (OUTPATIENT)
Dept: CT IMAGING | Age: 59
Discharge: HOME | End: 2019-01-23
Attending: OBSTETRICS & GYNECOLOGY
Payer: COMMERCIAL

## 2019-01-01 ENCOUNTER — TELEPHONE (OUTPATIENT)
Dept: GYNECOLOGY | Age: 59
End: 2019-01-01

## 2019-01-01 ENCOUNTER — APPOINTMENT (OUTPATIENT)
Dept: GENERAL RADIOLOGY | Age: 59
DRG: 393 | End: 2019-01-01
Attending: FAMILY MEDICINE
Payer: COMMERCIAL

## 2019-01-01 ENCOUNTER — APPOINTMENT (OUTPATIENT)
Dept: INTERVENTIONAL RADIOLOGY/VASCULAR | Age: 59
DRG: 388 | End: 2019-01-01
Attending: PHYSICIAN ASSISTANT
Payer: COMMERCIAL

## 2019-01-01 ENCOUNTER — HOSPITAL ENCOUNTER (INPATIENT)
Age: 59
LOS: 4 days | Discharge: HOSPICE | DRG: 389 | End: 2019-10-07
Attending: EMERGENCY MEDICINE | Admitting: OBSTETRICS & GYNECOLOGY
Payer: COMMERCIAL

## 2019-01-01 ENCOUNTER — HOSPITAL ENCOUNTER (OUTPATIENT)
Dept: INFUSION THERAPY | Age: 59
Discharge: HOME | End: 2019-02-05
Payer: COMMERCIAL

## 2019-01-01 ENCOUNTER — APPOINTMENT (OUTPATIENT)
Dept: ULTRASOUND IMAGING | Age: 59
DRG: 393 | End: 2019-01-01
Attending: OBSTETRICS & GYNECOLOGY
Payer: COMMERCIAL

## 2019-01-01 ENCOUNTER — APPOINTMENT (OUTPATIENT)
Dept: ULTRASOUND IMAGING | Age: 59
DRG: 393 | End: 2019-01-01
Attending: PHYSICIAN ASSISTANT
Payer: COMMERCIAL

## 2019-01-01 ENCOUNTER — HOSPITAL ENCOUNTER (OUTPATIENT)
Dept: INFUSION THERAPY | Age: 59
End: 2019-01-01
Payer: COMMERCIAL

## 2019-01-01 ENCOUNTER — HOSPITAL ENCOUNTER (OUTPATIENT)
Dept: INFUSION THERAPY | Age: 59
Discharge: HOME | End: 2019-09-16
Payer: COMMERCIAL

## 2019-01-01 ENCOUNTER — HOSPITAL ENCOUNTER (OUTPATIENT)
Dept: INFUSION THERAPY | Age: 59
End: 2019-01-01

## 2019-01-01 ENCOUNTER — APPOINTMENT (OUTPATIENT)
Dept: INFUSION THERAPY | Age: 59
End: 2019-01-01
Payer: COMMERCIAL

## 2019-01-01 ENCOUNTER — APPOINTMENT (OUTPATIENT)
Dept: GENERAL RADIOLOGY | Age: 59
End: 2019-01-01
Attending: EMERGENCY MEDICINE
Payer: COMMERCIAL

## 2019-01-01 ENCOUNTER — HOSPITAL ENCOUNTER (OUTPATIENT)
Dept: INFUSION THERAPY | Age: 59
Discharge: HOME | End: 2019-09-03
Payer: COMMERCIAL

## 2019-01-01 ENCOUNTER — TELEPHONE (OUTPATIENT)
Dept: PALLATIVE CARE | Age: 59
End: 2019-01-01

## 2019-01-01 ENCOUNTER — HOSPITAL ENCOUNTER (OUTPATIENT)
Dept: INFUSION THERAPY | Age: 59
Discharge: HOME | End: 2019-08-13
Payer: COMMERCIAL

## 2019-01-01 ENCOUNTER — HOSPITAL ENCOUNTER (OUTPATIENT)
Dept: INFUSION THERAPY | Age: 59
Discharge: HOME | End: 2019-07-23
Payer: COMMERCIAL

## 2019-01-01 ENCOUNTER — APPOINTMENT (OUTPATIENT)
Dept: CT IMAGING | Age: 59
DRG: 393 | End: 2019-01-01
Attending: EMERGENCY MEDICINE
Payer: COMMERCIAL

## 2019-01-01 ENCOUNTER — APPOINTMENT (OUTPATIENT)
Dept: GENERAL RADIOLOGY | Age: 59
DRG: 389 | End: 2019-01-01
Attending: EMERGENCY MEDICINE
Payer: COMMERCIAL

## 2019-01-01 ENCOUNTER — NURSE NAVIGATOR (OUTPATIENT)
Dept: ONCOLOGY | Age: 59
End: 2019-01-01

## 2019-01-01 ENCOUNTER — HOSPITAL ENCOUNTER (OUTPATIENT)
Dept: ULTRASOUND IMAGING | Age: 59
Discharge: HOME | End: 2019-06-10
Attending: OBSTETRICS & GYNECOLOGY
Payer: COMMERCIAL

## 2019-01-01 ENCOUNTER — APPOINTMENT (OUTPATIENT)
Dept: CT IMAGING | Age: 59
DRG: 388 | End: 2019-01-01
Attending: PHYSICIAN ASSISTANT
Payer: COMMERCIAL

## 2019-01-01 ENCOUNTER — HOSPITAL ENCOUNTER (EMERGENCY)
Age: 59
Discharge: HOME | End: 2019-10-10
Attending: EMERGENCY MEDICINE
Payer: COMMERCIAL

## 2019-01-01 ENCOUNTER — HOSPITAL ENCOUNTER (INPATIENT)
Age: 59
LOS: 16 days | Discharge: HOME | DRG: 388 | End: 2019-07-13
Attending: EMERGENCY MEDICINE | Admitting: OBSTETRICS & GYNECOLOGY
Payer: COMMERCIAL

## 2019-01-01 ENCOUNTER — HOSPITAL ENCOUNTER (OUTPATIENT)
Dept: INFUSION THERAPY | Age: 59
Discharge: HOME | End: 2019-01-08
Payer: COMMERCIAL

## 2019-01-01 ENCOUNTER — HOSPITAL ENCOUNTER (INPATIENT)
Age: 59
LOS: 7 days | Discharge: HOME | DRG: 393 | End: 2019-06-21
Attending: EMERGENCY MEDICINE | Admitting: FAMILY MEDICINE
Payer: COMMERCIAL

## 2019-01-01 ENCOUNTER — APPOINTMENT (OUTPATIENT)
Dept: GENERAL RADIOLOGY | Age: 59
DRG: 388 | End: 2019-01-01
Attending: PHYSICIAN ASSISTANT
Payer: COMMERCIAL

## 2019-01-01 ENCOUNTER — HOSPICE ADMISSION (OUTPATIENT)
Dept: HOSPICE | Facility: HOSPICE | Age: 59
End: 2019-01-01

## 2019-01-01 ENCOUNTER — APPOINTMENT (OUTPATIENT)
Dept: CT IMAGING | Age: 59
DRG: 388 | End: 2019-01-01
Attending: EMERGENCY MEDICINE
Payer: COMMERCIAL

## 2019-01-01 ENCOUNTER — APPOINTMENT (OUTPATIENT)
Dept: CT IMAGING | Age: 59
DRG: 389 | End: 2019-01-01
Attending: EMERGENCY MEDICINE
Payer: COMMERCIAL

## 2019-01-01 ENCOUNTER — HOSPITAL ENCOUNTER (OUTPATIENT)
Dept: INFUSION THERAPY | Age: 59
Discharge: HOME | End: 2019-07-25
Payer: COMMERCIAL

## 2019-01-01 ENCOUNTER — HOSPITAL ENCOUNTER (OUTPATIENT)
Dept: CT IMAGING | Age: 59
Discharge: HOME | End: 2019-05-22
Attending: OBSTETRICS & GYNECOLOGY
Payer: COMMERCIAL

## 2019-01-01 ENCOUNTER — HOSPITAL ENCOUNTER (OUTPATIENT)
Dept: INFUSION THERAPY | Age: 59
Discharge: HOME | End: 2019-06-11
Payer: COMMERCIAL

## 2019-01-01 VITALS
SYSTOLIC BLOOD PRESSURE: 142 MMHG | OXYGEN SATURATION: 97 % | TEMPERATURE: 98.6 F | RESPIRATION RATE: 18 BRPM | DIASTOLIC BLOOD PRESSURE: 83 MMHG | HEART RATE: 111 BPM

## 2019-01-01 VITALS
RESPIRATION RATE: 18 BRPM | TEMPERATURE: 98 F | DIASTOLIC BLOOD PRESSURE: 80 MMHG | HEIGHT: 67 IN | WEIGHT: 146 LBS | BODY MASS INDEX: 22.91 KG/M2 | SYSTOLIC BLOOD PRESSURE: 142 MMHG | HEART RATE: 100 BPM

## 2019-01-01 VITALS
DIASTOLIC BLOOD PRESSURE: 67 MMHG | TEMPERATURE: 98.6 F | RESPIRATION RATE: 12 BRPM | HEART RATE: 112 BPM | SYSTOLIC BLOOD PRESSURE: 152 MMHG | WEIGHT: 180 LBS | OXYGEN SATURATION: 96 % | HEIGHT: 67 IN | BODY MASS INDEX: 28.25 KG/M2

## 2019-01-01 VITALS
WEIGHT: 161 LBS | RESPIRATION RATE: 16 BRPM | DIASTOLIC BLOOD PRESSURE: 72 MMHG | TEMPERATURE: 98.2 F | BODY MASS INDEX: 25.88 KG/M2 | HEIGHT: 66 IN | HEART RATE: 104 BPM | SYSTOLIC BLOOD PRESSURE: 125 MMHG

## 2019-01-01 VITALS
SYSTOLIC BLOOD PRESSURE: 156 MMHG | WEIGHT: 169.9 LBS | TEMPERATURE: 97.8 F | HEIGHT: 66 IN | DIASTOLIC BLOOD PRESSURE: 91 MMHG | OXYGEN SATURATION: 98 % | RESPIRATION RATE: 16 BRPM | HEART RATE: 110 BPM | BODY MASS INDEX: 27.31 KG/M2

## 2019-01-01 VITALS
HEART RATE: 95 BPM | BODY MASS INDEX: 26.93 KG/M2 | DIASTOLIC BLOOD PRESSURE: 73 MMHG | TEMPERATURE: 98.4 F | WEIGHT: 167.6 LBS | OXYGEN SATURATION: 96 % | RESPIRATION RATE: 16 BRPM | SYSTOLIC BLOOD PRESSURE: 120 MMHG | HEIGHT: 66 IN

## 2019-01-01 VITALS
TEMPERATURE: 98.4 F | BODY MASS INDEX: 28.87 KG/M2 | HEIGHT: 66 IN | WEIGHT: 179.6 LBS | DIASTOLIC BLOOD PRESSURE: 86 MMHG | SYSTOLIC BLOOD PRESSURE: 160 MMHG | HEART RATE: 145 BPM

## 2019-01-01 VITALS
RESPIRATION RATE: 18 BRPM | DIASTOLIC BLOOD PRESSURE: 63 MMHG | HEIGHT: 66 IN | BODY MASS INDEX: 27.48 KG/M2 | TEMPERATURE: 99.2 F | HEART RATE: 112 BPM | WEIGHT: 171 LBS | SYSTOLIC BLOOD PRESSURE: 113 MMHG

## 2019-01-01 VITALS
BODY MASS INDEX: 23.29 KG/M2 | HEIGHT: 67 IN | HEART RATE: 115 BPM | WEIGHT: 148.4 LBS | DIASTOLIC BLOOD PRESSURE: 72 MMHG | SYSTOLIC BLOOD PRESSURE: 115 MMHG

## 2019-01-01 VITALS
SYSTOLIC BLOOD PRESSURE: 132 MMHG | TEMPERATURE: 96.7 F | RESPIRATION RATE: 18 BRPM | HEIGHT: 67 IN | BODY MASS INDEX: 22.79 KG/M2 | HEART RATE: 125 BPM | WEIGHT: 145.2 LBS | OXYGEN SATURATION: 97 % | DIASTOLIC BLOOD PRESSURE: 77 MMHG

## 2019-01-01 VITALS
HEART RATE: 126 BPM | BODY MASS INDEX: 25.88 KG/M2 | SYSTOLIC BLOOD PRESSURE: 127 MMHG | DIASTOLIC BLOOD PRESSURE: 75 MMHG | HEIGHT: 66 IN | WEIGHT: 161 LBS

## 2019-01-01 VITALS
TEMPERATURE: 97.7 F | DIASTOLIC BLOOD PRESSURE: 80 MMHG | WEIGHT: 145 LBS | HEIGHT: 67 IN | RESPIRATION RATE: 16 BRPM | BODY MASS INDEX: 22.76 KG/M2 | HEART RATE: 116 BPM | SYSTOLIC BLOOD PRESSURE: 122 MMHG | OXYGEN SATURATION: 97 %

## 2019-01-01 VITALS
WEIGHT: 189.6 LBS | DIASTOLIC BLOOD PRESSURE: 77 MMHG | SYSTOLIC BLOOD PRESSURE: 122 MMHG | HEART RATE: 100 BPM | HEIGHT: 66 IN | BODY MASS INDEX: 30.47 KG/M2

## 2019-01-01 VITALS
BODY MASS INDEX: 28.93 KG/M2 | HEART RATE: 119 BPM | SYSTOLIC BLOOD PRESSURE: 149 MMHG | WEIGHT: 180 LBS | HEIGHT: 66 IN | DIASTOLIC BLOOD PRESSURE: 89 MMHG

## 2019-01-01 VITALS
BODY MASS INDEX: 24.45 KG/M2 | HEIGHT: 67 IN | SYSTOLIC BLOOD PRESSURE: 128 MMHG | TEMPERATURE: 97.5 F | HEART RATE: 106 BPM | WEIGHT: 155.8 LBS | DIASTOLIC BLOOD PRESSURE: 72 MMHG

## 2019-01-01 VITALS
SYSTOLIC BLOOD PRESSURE: 136 MMHG | RESPIRATION RATE: 18 BRPM | WEIGHT: 202.4 LBS | HEART RATE: 81 BPM | HEIGHT: 67 IN | BODY MASS INDEX: 31.77 KG/M2 | DIASTOLIC BLOOD PRESSURE: 68 MMHG | TEMPERATURE: 98.4 F

## 2019-01-01 VITALS
HEART RATE: 92 BPM | WEIGHT: 196.2 LBS | BODY MASS INDEX: 31.53 KG/M2 | DIASTOLIC BLOOD PRESSURE: 87 MMHG | RESPIRATION RATE: 18 BRPM | TEMPERATURE: 98 F | SYSTOLIC BLOOD PRESSURE: 140 MMHG | HEIGHT: 66 IN

## 2019-01-01 VITALS
WEIGHT: 180 LBS | BODY MASS INDEX: 28.93 KG/M2 | SYSTOLIC BLOOD PRESSURE: 153 MMHG | HEART RATE: 111 BPM | HEIGHT: 66 IN | DIASTOLIC BLOOD PRESSURE: 87 MMHG

## 2019-01-01 VITALS
HEIGHT: 67 IN | HEART RATE: 103 BPM | BODY MASS INDEX: 31.08 KG/M2 | DIASTOLIC BLOOD PRESSURE: 79 MMHG | WEIGHT: 198 LBS | SYSTOLIC BLOOD PRESSURE: 130 MMHG

## 2019-01-01 VITALS
TEMPERATURE: 97.7 F | HEIGHT: 67 IN | OXYGEN SATURATION: 93 % | BODY MASS INDEX: 21.97 KG/M2 | HEART RATE: 133 BPM | WEIGHT: 140 LBS | DIASTOLIC BLOOD PRESSURE: 64 MMHG | SYSTOLIC BLOOD PRESSURE: 108 MMHG | RESPIRATION RATE: 19 BRPM

## 2019-01-01 DIAGNOSIS — C54.1 ENDOMETRIAL CANCER (HCC): Primary | ICD-10-CM

## 2019-01-01 DIAGNOSIS — C54.1 ENDOMETRIAL CANCER (HCC): ICD-10-CM

## 2019-01-01 DIAGNOSIS — G89.3 CANCER RELATED PAIN: ICD-10-CM

## 2019-01-01 DIAGNOSIS — R59.0 MEDIASTINAL LYMPHADENOPATHY: ICD-10-CM

## 2019-01-01 DIAGNOSIS — R11.15 NON-INTRACTABLE CYCLICAL VOMITING WITH NAUSEA: ICD-10-CM

## 2019-01-01 DIAGNOSIS — K59.01 SLOW TRANSIT CONSTIPATION: ICD-10-CM

## 2019-01-01 DIAGNOSIS — C80.0 CARCINOMATOSIS (HCC): ICD-10-CM

## 2019-01-01 DIAGNOSIS — G89.3 CANCER ASSOCIATED PAIN: ICD-10-CM

## 2019-01-01 DIAGNOSIS — R10.84 ABDOMINAL PAIN, GENERALIZED: Primary | ICD-10-CM

## 2019-01-01 DIAGNOSIS — M54.9 BACK PAIN WITHOUT RADIATION: ICD-10-CM

## 2019-01-01 DIAGNOSIS — I95.1 ORTHOSTATIC SYNCOPE: Primary | ICD-10-CM

## 2019-01-01 DIAGNOSIS — R18.0 MALIGNANT ASCITES: ICD-10-CM

## 2019-01-01 DIAGNOSIS — E43 UNSPECIFIED SEVERE PROTEIN-CALORIE MALNUTRITION (HCC): ICD-10-CM

## 2019-01-01 DIAGNOSIS — Z71.89 DNR (DO NOT RESUSCITATE) DISCUSSION: ICD-10-CM

## 2019-01-01 DIAGNOSIS — R10.84 GENERALIZED ABDOMINAL PAIN: ICD-10-CM

## 2019-01-01 DIAGNOSIS — K56.609 SMALL BOWEL OBSTRUCTION (HCC): ICD-10-CM

## 2019-01-01 DIAGNOSIS — C78.7 HEPATIC METASTASES (HCC): ICD-10-CM

## 2019-01-01 DIAGNOSIS — K56.600 PARTIAL SMALL BOWEL OBSTRUCTION (HCC): ICD-10-CM

## 2019-01-01 DIAGNOSIS — K56.609 SMALL BOWEL OBSTRUCTION (HCC): Primary | ICD-10-CM

## 2019-01-01 DIAGNOSIS — R11.2 INTRACTABLE VOMITING WITH NAUSEA, UNSPECIFIED VOMITING TYPE: ICD-10-CM

## 2019-01-01 DIAGNOSIS — Z71.89 GOALS OF CARE, COUNSELING/DISCUSSION: ICD-10-CM

## 2019-01-01 DIAGNOSIS — E86.0 DEHYDRATION: ICD-10-CM

## 2019-01-01 DIAGNOSIS — K56.50 SMALL BOWEL OBSTRUCTION DUE TO ADHESIONS (HCC): Primary | ICD-10-CM

## 2019-01-01 DIAGNOSIS — G89.18 ACUTE POSTOPERATIVE PAIN: ICD-10-CM

## 2019-01-01 DIAGNOSIS — R11.15 INTRACTABLE CYCLICAL VOMITING WITH NAUSEA: ICD-10-CM

## 2019-01-01 DIAGNOSIS — K92.0 COFFEE GROUND EMESIS: ICD-10-CM

## 2019-01-01 LAB
ABO + RH BLD: NORMAL
ABO + RH BLD: NORMAL
ALBUMIN SERPL-MCNC: 1.5 G/DL (ref 3.5–5)
ALBUMIN SERPL-MCNC: 1.6 G/DL (ref 3.5–5)
ALBUMIN SERPL-MCNC: 1.6 G/DL (ref 3.5–5)
ALBUMIN SERPL-MCNC: 1.7 G/DL (ref 3.5–5)
ALBUMIN SERPL-MCNC: 1.8 G/DL (ref 3.5–5)
ALBUMIN SERPL-MCNC: 1.9 G/DL (ref 3.5–5)
ALBUMIN SERPL-MCNC: 2 G/DL (ref 3.5–5)
ALBUMIN SERPL-MCNC: 2.1 G/DL (ref 3.5–5)
ALBUMIN SERPL-MCNC: 2.3 G/DL (ref 3.5–5)
ALBUMIN SERPL-MCNC: 2.5 G/DL (ref 3.5–5)
ALBUMIN SERPL-MCNC: 3.2 G/DL (ref 3.5–5)
ALBUMIN SERPL-MCNC: 3.4 G/DL (ref 3.5–5)
ALBUMIN SERPL-MCNC: 3.8 G/DL (ref 3.5–5.5)
ALBUMIN/GLOB SERPL: 0.3 {RATIO} (ref 1.1–2.2)
ALBUMIN/GLOB SERPL: 0.4 {RATIO} (ref 1.1–2.2)
ALBUMIN/GLOB SERPL: 0.5 {RATIO} (ref 1.1–2.2)
ALBUMIN/GLOB SERPL: 0.8 {RATIO} (ref 1.1–2.2)
ALBUMIN/GLOB SERPL: 0.8 {RATIO} (ref 1.2–2.2)
ALBUMIN/GLOB SERPL: 0.9 {RATIO} (ref 1.1–2.2)
ALP SERPL-CCNC: 100 U/L (ref 45–117)
ALP SERPL-CCNC: 100 U/L (ref 45–117)
ALP SERPL-CCNC: 103 U/L (ref 45–117)
ALP SERPL-CCNC: 112 U/L (ref 45–117)
ALP SERPL-CCNC: 112 U/L (ref 45–117)
ALP SERPL-CCNC: 118 U/L (ref 45–117)
ALP SERPL-CCNC: 126 U/L (ref 45–117)
ALP SERPL-CCNC: 136 U/L (ref 45–117)
ALP SERPL-CCNC: 146 U/L (ref 45–117)
ALP SERPL-CCNC: 147 U/L (ref 45–117)
ALP SERPL-CCNC: 154 U/L (ref 45–117)
ALP SERPL-CCNC: 159 U/L (ref 45–117)
ALP SERPL-CCNC: 170 U/L (ref 45–117)
ALP SERPL-CCNC: 189 U/L (ref 45–117)
ALP SERPL-CCNC: 89 IU/L (ref 39–117)
ALP SERPL-CCNC: 91 U/L (ref 45–117)
ALP SERPL-CCNC: 92 U/L (ref 45–117)
ALP SERPL-CCNC: 93 U/L (ref 45–117)
ALP SERPL-CCNC: 98 U/L (ref 45–117)
ALP SERPL-CCNC: 99 U/L (ref 45–117)
ALT SERPL-CCNC: 10 U/L (ref 12–78)
ALT SERPL-CCNC: 11 U/L (ref 12–78)
ALT SERPL-CCNC: 11 U/L (ref 12–78)
ALT SERPL-CCNC: 12 U/L (ref 12–78)
ALT SERPL-CCNC: 13 U/L (ref 12–78)
ALT SERPL-CCNC: 13 U/L (ref 12–78)
ALT SERPL-CCNC: 16 U/L (ref 12–78)
ALT SERPL-CCNC: 19 U/L (ref 12–78)
ALT SERPL-CCNC: 20 U/L (ref 12–78)
ALT SERPL-CCNC: 21 U/L (ref 12–78)
ALT SERPL-CCNC: 24 U/L (ref 12–78)
ALT SERPL-CCNC: 25 U/L (ref 12–78)
ALT SERPL-CCNC: 8 IU/L (ref 0–32)
ALT SERPL-CCNC: 9 U/L (ref 12–78)
AMORPH CRY URNS QL MICRO: ABNORMAL
ANION GAP SERPL CALC-SCNC: 10 MMOL/L (ref 5–15)
ANION GAP SERPL CALC-SCNC: 11 MMOL/L (ref 5–15)
ANION GAP SERPL CALC-SCNC: 12 MMOL/L (ref 5–15)
ANION GAP SERPL CALC-SCNC: 12 MMOL/L (ref 5–15)
ANION GAP SERPL CALC-SCNC: 5 MMOL/L (ref 5–15)
ANION GAP SERPL CALC-SCNC: 5 MMOL/L (ref 5–15)
ANION GAP SERPL CALC-SCNC: 6 MMOL/L (ref 5–15)
ANION GAP SERPL CALC-SCNC: 7 MMOL/L (ref 5–15)
ANION GAP SERPL CALC-SCNC: 8 MMOL/L (ref 5–15)
ANION GAP SERPL CALC-SCNC: 9 MMOL/L (ref 5–15)
APPEARANCE UR: ABNORMAL
APPEARANCE UR: ABNORMAL
APPEARANCE UR: CLEAR
APPEARANCE UR: CLEAR
AST SERPL-CCNC: 12 U/L (ref 15–37)
AST SERPL-CCNC: 12 U/L (ref 15–37)
AST SERPL-CCNC: 13 U/L (ref 15–37)
AST SERPL-CCNC: 14 U/L (ref 15–37)
AST SERPL-CCNC: 15 U/L (ref 15–37)
AST SERPL-CCNC: 16 U/L (ref 15–37)
AST SERPL-CCNC: 17 IU/L (ref 0–40)
AST SERPL-CCNC: 17 U/L (ref 15–37)
AST SERPL-CCNC: 18 U/L (ref 15–37)
AST SERPL-CCNC: 20 U/L (ref 15–37)
AST SERPL-CCNC: 20 U/L (ref 15–37)
AST SERPL-CCNC: 26 U/L (ref 15–37)
AST SERPL-CCNC: 30 U/L (ref 15–37)
AST SERPL-CCNC: 30 U/L (ref 15–37)
AST SERPL-CCNC: 31 U/L (ref 15–37)
AST SERPL-CCNC: 33 U/L (ref 15–37)
AST SERPL-CCNC: 38 U/L (ref 15–37)
AST SERPL-CCNC: 41 U/L (ref 15–37)
ATRIAL RATE: 101 BPM
ATRIAL RATE: 106 BPM
ATRIAL RATE: 119 BPM
ATRIAL RATE: 134 BPM
ATRIAL RATE: 142 BPM
BACTERIA SPEC CULT: ABNORMAL
BACTERIA SPEC CULT: NORMAL
BACTERIA URNS QL MICRO: ABNORMAL /HPF
BACTERIA URNS QL MICRO: NEGATIVE /HPF
BACTERIA URNS QL MICRO: NEGATIVE /HPF
BASOPHILS # BLD AUTO: 0 X10E3/UL (ref 0–0.2)
BASOPHILS # BLD: 0 K/UL (ref 0–0.1)
BASOPHILS # BLD: 0.1 K/UL (ref 0–0.1)
BASOPHILS NFR BLD AUTO: 0 %
BASOPHILS NFR BLD: 0 % (ref 0–1)
BASOPHILS NFR BLD: 1 % (ref 0–1)
BILIRUB SERPL-MCNC: 0.2 MG/DL (ref 0.2–1)
BILIRUB SERPL-MCNC: 0.3 MG/DL (ref 0.2–1)
BILIRUB SERPL-MCNC: 0.4 MG/DL (ref 0.2–1)
BILIRUB SERPL-MCNC: 0.5 MG/DL (ref 0.2–1)
BILIRUB SERPL-MCNC: <0.2 MG/DL (ref 0–1.2)
BILIRUB UR QL CFM: NEGATIVE
BILIRUB UR QL: NEGATIVE
BLD PROD TYP BPU: NORMAL
BLD PROD TYP BPU: NORMAL
BLOOD GROUP ANTIBODIES SERPL: NORMAL
BLOOD GROUP ANTIBODIES SERPL: NORMAL
BNP SERPL-MCNC: 708 PG/ML
BPU ID: NORMAL
BPU ID: NORMAL
BUN SERPL-MCNC: 10 MG/DL (ref 6–20)
BUN SERPL-MCNC: 10 MG/DL (ref 6–20)
BUN SERPL-MCNC: 11 MG/DL (ref 6–20)
BUN SERPL-MCNC: 11 MG/DL (ref 6–24)
BUN SERPL-MCNC: 12 MG/DL (ref 6–20)
BUN SERPL-MCNC: 13 MG/DL (ref 6–20)
BUN SERPL-MCNC: 14 MG/DL (ref 6–20)
BUN SERPL-MCNC: 15 MG/DL (ref 6–20)
BUN SERPL-MCNC: 15 MG/DL (ref 6–20)
BUN SERPL-MCNC: 16 MG/DL (ref 6–20)
BUN SERPL-MCNC: 18 MG/DL (ref 6–20)
BUN SERPL-MCNC: 18 MG/DL (ref 6–20)
BUN SERPL-MCNC: 20 MG/DL (ref 6–20)
BUN SERPL-MCNC: 21 MG/DL (ref 6–20)
BUN SERPL-MCNC: 22 MG/DL (ref 6–20)
BUN SERPL-MCNC: 23 MG/DL (ref 6–20)
BUN SERPL-MCNC: 33 MG/DL (ref 6–20)
BUN SERPL-MCNC: 33 MG/DL (ref 6–20)
BUN SERPL-MCNC: 38 MG/DL (ref 6–20)
BUN SERPL-MCNC: 45 MG/DL (ref 6–20)
BUN SERPL-MCNC: 7 MG/DL (ref 6–20)
BUN SERPL-MCNC: 8 MG/DL (ref 6–20)
BUN/CREAT SERPL: 10 (ref 12–20)
BUN/CREAT SERPL: 11 (ref 12–20)
BUN/CREAT SERPL: 13 (ref 12–20)
BUN/CREAT SERPL: 14 (ref 12–20)
BUN/CREAT SERPL: 14 (ref 12–20)
BUN/CREAT SERPL: 14 (ref 9–23)
BUN/CREAT SERPL: 15 (ref 12–20)
BUN/CREAT SERPL: 16 (ref 12–20)
BUN/CREAT SERPL: 17 (ref 12–20)
BUN/CREAT SERPL: 20 (ref 12–20)
BUN/CREAT SERPL: 20 (ref 12–20)
BUN/CREAT SERPL: 24 (ref 12–20)
BUN/CREAT SERPL: 26 (ref 12–20)
BUN/CREAT SERPL: 26 (ref 12–20)
BUN/CREAT SERPL: 28 (ref 12–20)
BUN/CREAT SERPL: 29 (ref 12–20)
BUN/CREAT SERPL: 29 (ref 12–20)
BUN/CREAT SERPL: 33 (ref 12–20)
BUN/CREAT SERPL: 33 (ref 12–20)
BUN/CREAT SERPL: 40 (ref 12–20)
BUN/CREAT SERPL: 42 (ref 12–20)
BUN/CREAT SERPL: 50 (ref 12–20)
BUN/CREAT SERPL: 7 (ref 12–20)
BUN/CREAT SERPL: 8 (ref 12–20)
BUN/CREAT SERPL: 8 (ref 12–20)
BUN/CREAT SERPL: 9 (ref 12–20)
CALCIUM SERPL-MCNC: 7.6 MG/DL (ref 8.5–10.1)
CALCIUM SERPL-MCNC: 7.7 MG/DL (ref 8.5–10.1)
CALCIUM SERPL-MCNC: 7.7 MG/DL (ref 8.5–10.1)
CALCIUM SERPL-MCNC: 7.9 MG/DL (ref 8.5–10.1)
CALCIUM SERPL-MCNC: 7.9 MG/DL (ref 8.5–10.1)
CALCIUM SERPL-MCNC: 8 MG/DL (ref 8.5–10.1)
CALCIUM SERPL-MCNC: 8 MG/DL (ref 8.5–10.1)
CALCIUM SERPL-MCNC: 8.1 MG/DL (ref 8.5–10.1)
CALCIUM SERPL-MCNC: 8.1 MG/DL (ref 8.5–10.1)
CALCIUM SERPL-MCNC: 8.2 MG/DL (ref 8.5–10.1)
CALCIUM SERPL-MCNC: 8.3 MG/DL (ref 8.5–10.1)
CALCIUM SERPL-MCNC: 8.4 MG/DL (ref 8.5–10.1)
CALCIUM SERPL-MCNC: 8.6 MG/DL (ref 8.5–10.1)
CALCIUM SERPL-MCNC: 8.6 MG/DL (ref 8.5–10.1)
CALCIUM SERPL-MCNC: 8.7 MG/DL (ref 8.5–10.1)
CALCIUM SERPL-MCNC: 8.8 MG/DL (ref 8.5–10.1)
CALCIUM SERPL-MCNC: 8.9 MG/DL (ref 8.5–10.1)
CALCIUM SERPL-MCNC: 9 MG/DL (ref 8.5–10.1)
CALCIUM SERPL-MCNC: 9 MG/DL (ref 8.5–10.1)
CALCIUM SERPL-MCNC: 9.1 MG/DL (ref 8.5–10.1)
CALCIUM SERPL-MCNC: 9.7 MG/DL (ref 8.7–10.2)
CALCULATED P AXIS, ECG09: 69 DEGREES
CALCULATED P AXIS, ECG09: 71 DEGREES
CALCULATED P AXIS, ECG09: 73 DEGREES
CALCULATED P AXIS, ECG09: 75 DEGREES
CALCULATED P AXIS, ECG09: 85 DEGREES
CALCULATED R AXIS, ECG10: 65 DEGREES
CALCULATED R AXIS, ECG10: 69 DEGREES
CALCULATED R AXIS, ECG10: 69 DEGREES
CALCULATED R AXIS, ECG10: 71 DEGREES
CALCULATED R AXIS, ECG10: 73 DEGREES
CALCULATED T AXIS, ECG11: 45 DEGREES
CALCULATED T AXIS, ECG11: 45 DEGREES
CALCULATED T AXIS, ECG11: 52 DEGREES
CALCULATED T AXIS, ECG11: 57 DEGREES
CALCULATED T AXIS, ECG11: 90 DEGREES
CANCER AG125 SERPL-ACNC: 115 U/ML (ref 1.5–35)
CANCER AG125 SERPL-ACNC: 142 U/ML (ref 1.5–35)
CANCER AG125 SERPL-ACNC: 348 U/ML (ref 1.5–35)
CANCER AG125 SERPL-ACNC: 367 U/ML (ref 1.5–35)
CANCER AG125 SERPL-ACNC: 525 U/ML (ref 1.5–35)
CANCER AG125 SERPL-ACNC: 607 U/ML (ref 1.5–35)
CANCER AG125 SERPL-ACNC: 636 U/ML (ref 0–38.1)
CANCER AG125 SERPL-ACNC: 900 U/ML (ref 1.5–35)
CHLORIDE SERPL-SCNC: 100 MMOL/L (ref 96–106)
CHLORIDE SERPL-SCNC: 100 MMOL/L (ref 97–108)
CHLORIDE SERPL-SCNC: 101 MMOL/L (ref 97–108)
CHLORIDE SERPL-SCNC: 102 MMOL/L (ref 97–108)
CHLORIDE SERPL-SCNC: 103 MMOL/L (ref 97–108)
CHLORIDE SERPL-SCNC: 104 MMOL/L (ref 97–108)
CHLORIDE SERPL-SCNC: 105 MMOL/L (ref 97–108)
CHLORIDE SERPL-SCNC: 106 MMOL/L (ref 97–108)
CHLORIDE SERPL-SCNC: 106 MMOL/L (ref 97–108)
CHLORIDE SERPL-SCNC: 108 MMOL/L (ref 97–108)
CHLORIDE SERPL-SCNC: 108 MMOL/L (ref 97–108)
CHLORIDE SERPL-SCNC: 109 MMOL/L (ref 97–108)
CHLORIDE SERPL-SCNC: 109 MMOL/L (ref 97–108)
CHLORIDE SERPL-SCNC: 110 MMOL/L (ref 97–108)
CHLORIDE SERPL-SCNC: 94 MMOL/L (ref 97–108)
CHLORIDE SERPL-SCNC: 96 MMOL/L (ref 97–108)
CHLORIDE SERPL-SCNC: 96 MMOL/L (ref 97–108)
CHLORIDE SERPL-SCNC: 97 MMOL/L (ref 97–108)
CHLORIDE SERPL-SCNC: 97 MMOL/L (ref 97–108)
CHLORIDE SERPL-SCNC: 98 MMOL/L (ref 97–108)
CHLORIDE SERPL-SCNC: 99 MMOL/L (ref 97–108)
CHLORIDE SERPL-SCNC: 99 MMOL/L (ref 97–108)
CO2 SERPL-SCNC: 20 MMOL/L (ref 21–32)
CO2 SERPL-SCNC: 22 MMOL/L (ref 21–32)
CO2 SERPL-SCNC: 22 MMOL/L (ref 21–32)
CO2 SERPL-SCNC: 23 MMOL/L (ref 21–32)
CO2 SERPL-SCNC: 24 MMOL/L (ref 20–29)
CO2 SERPL-SCNC: 24 MMOL/L (ref 21–32)
CO2 SERPL-SCNC: 25 MMOL/L (ref 21–32)
CO2 SERPL-SCNC: 26 MMOL/L (ref 21–32)
CO2 SERPL-SCNC: 26 MMOL/L (ref 21–32)
CO2 SERPL-SCNC: 28 MMOL/L (ref 21–32)
CO2 SERPL-SCNC: 30 MMOL/L (ref 21–32)
COLOR UR: ABNORMAL
COMMENT, HOLDF: NORMAL
CREAT SERPL-MCNC: 0.55 MG/DL (ref 0.55–1.02)
CREAT SERPL-MCNC: 0.61 MG/DL (ref 0.55–1.02)
CREAT SERPL-MCNC: 0.61 MG/DL (ref 0.55–1.02)
CREAT SERPL-MCNC: 0.63 MG/DL (ref 0.55–1.02)
CREAT SERPL-MCNC: 0.64 MG/DL (ref 0.55–1.02)
CREAT SERPL-MCNC: 0.66 MG/DL (ref 0.55–1.02)
CREAT SERPL-MCNC: 0.69 MG/DL (ref 0.55–1.02)
CREAT SERPL-MCNC: 0.71 MG/DL (ref 0.55–1.02)
CREAT SERPL-MCNC: 0.74 MG/DL (ref 0.55–1.02)
CREAT SERPL-MCNC: 0.75 MG/DL (ref 0.55–1.02)
CREAT SERPL-MCNC: 0.79 MG/DL (ref 0.55–1.02)
CREAT SERPL-MCNC: 0.8 MG/DL (ref 0.55–1.02)
CREAT SERPL-MCNC: 0.81 MG/DL (ref 0.57–1)
CREAT SERPL-MCNC: 0.86 MG/DL (ref 0.55–1.02)
CREAT SERPL-MCNC: 0.91 MG/DL (ref 0.55–1.02)
CREAT SERPL-MCNC: 0.91 MG/DL (ref 0.55–1.02)
CREAT SERPL-MCNC: 0.92 MG/DL (ref 0.55–1.02)
CREAT SERPL-MCNC: 0.96 MG/DL (ref 0.55–1.02)
CREAT SERPL-MCNC: 0.98 MG/DL (ref 0.55–1.02)
CREAT SERPL-MCNC: 0.99 MG/DL (ref 0.55–1.02)
CREAT SERPL-MCNC: 1.02 MG/DL (ref 0.55–1.02)
CREAT SERPL-MCNC: 1.02 MG/DL (ref 0.55–1.02)
CREAT SERPL-MCNC: 1.03 MG/DL (ref 0.55–1.02)
CREAT SERPL-MCNC: 1.08 MG/DL (ref 0.55–1.02)
CREAT SERPL-MCNC: 1.36 MG/DL (ref 0.55–1.02)
CREAT SERPL-MCNC: 1.43 MG/DL (ref 0.55–1.02)
CREAT SERPL-MCNC: 1.53 MG/DL (ref 0.55–1.02)
CREAT SERPL-MCNC: 1.55 MG/DL (ref 0.55–1.02)
CREAT SERPL-MCNC: 1.68 MG/DL (ref 0.55–1.02)
CREAT SERPL-MCNC: 1.79 MG/DL (ref 0.55–1.02)
CREAT SERPL-MCNC: 1.86 MG/DL (ref 0.55–1.02)
CREAT SERPL-MCNC: 1.96 MG/DL (ref 0.55–1.02)
CROSSMATCH RESULT,%XM: NORMAL
CROSSMATCH RESULT,%XM: NORMAL
DIAGNOSIS, 93000: NORMAL
DIFFERENTIAL METHOD BLD: ABNORMAL
EOSINOPHIL # BLD AUTO: 0.1 X10E3/UL (ref 0–0.4)
EOSINOPHIL # BLD: 0 K/UL (ref 0–0.4)
EOSINOPHIL # BLD: 0.1 K/UL (ref 0–0.4)
EOSINOPHIL # BLD: 0.2 K/UL (ref 0–0.4)
EOSINOPHIL #/AREA URNS HPF: NEGATIVE /[HPF]
EOSINOPHIL NFR BLD AUTO: 1 %
EOSINOPHIL NFR BLD: 0 % (ref 0–7)
EOSINOPHIL NFR BLD: 1 % (ref 0–7)
EOSINOPHIL NFR BLD: 2 % (ref 0–7)
EOSINOPHIL NFR BLD: 2 % (ref 0–7)
EOSINOPHIL NFR BLD: 3 % (ref 0–7)
EPITH CASTS URNS QL MICRO: ABNORMAL /LPF
ERYTHROCYTE [DISTWIDTH] IN BLOOD BY AUTOMATED COUNT: 12.6 % (ref 11.5–14.5)
ERYTHROCYTE [DISTWIDTH] IN BLOOD BY AUTOMATED COUNT: 12.7 % (ref 11.5–14.5)
ERYTHROCYTE [DISTWIDTH] IN BLOOD BY AUTOMATED COUNT: 12.8 % (ref 11.5–14.5)
ERYTHROCYTE [DISTWIDTH] IN BLOOD BY AUTOMATED COUNT: 13.1 % (ref 11.5–14.5)
ERYTHROCYTE [DISTWIDTH] IN BLOOD BY AUTOMATED COUNT: 13.2 % (ref 11.5–14.5)
ERYTHROCYTE [DISTWIDTH] IN BLOOD BY AUTOMATED COUNT: 13.3 % (ref 11.5–14.5)
ERYTHROCYTE [DISTWIDTH] IN BLOOD BY AUTOMATED COUNT: 13.3 % (ref 11.5–14.5)
ERYTHROCYTE [DISTWIDTH] IN BLOOD BY AUTOMATED COUNT: 13.4 % (ref 11.5–14.5)
ERYTHROCYTE [DISTWIDTH] IN BLOOD BY AUTOMATED COUNT: 13.5 % (ref 11.5–14.5)
ERYTHROCYTE [DISTWIDTH] IN BLOOD BY AUTOMATED COUNT: 13.7 % (ref 12.3–15.4)
ERYTHROCYTE [DISTWIDTH] IN BLOOD BY AUTOMATED COUNT: 13.8 % (ref 11.5–14.5)
ERYTHROCYTE [DISTWIDTH] IN BLOOD BY AUTOMATED COUNT: 14.3 % (ref 11.5–14.5)
ERYTHROCYTE [DISTWIDTH] IN BLOOD BY AUTOMATED COUNT: 14.5 % (ref 11.5–14.5)
ERYTHROCYTE [DISTWIDTH] IN BLOOD BY AUTOMATED COUNT: 17.2 % (ref 11.5–14.5)
ERYTHROCYTE [DISTWIDTH] IN BLOOD BY AUTOMATED COUNT: 18.9 % (ref 11.5–14.5)
ERYTHROCYTE [DISTWIDTH] IN BLOOD BY AUTOMATED COUNT: 19 % (ref 11.5–14.5)
ERYTHROCYTE [DISTWIDTH] IN BLOOD BY AUTOMATED COUNT: 19.8 % (ref 11.5–14.5)
ERYTHROCYTE [DISTWIDTH] IN BLOOD BY AUTOMATED COUNT: 20.2 % (ref 11.5–14.5)
ERYTHROCYTE [DISTWIDTH] IN BLOOD BY AUTOMATED COUNT: 20.6 % (ref 11.5–14.5)
FOLATE SERPL-MCNC: 16.9 NG/ML (ref 5–21)
GLOBULIN SER CALC-MCNC: 3.9 G/DL (ref 2–4)
GLOBULIN SER CALC-MCNC: 4.1 G/DL (ref 2–4)
GLOBULIN SER CALC-MCNC: 4.6 G/DL (ref 2–4)
GLOBULIN SER CALC-MCNC: 4.7 G/DL (ref 2–4)
GLOBULIN SER CALC-MCNC: 4.8 G/DL (ref 1.5–4.5)
GLOBULIN SER CALC-MCNC: 4.8 G/DL (ref 2–4)
GLOBULIN SER CALC-MCNC: 4.9 G/DL (ref 2–4)
GLOBULIN SER CALC-MCNC: 5.2 G/DL (ref 2–4)
GLOBULIN SER CALC-MCNC: 5.3 G/DL (ref 2–4)
GLOBULIN SER CALC-MCNC: 5.4 G/DL (ref 2–4)
GLOBULIN SER CALC-MCNC: 5.5 G/DL (ref 2–4)
GLOBULIN SER CALC-MCNC: 5.6 G/DL (ref 2–4)
GLOBULIN SER CALC-MCNC: 5.8 G/DL (ref 2–4)
GLOBULIN SER CALC-MCNC: 6.4 G/DL (ref 2–4)
GLUCOSE BLD STRIP.AUTO-MCNC: 100 MG/DL (ref 65–100)
GLUCOSE BLD STRIP.AUTO-MCNC: 101 MG/DL (ref 65–100)
GLUCOSE BLD STRIP.AUTO-MCNC: 105 MG/DL (ref 65–100)
GLUCOSE BLD STRIP.AUTO-MCNC: 108 MG/DL (ref 65–100)
GLUCOSE BLD STRIP.AUTO-MCNC: 109 MG/DL (ref 65–100)
GLUCOSE BLD STRIP.AUTO-MCNC: 110 MG/DL (ref 65–100)
GLUCOSE BLD STRIP.AUTO-MCNC: 112 MG/DL (ref 65–100)
GLUCOSE BLD STRIP.AUTO-MCNC: 114 MG/DL (ref 65–100)
GLUCOSE BLD STRIP.AUTO-MCNC: 114 MG/DL (ref 65–100)
GLUCOSE BLD STRIP.AUTO-MCNC: 115 MG/DL (ref 65–100)
GLUCOSE BLD STRIP.AUTO-MCNC: 118 MG/DL (ref 65–100)
GLUCOSE BLD STRIP.AUTO-MCNC: 118 MG/DL (ref 65–100)
GLUCOSE BLD STRIP.AUTO-MCNC: 119 MG/DL (ref 65–100)
GLUCOSE BLD STRIP.AUTO-MCNC: 120 MG/DL (ref 65–100)
GLUCOSE BLD STRIP.AUTO-MCNC: 121 MG/DL (ref 65–100)
GLUCOSE BLD STRIP.AUTO-MCNC: 122 MG/DL (ref 65–100)
GLUCOSE BLD STRIP.AUTO-MCNC: 123 MG/DL (ref 65–100)
GLUCOSE BLD STRIP.AUTO-MCNC: 123 MG/DL (ref 65–100)
GLUCOSE BLD STRIP.AUTO-MCNC: 124 MG/DL (ref 65–100)
GLUCOSE BLD STRIP.AUTO-MCNC: 125 MG/DL (ref 65–100)
GLUCOSE BLD STRIP.AUTO-MCNC: 126 MG/DL (ref 65–100)
GLUCOSE BLD STRIP.AUTO-MCNC: 126 MG/DL (ref 65–100)
GLUCOSE BLD STRIP.AUTO-MCNC: 127 MG/DL (ref 65–100)
GLUCOSE BLD STRIP.AUTO-MCNC: 127 MG/DL (ref 65–100)
GLUCOSE BLD STRIP.AUTO-MCNC: 128 MG/DL (ref 65–100)
GLUCOSE BLD STRIP.AUTO-MCNC: 129 MG/DL (ref 65–100)
GLUCOSE BLD STRIP.AUTO-MCNC: 130 MG/DL (ref 65–100)
GLUCOSE BLD STRIP.AUTO-MCNC: 131 MG/DL (ref 65–100)
GLUCOSE BLD STRIP.AUTO-MCNC: 133 MG/DL (ref 65–100)
GLUCOSE BLD STRIP.AUTO-MCNC: 135 MG/DL (ref 65–100)
GLUCOSE BLD STRIP.AUTO-MCNC: 138 MG/DL (ref 65–100)
GLUCOSE BLD STRIP.AUTO-MCNC: 147 MG/DL (ref 65–100)
GLUCOSE BLD STRIP.AUTO-MCNC: 184 MG/DL (ref 65–100)
GLUCOSE BLD STRIP.AUTO-MCNC: 95 MG/DL (ref 65–100)
GLUCOSE SERPL-MCNC: 101 MG/DL (ref 65–100)
GLUCOSE SERPL-MCNC: 101 MG/DL (ref 65–100)
GLUCOSE SERPL-MCNC: 107 MG/DL (ref 65–100)
GLUCOSE SERPL-MCNC: 109 MG/DL (ref 65–100)
GLUCOSE SERPL-MCNC: 110 MG/DL (ref 65–100)
GLUCOSE SERPL-MCNC: 110 MG/DL (ref 65–100)
GLUCOSE SERPL-MCNC: 112 MG/DL (ref 65–100)
GLUCOSE SERPL-MCNC: 112 MG/DL (ref 65–100)
GLUCOSE SERPL-MCNC: 114 MG/DL (ref 65–100)
GLUCOSE SERPL-MCNC: 118 MG/DL (ref 65–100)
GLUCOSE SERPL-MCNC: 120 MG/DL (ref 65–100)
GLUCOSE SERPL-MCNC: 120 MG/DL (ref 65–100)
GLUCOSE SERPL-MCNC: 123 MG/DL (ref 65–100)
GLUCOSE SERPL-MCNC: 142 MG/DL (ref 65–100)
GLUCOSE SERPL-MCNC: 76 MG/DL (ref 65–100)
GLUCOSE SERPL-MCNC: 78 MG/DL (ref 65–100)
GLUCOSE SERPL-MCNC: 81 MG/DL (ref 65–100)
GLUCOSE SERPL-MCNC: 90 MG/DL (ref 65–100)
GLUCOSE SERPL-MCNC: 90 MG/DL (ref 65–100)
GLUCOSE SERPL-MCNC: 93 MG/DL (ref 65–100)
GLUCOSE SERPL-MCNC: 94 MG/DL (ref 65–100)
GLUCOSE SERPL-MCNC: 95 MG/DL (ref 65–100)
GLUCOSE SERPL-MCNC: 95 MG/DL (ref 65–100)
GLUCOSE SERPL-MCNC: 97 MG/DL (ref 65–100)
GLUCOSE SERPL-MCNC: 98 MG/DL (ref 65–100)
GLUCOSE SERPL-MCNC: 99 MG/DL (ref 65–100)
GLUCOSE SERPL-MCNC: 99 MG/DL (ref 65–99)
GLUCOSE UR STRIP.AUTO-MCNC: NEGATIVE MG/DL
HCT VFR BLD AUTO: 20.1 % (ref 35–47)
HCT VFR BLD AUTO: 22.4 % (ref 35–47)
HCT VFR BLD AUTO: 22.9 % (ref 35–47)
HCT VFR BLD AUTO: 23.8 % (ref 35–47)
HCT VFR BLD AUTO: 24.5 % (ref 35–47)
HCT VFR BLD AUTO: 24.8 % (ref 35–47)
HCT VFR BLD AUTO: 25.1 % (ref 35–47)
HCT VFR BLD AUTO: 25.4 % (ref 35–47)
HCT VFR BLD AUTO: 26.1 % (ref 35–47)
HCT VFR BLD AUTO: 26.2 % (ref 35–47)
HCT VFR BLD AUTO: 27 % (ref 35–47)
HCT VFR BLD AUTO: 27.8 % (ref 35–47)
HCT VFR BLD AUTO: 28.2 % (ref 35–47)
HCT VFR BLD AUTO: 29.4 % (ref 35–47)
HCT VFR BLD AUTO: 31.1 % (ref 35–47)
HCT VFR BLD AUTO: 31.3 % (ref 35–47)
HCT VFR BLD AUTO: 31.8 % (ref 35–47)
HCT VFR BLD AUTO: 33.9 % (ref 35–47)
HCT VFR BLD AUTO: 34.6 % (ref 34–46.6)
HCT VFR BLD AUTO: 34.8 % (ref 35–47)
HCT VFR BLD AUTO: 34.9 % (ref 35–47)
HCT VFR BLD AUTO: 35.4 % (ref 35–47)
HGB BLD-MCNC: 10 G/DL (ref 11.5–16)
HGB BLD-MCNC: 10.1 G/DL (ref 11.5–16)
HGB BLD-MCNC: 10.5 G/DL (ref 11.5–16)
HGB BLD-MCNC: 10.9 G/DL (ref 11.5–16)
HGB BLD-MCNC: 11.3 G/DL (ref 11.5–16)
HGB BLD-MCNC: 11.4 G/DL (ref 11.5–16)
HGB BLD-MCNC: 11.5 G/DL (ref 11.1–15.9)
HGB BLD-MCNC: 11.6 G/DL (ref 11.5–16)
HGB BLD-MCNC: 6.2 G/DL (ref 11.5–16)
HGB BLD-MCNC: 7 G/DL (ref 11.5–16)
HGB BLD-MCNC: 7.3 G/DL (ref 11.5–16)
HGB BLD-MCNC: 7.6 G/DL (ref 11.5–16)
HGB BLD-MCNC: 7.6 G/DL (ref 11.5–16)
HGB BLD-MCNC: 7.7 G/DL (ref 11.5–16)
HGB BLD-MCNC: 7.9 G/DL (ref 11.5–16)
HGB BLD-MCNC: 8 G/DL (ref 11.5–16)
HGB BLD-MCNC: 8.1 G/DL (ref 11.5–16)
HGB BLD-MCNC: 8.2 G/DL (ref 11.5–16)
HGB BLD-MCNC: 8.4 G/DL (ref 11.5–16)
HGB BLD-MCNC: 8.6 G/DL (ref 11.5–16)
HGB BLD-MCNC: 8.9 G/DL (ref 11.5–16)
HGB BLD-MCNC: 9.5 G/DL (ref 11.5–16)
HGB UR QL STRIP: NEGATIVE
IMM GRANULOCYTES # BLD AUTO: 0 K/UL
IMM GRANULOCYTES # BLD AUTO: 0 K/UL (ref 0–0.04)
IMM GRANULOCYTES # BLD AUTO: 0 X10E3/UL (ref 0–0.1)
IMM GRANULOCYTES # BLD AUTO: 0.1 K/UL (ref 0–0.04)
IMM GRANULOCYTES # BLD: 0 K/UL (ref 0–0.04)
IMM GRANULOCYTES NFR BLD AUTO: 0 %
IMM GRANULOCYTES NFR BLD AUTO: 0 % (ref 0–0.5)
IMM GRANULOCYTES NFR BLD AUTO: 1 % (ref 0–0.5)
INR PPP: 1.1 (ref 0.9–1.1)
INR PPP: 1.1 (ref 0.9–1.1)
IRON SATN MFR SERPL: 16 % (ref 20–50)
IRON SERPL-MCNC: 25 UG/DL (ref 35–150)
KETONES UR QL STRIP.AUTO: 15 MG/DL
KETONES UR QL STRIP.AUTO: 40 MG/DL
KETONES UR QL STRIP.AUTO: ABNORMAL MG/DL
KETONES UR QL STRIP.AUTO: NEGATIVE MG/DL
LACTATE SERPL-SCNC: 0.9 MMOL/L (ref 0.4–2)
LACTATE SERPL-SCNC: 0.9 MMOL/L (ref 0.4–2)
LEUKOCYTE ESTERASE UR QL STRIP.AUTO: ABNORMAL
LEUKOCYTE ESTERASE UR QL STRIP.AUTO: NEGATIVE
LIPASE SERPL-CCNC: 14 U/L (ref 73–393)
LIPASE SERPL-CCNC: 33 U/L (ref 73–393)
LIPASE SERPL-CCNC: 55 U/L (ref 73–393)
LYMPHOCYTES # BLD AUTO: 1.2 X10E3/UL (ref 0.7–3.1)
LYMPHOCYTES # BLD: 0.1 K/UL (ref 0.8–3.5)
LYMPHOCYTES # BLD: 0.2 K/UL (ref 0.8–3.5)
LYMPHOCYTES # BLD: 0.3 K/UL (ref 0.8–3.5)
LYMPHOCYTES # BLD: 0.3 K/UL (ref 0.8–3.5)
LYMPHOCYTES # BLD: 0.4 K/UL (ref 0.8–3.5)
LYMPHOCYTES # BLD: 0.6 K/UL (ref 0.8–3.5)
LYMPHOCYTES # BLD: 0.7 K/UL (ref 0.8–3.5)
LYMPHOCYTES # BLD: 0.8 K/UL (ref 0.8–3.5)
LYMPHOCYTES # BLD: 0.9 K/UL (ref 0.8–3.5)
LYMPHOCYTES # BLD: 1 K/UL (ref 0.8–3.5)
LYMPHOCYTES # BLD: 1.3 K/UL (ref 0.8–3.5)
LYMPHOCYTES NFR BLD AUTO: 16 %
LYMPHOCYTES NFR BLD: 1 % (ref 12–49)
LYMPHOCYTES NFR BLD: 11 % (ref 12–49)
LYMPHOCYTES NFR BLD: 11 % (ref 12–49)
LYMPHOCYTES NFR BLD: 13 % (ref 12–49)
LYMPHOCYTES NFR BLD: 14 % (ref 12–49)
LYMPHOCYTES NFR BLD: 16 % (ref 12–49)
LYMPHOCYTES NFR BLD: 19 % (ref 12–49)
LYMPHOCYTES NFR BLD: 21 % (ref 12–49)
LYMPHOCYTES NFR BLD: 22 % (ref 12–49)
LYMPHOCYTES NFR BLD: 32 % (ref 12–49)
LYMPHOCYTES NFR BLD: 4 % (ref 12–49)
LYMPHOCYTES NFR BLD: 5 % (ref 12–49)
LYMPHOCYTES NFR BLD: 6 % (ref 12–49)
LYMPHOCYTES NFR BLD: 7 % (ref 12–49)
LYMPHOCYTES NFR BLD: 9 % (ref 12–49)
MAGNESIUM SERPL-MCNC: 1 MG/DL (ref 1.6–2.4)
MAGNESIUM SERPL-MCNC: 1.2 MG/DL (ref 1.6–2.4)
MAGNESIUM SERPL-MCNC: 1.4 MG/DL (ref 1.6–2.4)
MAGNESIUM SERPL-MCNC: 1.4 MG/DL (ref 1.6–2.4)
MAGNESIUM SERPL-MCNC: 1.5 MG/DL (ref 1.6–2.4)
MAGNESIUM SERPL-MCNC: 1.7 MG/DL (ref 1.6–2.4)
MAGNESIUM SERPL-MCNC: 1.8 MG/DL (ref 1.6–2.4)
MAGNESIUM SERPL-MCNC: 1.9 MG/DL (ref 1.6–2.4)
MAGNESIUM SERPL-MCNC: 2 MG/DL (ref 1.6–2.4)
MAGNESIUM SERPL-MCNC: 2.1 MG/DL (ref 1.6–2.4)
MAGNESIUM SERPL-MCNC: 2.2 MG/DL (ref 1.6–2.4)
MCH RBC QN AUTO: 29.8 PG (ref 26–34)
MCH RBC QN AUTO: 30 PG (ref 26–34)
MCH RBC QN AUTO: 30 PG (ref 26–34)
MCH RBC QN AUTO: 30.1 PG (ref 26–34)
MCH RBC QN AUTO: 30.2 PG (ref 26–34)
MCH RBC QN AUTO: 30.2 PG (ref 26–34)
MCH RBC QN AUTO: 30.8 PG (ref 26–34)
MCH RBC QN AUTO: 30.9 PG (ref 26–34)
MCH RBC QN AUTO: 31.9 PG (ref 26.6–33)
MCH RBC QN AUTO: 32 PG (ref 26–34)
MCH RBC QN AUTO: 32.3 PG (ref 26–34)
MCH RBC QN AUTO: 32.5 PG (ref 26–34)
MCH RBC QN AUTO: 32.9 PG (ref 26–34)
MCH RBC QN AUTO: 33.6 PG (ref 26–34)
MCH RBC QN AUTO: 33.6 PG (ref 26–34)
MCH RBC QN AUTO: 34.1 PG (ref 26–34)
MCH RBC QN AUTO: 34.4 PG (ref 26–34)
MCH RBC QN AUTO: 34.5 PG (ref 26–34)
MCHC RBC AUTO-ENTMCNC: 30.6 G/DL (ref 30–36.5)
MCHC RBC AUTO-ENTMCNC: 30.8 G/DL (ref 30–36.5)
MCHC RBC AUTO-ENTMCNC: 30.9 G/DL (ref 30–36.5)
MCHC RBC AUTO-ENTMCNC: 31 G/DL (ref 30–36.5)
MCHC RBC AUTO-ENTMCNC: 31.1 G/DL (ref 30–36.5)
MCHC RBC AUTO-ENTMCNC: 31.1 G/DL (ref 30–36.5)
MCHC RBC AUTO-ENTMCNC: 31.3 G/DL (ref 30–36.5)
MCHC RBC AUTO-ENTMCNC: 31.3 G/DL (ref 30–36.5)
MCHC RBC AUTO-ENTMCNC: 31.4 G/DL (ref 30–36.5)
MCHC RBC AUTO-ENTMCNC: 31.6 G/DL (ref 30–36.5)
MCHC RBC AUTO-ENTMCNC: 31.8 G/DL (ref 30–36.5)
MCHC RBC AUTO-ENTMCNC: 31.9 G/DL (ref 30–36.5)
MCHC RBC AUTO-ENTMCNC: 32.2 G/DL (ref 30–36.5)
MCHC RBC AUTO-ENTMCNC: 32.3 G/DL (ref 30–36.5)
MCHC RBC AUTO-ENTMCNC: 32.8 G/DL (ref 30–36.5)
MCHC RBC AUTO-ENTMCNC: 33.2 G/DL (ref 30–36.5)
MCHC RBC AUTO-ENTMCNC: 33.2 G/DL (ref 31.5–35.7)
MCHC RBC AUTO-ENTMCNC: 33.8 G/DL (ref 30–36.5)
MCV RBC AUTO: 100.6 FL (ref 80–99)
MCV RBC AUTO: 101.1 FL (ref 80–99)
MCV RBC AUTO: 101.7 FL (ref 80–99)
MCV RBC AUTO: 102.3 FL (ref 80–99)
MCV RBC AUTO: 104.5 FL (ref 80–99)
MCV RBC AUTO: 105.5 FL (ref 80–99)
MCV RBC AUTO: 106.7 FL (ref 80–99)
MCV RBC AUTO: 107.6 FL (ref 80–99)
MCV RBC AUTO: 109.7 FL (ref 80–99)
MCV RBC AUTO: 92.7 FL (ref 80–99)
MCV RBC AUTO: 93.1 FL (ref 80–99)
MCV RBC AUTO: 94.2 FL (ref 80–99)
MCV RBC AUTO: 95.2 FL (ref 80–99)
MCV RBC AUTO: 95.3 FL (ref 80–99)
MCV RBC AUTO: 95.3 FL (ref 80–99)
MCV RBC AUTO: 95.6 FL (ref 80–99)
MCV RBC AUTO: 95.7 FL (ref 80–99)
MCV RBC AUTO: 95.8 FL (ref 80–99)
MCV RBC AUTO: 96 FL (ref 79–97)
MCV RBC AUTO: 96.2 FL (ref 80–99)
MCV RBC AUTO: 96.6 FL (ref 80–99)
MCV RBC AUTO: 97 FL (ref 80–99)
METAMYELOCYTES NFR BLD MANUAL: 1 %
MONOCYTES # BLD AUTO: 1.1 X10E3/UL (ref 0.1–0.9)
MONOCYTES # BLD: 0 K/UL (ref 0–1)
MONOCYTES # BLD: 0.2 K/UL (ref 0–1)
MONOCYTES # BLD: 0.3 K/UL (ref 0–1)
MONOCYTES # BLD: 0.4 K/UL (ref 0–1)
MONOCYTES # BLD: 0.5 K/UL (ref 0–1)
MONOCYTES # BLD: 0.6 K/UL (ref 0–1)
MONOCYTES # BLD: 0.9 K/UL (ref 0–1)
MONOCYTES # BLD: 0.9 K/UL (ref 0–1)
MONOCYTES # BLD: 1 K/UL (ref 0–1)
MONOCYTES # BLD: 1.1 K/UL (ref 0–1)
MONOCYTES # BLD: 1.2 K/UL (ref 0–1)
MONOCYTES # BLD: 1.3 K/UL (ref 0–1)
MONOCYTES # BLD: 1.5 K/UL (ref 0–1)
MONOCYTES NFR BLD AUTO: 15 %
MONOCYTES NFR BLD: 0 % (ref 5–13)
MONOCYTES NFR BLD: 10 % (ref 5–13)
MONOCYTES NFR BLD: 11 % (ref 5–13)
MONOCYTES NFR BLD: 12 % (ref 5–13)
MONOCYTES NFR BLD: 13 % (ref 5–13)
MONOCYTES NFR BLD: 14 % (ref 5–13)
MONOCYTES NFR BLD: 14 % (ref 5–13)
MONOCYTES NFR BLD: 16 % (ref 5–13)
MONOCYTES NFR BLD: 18 % (ref 5–13)
MONOCYTES NFR BLD: 18 % (ref 5–13)
MONOCYTES NFR BLD: 20 % (ref 5–13)
MONOCYTES NFR BLD: 3 % (ref 5–13)
MONOCYTES NFR BLD: 4 % (ref 5–13)
MONOCYTES NFR BLD: 4 % (ref 5–13)
MONOCYTES NFR BLD: 7 % (ref 5–13)
MONOCYTES NFR BLD: 7 % (ref 5–13)
MONOCYTES NFR BLD: 8 % (ref 5–13)
MUCOUS THREADS URNS QL MICRO: ABNORMAL /LPF
MYELOCYTES NFR BLD MANUAL: 1 %
NEUTROPHILS # BLD AUTO: 5 X10E3/UL (ref 1.4–7)
NEUTROPHILS NFR BLD AUTO: 68 %
NEUTS BAND NFR BLD MANUAL: 1 % (ref 0–6)
NEUTS BAND NFR BLD MANUAL: 1 % (ref 0–6)
NEUTS BAND NFR BLD MANUAL: 11 %
NEUTS BAND NFR BLD MANUAL: 2 % (ref 0–6)
NEUTS BAND NFR BLD MANUAL: 4 % (ref 0–6)
NEUTS BAND NFR BLD MANUAL: 6 % (ref 0–6)
NEUTS SEG # BLD: 1.6 K/UL (ref 1.8–8)
NEUTS SEG # BLD: 2.4 K/UL (ref 1.8–8)
NEUTS SEG # BLD: 2.6 K/UL (ref 1.8–8)
NEUTS SEG # BLD: 3.4 K/UL (ref 1.8–8)
NEUTS SEG # BLD: 3.6 K/UL (ref 1.8–8)
NEUTS SEG # BLD: 3.7 K/UL (ref 1.8–8)
NEUTS SEG # BLD: 3.8 K/UL (ref 1.8–8)
NEUTS SEG # BLD: 3.9 K/UL (ref 1.8–8)
NEUTS SEG # BLD: 4 K/UL (ref 1.8–8)
NEUTS SEG # BLD: 4 K/UL (ref 1.8–8)
NEUTS SEG # BLD: 4.1 K/UL (ref 1.8–8)
NEUTS SEG # BLD: 4.6 K/UL (ref 1.8–8)
NEUTS SEG # BLD: 5.1 K/UL (ref 1.8–8)
NEUTS SEG # BLD: 5.2 K/UL (ref 1.8–8)
NEUTS SEG # BLD: 7.5 K/UL (ref 1.8–8)
NEUTS SEG # BLD: 8.9 K/UL (ref 1.8–8)
NEUTS SEG # BLD: 9.9 K/UL (ref 1.8–8)
NEUTS SEG NFR BLD: 54 % (ref 32–75)
NEUTS SEG NFR BLD: 57 % (ref 32–75)
NEUTS SEG NFR BLD: 62 % (ref 32–75)
NEUTS SEG NFR BLD: 64 % (ref 32–75)
NEUTS SEG NFR BLD: 67 % (ref 32–75)
NEUTS SEG NFR BLD: 68 % (ref 32–75)
NEUTS SEG NFR BLD: 75 % (ref 32–75)
NEUTS SEG NFR BLD: 75 % (ref 32–75)
NEUTS SEG NFR BLD: 76 % (ref 32–75)
NEUTS SEG NFR BLD: 76 % (ref 32–75)
NEUTS SEG NFR BLD: 80 % (ref 32–75)
NEUTS SEG NFR BLD: 81 % (ref 32–75)
NEUTS SEG NFR BLD: 82 % (ref 32–75)
NEUTS SEG NFR BLD: 83 % (ref 32–75)
NEUTS SEG NFR BLD: 84 % (ref 32–75)
NEUTS SEG NFR BLD: 85 % (ref 32–75)
NEUTS SEG NFR BLD: 85 % (ref 32–75)
NITRITE UR QL STRIP.AUTO: NEGATIVE
NRBC # BLD: 0 K/UL (ref 0–0.01)
NRBC BLD-RTO: 0 PER 100 WBC
OTHER,OTHU: ABNORMAL
P-R INTERVAL, ECG05: 100 MS
P-R INTERVAL, ECG05: 112 MS
P-R INTERVAL, ECG05: 116 MS
P-R INTERVAL, ECG05: 118 MS
P-R INTERVAL, ECG05: 126 MS
PH UR STRIP: 5 [PH] (ref 5–8)
PH UR STRIP: 5.5 [PH] (ref 5–8)
PH UR STRIP: 6.5 [PH] (ref 5–8)
PH UR STRIP: 7.5 [PH] (ref 5–8)
PHOSPHATE SERPL-MCNC: 3.2 MG/DL (ref 2.6–4.7)
PLATELET # BLD AUTO: 105 K/UL (ref 150–400)
PLATELET # BLD AUTO: 141 K/UL (ref 150–400)
PLATELET # BLD AUTO: 144 K/UL (ref 150–400)
PLATELET # BLD AUTO: 153 K/UL (ref 150–400)
PLATELET # BLD AUTO: 157 K/UL (ref 150–400)
PLATELET # BLD AUTO: 166 K/UL (ref 150–400)
PLATELET # BLD AUTO: 185 K/UL (ref 150–400)
PLATELET # BLD AUTO: 216 K/UL (ref 150–400)
PLATELET # BLD AUTO: 218 K/UL (ref 150–400)
PLATELET # BLD AUTO: 225 K/UL (ref 150–400)
PLATELET # BLD AUTO: 229 K/UL (ref 150–400)
PLATELET # BLD AUTO: 234 K/UL (ref 150–400)
PLATELET # BLD AUTO: 239 K/UL (ref 150–400)
PLATELET # BLD AUTO: 248 K/UL (ref 150–400)
PLATELET # BLD AUTO: 273 K/UL (ref 150–400)
PLATELET # BLD AUTO: 309 K/UL (ref 150–400)
PLATELET # BLD AUTO: 378 X10E3/UL (ref 150–379)
PLATELET # BLD AUTO: 382 K/UL (ref 150–400)
PLATELET # BLD AUTO: 441 K/UL (ref 150–400)
PLATELET # BLD AUTO: 442 K/UL (ref 150–400)
PLATELET # BLD AUTO: 55 K/UL (ref 150–400)
PLATELET # BLD AUTO: 82 K/UL (ref 150–400)
PLATELET COMMENTS,PCOM: ABNORMAL
PMV BLD AUTO: 10.1 FL (ref 8.9–12.9)
PMV BLD AUTO: 10.3 FL (ref 8.9–12.9)
PMV BLD AUTO: 10.4 FL (ref 8.9–12.9)
PMV BLD AUTO: 10.4 FL (ref 8.9–12.9)
PMV BLD AUTO: 10.5 FL (ref 8.9–12.9)
PMV BLD AUTO: 10.6 FL (ref 8.9–12.9)
PMV BLD AUTO: 11.3 FL (ref 8.9–12.9)
PMV BLD AUTO: 11.6 FL (ref 8.9–12.9)
PMV BLD AUTO: 11.6 FL (ref 8.9–12.9)
PMV BLD AUTO: 11.7 FL (ref 8.9–12.9)
PMV BLD AUTO: 11.9 FL (ref 8.9–12.9)
PMV BLD AUTO: 12 FL (ref 8.9–12.9)
PMV BLD AUTO: 12.3 FL (ref 8.9–12.9)
PMV BLD AUTO: 12.6 FL (ref 8.9–12.9)
PMV BLD AUTO: 9.9 FL (ref 8.9–12.9)
POTASSIUM SERPL-SCNC: 3.3 MMOL/L (ref 3.5–5.1)
POTASSIUM SERPL-SCNC: 3.5 MMOL/L (ref 3.5–5.1)
POTASSIUM SERPL-SCNC: 3.6 MMOL/L (ref 3.5–5.1)
POTASSIUM SERPL-SCNC: 3.7 MMOL/L (ref 3.5–5.1)
POTASSIUM SERPL-SCNC: 3.8 MMOL/L (ref 3.5–5.1)
POTASSIUM SERPL-SCNC: 3.9 MMOL/L (ref 3.5–5.1)
POTASSIUM SERPL-SCNC: 4 MMOL/L (ref 3.5–5.1)
POTASSIUM SERPL-SCNC: 4.1 MMOL/L (ref 3.5–5.1)
POTASSIUM SERPL-SCNC: 4.2 MMOL/L (ref 3.5–5.1)
POTASSIUM SERPL-SCNC: 4.3 MMOL/L (ref 3.5–5.1)
POTASSIUM SERPL-SCNC: 4.4 MMOL/L (ref 3.5–5.1)
POTASSIUM SERPL-SCNC: 4.7 MMOL/L (ref 3.5–5.1)
POTASSIUM SERPL-SCNC: 4.7 MMOL/L (ref 3.5–5.2)
POTASSIUM SERPL-SCNC: 5.1 MMOL/L (ref 3.5–5.1)
PREALB SERPL-MCNC: 6.6 MG/DL (ref 20–40)
PROT SERPL-MCNC: 6.3 G/DL (ref 6.4–8.2)
PROT SERPL-MCNC: 6.3 G/DL (ref 6.4–8.2)
PROT SERPL-MCNC: 6.4 G/DL (ref 6.4–8.2)
PROT SERPL-MCNC: 6.6 G/DL (ref 6.4–8.2)
PROT SERPL-MCNC: 6.6 G/DL (ref 6.4–8.2)
PROT SERPL-MCNC: 6.7 G/DL (ref 6.4–8.2)
PROT SERPL-MCNC: 6.9 G/DL (ref 6.4–8.2)
PROT SERPL-MCNC: 6.9 G/DL (ref 6.4–8.2)
PROT SERPL-MCNC: 7 G/DL (ref 6.4–8.2)
PROT SERPL-MCNC: 7.2 G/DL (ref 6.4–8.2)
PROT SERPL-MCNC: 7.3 G/DL (ref 6.4–8.2)
PROT SERPL-MCNC: 7.3 G/DL (ref 6.4–8.2)
PROT SERPL-MCNC: 7.6 G/DL (ref 6.4–8.2)
PROT SERPL-MCNC: 7.7 G/DL (ref 6.4–8.2)
PROT SERPL-MCNC: 7.7 G/DL (ref 6.4–8.2)
PROT SERPL-MCNC: 7.8 G/DL (ref 6.4–8.2)
PROT SERPL-MCNC: 7.9 G/DL (ref 6.4–8.2)
PROT SERPL-MCNC: 7.9 G/DL (ref 6.4–8.2)
PROT SERPL-MCNC: 8.5 G/DL (ref 6.4–8.2)
PROT SERPL-MCNC: 8.6 G/DL (ref 6–8.5)
PROT UR STRIP-MCNC: 30 MG/DL
PROT UR STRIP-MCNC: 30 MG/DL
PROT UR STRIP-MCNC: ABNORMAL MG/DL
PROT UR STRIP-MCNC: NEGATIVE MG/DL
PROTHROMBIN TIME: 10.8 SEC (ref 9–11.1)
PROTHROMBIN TIME: 11 SEC (ref 9–11.1)
Q-T INTERVAL, ECG07: 290 MS
Q-T INTERVAL, ECG07: 332 MS
Q-T INTERVAL, ECG07: 338 MS
Q-T INTERVAL, ECG07: 354 MS
Q-T INTERVAL, ECG07: 358 MS
QRS DURATION, ECG06: 78 MS
QRS DURATION, ECG06: 86 MS
QRS DURATION, ECG06: 88 MS
QRS DURATION, ECG06: 96 MS
QRS DURATION, ECG06: 98 MS
QTC CALCULATION (BEZET), ECG08: 446 MS
QTC CALCULATION (BEZET), ECG08: 464 MS
QTC CALCULATION (BEZET), ECG08: 470 MS
QTC CALCULATION (BEZET), ECG08: 475 MS
QTC CALCULATION (BEZET), ECG08: 495 MS
RBC # BLD AUTO: 2.08 M/UL (ref 3.8–5.2)
RBC # BLD AUTO: 2.23 M/UL (ref 3.8–5.2)
RBC # BLD AUTO: 2.26 M/UL (ref 3.8–5.2)
RBC # BLD AUTO: 2.35 M/UL (ref 3.8–5.2)
RBC # BLD AUTO: 2.38 M/UL (ref 3.8–5.2)
RBC # BLD AUTO: 2.41 M/UL (ref 3.8–5.2)
RBC # BLD AUTO: 2.43 M/UL (ref 3.8–5.2)
RBC # BLD AUTO: 2.43 M/UL (ref 3.8–5.2)
RBC # BLD AUTO: 2.69 M/UL (ref 3.8–5.2)
RBC # BLD AUTO: 2.75 M/UL (ref 3.8–5.2)
RBC # BLD AUTO: 2.82 M/UL (ref 3.8–5.2)
RBC # BLD AUTO: 2.89 M/UL (ref 3.8–5.2)
RBC # BLD AUTO: 2.91 M/UL (ref 3.8–5.2)
RBC # BLD AUTO: 2.95 M/UL (ref 3.8–5.2)
RBC # BLD AUTO: 3.04 M/UL (ref 3.8–5.2)
RBC # BLD AUTO: 3.17 M/UL (ref 3.8–5.2)
RBC # BLD AUTO: 3.34 M/UL (ref 3.8–5.2)
RBC # BLD AUTO: 3.46 M/UL (ref 3.8–5.2)
RBC # BLD AUTO: 3.5 M/UL (ref 3.8–5.2)
RBC # BLD AUTO: 3.54 M/UL (ref 3.8–5.2)
RBC # BLD AUTO: 3.61 X10E6/UL (ref 3.77–5.28)
RBC # BLD AUTO: 3.75 M/UL (ref 3.8–5.2)
RBC #/AREA URNS HPF: ABNORMAL /HPF (ref 0–5)
RBC MORPH BLD: ABNORMAL
SAMPLES BEING HELD,HOLD: NORMAL
SERVICE CMNT-IMP: ABNORMAL
SERVICE CMNT-IMP: NORMAL
SODIUM SERPL-SCNC: 130 MMOL/L (ref 136–145)
SODIUM SERPL-SCNC: 130 MMOL/L (ref 136–145)
SODIUM SERPL-SCNC: 131 MMOL/L (ref 136–145)
SODIUM SERPL-SCNC: 132 MMOL/L (ref 136–145)
SODIUM SERPL-SCNC: 132 MMOL/L (ref 136–145)
SODIUM SERPL-SCNC: 133 MMOL/L (ref 136–145)
SODIUM SERPL-SCNC: 134 MMOL/L (ref 136–145)
SODIUM SERPL-SCNC: 135 MMOL/L (ref 136–145)
SODIUM SERPL-SCNC: 136 MMOL/L (ref 136–145)
SODIUM SERPL-SCNC: 137 MMOL/L (ref 134–144)
SODIUM SERPL-SCNC: 137 MMOL/L (ref 136–145)
SODIUM SERPL-SCNC: 137 MMOL/L (ref 136–145)
SODIUM SERPL-SCNC: 138 MMOL/L (ref 136–145)
SODIUM SERPL-SCNC: 139 MMOL/L (ref 136–145)
SODIUM SERPL-SCNC: 140 MMOL/L (ref 136–145)
SODIUM SERPL-SCNC: 140 MMOL/L (ref 136–145)
SP GR UR REFRACTOMETRY: 1.02 (ref 1–1.03)
SP GR UR REFRACTOMETRY: <1.005 (ref 1–1.03)
SPECIMEN EXP DATE BLD: NORMAL
SPECIMEN EXP DATE BLD: NORMAL
STATUS OF UNIT,%ST: NORMAL
STATUS OF UNIT,%ST: NORMAL
TIBC SERPL-MCNC: 159 UG/DL (ref 250–450)
TROPONIN I SERPL-MCNC: <0.05 NG/ML
UA: UC IF INDICATED,UAUC: ABNORMAL
UNIT DIVISION, %UDIV: 0
UNIT DIVISION, %UDIV: 0
UR CULT HOLD, URHOLD: NORMAL
UR CULT HOLD, URHOLD: NORMAL
UROBILINOGEN UR QL STRIP.AUTO: 0.2 EU/DL (ref 0.2–1)
UROBILINOGEN UR QL STRIP.AUTO: 1 EU/DL (ref 0.2–1)
VANCOMYCIN SERPL-MCNC: 30.2 UG/ML
VENTRICULAR RATE, ECG03: 101 BPM
VENTRICULAR RATE, ECG03: 106 BPM
VENTRICULAR RATE, ECG03: 119 BPM
VENTRICULAR RATE, ECG03: 134 BPM
VENTRICULAR RATE, ECG03: 142 BPM
VIT B12 SERPL-MCNC: 531 PG/ML (ref 193–986)
WBC # BLD AUTO: 10.7 K/UL (ref 3.6–11)
WBC # BLD AUTO: 12.1 K/UL (ref 3.6–11)
WBC # BLD AUTO: 3 K/UL (ref 3.6–11)
WBC # BLD AUTO: 3.7 K/UL (ref 3.6–11)
WBC # BLD AUTO: 3.7 K/UL (ref 3.6–11)
WBC # BLD AUTO: 4.1 K/UL (ref 3.6–11)
WBC # BLD AUTO: 4.2 K/UL (ref 3.6–11)
WBC # BLD AUTO: 4.5 K/UL (ref 3.6–11)
WBC # BLD AUTO: 4.6 K/UL (ref 3.6–11)
WBC # BLD AUTO: 4.7 K/UL (ref 3.6–11)
WBC # BLD AUTO: 4.8 K/UL (ref 3.6–11)
WBC # BLD AUTO: 4.8 K/UL (ref 3.6–11)
WBC # BLD AUTO: 5.4 K/UL (ref 3.6–11)
WBC # BLD AUTO: 5.4 K/UL (ref 3.6–11)
WBC # BLD AUTO: 5.5 K/UL (ref 3.6–11)
WBC # BLD AUTO: 5.7 K/UL (ref 3.6–11)
WBC # BLD AUTO: 5.7 K/UL (ref 3.6–11)
WBC # BLD AUTO: 5.9 K/UL (ref 3.6–11)
WBC # BLD AUTO: 5.9 K/UL (ref 3.6–11)
WBC # BLD AUTO: 6 K/UL (ref 3.6–11)
WBC # BLD AUTO: 7.5 X10E3/UL (ref 3.4–10.8)
WBC # BLD AUTO: 9.8 K/UL (ref 3.6–11)
WBC MORPH BLD: ABNORMAL
WBC MORPH BLD: ABNORMAL
WBC URNS QL MICRO: ABNORMAL /HPF (ref 0–4)

## 2019-01-01 PROCEDURE — 96375 TX/PRO/DX INJ NEW DRUG ADDON: CPT

## 2019-01-01 PROCEDURE — 65410000002 HC RM PRIVATE OB

## 2019-01-01 PROCEDURE — 74011000258 HC RX REV CODE- 258: Performed by: OBSTETRICS & GYNECOLOGY

## 2019-01-01 PROCEDURE — 74011250636 HC RX REV CODE- 250/636: Performed by: PHYSICIAN ASSISTANT

## 2019-01-01 PROCEDURE — 74011250637 HC RX REV CODE- 250/637: Performed by: HOSPITALIST

## 2019-01-01 PROCEDURE — 87086 URINE CULTURE/COLONY COUNT: CPT

## 2019-01-01 PROCEDURE — 74011250636 HC RX REV CODE- 250/636: Performed by: OBSTETRICS & GYNECOLOGY

## 2019-01-01 PROCEDURE — 74011250637 HC RX REV CODE- 250/637: Performed by: OBSTETRICS & GYNECOLOGY

## 2019-01-01 PROCEDURE — 77030012965 HC NDL HUBR BBMI -A

## 2019-01-01 PROCEDURE — 65660000000 HC RM CCU STEPDOWN

## 2019-01-01 PROCEDURE — 71260 CT THORAX DX C+: CPT

## 2019-01-01 PROCEDURE — C9113 INJ PANTOPRAZOLE SODIUM, VIA: HCPCS | Performed by: PHYSICIAN ASSISTANT

## 2019-01-01 PROCEDURE — 80053 COMPREHEN METABOLIC PANEL: CPT

## 2019-01-01 PROCEDURE — 36415 COLL VENOUS BLD VENIPUNCTURE: CPT

## 2019-01-01 PROCEDURE — 3E04305 INTRODUCTION OF OTHER ANTINEOPLASTIC INTO CENTRAL VEIN, PERCUTANEOUS APPROACH: ICD-10-PCS | Performed by: OBSTETRICS & GYNECOLOGY

## 2019-01-01 PROCEDURE — 71045 X-RAY EXAM CHEST 1 VIEW: CPT

## 2019-01-01 PROCEDURE — 49083 ABD PARACENTESIS W/IMAGING: CPT

## 2019-01-01 PROCEDURE — 74011000258 HC RX REV CODE- 258: Performed by: RADIOLOGY

## 2019-01-01 PROCEDURE — 70470 CT HEAD/BRAIN W/O & W/DYE: CPT

## 2019-01-01 PROCEDURE — 74011250637 HC RX REV CODE- 250/637: Performed by: INTERNAL MEDICINE

## 2019-01-01 PROCEDURE — 94760 N-INVAS EAR/PLS OXIMETRY 1: CPT

## 2019-01-01 PROCEDURE — 74011250636 HC RX REV CODE- 250/636

## 2019-01-01 PROCEDURE — 74011250636 HC RX REV CODE- 250/636: Performed by: RADIOLOGY

## 2019-01-01 PROCEDURE — C9113 INJ PANTOPRAZOLE SODIUM, VIA: HCPCS | Performed by: OBSTETRICS & GYNECOLOGY

## 2019-01-01 PROCEDURE — 02HV33Z INSERTION OF INFUSION DEVICE INTO SUPERIOR VENA CAVA, PERCUTANEOUS APPROACH: ICD-10-PCS | Performed by: PHYSICIAN ASSISTANT

## 2019-01-01 PROCEDURE — 0W9G30Z DRAINAGE OF PERITONEAL CAVITY WITH DRAINAGE DEVICE, PERCUTANEOUS APPROACH: ICD-10-PCS | Performed by: RADIOLOGY

## 2019-01-01 PROCEDURE — 83690 ASSAY OF LIPASE: CPT

## 2019-01-01 PROCEDURE — 83735 ASSAY OF MAGNESIUM: CPT

## 2019-01-01 PROCEDURE — 74011250637 HC RX REV CODE- 250/637: Performed by: PHYSICIAN ASSISTANT

## 2019-01-01 PROCEDURE — 80048 BASIC METABOLIC PNL TOTAL CA: CPT

## 2019-01-01 PROCEDURE — 0DH63UZ INSERTION OF FEEDING DEVICE INTO STOMACH, PERCUTANEOUS APPROACH: ICD-10-PCS | Performed by: RADIOLOGY

## 2019-01-01 PROCEDURE — 74011000250 HC RX REV CODE- 250: Performed by: OBSTETRICS & GYNECOLOGY

## 2019-01-01 PROCEDURE — 83605 ASSAY OF LACTIC ACID: CPT

## 2019-01-01 PROCEDURE — 74011636637 HC RX REV CODE- 636/637: Performed by: OBSTETRICS & GYNECOLOGY

## 2019-01-01 PROCEDURE — 76450000000

## 2019-01-01 PROCEDURE — 81001 URINALYSIS AUTO W/SCOPE: CPT

## 2019-01-01 PROCEDURE — C1894 INTRO/SHEATH, NON-LASER: HCPCS

## 2019-01-01 PROCEDURE — 82962 GLUCOSE BLOOD TEST: CPT

## 2019-01-01 PROCEDURE — 74011000250 HC RX REV CODE- 250: Performed by: PHYSICIAN ASSISTANT

## 2019-01-01 PROCEDURE — 74011636637 HC RX REV CODE- 636/637: Performed by: PHYSICIAN ASSISTANT

## 2019-01-01 PROCEDURE — 74011250637 HC RX REV CODE- 250/637: Performed by: FAMILY MEDICINE

## 2019-01-01 PROCEDURE — 99285 EMERGENCY DEPT VISIT HI MDM: CPT

## 2019-01-01 PROCEDURE — 74011000258 HC RX REV CODE- 258: Performed by: PHYSICIAN ASSISTANT

## 2019-01-01 PROCEDURE — 99152 MOD SED SAME PHYS/QHP 5/>YRS: CPT

## 2019-01-01 PROCEDURE — 94761 N-INVAS EAR/PLS OXIMETRY MLT: CPT

## 2019-01-01 PROCEDURE — 93005 ELECTROCARDIOGRAM TRACING: CPT

## 2019-01-01 PROCEDURE — 74011000258 HC RX REV CODE- 258: Performed by: FAMILY MEDICINE

## 2019-01-01 PROCEDURE — 74176 CT ABD & PELVIS W/O CONTRAST: CPT

## 2019-01-01 PROCEDURE — 74011250636 HC RX REV CODE- 250/636: Performed by: HOSPITALIST

## 2019-01-01 PROCEDURE — 74011250636 HC RX REV CODE- 250/636: Performed by: FAMILY MEDICINE

## 2019-01-01 PROCEDURE — 74011000258 HC RX REV CODE- 258: Performed by: HOSPITALIST

## 2019-01-01 PROCEDURE — 77030005513 HC CATH URETH FOL11 MDII -B

## 2019-01-01 PROCEDURE — 77030013169 SET IV BLD ICUM -A

## 2019-01-01 PROCEDURE — 96361 HYDRATE IV INFUSION ADD-ON: CPT

## 2019-01-01 PROCEDURE — 84134 ASSAY OF PREALBUMIN: CPT

## 2019-01-01 PROCEDURE — 85025 COMPLETE CBC W/AUTO DIFF WBC: CPT

## 2019-01-01 PROCEDURE — 85027 COMPLETE CBC AUTOMATED: CPT

## 2019-01-01 PROCEDURE — 74011250636 HC RX REV CODE- 250/636: Performed by: EMERGENCY MEDICINE

## 2019-01-01 PROCEDURE — 96374 THER/PROPH/DIAG INJ IV PUSH: CPT

## 2019-01-01 PROCEDURE — 86920 COMPATIBILITY TEST SPIN: CPT

## 2019-01-01 PROCEDURE — 87040 BLOOD CULTURE FOR BACTERIA: CPT

## 2019-01-01 PROCEDURE — 87205 SMEAR GRAM STAIN: CPT

## 2019-01-01 PROCEDURE — 36591 DRAW BLOOD OFF VENOUS DEVICE: CPT

## 2019-01-01 PROCEDURE — 86304 IMMUNOASSAY TUMOR CA 125: CPT

## 2019-01-01 PROCEDURE — 74011636320 HC RX REV CODE- 636/320: Performed by: RADIOLOGY

## 2019-01-01 PROCEDURE — 74177 CT ABD & PELVIS W/CONTRAST: CPT

## 2019-01-01 PROCEDURE — 65270000029 HC RM PRIVATE

## 2019-01-01 PROCEDURE — 96367 TX/PROPH/DG ADDL SEQ IV INF: CPT

## 2019-01-01 PROCEDURE — 36573 INSJ PICC RS&I 5 YR+: CPT | Performed by: PHYSICIAN ASSISTANT

## 2019-01-01 PROCEDURE — 96413 CHEMO IV INFUSION 1 HR: CPT

## 2019-01-01 PROCEDURE — 77010033678 HC OXYGEN DAILY

## 2019-01-01 PROCEDURE — 77030018617 HC TU FEED GASTMY1 COOK -B

## 2019-01-01 PROCEDURE — 74245 XR UPPER GI/SMALL BOWEL: CPT

## 2019-01-01 PROCEDURE — 99284 EMERGENCY DEPT VISIT MOD MDM: CPT

## 2019-01-01 PROCEDURE — 74011250636 HC RX REV CODE- 250/636: Performed by: NURSE PRACTITIONER

## 2019-01-01 PROCEDURE — 96360 HYDRATION IV INFUSION INIT: CPT

## 2019-01-01 PROCEDURE — C1729 CATH, DRAINAGE: HCPCS

## 2019-01-01 PROCEDURE — 77030010545

## 2019-01-01 PROCEDURE — 84484 ASSAY OF TROPONIN QUANT: CPT

## 2019-01-01 PROCEDURE — BD12YZZ FLUOROSCOPY OF STOMACH USING OTHER CONTRAST: ICD-10-PCS | Performed by: RADIOLOGY

## 2019-01-01 PROCEDURE — 3E0G76Z INTRODUCTION OF NUTRITIONAL SUBSTANCE INTO UPPER GI, VIA NATURAL OR ARTIFICIAL OPENING: ICD-10-PCS | Performed by: RADIOLOGY

## 2019-01-01 PROCEDURE — 77030018836 HC SOL IRR NACL ICUM -A

## 2019-01-01 PROCEDURE — 83540 ASSAY OF IRON: CPT

## 2019-01-01 PROCEDURE — 83880 ASSAY OF NATRIURETIC PEPTIDE: CPT

## 2019-01-01 PROCEDURE — C9113 INJ PANTOPRAZOLE SODIUM, VIA: HCPCS | Performed by: FAMILY MEDICINE

## 2019-01-01 PROCEDURE — 77030020365 HC SOL INJ SOD CL 0.9% 50ML

## 2019-01-01 PROCEDURE — 84100 ASSAY OF PHOSPHORUS: CPT

## 2019-01-01 PROCEDURE — 82607 VITAMIN B-12: CPT

## 2019-01-01 PROCEDURE — 74018 RADEX ABDOMEN 1 VIEW: CPT

## 2019-01-01 PROCEDURE — 82746 ASSAY OF FOLIC ACID SERUM: CPT

## 2019-01-01 PROCEDURE — P9016 RBC LEUKOCYTES REDUCED: HCPCS

## 2019-01-01 PROCEDURE — 76705 ECHO EXAM OF ABDOMEN: CPT

## 2019-01-01 PROCEDURE — 77030020847 HC STATLOK BARD -A

## 2019-01-01 PROCEDURE — 74011250636 HC RX REV CODE- 250/636: Performed by: INTERNAL MEDICINE

## 2019-01-01 PROCEDURE — 85610 PROTHROMBIN TIME: CPT

## 2019-01-01 PROCEDURE — C1751 CATH, INF, PER/CENT/MIDLINE: HCPCS

## 2019-01-01 PROCEDURE — 81003 URINALYSIS AUTO W/O SCOPE: CPT

## 2019-01-01 PROCEDURE — 86900 BLOOD TYPING SEROLOGIC ABO: CPT

## 2019-01-01 PROCEDURE — 74011000250 HC RX REV CODE- 250: Performed by: FAMILY MEDICINE

## 2019-01-01 PROCEDURE — C1750 CATH, HEMODIALYSIS,LONG-TERM: HCPCS

## 2019-01-01 PROCEDURE — 77030005208 HC CATH HLDR GLWC -A

## 2019-01-01 PROCEDURE — C1769 GUIDE WIRE: HCPCS

## 2019-01-01 PROCEDURE — 80202 ASSAY OF VANCOMYCIN: CPT

## 2019-01-01 PROCEDURE — 76937 US GUIDE VASCULAR ACCESS: CPT

## 2019-01-01 PROCEDURE — 4A02X4A MEASUREMENT OF CARDIAC ELECTRICAL ACTIVITY, GUIDANCE, EXTERNAL APPROACH: ICD-10-PCS | Performed by: PHYSICIAN ASSISTANT

## 2019-01-01 PROCEDURE — 36430 TRANSFUSION BLD/BLD COMPNT: CPT

## 2019-01-01 PROCEDURE — 74011000250 HC RX REV CODE- 250: Performed by: HOSPITALIST

## 2019-01-01 PROCEDURE — 74011000250 HC RX REV CODE- 250: Performed by: RADIOLOGY

## 2019-01-01 PROCEDURE — 77030004561 HC CATH ANGI DX COBRA ANGI -B

## 2019-01-01 PROCEDURE — 96376 TX/PRO/DX INJ SAME DRUG ADON: CPT

## 2019-01-01 PROCEDURE — 99223 1ST HOSP IP/OBS HIGH 75: CPT | Performed by: INTERNAL MEDICINE

## 2019-01-01 RX ORDER — DIPHENHYDRAMINE HYDROCHLORIDE 50 MG/ML
50 INJECTION, SOLUTION INTRAMUSCULAR; INTRAVENOUS AS NEEDED
Status: DISCONTINUED | OUTPATIENT
Start: 2019-01-01 | End: 2019-01-01 | Stop reason: HOSPADM

## 2019-01-01 RX ORDER — FENTANYL 25 UG/1
1 PATCH TRANSDERMAL
Qty: 10 PATCH | Refills: 0 | Status: SHIPPED | OUTPATIENT
Start: 2019-01-01 | End: 2019-11-06

## 2019-01-01 RX ORDER — ONDANSETRON 2 MG/ML
8 INJECTION INTRAMUSCULAR; INTRAVENOUS ONCE
Status: CANCELLED | OUTPATIENT
Start: 2019-01-01

## 2019-01-01 RX ORDER — SODIUM CHLORIDE 9 MG/ML
25 INJECTION, SOLUTION INTRAVENOUS ONCE
Status: COMPLETED | OUTPATIENT
Start: 2019-01-01 | End: 2019-01-01

## 2019-01-01 RX ORDER — DIPHENHYDRAMINE HYDROCHLORIDE 50 MG/ML
50 INJECTION, SOLUTION INTRAMUSCULAR; INTRAVENOUS AS NEEDED
Status: CANCELLED
Start: 2019-01-01

## 2019-01-01 RX ORDER — SODIUM CHLORIDE 0.9 % (FLUSH) 0.9 %
10 SYRINGE (ML) INJECTION
Status: COMPLETED | OUTPATIENT
Start: 2019-01-01 | End: 2019-01-01

## 2019-01-01 RX ORDER — MAGNESIUM SULFATE HEPTAHYDRATE 40 MG/ML
2 INJECTION, SOLUTION INTRAVENOUS ONCE
Status: COMPLETED | OUTPATIENT
Start: 2019-01-01 | End: 2019-01-01

## 2019-01-01 RX ORDER — METOCLOPRAMIDE 10 MG/1
10 TABLET ORAL EVERY 6 HOURS
Qty: 120 TAB | Refills: 1 | Status: SHIPPED | OUTPATIENT
Start: 2019-01-01 | End: 2019-01-01

## 2019-01-01 RX ORDER — ACETAMINOPHEN 325 MG/1
650 TABLET ORAL AS NEEDED
Status: CANCELLED
Start: 2019-01-01

## 2019-01-01 RX ORDER — HALOPERIDOL 5 MG/ML
1 INJECTION INTRAMUSCULAR EVERY 4 HOURS
Status: DISCONTINUED | OUTPATIENT
Start: 2019-01-01 | End: 2019-01-01 | Stop reason: HOSPADM

## 2019-01-01 RX ORDER — METOPROLOL TARTRATE 25 MG/1
25 TABLET, FILM COATED ORAL EVERY 12 HOURS
Status: DISCONTINUED | OUTPATIENT
Start: 2019-01-01 | End: 2019-01-01 | Stop reason: HOSPADM

## 2019-01-01 RX ORDER — PROCHLORPERAZINE EDISYLATE 5 MG/ML
10 INJECTION INTRAMUSCULAR; INTRAVENOUS
Status: DISCONTINUED | OUTPATIENT
Start: 2019-01-01 | End: 2019-01-01 | Stop reason: HOSPADM

## 2019-01-01 RX ORDER — PROMETHAZINE HYDROCHLORIDE 25 MG/1
25 TABLET ORAL
Qty: 30 TAB | Refills: 6 | Status: SHIPPED | OUTPATIENT
Start: 2019-01-01 | End: 2019-01-01

## 2019-01-01 RX ORDER — HEPARIN 100 UNIT/ML
300-500 SYRINGE INTRAVENOUS AS NEEDED
Status: CANCELLED
Start: 2019-01-01

## 2019-01-01 RX ORDER — HEPARIN 100 UNIT/ML
300-500 SYRINGE INTRAVENOUS AS NEEDED
Status: ACTIVE | OUTPATIENT
Start: 2019-01-01 | End: 2019-01-01

## 2019-01-01 RX ORDER — SODIUM CHLORIDE 9 MG/ML
10 INJECTION INTRAMUSCULAR; INTRAVENOUS; SUBCUTANEOUS AS NEEDED
Status: DISCONTINUED | OUTPATIENT
Start: 2019-01-01 | End: 2019-01-01 | Stop reason: HOSPADM

## 2019-01-01 RX ORDER — MAGNESIUM SULFATE HEPTAHYDRATE 40 MG/ML
2 INJECTION, SOLUTION INTRAVENOUS
Status: COMPLETED | OUTPATIENT
Start: 2019-01-01 | End: 2019-01-01

## 2019-01-01 RX ORDER — AMOXICILLIN 250 MG
2 CAPSULE ORAL DAILY
Qty: 60 TAB | Refills: 3 | Status: SHIPPED | OUTPATIENT
Start: 2019-01-01 | End: 2019-01-01

## 2019-01-01 RX ORDER — SODIUM CHLORIDE 0.9 % (FLUSH) 0.9 %
10 SYRINGE (ML) INJECTION AS NEEDED
Status: DISCONTINUED | OUTPATIENT
Start: 2019-01-01 | End: 2019-01-01 | Stop reason: HOSPADM

## 2019-01-01 RX ORDER — ONDANSETRON 2 MG/ML
8 INJECTION INTRAMUSCULAR; INTRAVENOUS ONCE
Status: COMPLETED | OUTPATIENT
Start: 2019-01-01 | End: 2019-01-01

## 2019-01-01 RX ORDER — EPINEPHRINE 1 MG/ML
0.3 INJECTION, SOLUTION, CONCENTRATE INTRAVENOUS AS NEEDED
Status: CANCELLED | OUTPATIENT
Start: 2019-01-01

## 2019-01-01 RX ORDER — SODIUM CHLORIDE 0.9 % (FLUSH) 0.9 %
10 SYRINGE (ML) INJECTION AS NEEDED
Status: CANCELLED
Start: 2019-01-01

## 2019-01-01 RX ORDER — HYDROMORPHONE HYDROCHLORIDE 2 MG/1
2 TABLET ORAL
Status: DISCONTINUED | OUTPATIENT
Start: 2019-01-01 | End: 2019-01-01

## 2019-01-01 RX ORDER — HYDROMORPHONE HYDROCHLORIDE 4 MG/1
TABLET ORAL
COMMUNITY
End: 2019-01-01 | Stop reason: SDUPTHER

## 2019-01-01 RX ORDER — SODIUM CHLORIDE 0.9 % (FLUSH) 0.9 %
SYRINGE (ML) INJECTION
Status: COMPLETED
Start: 2019-01-01 | End: 2019-01-01

## 2019-01-01 RX ORDER — SODIUM CHLORIDE 9 MG/ML
10 INJECTION INTRAMUSCULAR; INTRAVENOUS; SUBCUTANEOUS AS NEEDED
Status: CANCELLED | OUTPATIENT
Start: 2019-01-01

## 2019-01-01 RX ORDER — MORPHINE SULFATE 15 MG/1
15 TABLET ORAL
Status: DISCONTINUED | OUTPATIENT
Start: 2019-01-01 | End: 2019-01-01

## 2019-01-01 RX ORDER — SODIUM CHLORIDE 9 MG/ML
25 INJECTION, SOLUTION INTRAVENOUS CONTINUOUS
Status: CANCELLED | OUTPATIENT
Start: 2019-01-01

## 2019-01-01 RX ORDER — ONDANSETRON 2 MG/ML
8 INJECTION INTRAMUSCULAR; INTRAVENOUS AS NEEDED
Status: CANCELLED | OUTPATIENT
Start: 2019-01-01

## 2019-01-01 RX ORDER — DEXTROSE MONOHYDRATE 50 MG/ML
500 INJECTION, SOLUTION INTRAVENOUS CONTINUOUS
Status: CANCELLED | OUTPATIENT
Start: 2019-01-01

## 2019-01-01 RX ORDER — SODIUM CHLORIDE 0.9 % (FLUSH) 0.9 %
5-40 SYRINGE (ML) INJECTION EVERY 8 HOURS
Status: DISCONTINUED | OUTPATIENT
Start: 2019-01-01 | End: 2019-01-01 | Stop reason: HOSPADM

## 2019-01-01 RX ORDER — SODIUM BICARBONATE 42 MG/ML
1 INJECTION, SOLUTION INTRAVENOUS
Status: COMPLETED | OUTPATIENT
Start: 2019-01-01 | End: 2019-01-01

## 2019-01-01 RX ORDER — DEXTROSE MONOHYDRATE 50 MG/ML
500 INJECTION, SOLUTION INTRAVENOUS CONTINUOUS
Status: DISPENSED | OUTPATIENT
Start: 2019-01-01 | End: 2019-01-01

## 2019-01-01 RX ORDER — ALUMINA, MAGNESIA, AND SIMETHICONE 2400; 2400; 240 MG/30ML; MG/30ML; MG/30ML
30 SUSPENSION ORAL
Qty: 150 ML | Refills: 0 | Status: SHIPPED | OUTPATIENT
Start: 2019-01-01 | End: 2019-01-01

## 2019-01-01 RX ORDER — HALOPERIDOL 2 MG/1
2 TABLET ORAL 3 TIMES DAILY
Qty: 90 TAB | Refills: 3 | Status: SHIPPED | OUTPATIENT
Start: 2019-01-01

## 2019-01-01 RX ORDER — ENOXAPARIN SODIUM 100 MG/ML
40 INJECTION SUBCUTANEOUS EVERY 24 HOURS
Status: DISCONTINUED | OUTPATIENT
Start: 2019-01-01 | End: 2019-01-01

## 2019-01-01 RX ORDER — SODIUM CHLORIDE 9 MG/ML
500 INJECTION, SOLUTION INTRAVENOUS CONTINUOUS
Status: CANCELLED | OUTPATIENT
Start: 2019-01-01

## 2019-01-01 RX ORDER — HYDROMORPHONE HCL/0.9% NACL/PF 0.5 MG/ML
PLASTIC BAG, INJECTION (ML) INTRAVENOUS CONTINUOUS
Status: DISCONTINUED | OUTPATIENT
Start: 2019-01-01 | End: 2019-01-01

## 2019-01-01 RX ORDER — POTASSIUM CHLORIDE 14.9 MG/ML
40 INJECTION INTRAVENOUS
Status: DISCONTINUED | OUTPATIENT
Start: 2019-01-01 | End: 2019-01-01

## 2019-01-01 RX ORDER — INSULIN LISPRO 100 [IU]/ML
INJECTION, SOLUTION INTRAVENOUS; SUBCUTANEOUS EVERY 6 HOURS
Status: DISCONTINUED | OUTPATIENT
Start: 2019-01-01 | End: 2019-01-01

## 2019-01-01 RX ORDER — HYDROMORPHONE HYDROCHLORIDE 4 MG/1
4 TABLET ORAL
Qty: 100 TAB | Refills: 0 | Status: SHIPPED | OUTPATIENT
Start: 2019-01-01 | End: 2019-01-01 | Stop reason: SDUPTHER

## 2019-01-01 RX ORDER — SODIUM CHLORIDE 9 MG/ML
125 INJECTION, SOLUTION INTRAVENOUS CONTINUOUS
Status: DISCONTINUED | OUTPATIENT
Start: 2019-01-01 | End: 2019-01-01

## 2019-01-01 RX ORDER — LORAZEPAM 2 MG/ML
0.5 INJECTION INTRAMUSCULAR
Status: CANCELLED
Start: 2019-01-01

## 2019-01-01 RX ORDER — HYDROCORTISONE SODIUM SUCCINATE 100 MG/2ML
100 INJECTION, POWDER, FOR SOLUTION INTRAMUSCULAR; INTRAVENOUS AS NEEDED
Status: CANCELLED | OUTPATIENT
Start: 2019-01-01

## 2019-01-01 RX ORDER — METOCLOPRAMIDE 10 MG/1
10 TABLET ORAL
Status: DISCONTINUED | OUTPATIENT
Start: 2019-01-01 | End: 2019-01-01

## 2019-01-01 RX ORDER — AMOXICILLIN 250 MG
2 CAPSULE ORAL DAILY
Status: DISCONTINUED | OUTPATIENT
Start: 2019-01-01 | End: 2019-01-01 | Stop reason: HOSPADM

## 2019-01-01 RX ORDER — MORPHINE SULFATE 10 MG/ML
5 INJECTION, SOLUTION INTRAMUSCULAR; INTRAVENOUS
Status: DISCONTINUED | OUTPATIENT
Start: 2019-01-01 | End: 2019-01-01 | Stop reason: HOSPADM

## 2019-01-01 RX ORDER — FENTANYL 12.5 UG/1
1 PATCH TRANSDERMAL
Qty: 10 PATCH | Refills: 0 | Status: SHIPPED | OUTPATIENT
Start: 2019-01-01 | End: 2019-11-09

## 2019-01-01 RX ORDER — DIPHENHYDRAMINE HYDROCHLORIDE 50 MG/ML
12.5 INJECTION, SOLUTION INTRAMUSCULAR; INTRAVENOUS
Status: DISCONTINUED | OUTPATIENT
Start: 2019-01-01 | End: 2019-01-01 | Stop reason: HOSPADM

## 2019-01-01 RX ORDER — METOCLOPRAMIDE HYDROCHLORIDE 5 MG/5ML
10 SOLUTION ORAL
Qty: 300 ML | Refills: 3 | Status: SHIPPED | OUTPATIENT
Start: 2019-01-01

## 2019-01-01 RX ORDER — ENOXAPARIN SODIUM 100 MG/ML
40 INJECTION SUBCUTANEOUS EVERY 24 HOURS
Status: DISCONTINUED | OUTPATIENT
Start: 2019-01-01 | End: 2019-01-01 | Stop reason: HOSPADM

## 2019-01-01 RX ORDER — ALBUTEROL SULFATE 0.83 MG/ML
2.5 SOLUTION RESPIRATORY (INHALATION) AS NEEDED
Status: CANCELLED
Start: 2019-01-01

## 2019-01-01 RX ORDER — DEXAMETHASONE 4 MG/1
TABLET ORAL
Qty: 30 TAB | Refills: 10 | Status: SHIPPED | OUTPATIENT
Start: 2019-01-01

## 2019-01-01 RX ORDER — ONDANSETRON 2 MG/ML
INJECTION INTRAMUSCULAR; INTRAVENOUS
Status: DISPENSED
Start: 2019-01-01 | End: 2019-01-01

## 2019-01-01 RX ORDER — METOPROLOL TARTRATE 25 MG/1
12.5 TABLET, FILM COATED ORAL EVERY 12 HOURS
Qty: 30 TAB | Refills: 0 | Status: SHIPPED | OUTPATIENT
Start: 2019-01-01 | End: 2019-01-01

## 2019-01-01 RX ORDER — BACITRACIN 500 UNIT/G
PACKET (EA) TOPICAL
Status: DISCONTINUED
Start: 2019-01-01 | End: 2019-01-01 | Stop reason: HOSPADM

## 2019-01-01 RX ORDER — SODIUM CHLORIDE 9 MG/ML
INJECTION INTRAMUSCULAR; INTRAVENOUS; SUBCUTANEOUS
Status: DISPENSED
Start: 2019-01-01 | End: 2019-01-01

## 2019-01-01 RX ORDER — SODIUM CHLORIDE 0.9 % (FLUSH) 0.9 %
5-10 SYRINGE (ML) INJECTION AS NEEDED
Status: DISCONTINUED | OUTPATIENT
Start: 2019-01-01 | End: 2019-01-01 | Stop reason: HOSPADM

## 2019-01-01 RX ORDER — MIDAZOLAM HYDROCHLORIDE 1 MG/ML
10 INJECTION, SOLUTION INTRAMUSCULAR; INTRAVENOUS
Status: DISCONTINUED | OUTPATIENT
Start: 2019-01-01 | End: 2019-01-01

## 2019-01-01 RX ORDER — ONDANSETRON 2 MG/ML
8 INJECTION INTRAMUSCULAR; INTRAVENOUS
Status: DISCONTINUED | OUTPATIENT
Start: 2019-01-01 | End: 2019-01-01 | Stop reason: HOSPADM

## 2019-01-01 RX ORDER — HEPARIN 100 UNIT/ML
300 SYRINGE INTRAVENOUS AS NEEDED
Status: DISCONTINUED | OUTPATIENT
Start: 2019-01-01 | End: 2019-01-01 | Stop reason: HOSPADM

## 2019-01-01 RX ORDER — DEXAMETHASONE 1 MG/1
4 TABLET ORAL EVERY 12 HOURS
Status: DISCONTINUED | OUTPATIENT
Start: 2019-01-01 | End: 2019-01-01 | Stop reason: HOSPADM

## 2019-01-01 RX ORDER — METOCLOPRAMIDE HYDROCHLORIDE 5 MG/ML
10 INJECTION INTRAMUSCULAR; INTRAVENOUS EVERY 6 HOURS
Status: DISCONTINUED | OUTPATIENT
Start: 2019-01-01 | End: 2019-01-01

## 2019-01-01 RX ORDER — FACIAL-BODY WIPES
10 EACH TOPICAL DAILY PRN
Status: DISCONTINUED | OUTPATIENT
Start: 2019-01-01 | End: 2019-01-01 | Stop reason: HOSPADM

## 2019-01-01 RX ORDER — MORPHINE SULFATE 2 MG/ML
4 INJECTION, SOLUTION INTRAMUSCULAR; INTRAVENOUS
Status: COMPLETED | OUTPATIENT
Start: 2019-01-01 | End: 2019-01-01

## 2019-01-01 RX ORDER — HYDROMORPHONE HYDROCHLORIDE 2 MG/1
TABLET ORAL
Status: ON HOLD | COMMUNITY
End: 2019-01-01 | Stop reason: SDUPTHER

## 2019-01-01 RX ORDER — METOCLOPRAMIDE 10 MG/1
10 TABLET ORAL EVERY 8 HOURS
Status: DISCONTINUED | OUTPATIENT
Start: 2019-01-01 | End: 2019-01-01

## 2019-01-01 RX ORDER — VANCOMYCIN HYDROCHLORIDE
1250 EVERY 12 HOURS
Status: DISCONTINUED | OUTPATIENT
Start: 2019-01-01 | End: 2019-01-01

## 2019-01-01 RX ORDER — FENTANYL 25 UG/1
1 PATCH TRANSDERMAL
Status: DISCONTINUED | OUTPATIENT
Start: 2019-01-01 | End: 2019-01-01 | Stop reason: DRUGHIGH

## 2019-01-01 RX ORDER — SODIUM CHLORIDE 9 MG/ML
500 INJECTION, SOLUTION INTRAVENOUS CONTINUOUS
Status: DISPENSED | OUTPATIENT
Start: 2019-01-01 | End: 2019-01-01

## 2019-01-01 RX ORDER — ONDANSETRON 2 MG/ML
4 INJECTION INTRAMUSCULAR; INTRAVENOUS
Status: COMPLETED | OUTPATIENT
Start: 2019-01-01 | End: 2019-01-01

## 2019-01-01 RX ORDER — SODIUM CHLORIDE 9 MG/ML
250 INJECTION, SOLUTION INTRAVENOUS AS NEEDED
Status: DISCONTINUED | OUTPATIENT
Start: 2019-01-01 | End: 2019-01-01 | Stop reason: HOSPADM

## 2019-01-01 RX ORDER — HEPARIN 100 UNIT/ML
300-500 SYRINGE INTRAVENOUS AS NEEDED
Status: CANCELLED | OUTPATIENT
Start: 2019-01-01

## 2019-01-01 RX ORDER — ALBUMIN HUMAN 250 G/1000ML
25 SOLUTION INTRAVENOUS
Status: DISCONTINUED | OUTPATIENT
Start: 2019-01-01 | End: 2019-01-01

## 2019-01-01 RX ORDER — PANTOPRAZOLE SODIUM 40 MG/10ML
40 INJECTION, POWDER, LYOPHILIZED, FOR SOLUTION INTRAVENOUS DAILY
Status: DISCONTINUED | OUTPATIENT
Start: 2019-01-01 | End: 2019-01-01 | Stop reason: SDUPTHER

## 2019-01-01 RX ORDER — LIDOCAINE HYDROCHLORIDE 10 MG/ML
10 INJECTION, SOLUTION EPIDURAL; INFILTRATION; INTRACAUDAL; PERINEURAL ONCE
Status: COMPLETED | OUTPATIENT
Start: 2019-01-01 | End: 2019-01-01

## 2019-01-01 RX ORDER — KETOROLAC TROMETHAMINE 30 MG/ML
15 INJECTION, SOLUTION INTRAMUSCULAR; INTRAVENOUS EVERY 8 HOURS
Status: DISCONTINUED | OUTPATIENT
Start: 2019-01-01 | End: 2019-01-01

## 2019-01-01 RX ORDER — HYDROMORPHONE HYDROCHLORIDE 2 MG/1
2 TABLET ORAL
Qty: 50 TAB | Refills: 0 | Status: SHIPPED | OUTPATIENT
Start: 2019-01-01 | End: 2019-01-01

## 2019-01-01 RX ORDER — DEXTROSE, SODIUM CHLORIDE, AND POTASSIUM CHLORIDE 5; .45; .15 G/100ML; G/100ML; G/100ML
100 INJECTION INTRAVENOUS CONTINUOUS
Status: DISCONTINUED | OUTPATIENT
Start: 2019-01-01 | End: 2019-01-01

## 2019-01-01 RX ORDER — AMOXICILLIN 250 MG
1 CAPSULE ORAL DAILY
Status: DISCONTINUED | OUTPATIENT
Start: 2019-01-01 | End: 2019-01-01

## 2019-01-01 RX ORDER — MORPHINE SULFATE 15 MG/1
15 TABLET, FILM COATED, EXTENDED RELEASE ORAL EVERY 12 HOURS
Status: DISCONTINUED | OUTPATIENT
Start: 2019-01-01 | End: 2019-01-01

## 2019-01-01 RX ORDER — SODIUM CHLORIDE 9 MG/ML
25 INJECTION, SOLUTION INTRAVENOUS CONTINUOUS
Status: DISPENSED | OUTPATIENT
Start: 2019-01-01 | End: 2019-01-01

## 2019-01-01 RX ORDER — SODIUM CHLORIDE 9 MG/ML
25 INJECTION, SOLUTION INTRAVENOUS CONTINUOUS
Status: DISCONTINUED | OUTPATIENT
Start: 2019-01-01 | End: 2019-01-01 | Stop reason: HOSPADM

## 2019-01-01 RX ORDER — ONDANSETRON 4 MG/1
4 TABLET, ORALLY DISINTEGRATING ORAL 3 TIMES DAILY
Status: DISCONTINUED | OUTPATIENT
Start: 2019-01-01 | End: 2019-01-01

## 2019-01-01 RX ORDER — MAGNESIUM CITRATE
296 SOLUTION, ORAL ORAL
Status: COMPLETED | OUTPATIENT
Start: 2019-01-01 | End: 2019-01-01

## 2019-01-01 RX ORDER — HYDROMORPHONE HYDROCHLORIDE 2 MG/1
2-4 TABLET ORAL
Qty: 10 TAB | Refills: 0 | Status: SHIPPED | OUTPATIENT
Start: 2019-01-01 | End: 2019-01-01

## 2019-01-01 RX ORDER — HYDROMORPHONE HYDROCHLORIDE 1 MG/ML
1 INJECTION, SOLUTION INTRAMUSCULAR; INTRAVENOUS; SUBCUTANEOUS
Status: DISCONTINUED | OUTPATIENT
Start: 2019-01-01 | End: 2019-01-01

## 2019-01-01 RX ORDER — DEXAMETHASONE 1 MG/1
2 TABLET ORAL EVERY 12 HOURS
Status: DISCONTINUED | OUTPATIENT
Start: 2019-01-01 | End: 2019-01-01

## 2019-01-01 RX ORDER — FENTANYL 12.5 UG/1
1 PATCH TRANSDERMAL
Status: DISCONTINUED | OUTPATIENT
Start: 2019-01-01 | End: 2019-01-01 | Stop reason: HOSPADM

## 2019-01-01 RX ORDER — SODIUM CHLORIDE 0.9 % (FLUSH) 0.9 %
5-40 SYRINGE (ML) INJECTION AS NEEDED
Status: DISCONTINUED | OUTPATIENT
Start: 2019-01-01 | End: 2019-01-01 | Stop reason: HOSPADM

## 2019-01-01 RX ORDER — DEXAMETHASONE SODIUM PHOSPHATE 4 MG/ML
4 INJECTION, SOLUTION INTRA-ARTICULAR; INTRALESIONAL; INTRAMUSCULAR; INTRAVENOUS; SOFT TISSUE EVERY 12 HOURS
Status: DISCONTINUED | OUTPATIENT
Start: 2019-01-01 | End: 2019-01-01

## 2019-01-01 RX ORDER — ENOXAPARIN SODIUM 100 MG/ML
30 INJECTION SUBCUTANEOUS EVERY 24 HOURS
Status: DISCONTINUED | OUTPATIENT
Start: 2019-01-01 | End: 2019-01-01 | Stop reason: DRUGHIGH

## 2019-01-01 RX ORDER — PROCHLORPERAZINE MALEATE 5 MG
5 TABLET ORAL
Qty: 20 TAB | Refills: 0 | Status: SHIPPED | OUTPATIENT
Start: 2019-01-01 | End: 2019-01-01 | Stop reason: ALTCHOICE

## 2019-01-01 RX ORDER — PROCHLORPERAZINE MALEATE 10 MG
10 TABLET ORAL
Qty: 90 TAB | Refills: 3 | Status: SHIPPED | OUTPATIENT
Start: 2019-01-01 | End: 2019-01-01

## 2019-01-01 RX ORDER — HYDROMORPHONE HYDROCHLORIDE 5 MG/5ML
2 SOLUTION ORAL
Qty: 300 ML | Refills: 0 | Status: SHIPPED | OUTPATIENT
Start: 2019-01-01 | End: 2019-01-01 | Stop reason: ALTCHOICE

## 2019-01-01 RX ORDER — DEXTROSE 50 % IN WATER (D50W) INTRAVENOUS SYRINGE
12.5-25 AS NEEDED
Status: DISCONTINUED | OUTPATIENT
Start: 2019-01-01 | End: 2019-01-01

## 2019-01-01 RX ORDER — SENNOSIDES 8.6 MG/1
2 TABLET ORAL
Status: DISCONTINUED | OUTPATIENT
Start: 2019-01-01 | End: 2019-01-01 | Stop reason: HOSPADM

## 2019-01-01 RX ORDER — HYDROMORPHONE HYDROCHLORIDE 2 MG/1
4 TABLET ORAL
Status: COMPLETED | OUTPATIENT
Start: 2019-01-01 | End: 2019-01-01

## 2019-01-01 RX ORDER — METOPROLOL TARTRATE 25 MG/1
12.5 TABLET, FILM COATED ORAL EVERY 12 HOURS
Status: DISCONTINUED | OUTPATIENT
Start: 2019-01-01 | End: 2019-01-01 | Stop reason: HOSPADM

## 2019-01-01 RX ORDER — HEPARIN 100 UNIT/ML
300-500 SYRINGE INTRAVENOUS AS NEEDED
Status: DISCONTINUED | OUTPATIENT
Start: 2019-01-01 | End: 2019-01-01 | Stop reason: HOSPADM

## 2019-01-01 RX ORDER — MORPHINE SULFATE 2 MG/ML
2 INJECTION, SOLUTION INTRAMUSCULAR; INTRAVENOUS
Status: COMPLETED | OUTPATIENT
Start: 2019-01-01 | End: 2019-01-01

## 2019-01-01 RX ORDER — PROCHLORPERAZINE MALEATE 10 MG
10 TABLET ORAL
Status: DISCONTINUED | OUTPATIENT
Start: 2019-01-01 | End: 2019-01-01 | Stop reason: HOSPADM

## 2019-01-01 RX ORDER — MORPHINE SULFATE 2 MG/ML
4 INJECTION, SOLUTION INTRAMUSCULAR; INTRAVENOUS ONCE
Status: COMPLETED | OUTPATIENT
Start: 2019-01-01 | End: 2019-01-01

## 2019-01-01 RX ORDER — PANTOPRAZOLE SODIUM 40 MG/1
40 TABLET, DELAYED RELEASE ORAL
Qty: 20 TAB | Refills: 0 | Status: SHIPPED | OUTPATIENT
Start: 2019-01-01 | End: 2019-01-01

## 2019-01-01 RX ORDER — METOCLOPRAMIDE 10 MG/1
10 TABLET ORAL EVERY 8 HOURS
Qty: 90 TAB | Refills: 2 | Status: SHIPPED | OUTPATIENT
Start: 2019-01-01 | End: 2019-01-01

## 2019-01-01 RX ORDER — HYDROMORPHONE HYDROCHLORIDE 5 MG/5ML
2 SOLUTION ORAL
Status: DISCONTINUED | OUTPATIENT
Start: 2019-01-01 | End: 2019-01-01 | Stop reason: HOSPADM

## 2019-01-01 RX ORDER — PROCHLORPERAZINE MALEATE 5 MG
5 TABLET ORAL
Status: DISCONTINUED | OUTPATIENT
Start: 2019-01-01 | End: 2019-01-01

## 2019-01-01 RX ORDER — HYDROMORPHONE HYDROCHLORIDE 2 MG/1
2-4 TABLET ORAL
Status: DISCONTINUED | OUTPATIENT
Start: 2019-01-01 | End: 2019-01-01

## 2019-01-01 RX ORDER — LIDOCAINE AND PRILOCAINE 25; 25 MG/G; MG/G
CREAM TOPICAL
Qty: 30 G | Refills: 0 | Status: SHIPPED | OUTPATIENT
Start: 2019-01-01 | End: 2019-01-01

## 2019-01-01 RX ORDER — METOCLOPRAMIDE 10 MG/1
10 TABLET ORAL EVERY 6 HOURS
Status: DISCONTINUED | OUTPATIENT
Start: 2019-01-01 | End: 2019-01-01 | Stop reason: HOSPADM

## 2019-01-01 RX ORDER — HEPARIN 100 UNIT/ML
500 SYRINGE INTRAVENOUS AS NEEDED
Status: DISCONTINUED | OUTPATIENT
Start: 2019-01-01 | End: 2019-01-01 | Stop reason: HOSPADM

## 2019-01-01 RX ORDER — HYDROMORPHONE HYDROCHLORIDE 2 MG/1
2 TABLET ORAL
Qty: 20 TAB | Refills: 0 | Status: SHIPPED | OUTPATIENT
Start: 2019-01-01 | End: 2019-01-01

## 2019-01-01 RX ORDER — METOCLOPRAMIDE HYDROCHLORIDE 5 MG/ML
5 INJECTION INTRAMUSCULAR; INTRAVENOUS EVERY 8 HOURS
Status: DISCONTINUED | OUTPATIENT
Start: 2019-01-01 | End: 2019-01-01

## 2019-01-01 RX ORDER — METOCLOPRAMIDE HYDROCHLORIDE 5 MG/5ML
10 SOLUTION ORAL
Status: DISCONTINUED | OUTPATIENT
Start: 2019-01-01 | End: 2019-01-01 | Stop reason: HOSPADM

## 2019-01-01 RX ORDER — SODIUM CHLORIDE 9 MG/ML
10 INJECTION INTRAMUSCULAR; INTRAVENOUS; SUBCUTANEOUS AS NEEDED
Status: DISPENSED | OUTPATIENT
Start: 2019-01-01 | End: 2019-01-01

## 2019-01-01 RX ORDER — ONDANSETRON 4 MG/1
8 TABLET, ORALLY DISINTEGRATING ORAL
Status: DISCONTINUED | OUTPATIENT
Start: 2019-01-01 | End: 2019-01-01 | Stop reason: HOSPADM

## 2019-01-01 RX ORDER — FENTANYL 25 UG/1
1 PATCH TRANSDERMAL
Status: DISCONTINUED | OUTPATIENT
Start: 2019-01-01 | End: 2019-01-01 | Stop reason: HOSPADM

## 2019-01-01 RX ORDER — MORPHINE SULFATE 2 MG/ML
INJECTION, SOLUTION INTRAMUSCULAR; INTRAVENOUS
Status: DISPENSED
Start: 2019-01-01 | End: 2019-01-01

## 2019-01-01 RX ORDER — POLYETHYLENE GLYCOL 3350 17 G/17G
17 POWDER, FOR SOLUTION ORAL DAILY
Qty: 30 PACKET | Refills: 3 | Status: SHIPPED | OUTPATIENT
Start: 2019-01-01 | End: 2019-01-01

## 2019-01-01 RX ORDER — MAGNESIUM SULFATE 100 %
4 CRYSTALS MISCELLANEOUS AS NEEDED
Status: DISCONTINUED | OUTPATIENT
Start: 2019-01-01 | End: 2019-01-01

## 2019-01-01 RX ORDER — NALOXONE HYDROCHLORIDE 4 MG/.1ML
SPRAY NASAL
Qty: 1 EACH | Refills: 3 | Status: SHIPPED | OUTPATIENT
Start: 2019-01-01 | End: 2019-01-01

## 2019-01-01 RX ORDER — MORPHINE SULFATE 2 MG/ML
4 INJECTION, SOLUTION INTRAMUSCULAR; INTRAVENOUS
Status: DISCONTINUED | OUTPATIENT
Start: 2019-01-01 | End: 2019-01-01

## 2019-01-01 RX ORDER — METOCLOPRAMIDE 10 MG/1
10 TABLET ORAL EVERY 6 HOURS
Status: DISCONTINUED | OUTPATIENT
Start: 2019-01-01 | End: 2019-01-01

## 2019-01-01 RX ORDER — SODIUM CHLORIDE 0.9 % (FLUSH) 0.9 %
10 SYRINGE (ML) INJECTION AS NEEDED
Status: CANCELLED | OUTPATIENT
Start: 2019-01-01

## 2019-01-01 RX ORDER — HALOPERIDOL 5 MG/ML
1 INJECTION INTRAMUSCULAR
Status: DISCONTINUED | OUTPATIENT
Start: 2019-01-01 | End: 2019-01-01 | Stop reason: HOSPADM

## 2019-01-01 RX ORDER — PROCHLORPERAZINE MALEATE 10 MG
5 TABLET ORAL
COMMUNITY
End: 2019-01-01

## 2019-01-01 RX ORDER — LANOLIN ALCOHOL/MO/W.PET/CERES
400 CREAM (GRAM) TOPICAL 3 TIMES DAILY
Qty: 60 TAB | Refills: 1 | Status: SHIPPED | OUTPATIENT
Start: 2019-01-01 | End: 2019-01-01

## 2019-01-01 RX ORDER — HYDROMORPHONE HYDROCHLORIDE 2 MG/1
1 TABLET ORAL
Status: DISCONTINUED | OUTPATIENT
Start: 2019-01-01 | End: 2019-01-01

## 2019-01-01 RX ORDER — PANTOPRAZOLE SODIUM 40 MG/1
40 TABLET, DELAYED RELEASE ORAL 2 TIMES DAILY
Qty: 60 TAB | Refills: 3 | Status: SHIPPED | OUTPATIENT
Start: 2019-01-01

## 2019-01-01 RX ORDER — PROCHLORPERAZINE EDISYLATE 5 MG/ML
10 INJECTION INTRAMUSCULAR; INTRAVENOUS
Status: DISCONTINUED | OUTPATIENT
Start: 2019-01-01 | End: 2019-01-01 | Stop reason: SDUPTHER

## 2019-01-01 RX ORDER — VANCOMYCIN 2 GRAM/500 ML IN 0.9 % SODIUM CHLORIDE INTRAVENOUS
2000 ONCE
Status: DISCONTINUED | OUTPATIENT
Start: 2019-01-01 | End: 2019-01-01

## 2019-01-01 RX ORDER — MORPHINE SULFATE 2 MG/ML
2 INJECTION, SOLUTION INTRAMUSCULAR; INTRAVENOUS
Status: DISCONTINUED | OUTPATIENT
Start: 2019-01-01 | End: 2019-01-01

## 2019-01-01 RX ORDER — PANTOPRAZOLE SODIUM 40 MG/1
40 TABLET, DELAYED RELEASE ORAL
Status: DISCONTINUED | OUTPATIENT
Start: 2019-01-01 | End: 2019-01-01

## 2019-01-01 RX ORDER — HYDROMORPHONE HYDROCHLORIDE 2 MG/1
2 TABLET ORAL
Status: DISCONTINUED | OUTPATIENT
Start: 2019-01-01 | End: 2019-01-01 | Stop reason: HOSPADM

## 2019-01-01 RX ORDER — POLYETHYLENE GLYCOL 3350 17 G/17G
17 POWDER, FOR SOLUTION ORAL DAILY
Status: DISCONTINUED | OUTPATIENT
Start: 2019-01-01 | End: 2019-01-01 | Stop reason: HOSPADM

## 2019-01-01 RX ORDER — PROCHLORPERAZINE EDISYLATE 5 MG/ML
10 INJECTION INTRAMUSCULAR; INTRAVENOUS EVERY 8 HOURS
Status: DISCONTINUED | OUTPATIENT
Start: 2019-01-01 | End: 2019-01-01 | Stop reason: SDUPTHER

## 2019-01-01 RX ORDER — PANTOPRAZOLE SODIUM 40 MG/1
40 TABLET, DELAYED RELEASE ORAL 2 TIMES DAILY
Qty: 60 TAB | Refills: 2 | Status: SHIPPED | OUTPATIENT
Start: 2019-01-01 | End: 2019-01-01

## 2019-01-01 RX ORDER — SODIUM CHLORIDE 9 MG/ML
50 INJECTION, SOLUTION INTRAVENOUS CONTINUOUS
Status: DISCONTINUED | OUTPATIENT
Start: 2019-01-01 | End: 2019-01-01 | Stop reason: HOSPADM

## 2019-01-01 RX ORDER — POTASSIUM CHLORIDE 14.9 MG/ML
10 INJECTION INTRAVENOUS
Status: COMPLETED | OUTPATIENT
Start: 2019-01-01 | End: 2019-01-01

## 2019-01-01 RX ORDER — ONDANSETRON 2 MG/ML
4 INJECTION INTRAMUSCULAR; INTRAVENOUS
Status: DISCONTINUED | OUTPATIENT
Start: 2019-01-01 | End: 2019-01-01

## 2019-01-01 RX ORDER — HYDROMORPHONE HYDROCHLORIDE 5 MG/5ML
2 SOLUTION ORAL
Status: DISCONTINUED | OUTPATIENT
Start: 2019-01-01 | End: 2019-01-01

## 2019-01-01 RX ORDER — ONDANSETRON 4 MG/1
4 TABLET, ORALLY DISINTEGRATING ORAL
Qty: 60 TAB | Refills: 3 | Status: SHIPPED | OUTPATIENT
Start: 2019-01-01

## 2019-01-01 RX ORDER — MORPHINE SULFATE 15 MG/1
15 TABLET ORAL
Status: DISCONTINUED | OUTPATIENT
Start: 2019-01-01 | End: 2019-01-01 | Stop reason: HOSPADM

## 2019-01-01 RX ORDER — HYDROMORPHONE HYDROCHLORIDE 4 MG/1
4 TABLET ORAL
Qty: 100 TAB | Refills: 0 | Status: SHIPPED | OUTPATIENT
Start: 2019-01-01 | End: 2019-11-02

## 2019-01-01 RX ORDER — ONDANSETRON 2 MG/ML
8 INJECTION INTRAMUSCULAR; INTRAVENOUS
Status: DISCONTINUED | OUTPATIENT
Start: 2019-01-01 | End: 2019-01-01

## 2019-01-01 RX ORDER — IBUPROFEN 800 MG/1
800 TABLET ORAL
Qty: 30 TAB | Refills: 1 | Status: SHIPPED | OUTPATIENT
Start: 2019-01-01 | End: 2019-01-01

## 2019-01-01 RX ORDER — ONDANSETRON 2 MG/ML
8 INJECTION INTRAMUSCULAR; INTRAVENOUS ONCE
Status: CANCELLED | OUTPATIENT
Start: 2019-01-01 | End: 2019-01-01

## 2019-01-01 RX ORDER — FENTANYL 25 UG/1
1 PATCH TRANSDERMAL
Qty: 10 PATCH | Refills: 0 | Status: SHIPPED | OUTPATIENT
Start: 2019-01-01 | End: 2019-01-01

## 2019-01-01 RX ORDER — SODIUM CHLORIDE 0.9 % (FLUSH) 0.9 %
5-10 SYRINGE (ML) INJECTION EVERY 8 HOURS
Status: DISCONTINUED | OUTPATIENT
Start: 2019-01-01 | End: 2019-01-01 | Stop reason: HOSPADM

## 2019-01-01 RX ORDER — POLYETHYLENE GLYCOL 3350 17 G/17G
17 POWDER, FOR SOLUTION ORAL DAILY
Qty: 10 EACH | Refills: 0 | Status: ON HOLD | OUTPATIENT
Start: 2019-01-01 | End: 2019-01-01 | Stop reason: SDUPTHER

## 2019-01-01 RX ORDER — METOCLOPRAMIDE 10 MG/1
10 TABLET ORAL
Status: DISCONTINUED | OUTPATIENT
Start: 2019-01-01 | End: 2019-01-01 | Stop reason: HOSPADM

## 2019-01-01 RX ORDER — PANTOPRAZOLE SODIUM 40 MG/1
40 TABLET, DELAYED RELEASE ORAL 2 TIMES DAILY
Qty: 60 TAB | Refills: 2 | Status: ON HOLD | OUTPATIENT
Start: 2019-01-01 | End: 2019-01-01 | Stop reason: SDUPTHER

## 2019-01-01 RX ORDER — AMOXICILLIN 250 MG
2 CAPSULE ORAL DAILY
Status: DISCONTINUED | OUTPATIENT
Start: 2019-01-01 | End: 2019-01-01

## 2019-01-01 RX ORDER — ONDANSETRON 2 MG/ML
8 INJECTION INTRAMUSCULAR; INTRAVENOUS AS NEEDED
Status: DISPENSED | OUTPATIENT
Start: 2019-01-01 | End: 2019-01-01

## 2019-01-01 RX ORDER — FENTANYL CITRATE 50 UG/ML
200 INJECTION, SOLUTION INTRAMUSCULAR; INTRAVENOUS
Status: DISCONTINUED | OUTPATIENT
Start: 2019-01-01 | End: 2019-01-01

## 2019-01-01 RX ORDER — ALUMINA, MAGNESIA, AND SIMETHICONE 2400; 2400; 240 MG/30ML; MG/30ML; MG/30ML
30 SUSPENSION ORAL
Status: DISCONTINUED | OUTPATIENT
Start: 2019-01-01 | End: 2019-01-01 | Stop reason: HOSPADM

## 2019-01-01 RX ORDER — SODIUM CHLORIDE 0.9 % (FLUSH) 0.9 %
10 SYRINGE (ML) INJECTION AS NEEDED
Status: ACTIVE | OUTPATIENT
Start: 2019-01-01 | End: 2019-01-01

## 2019-01-01 RX ORDER — SODIUM CHLORIDE 9 MG/ML
10 INJECTION INTRAMUSCULAR; INTRAVENOUS; SUBCUTANEOUS AS NEEDED
Status: ACTIVE | OUTPATIENT
Start: 2019-01-01 | End: 2019-01-01

## 2019-01-01 RX ORDER — PANTOPRAZOLE SODIUM 40 MG/10ML
20 INJECTION, POWDER, LYOPHILIZED, FOR SOLUTION INTRAVENOUS EVERY 12 HOURS
Status: DISCONTINUED | OUTPATIENT
Start: 2019-01-01 | End: 2019-01-01 | Stop reason: SDUPTHER

## 2019-01-01 RX ORDER — MORPHINE SULFATE 2 MG/ML
2 INJECTION, SOLUTION INTRAMUSCULAR; INTRAVENOUS ONCE
Status: COMPLETED | OUTPATIENT
Start: 2019-01-01 | End: 2019-01-01

## 2019-01-01 RX ORDER — DIPHENHYDRAMINE HYDROCHLORIDE 50 MG/ML
INJECTION, SOLUTION INTRAMUSCULAR; INTRAVENOUS
Status: DISCONTINUED
Start: 2019-01-01 | End: 2019-01-01 | Stop reason: HOSPADM

## 2019-01-01 RX ORDER — PANTOPRAZOLE SODIUM 40 MG/1
40 TABLET, DELAYED RELEASE ORAL
Status: DISCONTINUED | OUTPATIENT
Start: 2019-01-01 | End: 2019-01-01 | Stop reason: HOSPADM

## 2019-01-01 RX ORDER — HYDROMORPHONE HYDROCHLORIDE 1 MG/ML
1 INJECTION, SOLUTION INTRAMUSCULAR; INTRAVENOUS; SUBCUTANEOUS
Status: DISCONTINUED | OUTPATIENT
Start: 2019-01-01 | End: 2019-01-01 | Stop reason: HOSPADM

## 2019-01-01 RX ORDER — AMOXICILLIN 250 MG
2 CAPSULE ORAL DAILY
Qty: 60 TAB | Refills: 2 | Status: ON HOLD | OUTPATIENT
Start: 2019-01-01 | End: 2019-01-01 | Stop reason: SDUPTHER

## 2019-01-01 RX ORDER — SODIUM CHLORIDE 0.9 % (FLUSH) 0.9 %
SYRINGE (ML) INJECTION
Status: DISPENSED
Start: 2019-01-01 | End: 2019-01-01

## 2019-01-01 RX ORDER — PROCHLORPERAZINE EDISYLATE 5 MG/ML
10 INJECTION INTRAMUSCULAR; INTRAVENOUS ONCE
Status: COMPLETED | OUTPATIENT
Start: 2019-01-01 | End: 2019-01-01

## 2019-01-01 RX ORDER — ONDANSETRON 4 MG/1
4 TABLET, ORALLY DISINTEGRATING ORAL
Qty: 30 TAB | Refills: 2 | Status: ON HOLD | OUTPATIENT
Start: 2019-01-01 | End: 2019-01-01 | Stop reason: SDUPTHER

## 2019-01-01 RX ORDER — HYDROMORPHONE HYDROCHLORIDE 2 MG/1
2 TABLET ORAL
Qty: 20 TAB | Refills: 0 | Status: SHIPPED | OUTPATIENT
Start: 2019-01-01 | End: 2019-01-01 | Stop reason: SDUPTHER

## 2019-01-01 RX ORDER — LANOLIN ALCOHOL/MO/W.PET/CERES
325 CREAM (GRAM) TOPICAL
Qty: 60 TAB | Refills: 1 | Status: SHIPPED | OUTPATIENT
Start: 2019-01-01 | End: 2019-01-01

## 2019-01-01 RX ORDER — POLYETHYLENE GLYCOL 3350 17 G/17G
17 POWDER, FOR SOLUTION ORAL DAILY
Status: DISCONTINUED | OUTPATIENT
Start: 2019-01-01 | End: 2019-01-01

## 2019-01-01 RX ORDER — PANTOPRAZOLE SODIUM 40 MG/10ML
INJECTION, POWDER, LYOPHILIZED, FOR SOLUTION INTRAVENOUS
Status: DISPENSED
Start: 2019-01-01 | End: 2019-01-01

## 2019-01-01 RX ORDER — SODIUM CHLORIDE 9 MG/ML
100 INJECTION, SOLUTION INTRAVENOUS CONTINUOUS
Status: DISCONTINUED | OUTPATIENT
Start: 2019-01-01 | End: 2019-01-01

## 2019-01-01 RX ORDER — MORPHINE SULFATE 15 MG/1
15 TABLET ORAL
Qty: 30 TAB | Refills: 0 | Status: SHIPPED | OUTPATIENT
Start: 2019-01-01 | End: 2019-01-01

## 2019-01-01 RX ORDER — HYDROMORPHONE HYDROCHLORIDE 4 MG/1
4 TABLET ORAL
Qty: 150 TAB | Refills: 0 | Status: SHIPPED | OUTPATIENT
Start: 2019-01-01 | End: 2019-01-01

## 2019-01-01 RX ORDER — ONDANSETRON 2 MG/ML
8 INJECTION INTRAMUSCULAR; INTRAVENOUS
Status: COMPLETED | OUTPATIENT
Start: 2019-01-01 | End: 2019-01-01

## 2019-01-01 RX ADMIN — METOCLOPRAMIDE 5 MG: 5 INJECTION, SOLUTION INTRAMUSCULAR; INTRAVENOUS at 11:21

## 2019-01-01 RX ADMIN — METOPROLOL TARTRATE 12.5 MG: 25 TABLET ORAL at 08:21

## 2019-01-01 RX ADMIN — HYDROMORPHONE HYDROCHLORIDE 2 MG: 5 LIQUID ORAL at 14:28

## 2019-01-01 RX ADMIN — PROCHLORPERAZINE EDISYLATE 10 MG: 5 INJECTION INTRAMUSCULAR; INTRAVENOUS at 17:26

## 2019-01-01 RX ADMIN — MAGNESIUM SULFATE HEPTAHYDRATE 2 G: 40 INJECTION, SOLUTION INTRAVENOUS at 09:07

## 2019-01-01 RX ADMIN — PANTOPRAZOLE SODIUM 20 MG: 40 INJECTION, POWDER, FOR SOLUTION INTRAVENOUS at 21:08

## 2019-01-01 RX ADMIN — POTASSIUM CHLORIDE 10 MEQ: 14.9 INJECTION, SOLUTION INTRAVENOUS at 18:21

## 2019-01-01 RX ADMIN — PANTOPRAZOLE SODIUM 20 MG: 40 INJECTION, POWDER, FOR SOLUTION INTRAVENOUS at 21:07

## 2019-01-01 RX ADMIN — SODIUM CHLORIDE 150 MG: 900 INJECTION, SOLUTION INTRAVENOUS at 16:50

## 2019-01-01 RX ADMIN — SODIUM CHLORIDE 125 ML/HR: 900 INJECTION, SOLUTION INTRAVENOUS at 02:18

## 2019-01-01 RX ADMIN — METOCLOPRAMIDE HYDROCHLORIDE 10 MG: 10 TABLET ORAL at 06:48

## 2019-01-01 RX ADMIN — METOCLOPRAMIDE 10 MG: 5 INJECTION, SOLUTION INTRAMUSCULAR; INTRAVENOUS at 06:26

## 2019-01-01 RX ADMIN — HYDROMORPHONE HYDROCHLORIDE 4 MG: 2 TABLET ORAL at 13:04

## 2019-01-01 RX ADMIN — ALUMINUM HYDROXIDE, MAGNESIUM HYDROXIDE, AND DIMETHICONE 30 ML: 400; 400; 40 SUSPENSION ORAL at 22:15

## 2019-01-01 RX ADMIN — PROCHLORPERAZINE EDISYLATE 10 MG: 5 INJECTION INTRAMUSCULAR; INTRAVENOUS at 10:34

## 2019-01-01 RX ADMIN — METOPROLOL TARTRATE 12.5 MG: 25 TABLET ORAL at 09:02

## 2019-01-01 RX ADMIN — PROCHLORPERAZINE EDISYLATE 10 MG: 5 INJECTION INTRAMUSCULAR; INTRAVENOUS at 00:27

## 2019-01-01 RX ADMIN — MORPHINE SULFATE 4 MG: 2 INJECTION, SOLUTION INTRAMUSCULAR; INTRAVENOUS at 03:16

## 2019-01-01 RX ADMIN — PANTOPRAZOLE SODIUM 20 MG: 40 INJECTION, POWDER, FOR SOLUTION INTRAVENOUS at 08:10

## 2019-01-01 RX ADMIN — METOCLOPRAMIDE HYDROCHLORIDE 10 MG: 10 TABLET ORAL at 13:07

## 2019-01-01 RX ADMIN — Medication 10 ML: at 18:34

## 2019-01-01 RX ADMIN — MAGNESIUM SULFATE HEPTAHYDRATE 2 G: 40 INJECTION, SOLUTION INTRAVENOUS at 15:29

## 2019-01-01 RX ADMIN — SENNOSIDES,DOCUSATE SODIUM 2 TABLET: 8.6; 5 TABLET, FILM COATED ORAL at 08:21

## 2019-01-01 RX ADMIN — HYDROMORPHONE HYDROCHLORIDE 1 MG: 1 INJECTION, SOLUTION INTRAMUSCULAR; INTRAVENOUS; SUBCUTANEOUS at 10:22

## 2019-01-01 RX ADMIN — HALOPERIDOL LACTATE 1 MG: 5 INJECTION, SOLUTION INTRAMUSCULAR at 22:03

## 2019-01-01 RX ADMIN — ONDANSETRON 8 MG: 2 INJECTION INTRAMUSCULAR; INTRAVENOUS at 15:02

## 2019-01-01 RX ADMIN — METOCLOPRAMIDE 10 MG: 5 INJECTION, SOLUTION INTRAMUSCULAR; INTRAVENOUS at 00:58

## 2019-01-01 RX ADMIN — Medication 10 ML: at 06:35

## 2019-01-01 RX ADMIN — SODIUM CHLORIDE 20 MG: 9 INJECTION INTRAMUSCULAR; INTRAVENOUS; SUBCUTANEOUS at 10:00

## 2019-01-01 RX ADMIN — HYDROMORPHONE HYDROCHLORIDE 1 MG: 1 INJECTION, SOLUTION INTRAMUSCULAR; INTRAVENOUS; SUBCUTANEOUS at 17:16

## 2019-01-01 RX ADMIN — METOCLOPRAMIDE 10 MG: 5 INJECTION, SOLUTION INTRAMUSCULAR; INTRAVENOUS at 06:00

## 2019-01-01 RX ADMIN — METOCLOPRAMIDE HYDROCHLORIDE 10 MG: 10 TABLET ORAL at 11:28

## 2019-01-01 RX ADMIN — SODIUM CHLORIDE 10 MG: 9 INJECTION INTRAMUSCULAR; INTRAVENOUS; SUBCUTANEOUS at 17:18

## 2019-01-01 RX ADMIN — METOPROLOL TARTRATE 12.5 MG: 25 TABLET ORAL at 22:04

## 2019-01-01 RX ADMIN — SODIUM CHLORIDE 10 MG: 9 INJECTION INTRAMUSCULAR; INTRAVENOUS; SUBCUTANEOUS at 15:41

## 2019-01-01 RX ADMIN — METOCLOPRAMIDE 10 MG: 5 INJECTION, SOLUTION INTRAMUSCULAR; INTRAVENOUS at 18:47

## 2019-01-01 RX ADMIN — DEXAMETHASONE SODIUM PHOSPHATE 12 MG: 4 INJECTION, SOLUTION INTRAMUSCULAR; INTRAVENOUS at 16:28

## 2019-01-01 RX ADMIN — POTASSIUM CHLORIDE 10 MEQ: 14.9 INJECTION, SOLUTION INTRAVENOUS at 16:50

## 2019-01-01 RX ADMIN — Medication 10 ML: at 16:36

## 2019-01-01 RX ADMIN — PANTOPRAZOLE SODIUM 20 MG: 40 INJECTION, POWDER, FOR SOLUTION INTRAVENOUS at 21:31

## 2019-01-01 RX ADMIN — PANTOPRAZOLE SODIUM 40 MG: 40 TABLET, DELAYED RELEASE ORAL at 06:31

## 2019-01-01 RX ADMIN — ENOXAPARIN SODIUM 40 MG: 100 INJECTION SUBCUTANEOUS at 08:09

## 2019-01-01 RX ADMIN — HYDROMORPHONE HYDROCHLORIDE 4 MG: 2 TABLET ORAL at 01:01

## 2019-01-01 RX ADMIN — Medication 10 ML: at 22:51

## 2019-01-01 RX ADMIN — SODIUM CHLORIDE 10 MG: 9 INJECTION INTRAMUSCULAR; INTRAVENOUS; SUBCUTANEOUS at 22:04

## 2019-01-01 RX ADMIN — MORPHINE SULFATE 2 MG: 2 INJECTION, SOLUTION INTRAMUSCULAR; INTRAVENOUS at 02:22

## 2019-01-01 RX ADMIN — SODIUM CHLORIDE 100 ML: 900 INJECTION, SOLUTION INTRAVENOUS at 08:11

## 2019-01-01 RX ADMIN — Medication 10 ML: at 06:31

## 2019-01-01 RX ADMIN — ONDANSETRON 8 MG: 2 INJECTION INTRAMUSCULAR; INTRAVENOUS at 09:34

## 2019-01-01 RX ADMIN — METOCLOPRAMIDE 10 MG: 5 INJECTION, SOLUTION INTRAMUSCULAR; INTRAVENOUS at 00:36

## 2019-01-01 RX ADMIN — SENNOSIDES, DOCUSATE SODIUM 1 TABLET: 50; 8.6 TABLET, FILM COATED ORAL at 09:02

## 2019-01-01 RX ADMIN — METOPROLOL TARTRATE 12.5 MG: 25 TABLET ORAL at 08:53

## 2019-01-01 RX ADMIN — PIPERACILLIN SODIUM,TAZOBACTAM SODIUM 3.38 G: 3; .375 INJECTION, POWDER, FOR SOLUTION INTRAVENOUS at 10:06

## 2019-01-01 RX ADMIN — HYDROMORPHONE HYDROCHLORIDE 1 MG: 1 INJECTION, SOLUTION INTRAMUSCULAR; INTRAVENOUS; SUBCUTANEOUS at 14:34

## 2019-01-01 RX ADMIN — Medication 10 ML: at 10:30

## 2019-01-01 RX ADMIN — HALOPERIDOL LACTATE 1 MG: 5 INJECTION, SOLUTION INTRAMUSCULAR at 14:48

## 2019-01-01 RX ADMIN — HYDROMORPHONE HYDROCHLORIDE 2 MG: 2 TABLET ORAL at 08:10

## 2019-01-01 RX ADMIN — SODIUM CHLORIDE 10 MG: 9 INJECTION INTRAMUSCULAR; INTRAVENOUS; SUBCUTANEOUS at 10:08

## 2019-01-01 RX ADMIN — Medication 10 ML: at 12:05

## 2019-01-01 RX ADMIN — SENNOSIDES,DOCUSATE SODIUM 2 TABLET: 8.6; 5 TABLET, FILM COATED ORAL at 08:48

## 2019-01-01 RX ADMIN — PROCHLORPERAZINE EDISYLATE 10 MG: 5 INJECTION INTRAMUSCULAR; INTRAVENOUS at 08:34

## 2019-01-01 RX ADMIN — HYDROMORPHONE HYDROCHLORIDE 2 MG: 2 TABLET ORAL at 22:14

## 2019-01-01 RX ADMIN — FENTANYL CITRATE 25 MCG: 50 INJECTION, SOLUTION INTRAMUSCULAR; INTRAVENOUS at 14:15

## 2019-01-01 RX ADMIN — MORPHINE SULFATE 15 MG: 15 TABLET ORAL at 19:09

## 2019-01-01 RX ADMIN — HYDROMORPHONE HYDROCHLORIDE 1 MG: 1 INJECTION, SOLUTION INTRAMUSCULAR; INTRAVENOUS; SUBCUTANEOUS at 17:49

## 2019-01-01 RX ADMIN — HYDROMORPHONE HYDROCHLORIDE 1 MG: 1 INJECTION, SOLUTION INTRAMUSCULAR; INTRAVENOUS; SUBCUTANEOUS at 22:19

## 2019-01-01 RX ADMIN — Medication 10 ML: at 22:00

## 2019-01-01 RX ADMIN — DOXORUBICIN HYDROCHLORIDE 86.8 MG: 2 INJECTION, SUSPENSION, LIPOSOMAL INTRAVENOUS at 11:38

## 2019-01-01 RX ADMIN — METOPROLOL TARTRATE 12.5 MG: 25 TABLET ORAL at 11:02

## 2019-01-01 RX ADMIN — HALOPERIDOL LACTATE 1 MG: 5 INJECTION, SOLUTION INTRAMUSCULAR at 05:45

## 2019-01-01 RX ADMIN — ONDANSETRON 8 MG: 2 INJECTION, SOLUTION INTRAMUSCULAR; INTRAVENOUS at 16:26

## 2019-01-01 RX ADMIN — DEXTROSE MONOHYDRATE, SODIUM CHLORIDE, AND POTASSIUM CHLORIDE 100 ML/HR: 50; 4.5; 1.49 INJECTION, SOLUTION INTRAVENOUS at 00:27

## 2019-01-01 RX ADMIN — LIDOCAINE HYDROCHLORIDE 10 ML: 10 INJECTION, SOLUTION EPIDURAL; INFILTRATION; INTRACAUDAL; PERINEURAL at 14:00

## 2019-01-01 RX ADMIN — Medication 10 ML: at 13:07

## 2019-01-01 RX ADMIN — SODIUM CHLORIDE, PRESERVATIVE FREE 500 UNITS: 5 INJECTION INTRAVENOUS at 17:05

## 2019-01-01 RX ADMIN — ONDANSETRON 8 MG: 2 INJECTION INTRAMUSCULAR; INTRAVENOUS at 11:47

## 2019-01-01 RX ADMIN — HYDROMORPHONE HYDROCHLORIDE 1 MG: 1 INJECTION, SOLUTION INTRAMUSCULAR; INTRAVENOUS; SUBCUTANEOUS at 04:08

## 2019-01-01 RX ADMIN — METOCLOPRAMIDE 5 MG: 5 INJECTION, SOLUTION INTRAMUSCULAR; INTRAVENOUS at 11:05

## 2019-01-01 RX ADMIN — PANTOPRAZOLE SODIUM 20 MG: 40 INJECTION, POWDER, FOR SOLUTION INTRAVENOUS at 08:57

## 2019-01-01 RX ADMIN — HYDROMORPHONE HYDROCHLORIDE 1 MG: 1 INJECTION, SOLUTION INTRAMUSCULAR; INTRAVENOUS; SUBCUTANEOUS at 06:41

## 2019-01-01 RX ADMIN — POLYETHYLENE GLYCOL 3350 17 G: 17 POWDER, FOR SOLUTION ORAL at 17:35

## 2019-01-01 RX ADMIN — HYDROMORPHONE HYDROCHLORIDE 1 MG: 1 INJECTION, SOLUTION INTRAMUSCULAR; INTRAVENOUS; SUBCUTANEOUS at 21:26

## 2019-01-01 RX ADMIN — MORPHINE SULFATE 2 MG: 2 INJECTION, SOLUTION INTRAMUSCULAR; INTRAVENOUS at 21:47

## 2019-01-01 RX ADMIN — PANTOPRAZOLE SODIUM 40 MG: 40 TABLET, DELAYED RELEASE ORAL at 06:48

## 2019-01-01 RX ADMIN — METOCLOPRAMIDE HYDROCHLORIDE 10 MG: 10 TABLET ORAL at 08:59

## 2019-01-01 RX ADMIN — METOCLOPRAMIDE HYDROCHLORIDE 10 MG: 10 TABLET ORAL at 15:32

## 2019-01-01 RX ADMIN — METOCLOPRAMIDE HYDROCHLORIDE 10 MG: 10 TABLET ORAL at 12:40

## 2019-01-01 RX ADMIN — ENOXAPARIN SODIUM 40 MG: 40 INJECTION SUBCUTANEOUS at 17:27

## 2019-01-01 RX ADMIN — PANTOPRAZOLE SODIUM 20 MG: 40 INJECTION, POWDER, FOR SOLUTION INTRAVENOUS at 08:45

## 2019-01-01 RX ADMIN — ENOXAPARIN SODIUM 40 MG: 100 INJECTION SUBCUTANEOUS at 09:02

## 2019-01-01 RX ADMIN — PANTOPRAZOLE SODIUM 40 MG: 40 TABLET, DELAYED RELEASE ORAL at 16:38

## 2019-01-01 RX ADMIN — HYDROMORPHONE HYDROCHLORIDE 1 MG: 1 INJECTION, SOLUTION INTRAMUSCULAR; INTRAVENOUS; SUBCUTANEOUS at 07:56

## 2019-01-01 RX ADMIN — ASCORBIC ACID: 500 INJECTION, SOLUTION INTRAMUSCULAR; INTRAVENOUS; SUBCUTANEOUS at 19:04

## 2019-01-01 RX ADMIN — DEXAMETHASONE SODIUM PHOSPHATE 4 MG: 4 INJECTION, SOLUTION INTRAMUSCULAR; INTRAVENOUS at 01:07

## 2019-01-01 RX ADMIN — DEXAMETHASONE SODIUM PHOSPHATE 4 MG: 4 INJECTION, SOLUTION INTRAMUSCULAR; INTRAVENOUS at 00:11

## 2019-01-01 RX ADMIN — ASCORBIC ACID: 500 INJECTION, SOLUTION INTRAMUSCULAR; INTRAVENOUS; SUBCUTANEOUS at 19:17

## 2019-01-01 RX ADMIN — PROCHLORPERAZINE EDISYLATE 5 MG: 5 INJECTION INTRAMUSCULAR; INTRAVENOUS at 09:28

## 2019-01-01 RX ADMIN — METOPROLOL TARTRATE 12.5 MG: 25 TABLET ORAL at 09:03

## 2019-01-01 RX ADMIN — HYDROMORPHONE HYDROCHLORIDE 1 MG: 1 INJECTION, SOLUTION INTRAMUSCULAR; INTRAVENOUS; SUBCUTANEOUS at 08:09

## 2019-01-01 RX ADMIN — Medication 10 ML: at 17:46

## 2019-01-01 RX ADMIN — HYDROMORPHONE HYDROCHLORIDE 2 MG: 5 LIQUID ORAL at 18:31

## 2019-01-01 RX ADMIN — METOCLOPRAMIDE HYDROCHLORIDE 10 MG: 10 TABLET ORAL at 12:27

## 2019-01-01 RX ADMIN — HALOPERIDOL LACTATE 1 MG: 5 INJECTION, SOLUTION INTRAMUSCULAR at 09:55

## 2019-01-01 RX ADMIN — Medication 10 ML: at 22:03

## 2019-01-01 RX ADMIN — METOCLOPRAMIDE 5 MG: 5 INJECTION, SOLUTION INTRAMUSCULAR; INTRAVENOUS at 09:02

## 2019-01-01 RX ADMIN — SODIUM BICARBONATE 2 ML: 42 INJECTION, SOLUTION INTRAVENOUS at 14:00

## 2019-01-01 RX ADMIN — MIDAZOLAM HYDROCHLORIDE 1 MG: 1 INJECTION, SOLUTION INTRAMUSCULAR; INTRAVENOUS at 14:30

## 2019-01-01 RX ADMIN — ONDANSETRON 8 MG: 2 INJECTION INTRAMUSCULAR; INTRAVENOUS at 13:58

## 2019-01-01 RX ADMIN — METOCLOPRAMIDE 10 MG: 5 INJECTION, SOLUTION INTRAMUSCULAR; INTRAVENOUS at 17:27

## 2019-01-01 RX ADMIN — ENOXAPARIN SODIUM 40 MG: 40 INJECTION SUBCUTANEOUS at 18:14

## 2019-01-01 RX ADMIN — DOXORUBICIN HYDROCHLORIDE 86.8 MG: 2 INJECTION, SUSPENSION, LIPOSOMAL INTRAVENOUS at 12:25

## 2019-01-01 RX ADMIN — POLYETHYLENE GLYCOL 3350 17 G: 17 POWDER, FOR SOLUTION ORAL at 08:10

## 2019-01-01 RX ADMIN — METOCLOPRAMIDE HYDROCHLORIDE 10 MG: 10 TABLET ORAL at 07:01

## 2019-01-01 RX ADMIN — SODIUM CHLORIDE 20 MG: 9 INJECTION INTRAMUSCULAR; INTRAVENOUS; SUBCUTANEOUS at 22:03

## 2019-01-01 RX ADMIN — SENNOSIDES,DOCUSATE SODIUM 2 TABLET: 8.6; 5 TABLET, FILM COATED ORAL at 09:17

## 2019-01-01 RX ADMIN — PROCHLORPERAZINE MALEATE 5 MG: 5 TABLET, FILM COATED ORAL at 13:04

## 2019-01-01 RX ADMIN — IOPAMIDOL 100 ML: 755 INJECTION, SOLUTION INTRAVENOUS at 11:42

## 2019-01-01 RX ADMIN — METOCLOPRAMIDE HYDROCHLORIDE 10 MG: 10 TABLET ORAL at 06:06

## 2019-01-01 RX ADMIN — METOCLOPRAMIDE 5 MG: 5 INJECTION, SOLUTION INTRAMUSCULAR; INTRAVENOUS at 01:03

## 2019-01-01 RX ADMIN — METOCLOPRAMIDE HYDROCHLORIDE 10 MG: 10 TABLET ORAL at 21:00

## 2019-01-01 RX ADMIN — METOCLOPRAMIDE 10 MG: 5 INJECTION, SOLUTION INTRAMUSCULAR; INTRAVENOUS at 12:02

## 2019-01-01 RX ADMIN — Medication: at 07:56

## 2019-01-01 RX ADMIN — METOPROLOL TARTRATE 12.5 MG: 25 TABLET ORAL at 08:54

## 2019-01-01 RX ADMIN — PANTOPRAZOLE SODIUM 40 MG: 40 TABLET, DELAYED RELEASE ORAL at 06:35

## 2019-01-01 RX ADMIN — METOCLOPRAMIDE HYDROCHLORIDE 10 MG: 10 TABLET ORAL at 11:40

## 2019-01-01 RX ADMIN — SODIUM CHLORIDE 100 ML: 900 INJECTION, SOLUTION INTRAVENOUS at 22:23

## 2019-01-01 RX ADMIN — METOCLOPRAMIDE HYDROCHLORIDE 10 MG: 10 TABLET ORAL at 16:29

## 2019-01-01 RX ADMIN — HYDROMORPHONE HYDROCHLORIDE 1 MG: 1 INJECTION, SOLUTION INTRAMUSCULAR; INTRAVENOUS; SUBCUTANEOUS at 10:49

## 2019-01-01 RX ADMIN — METOCLOPRAMIDE HYDROCHLORIDE 10 MG: 10 TABLET ORAL at 17:44

## 2019-01-01 RX ADMIN — HYDROMORPHONE HYDROCHLORIDE 1 MG: 1 INJECTION, SOLUTION INTRAMUSCULAR; INTRAVENOUS; SUBCUTANEOUS at 15:03

## 2019-01-01 RX ADMIN — METOCLOPRAMIDE 5 MG: 5 INJECTION, SOLUTION INTRAMUSCULAR; INTRAVENOUS at 03:53

## 2019-01-01 RX ADMIN — CARBOPLATIN 602 MG: 10 INJECTION, SOLUTION INTRAVENOUS at 15:29

## 2019-01-01 RX ADMIN — POLYETHYLENE GLYCOL 3350 17 G: 17 POWDER, FOR SOLUTION ORAL at 17:17

## 2019-01-01 RX ADMIN — Medication 10 ML: at 04:06

## 2019-01-01 RX ADMIN — SODIUM CHLORIDE 20 MG: 9 INJECTION INTRAMUSCULAR; INTRAVENOUS; SUBCUTANEOUS at 21:14

## 2019-01-01 RX ADMIN — ENOXAPARIN SODIUM 40 MG: 40 INJECTION SUBCUTANEOUS at 18:10

## 2019-01-01 RX ADMIN — POLYETHYLENE GLYCOL 3350 17 G: 17 POWDER, FOR SOLUTION ORAL at 08:34

## 2019-01-01 RX ADMIN — HYDROMORPHONE HYDROCHLORIDE 1 MG: 1 INJECTION, SOLUTION INTRAMUSCULAR; INTRAVENOUS; SUBCUTANEOUS at 19:00

## 2019-01-01 RX ADMIN — SENNOSIDES,DOCUSATE SODIUM 2 TABLET: 8.6; 5 TABLET, FILM COATED ORAL at 08:00

## 2019-01-01 RX ADMIN — METOPROLOL TARTRATE 12.5 MG: 25 TABLET ORAL at 10:24

## 2019-01-01 RX ADMIN — FENTANYL CITRATE 25 MCG: 50 INJECTION, SOLUTION INTRAMUSCULAR; INTRAVENOUS at 14:24

## 2019-01-01 RX ADMIN — MAGNESIUM CITRATE 296 ML: 1.75 LIQUID ORAL at 10:12

## 2019-01-01 RX ADMIN — PANTOPRAZOLE SODIUM 20 MG: 40 INJECTION, POWDER, FOR SOLUTION INTRAVENOUS at 20:33

## 2019-01-01 RX ADMIN — HYDROMORPHONE HYDROCHLORIDE 1 MG: 1 INJECTION, SOLUTION INTRAMUSCULAR; INTRAVENOUS; SUBCUTANEOUS at 07:29

## 2019-01-01 RX ADMIN — HYDROMORPHONE HYDROCHLORIDE 1 MG: 1 INJECTION, SOLUTION INTRAMUSCULAR; INTRAVENOUS; SUBCUTANEOUS at 10:09

## 2019-01-01 RX ADMIN — HALOPERIDOL LACTATE 1 MG: 5 INJECTION, SOLUTION INTRAMUSCULAR at 18:32

## 2019-01-01 RX ADMIN — HYDROMORPHONE HYDROCHLORIDE 4 MG: 2 TABLET ORAL at 00:16

## 2019-01-01 RX ADMIN — Medication 10 ML: at 06:01

## 2019-01-01 RX ADMIN — SODIUM CHLORIDE 20 MG: 9 INJECTION INTRAMUSCULAR; INTRAVENOUS; SUBCUTANEOUS at 08:50

## 2019-01-01 RX ADMIN — METOPROLOL TARTRATE 12.5 MG: 25 TABLET ORAL at 21:13

## 2019-01-01 RX ADMIN — POLYETHYLENE GLYCOL 3350 17 G: 17 POWDER, FOR SOLUTION ORAL at 17:27

## 2019-01-01 RX ADMIN — METOPROLOL TARTRATE 12.5 MG: 25 TABLET ORAL at 21:08

## 2019-01-01 RX ADMIN — HYDROMORPHONE HYDROCHLORIDE 1 MG: 2 TABLET ORAL at 13:38

## 2019-01-01 RX ADMIN — SODIUM CHLORIDE 20 MG: 9 INJECTION INTRAMUSCULAR; INTRAVENOUS; SUBCUTANEOUS at 21:42

## 2019-01-01 RX ADMIN — Medication 10 ML: at 00:11

## 2019-01-01 RX ADMIN — CARBOPLATIN 443 MG: 10 INJECTION, SOLUTION INTRAVENOUS at 14:41

## 2019-01-01 RX ADMIN — Medication 10 ML: at 12:27

## 2019-01-01 RX ADMIN — SODIUM CHLORIDE 100 ML: 900 INJECTION, SOLUTION INTRAVENOUS at 15:20

## 2019-01-01 RX ADMIN — HYDROMORPHONE HYDROCHLORIDE 1 MG: 1 INJECTION, SOLUTION INTRAMUSCULAR; INTRAVENOUS; SUBCUTANEOUS at 21:42

## 2019-01-01 RX ADMIN — Medication 10 ML: at 06:26

## 2019-01-01 RX ADMIN — Medication 10 ML: at 14:00

## 2019-01-01 RX ADMIN — PANTOPRAZOLE SODIUM 20 MG: 40 INJECTION, POWDER, FOR SOLUTION INTRAVENOUS at 08:33

## 2019-01-01 RX ADMIN — PANTOPRAZOLE SODIUM 40 MG: 40 TABLET, DELAYED RELEASE ORAL at 07:00

## 2019-01-01 RX ADMIN — Medication 10 ML: at 14:49

## 2019-01-01 RX ADMIN — Medication 10 ML: at 01:11

## 2019-01-01 RX ADMIN — SODIUM CHLORIDE 25 ML/HR: 900 INJECTION, SOLUTION INTRAVENOUS at 16:21

## 2019-01-01 RX ADMIN — SODIUM CHLORIDE 20 MG: 9 INJECTION INTRAMUSCULAR; INTRAVENOUS; SUBCUTANEOUS at 20:43

## 2019-01-01 RX ADMIN — METOCLOPRAMIDE 10 MG: 5 INJECTION, SOLUTION INTRAMUSCULAR; INTRAVENOUS at 06:22

## 2019-01-01 RX ADMIN — HYDROMORPHONE HYDROCHLORIDE 1 MG: 1 INJECTION, SOLUTION INTRAMUSCULAR; INTRAVENOUS; SUBCUTANEOUS at 14:23

## 2019-01-01 RX ADMIN — MIDAZOLAM HYDROCHLORIDE 1 MG: 1 INJECTION, SOLUTION INTRAMUSCULAR; INTRAVENOUS at 14:20

## 2019-01-01 RX ADMIN — HYDROMORPHONE HYDROCHLORIDE 1 MG: 1 INJECTION, SOLUTION INTRAMUSCULAR; INTRAVENOUS; SUBCUTANEOUS at 06:22

## 2019-01-01 RX ADMIN — METOCLOPRAMIDE 10 MG: 5 INJECTION, SOLUTION INTRAMUSCULAR; INTRAVENOUS at 23:55

## 2019-01-01 RX ADMIN — ONDANSETRON 8 MG: 2 INJECTION INTRAMUSCULAR; INTRAVENOUS at 09:22

## 2019-01-01 RX ADMIN — DEXAMETHASONE SODIUM PHOSPHATE 12 MG: 4 INJECTION, SOLUTION INTRAMUSCULAR; INTRAVENOUS at 14:03

## 2019-01-01 RX ADMIN — HALOPERIDOL LACTATE 1 MG: 5 INJECTION, SOLUTION INTRAMUSCULAR at 14:00

## 2019-01-01 RX ADMIN — PIPERACILLIN SODIUM,TAZOBACTAM SODIUM 3.38 G: 3; .375 INJECTION, POWDER, FOR SOLUTION INTRAVENOUS at 02:24

## 2019-01-01 RX ADMIN — HYDROMORPHONE HYDROCHLORIDE 2 MG: 2 TABLET ORAL at 01:54

## 2019-01-01 RX ADMIN — PANTOPRAZOLE SODIUM 20 MG: 40 INJECTION, POWDER, FOR SOLUTION INTRAVENOUS at 21:23

## 2019-01-01 RX ADMIN — HYDROMORPHONE HYDROCHLORIDE 1 MG: 1 INJECTION, SOLUTION INTRAMUSCULAR; INTRAVENOUS; SUBCUTANEOUS at 21:36

## 2019-01-01 RX ADMIN — SODIUM CHLORIDE 10 MG: 9 INJECTION INTRAMUSCULAR; INTRAVENOUS; SUBCUTANEOUS at 13:01

## 2019-01-01 RX ADMIN — Medication 10 ML: at 23:15

## 2019-01-01 RX ADMIN — ENOXAPARIN SODIUM 40 MG: 40 INJECTION SUBCUTANEOUS at 13:07

## 2019-01-01 RX ADMIN — SODIUM CHLORIDE, PRESERVATIVE FREE 500 UNITS: 5 INJECTION INTRAVENOUS at 12:46

## 2019-01-01 RX ADMIN — SENNOSIDES,DOCUSATE SODIUM 2 TABLET: 8.6; 5 TABLET, FILM COATED ORAL at 09:23

## 2019-01-01 RX ADMIN — SODIUM CHLORIDE 25 ML/HR: 900 INJECTION, SOLUTION INTRAVENOUS at 14:17

## 2019-01-01 RX ADMIN — METOCLOPRAMIDE HYDROCHLORIDE 10 MG: 10 TABLET ORAL at 06:41

## 2019-01-01 RX ADMIN — PANTOPRAZOLE SODIUM 20 MG: 40 INJECTION, POWDER, FOR SOLUTION INTRAVENOUS at 21:00

## 2019-01-01 RX ADMIN — Medication 10 ML: at 06:09

## 2019-01-01 RX ADMIN — METOCLOPRAMIDE 5 MG: 5 INJECTION, SOLUTION INTRAMUSCULAR; INTRAVENOUS at 19:47

## 2019-01-01 RX ADMIN — HYDROMORPHONE HYDROCHLORIDE 1 MG: 1 INJECTION, SOLUTION INTRAMUSCULAR; INTRAVENOUS; SUBCUTANEOUS at 16:36

## 2019-01-01 RX ADMIN — HYDROMORPHONE HYDROCHLORIDE 1 MG: 2 TABLET ORAL at 09:01

## 2019-01-01 RX ADMIN — SODIUM CHLORIDE 150 MG: 900 INJECTION, SOLUTION INTRAVENOUS at 14:42

## 2019-01-01 RX ADMIN — POLYETHYLENE GLYCOL 3350 17 G: 17 POWDER, FOR SOLUTION ORAL at 09:00

## 2019-01-01 RX ADMIN — SODIUM CHLORIDE 100 ML/HR: 900 INJECTION, SOLUTION INTRAVENOUS at 07:05

## 2019-01-01 RX ADMIN — HALOPERIDOL LACTATE 1 MG: 5 INJECTION, SOLUTION INTRAMUSCULAR at 10:00

## 2019-01-01 RX ADMIN — METOPROLOL TARTRATE 12.5 MG: 25 TABLET ORAL at 21:24

## 2019-01-01 RX ADMIN — ONDANSETRON 8 MG: 2 INJECTION, SOLUTION INTRAMUSCULAR; INTRAVENOUS at 10:37

## 2019-01-01 RX ADMIN — METOCLOPRAMIDE 10 MG: 5 INJECTION, SOLUTION INTRAMUSCULAR; INTRAVENOUS at 06:31

## 2019-01-01 RX ADMIN — PANTOPRAZOLE SODIUM 20 MG: 40 INJECTION, POWDER, FOR SOLUTION INTRAVENOUS at 09:08

## 2019-01-01 RX ADMIN — IOPAMIDOL 100 ML: 755 INJECTION, SOLUTION INTRAVENOUS at 22:23

## 2019-01-01 RX ADMIN — SODIUM CHLORIDE 40 ML/HR: 900 INJECTION, SOLUTION INTRAVENOUS at 19:01

## 2019-01-01 RX ADMIN — I.V. FAT EMULSION 500 ML: 20 EMULSION INTRAVENOUS at 18:48

## 2019-01-01 RX ADMIN — POLYETHYLENE GLYCOL 3350 17 G: 17 POWDER, FOR SOLUTION ORAL at 08:00

## 2019-01-01 RX ADMIN — MAGNESIUM SULFATE IN WATER 2 G: 40 INJECTION, SOLUTION INTRAVENOUS at 15:16

## 2019-01-01 RX ADMIN — SENNOSIDES,DOCUSATE SODIUM 2 TABLET: 8.6; 5 TABLET, FILM COATED ORAL at 10:55

## 2019-01-01 RX ADMIN — Medication 300 UNITS: at 00:01

## 2019-01-01 RX ADMIN — METOCLOPRAMIDE 10 MG: 5 INJECTION, SOLUTION INTRAMUSCULAR; INTRAVENOUS at 18:05

## 2019-01-01 RX ADMIN — PANTOPRAZOLE SODIUM 40 MG: 40 TABLET, DELAYED RELEASE ORAL at 16:10

## 2019-01-01 RX ADMIN — PANTOPRAZOLE SODIUM 40 MG: 40 TABLET, DELAYED RELEASE ORAL at 17:00

## 2019-01-01 RX ADMIN — METOCLOPRAMIDE HYDROCHLORIDE 10 MG: 10 TABLET ORAL at 21:24

## 2019-01-01 RX ADMIN — METOCLOPRAMIDE 10 MG: 5 INJECTION, SOLUTION INTRAMUSCULAR; INTRAVENOUS at 00:38

## 2019-01-01 RX ADMIN — SODIUM CHLORIDE 1000 ML: 900 INJECTION, SOLUTION INTRAVENOUS at 20:54

## 2019-01-01 RX ADMIN — METOCLOPRAMIDE 10 MG: 5 INJECTION, SOLUTION INTRAMUSCULAR; INTRAVENOUS at 12:22

## 2019-01-01 RX ADMIN — HYDROMORPHONE HYDROCHLORIDE 1 MG: 1 INJECTION, SOLUTION INTRAMUSCULAR; INTRAVENOUS; SUBCUTANEOUS at 01:20

## 2019-01-01 RX ADMIN — METOCLOPRAMIDE 10 MG: 5 INJECTION, SOLUTION INTRAMUSCULAR; INTRAVENOUS at 12:32

## 2019-01-01 RX ADMIN — Medication 10 ML: at 15:48

## 2019-01-01 RX ADMIN — METOCLOPRAMIDE 10 MG: 5 INJECTION, SOLUTION INTRAMUSCULAR; INTRAVENOUS at 17:17

## 2019-01-01 RX ADMIN — HALOPERIDOL LACTATE 1 MG: 5 INJECTION, SOLUTION INTRAMUSCULAR at 02:23

## 2019-01-01 RX ADMIN — HYDROMORPHONE HYDROCHLORIDE 1 MG: 1 INJECTION, SOLUTION INTRAMUSCULAR; INTRAVENOUS; SUBCUTANEOUS at 14:52

## 2019-01-01 RX ADMIN — HYDROMORPHONE HYDROCHLORIDE 1 MG: 1 INJECTION, SOLUTION INTRAMUSCULAR; INTRAVENOUS; SUBCUTANEOUS at 01:46

## 2019-01-01 RX ADMIN — HYDROMORPHONE HYDROCHLORIDE 1 MG: 1 INJECTION, SOLUTION INTRAMUSCULAR; INTRAVENOUS; SUBCUTANEOUS at 21:03

## 2019-01-01 RX ADMIN — POLYETHYLENE GLYCOL 3350 17 G: 17 POWDER, FOR SOLUTION ORAL at 09:02

## 2019-01-01 RX ADMIN — SODIUM CHLORIDE 50 ML/HR: 900 INJECTION, SOLUTION INTRAVENOUS at 18:45

## 2019-01-01 RX ADMIN — SODIUM CHLORIDE 10 MG: 9 INJECTION INTRAMUSCULAR; INTRAVENOUS; SUBCUTANEOUS at 17:47

## 2019-01-01 RX ADMIN — METOCLOPRAMIDE 10 MG: 5 INJECTION, SOLUTION INTRAMUSCULAR; INTRAVENOUS at 18:20

## 2019-01-01 RX ADMIN — METOCLOPRAMIDE HYDROCHLORIDE 10 MG: 10 TABLET ORAL at 14:20

## 2019-01-01 RX ADMIN — PANTOPRAZOLE SODIUM 20 MG: 40 INJECTION, POWDER, FOR SOLUTION INTRAVENOUS at 09:22

## 2019-01-01 RX ADMIN — SENNOSIDES 17.2 MG: 8.6 TABLET, FILM COATED ORAL at 21:41

## 2019-01-01 RX ADMIN — HYDROMORPHONE HYDROCHLORIDE 1 MG: 1 INJECTION, SOLUTION INTRAMUSCULAR; INTRAVENOUS; SUBCUTANEOUS at 04:38

## 2019-01-01 RX ADMIN — DEXAMETHASONE SODIUM PHOSPHATE 4 MG: 4 INJECTION, SOLUTION INTRAMUSCULAR; INTRAVENOUS at 17:35

## 2019-01-01 RX ADMIN — SODIUM CHLORIDE 10 MG: 9 INJECTION INTRAMUSCULAR; INTRAVENOUS; SUBCUTANEOUS at 00:17

## 2019-01-01 RX ADMIN — SODIUM CHLORIDE 10 MG: 9 INJECTION INTRAMUSCULAR; INTRAVENOUS; SUBCUTANEOUS at 06:00

## 2019-01-01 RX ADMIN — HYDROMORPHONE HYDROCHLORIDE 1 MG: 1 INJECTION, SOLUTION INTRAMUSCULAR; INTRAVENOUS; SUBCUTANEOUS at 05:19

## 2019-01-01 RX ADMIN — PANTOPRAZOLE SODIUM 20 MG: 40 INJECTION, POWDER, FOR SOLUTION INTRAVENOUS at 09:03

## 2019-01-01 RX ADMIN — PIPERACILLIN SODIUM,TAZOBACTAM SODIUM 3.38 G: 3; .375 INJECTION, POWDER, FOR SOLUTION INTRAVENOUS at 17:56

## 2019-01-01 RX ADMIN — ONDANSETRON 8 MG: 2 INJECTION INTRAMUSCULAR; INTRAVENOUS at 01:58

## 2019-01-01 RX ADMIN — Medication: at 01:27

## 2019-01-01 RX ADMIN — DEXTROSE MONOHYDRATE, SODIUM CHLORIDE, AND POTASSIUM CHLORIDE 100 ML/HR: 50; 4.5; 1.49 INJECTION, SOLUTION INTRAVENOUS at 14:58

## 2019-01-01 RX ADMIN — HYDROMORPHONE HYDROCHLORIDE 1 MG: 1 INJECTION, SOLUTION INTRAMUSCULAR; INTRAVENOUS; SUBCUTANEOUS at 03:05

## 2019-01-01 RX ADMIN — SODIUM CHLORIDE 125 ML/HR: 900 INJECTION, SOLUTION INTRAVENOUS at 21:26

## 2019-01-01 RX ADMIN — DIPHENHYDRAMINE HYDROCHLORIDE 50 MG: 50 INJECTION, SOLUTION INTRAMUSCULAR; INTRAVENOUS at 17:01

## 2019-01-01 RX ADMIN — SODIUM CHLORIDE 20 MG: 9 INJECTION INTRAMUSCULAR; INTRAVENOUS; SUBCUTANEOUS at 09:06

## 2019-01-01 RX ADMIN — ONDANSETRON 8 MG: 2 INJECTION INTRAMUSCULAR; INTRAVENOUS at 20:50

## 2019-01-01 RX ADMIN — HYDROMORPHONE HYDROCHLORIDE 1 MG: 1 INJECTION, SOLUTION INTRAMUSCULAR; INTRAVENOUS; SUBCUTANEOUS at 00:51

## 2019-01-01 RX ADMIN — ASCORBIC ACID: 500 INJECTION, SOLUTION INTRAMUSCULAR; INTRAVENOUS; SUBCUTANEOUS at 19:12

## 2019-01-01 RX ADMIN — HYDROMORPHONE HYDROCHLORIDE 1 MG: 1 INJECTION, SOLUTION INTRAMUSCULAR; INTRAVENOUS; SUBCUTANEOUS at 03:27

## 2019-01-01 RX ADMIN — METOPROLOL TARTRATE 12.5 MG: 25 TABLET ORAL at 08:31

## 2019-01-01 RX ADMIN — DEXTROSE MONOHYDRATE 250 ML: 5 INJECTION, SOLUTION INTRAVENOUS at 11:49

## 2019-01-01 RX ADMIN — Medication 10 ML: at 11:42

## 2019-01-01 RX ADMIN — VANCOMYCIN HYDROCHLORIDE 2000 MG: 10 INJECTION, POWDER, LYOPHILIZED, FOR SOLUTION INTRAVENOUS at 07:05

## 2019-01-01 RX ADMIN — METOCLOPRAMIDE 10 MG: 5 INJECTION, SOLUTION INTRAMUSCULAR; INTRAVENOUS at 12:29

## 2019-01-01 RX ADMIN — Medication 10 ML: at 02:22

## 2019-01-01 RX ADMIN — HYDROMORPHONE HYDROCHLORIDE 1 MG: 1 INJECTION, SOLUTION INTRAMUSCULAR; INTRAVENOUS; SUBCUTANEOUS at 00:15

## 2019-01-01 RX ADMIN — ASCORBIC ACID: 500 INJECTION, SOLUTION INTRAMUSCULAR; INTRAVENOUS; SUBCUTANEOUS at 18:34

## 2019-01-01 RX ADMIN — VANCOMYCIN HYDROCHLORIDE 1250 MG: 10 INJECTION, POWDER, LYOPHILIZED, FOR SOLUTION INTRAVENOUS at 19:06

## 2019-01-01 RX ADMIN — FENTANYL CITRATE 25 MCG: 50 INJECTION, SOLUTION INTRAMUSCULAR; INTRAVENOUS at 14:31

## 2019-01-01 RX ADMIN — PROCHLORPERAZINE EDISYLATE 10 MG: 5 INJECTION INTRAMUSCULAR; INTRAVENOUS at 11:07

## 2019-01-01 RX ADMIN — DEXTROSE MONOHYDRATE, SODIUM CHLORIDE, AND POTASSIUM CHLORIDE 100 ML/HR: 50; 4.5; 1.49 INJECTION, SOLUTION INTRAVENOUS at 13:05

## 2019-01-01 RX ADMIN — SODIUM CHLORIDE 40 MG: 9 INJECTION, SOLUTION INTRAMUSCULAR; INTRAVENOUS; SUBCUTANEOUS at 10:57

## 2019-01-01 RX ADMIN — I.V. FAT EMULSION 500 ML: 20 EMULSION INTRAVENOUS at 19:18

## 2019-01-01 RX ADMIN — SODIUM CHLORIDE 25 ML/HR: 900 INJECTION, SOLUTION INTRAVENOUS at 01:48

## 2019-01-01 RX ADMIN — Medication 5 ML: at 14:00

## 2019-01-01 RX ADMIN — POLYETHYLENE GLYCOL 3350 17 G: 17 POWDER, FOR SOLUTION ORAL at 09:17

## 2019-01-01 RX ADMIN — POTASSIUM CHLORIDE 10 MEQ: 14.9 INJECTION, SOLUTION INTRAVENOUS at 21:02

## 2019-01-01 RX ADMIN — SODIUM CHLORIDE 40 MG: 9 INJECTION, SOLUTION INTRAMUSCULAR; INTRAVENOUS; SUBCUTANEOUS at 02:18

## 2019-01-01 RX ADMIN — HYDROMORPHONE HYDROCHLORIDE 1 MG: 1 INJECTION, SOLUTION INTRAMUSCULAR; INTRAVENOUS; SUBCUTANEOUS at 21:51

## 2019-01-01 RX ADMIN — DEXTROSE MONOHYDRATE, SODIUM CHLORIDE, AND POTASSIUM CHLORIDE 100 ML/HR: 50; 4.5; 1.49 INJECTION, SOLUTION INTRAVENOUS at 10:34

## 2019-01-01 RX ADMIN — HALOPERIDOL LACTATE 1 MG: 5 INJECTION, SOLUTION INTRAMUSCULAR at 21:47

## 2019-01-01 RX ADMIN — SODIUM CHLORIDE 150 MG: 900 INJECTION, SOLUTION INTRAVENOUS at 13:32

## 2019-01-01 RX ADMIN — Medication 10 ML: at 21:34

## 2019-01-01 RX ADMIN — DEXTROSE MONOHYDRATE, SODIUM CHLORIDE, AND POTASSIUM CHLORIDE 100 ML/HR: 50; 4.5; 1.49 INJECTION, SOLUTION INTRAVENOUS at 05:56

## 2019-01-01 RX ADMIN — DEXTROSE MONOHYDRATE, SODIUM CHLORIDE, AND POTASSIUM CHLORIDE 100 ML/HR: 50; 4.5; 1.49 INJECTION, SOLUTION INTRAVENOUS at 01:02

## 2019-01-01 RX ADMIN — HYDROMORPHONE HYDROCHLORIDE 1 MG: 1 INJECTION, SOLUTION INTRAMUSCULAR; INTRAVENOUS; SUBCUTANEOUS at 11:15

## 2019-01-01 RX ADMIN — Medication 10 ML: at 17:05

## 2019-01-01 RX ADMIN — SODIUM CHLORIDE 20 MG: 9 INJECTION INTRAMUSCULAR; INTRAVENOUS; SUBCUTANEOUS at 20:15

## 2019-01-01 RX ADMIN — SODIUM CHLORIDE 20 MG: 9 INJECTION INTRAMUSCULAR; INTRAVENOUS; SUBCUTANEOUS at 10:49

## 2019-01-01 RX ADMIN — SODIUM CHLORIDE 10 ML: 9 INJECTION, SOLUTION INTRAMUSCULAR; INTRAVENOUS; SUBCUTANEOUS at 09:23

## 2019-01-01 RX ADMIN — METOPROLOL TARTRATE 12.5 MG: 25 TABLET ORAL at 09:09

## 2019-01-01 RX ADMIN — HYDROMORPHONE HYDROCHLORIDE 2 MG: 5 LIQUID ORAL at 22:03

## 2019-01-01 RX ADMIN — SODIUM CHLORIDE 10 MG: 9 INJECTION INTRAMUSCULAR; INTRAVENOUS; SUBCUTANEOUS at 01:58

## 2019-01-01 RX ADMIN — SODIUM CHLORIDE 25 ML/HR: 900 INJECTION, SOLUTION INTRAVENOUS at 10:00

## 2019-01-01 RX ADMIN — ONDANSETRON 4 MG: 4 TABLET, ORALLY DISINTEGRATING ORAL at 15:26

## 2019-01-01 RX ADMIN — POLYETHYLENE GLYCOL 3350 17 G: 17 POWDER, FOR SOLUTION ORAL at 09:01

## 2019-01-01 RX ADMIN — INSULIN LISPRO 2 UNITS: 100 INJECTION, SOLUTION INTRAVENOUS; SUBCUTANEOUS at 19:03

## 2019-01-01 RX ADMIN — SODIUM CHLORIDE 20 MG: 9 INJECTION INTRAMUSCULAR; INTRAVENOUS; SUBCUTANEOUS at 11:01

## 2019-01-01 RX ADMIN — Medication 10 ML: at 15:20

## 2019-01-01 RX ADMIN — CARBOPLATIN 562 MG: 10 INJECTION, SOLUTION INTRAVENOUS at 17:11

## 2019-01-01 RX ADMIN — MORPHINE SULFATE 15 MG: 15 TABLET ORAL at 17:44

## 2019-01-01 RX ADMIN — PROCHLORPERAZINE EDISYLATE 10 MG: 5 INJECTION INTRAMUSCULAR; INTRAVENOUS at 17:12

## 2019-01-01 RX ADMIN — Medication 10 ML: at 05:46

## 2019-01-01 RX ADMIN — HYDROMORPHONE HYDROCHLORIDE 1 MG: 1 INJECTION, SOLUTION INTRAMUSCULAR; INTRAVENOUS; SUBCUTANEOUS at 23:05

## 2019-01-01 RX ADMIN — HALOPERIDOL LACTATE 1 MG: 5 INJECTION, SOLUTION INTRAMUSCULAR at 22:04

## 2019-01-01 RX ADMIN — METOCLOPRAMIDE 10 MG: 5 INJECTION, SOLUTION INTRAMUSCULAR; INTRAVENOUS at 00:13

## 2019-01-01 RX ADMIN — HYDROMORPHONE HYDROCHLORIDE 1 MG: 1 INJECTION, SOLUTION INTRAMUSCULAR; INTRAVENOUS; SUBCUTANEOUS at 20:35

## 2019-01-01 RX ADMIN — METOCLOPRAMIDE HYDROCHLORIDE 10 MG: 10 TABLET ORAL at 00:07

## 2019-01-01 RX ADMIN — ONDANSETRON 8 MG: 2 INJECTION INTRAMUSCULAR; INTRAVENOUS at 04:38

## 2019-01-01 RX ADMIN — HALOPERIDOL LACTATE 1 MG: 5 INJECTION, SOLUTION INTRAMUSCULAR at 02:17

## 2019-01-01 RX ADMIN — MAGNESIUM SULFATE IN WATER 2 G: 40 INJECTION, SOLUTION INTRAVENOUS at 16:30

## 2019-01-01 RX ADMIN — DEXAMETHASONE SODIUM PHOSPHATE 12 MG: 4 INJECTION, SOLUTION INTRAMUSCULAR; INTRAVENOUS at 15:00

## 2019-01-01 RX ADMIN — HYDROMORPHONE HYDROCHLORIDE 1 MG: 1 INJECTION, SOLUTION INTRAMUSCULAR; INTRAVENOUS; SUBCUTANEOUS at 18:41

## 2019-01-01 RX ADMIN — HALOPERIDOL LACTATE 1 MG: 5 INJECTION, SOLUTION INTRAMUSCULAR at 05:59

## 2019-01-01 RX ADMIN — DEXTROSE MONOHYDRATE, SODIUM CHLORIDE, AND POTASSIUM CHLORIDE 100 ML/HR: 50; 4.5; 1.49 INJECTION, SOLUTION INTRAVENOUS at 09:31

## 2019-01-01 RX ADMIN — Medication 10 ML: at 18:38

## 2019-01-01 RX ADMIN — MIDAZOLAM HYDROCHLORIDE 1 MG: 1 INJECTION, SOLUTION INTRAMUSCULAR; INTRAVENOUS at 14:24

## 2019-01-01 RX ADMIN — DEXTROSE MONOHYDRATE, SODIUM CHLORIDE, AND POTASSIUM CHLORIDE 100 ML/HR: 50; 4.5; 1.49 INJECTION, SOLUTION INTRAVENOUS at 11:02

## 2019-01-01 RX ADMIN — SODIUM CHLORIDE 40 ML/HR: 900 INJECTION, SOLUTION INTRAVENOUS at 18:25

## 2019-01-01 RX ADMIN — SODIUM CHLORIDE 10 MG: 9 INJECTION INTRAMUSCULAR; INTRAVENOUS; SUBCUTANEOUS at 15:42

## 2019-01-01 RX ADMIN — METOCLOPRAMIDE 10 MG: 5 INJECTION, SOLUTION INTRAMUSCULAR; INTRAVENOUS at 00:31

## 2019-01-01 RX ADMIN — DEXTROSE MONOHYDRATE, SODIUM CHLORIDE, AND POTASSIUM CHLORIDE 100 ML/HR: 50; 4.5; 1.49 INJECTION, SOLUTION INTRAVENOUS at 01:05

## 2019-01-01 RX ADMIN — METOCLOPRAMIDE 10 MG: 5 INJECTION, SOLUTION INTRAMUSCULAR; INTRAVENOUS at 18:13

## 2019-01-01 RX ADMIN — HYDROMORPHONE HYDROCHLORIDE 4 MG: 2 TABLET ORAL at 09:27

## 2019-01-01 RX ADMIN — HALOPERIDOL LACTATE 1 MG: 5 INJECTION, SOLUTION INTRAMUSCULAR at 18:47

## 2019-01-01 RX ADMIN — SODIUM CHLORIDE 20 MG: 9 INJECTION INTRAMUSCULAR; INTRAVENOUS; SUBCUTANEOUS at 21:01

## 2019-01-01 RX ADMIN — SODIUM CHLORIDE 40 ML/HR: 900 INJECTION, SOLUTION INTRAVENOUS at 21:41

## 2019-01-01 RX ADMIN — SODIUM CHLORIDE 100 ML: 900 INJECTION, SOLUTION INTRAVENOUS at 11:42

## 2019-01-01 RX ADMIN — METOCLOPRAMIDE HYDROCHLORIDE 10 MG: 10 TABLET ORAL at 19:09

## 2019-01-01 RX ADMIN — Medication 500 UNITS: at 09:30

## 2019-01-01 RX ADMIN — DEXTROSE MONOHYDRATE, SODIUM CHLORIDE, AND POTASSIUM CHLORIDE 100 ML/HR: 50; 4.5; 1.49 INJECTION, SOLUTION INTRAVENOUS at 19:53

## 2019-01-01 RX ADMIN — ENOXAPARIN SODIUM 40 MG: 100 INJECTION SUBCUTANEOUS at 08:31

## 2019-01-01 RX ADMIN — METOPROLOL TARTRATE 12.5 MG: 25 TABLET ORAL at 21:09

## 2019-01-01 RX ADMIN — ASCORBIC ACID: 500 INJECTION, SOLUTION INTRAMUSCULAR; INTRAVENOUS; SUBCUTANEOUS at 18:47

## 2019-01-01 RX ADMIN — HYDROMORPHONE HYDROCHLORIDE 1 MG: 1 INJECTION, SOLUTION INTRAMUSCULAR; INTRAVENOUS; SUBCUTANEOUS at 13:08

## 2019-01-01 RX ADMIN — SODIUM CHLORIDE 10 MG: 9 INJECTION INTRAMUSCULAR; INTRAVENOUS; SUBCUTANEOUS at 08:58

## 2019-01-01 RX ADMIN — SENNOSIDES 17.2 MG: 8.6 TABLET, FILM COATED ORAL at 07:22

## 2019-01-01 RX ADMIN — HYDROMORPHONE HYDROCHLORIDE 2 MG: 2 TABLET ORAL at 10:05

## 2019-01-01 RX ADMIN — HALOPERIDOL LACTATE 1 MG: 5 INJECTION, SOLUTION INTRAMUSCULAR at 10:24

## 2019-01-01 RX ADMIN — HYDROMORPHONE HYDROCHLORIDE 1 MG: 1 INJECTION, SOLUTION INTRAMUSCULAR; INTRAVENOUS; SUBCUTANEOUS at 17:46

## 2019-01-01 RX ADMIN — PANTOPRAZOLE SODIUM 20 MG: 40 INJECTION, POWDER, FOR SOLUTION INTRAVENOUS at 21:28

## 2019-01-01 RX ADMIN — Medication 10 ML: at 06:00

## 2019-01-01 RX ADMIN — HYDROMORPHONE HYDROCHLORIDE 2 MG: 2 TABLET ORAL at 20:59

## 2019-01-01 RX ADMIN — MORPHINE SULFATE 15 MG: 15 TABLET ORAL at 23:15

## 2019-01-01 RX ADMIN — ONDANSETRON 8 MG: 2 INJECTION, SOLUTION INTRAMUSCULAR; INTRAVENOUS at 09:42

## 2019-01-01 RX ADMIN — Medication 10 ML: at 21:09

## 2019-01-01 RX ADMIN — SODIUM CHLORIDE, PRESERVATIVE FREE 500 UNITS: 5 INJECTION INTRAVENOUS at 15:26

## 2019-01-01 RX ADMIN — DEXTROSE MONOHYDRATE, SODIUM CHLORIDE, AND POTASSIUM CHLORIDE 100 ML/HR: 50; 4.5; 1.49 INJECTION, SOLUTION INTRAVENOUS at 15:29

## 2019-01-01 RX ADMIN — SODIUM CHLORIDE 10 MG: 9 INJECTION INTRAMUSCULAR; INTRAVENOUS; SUBCUTANEOUS at 22:56

## 2019-01-01 RX ADMIN — METOCLOPRAMIDE 10 MG: 5 INJECTION, SOLUTION INTRAMUSCULAR; INTRAVENOUS at 11:23

## 2019-01-01 RX ADMIN — HYDROMORPHONE HYDROCHLORIDE 3 MG: 2 TABLET ORAL at 20:26

## 2019-01-01 RX ADMIN — ASCORBIC ACID: 500 INJECTION, SOLUTION INTRAMUSCULAR; INTRAVENOUS; SUBCUTANEOUS at 19:10

## 2019-01-01 RX ADMIN — HYDROMORPHONE HYDROCHLORIDE 1 MG: 1 INJECTION, SOLUTION INTRAMUSCULAR; INTRAVENOUS; SUBCUTANEOUS at 10:30

## 2019-01-01 RX ADMIN — ONDANSETRON 8 MG: 2 INJECTION, SOLUTION INTRAMUSCULAR; INTRAVENOUS at 14:44

## 2019-01-01 RX ADMIN — METOCLOPRAMIDE 5 MG: 5 INJECTION, SOLUTION INTRAMUSCULAR; INTRAVENOUS at 17:00

## 2019-01-01 RX ADMIN — METOCLOPRAMIDE 10 MG: 5 INJECTION, SOLUTION INTRAMUSCULAR; INTRAVENOUS at 17:32

## 2019-01-01 RX ADMIN — METOPROLOL TARTRATE 12.5 MG: 25 TABLET ORAL at 21:22

## 2019-01-01 RX ADMIN — SODIUM CHLORIDE 10 ML: 9 INJECTION, SOLUTION INTRAMUSCULAR; INTRAVENOUS; SUBCUTANEOUS at 09:20

## 2019-01-01 RX ADMIN — HYDROMORPHONE HYDROCHLORIDE 2 MG: 2 TABLET ORAL at 07:01

## 2019-01-01 RX ADMIN — METOPROLOL TARTRATE 12.5 MG: 25 TABLET ORAL at 08:51

## 2019-01-01 RX ADMIN — Medication: at 20:51

## 2019-01-01 RX ADMIN — HYDROMORPHONE HYDROCHLORIDE 2 MG: 2 TABLET ORAL at 15:25

## 2019-01-01 RX ADMIN — METOCLOPRAMIDE 5 MG: 5 INJECTION, SOLUTION INTRAMUSCULAR; INTRAVENOUS at 02:56

## 2019-01-01 RX ADMIN — METOCLOPRAMIDE HYDROCHLORIDE 10 MG: 10 TABLET ORAL at 21:39

## 2019-01-01 RX ADMIN — METOPROLOL TARTRATE 12.5 MG: 25 TABLET ORAL at 08:48

## 2019-01-01 RX ADMIN — ONDANSETRON 4 MG: 4 TABLET, ORALLY DISINTEGRATING ORAL at 08:58

## 2019-01-01 RX ADMIN — FENTANYL CITRATE 25 MCG: 50 INJECTION, SOLUTION INTRAMUSCULAR; INTRAVENOUS at 14:20

## 2019-01-01 RX ADMIN — METOCLOPRAMIDE HYDROCHLORIDE 10 MG: 10 TABLET ORAL at 23:15

## 2019-01-01 RX ADMIN — ENOXAPARIN SODIUM 40 MG: 40 INJECTION SUBCUTANEOUS at 17:51

## 2019-01-01 RX ADMIN — I.V. FAT EMULSION 500 ML: 20 EMULSION INTRAVENOUS at 20:20

## 2019-01-01 RX ADMIN — SODIUM CHLORIDE 1000 ML: 900 INJECTION, SOLUTION INTRAVENOUS at 22:19

## 2019-01-01 RX ADMIN — SENNOSIDES 17.2 MG: 8.6 TABLET, FILM COATED ORAL at 12:04

## 2019-01-01 RX ADMIN — Medication 10 ML: at 17:00

## 2019-01-01 RX ADMIN — SODIUM CHLORIDE 50 ML/HR: 900 INJECTION, SOLUTION INTRAVENOUS at 00:17

## 2019-01-01 RX ADMIN — PANTOPRAZOLE SODIUM 40 MG: 40 TABLET, DELAYED RELEASE ORAL at 15:32

## 2019-01-01 RX ADMIN — METOCLOPRAMIDE 10 MG: 5 INJECTION, SOLUTION INTRAMUSCULAR; INTRAVENOUS at 12:04

## 2019-01-01 RX ADMIN — HYDROMORPHONE HYDROCHLORIDE 1 MG: 1 INJECTION, SOLUTION INTRAMUSCULAR; INTRAVENOUS; SUBCUTANEOUS at 11:02

## 2019-01-01 RX ADMIN — SODIUM CHLORIDE 25 ML/HR: 900 INJECTION, SOLUTION INTRAVENOUS at 14:31

## 2019-01-01 RX ADMIN — ONDANSETRON 8 MG: 2 INJECTION INTRAMUSCULAR; INTRAVENOUS at 08:02

## 2019-01-01 RX ADMIN — Medication 10 ML: at 10:22

## 2019-01-01 RX ADMIN — METOPROLOL TARTRATE 12.5 MG: 25 TABLET ORAL at 10:08

## 2019-01-01 RX ADMIN — ONDANSETRON HYDROCHLORIDE 4 MG: 2 INJECTION, SOLUTION INTRAMUSCULAR; INTRAVENOUS at 02:22

## 2019-01-01 RX ADMIN — HYDROMORPHONE HYDROCHLORIDE 2 MG: 5 LIQUID ORAL at 17:19

## 2019-01-01 RX ADMIN — Medication 10 ML: at 15:25

## 2019-01-01 RX ADMIN — Medication 10 ML: at 15:11

## 2019-01-01 RX ADMIN — SODIUM CHLORIDE 40 ML/HR: 900 INJECTION, SOLUTION INTRAVENOUS at 17:31

## 2019-01-01 RX ADMIN — SODIUM CHLORIDE 125 ML/HR: 900 INJECTION, SOLUTION INTRAVENOUS at 14:02

## 2019-01-01 RX ADMIN — HYDROMORPHONE HYDROCHLORIDE 1 MG: 1 INJECTION, SOLUTION INTRAMUSCULAR; INTRAVENOUS; SUBCUTANEOUS at 19:05

## 2019-01-01 RX ADMIN — SODIUM CHLORIDE 125 ML/HR: 900 INJECTION, SOLUTION INTRAVENOUS at 05:29

## 2019-01-01 RX ADMIN — IOPAMIDOL 100 ML: 755 INJECTION, SOLUTION INTRAVENOUS at 15:20

## 2019-01-01 RX ADMIN — PIPERACILLIN AND TAZOBACTAM 3.38 G: 3; .375 INJECTION, POWDER, FOR SOLUTION INTRAVENOUS at 02:00

## 2019-01-01 RX ADMIN — SODIUM CHLORIDE 10 ML: 9 INJECTION, SOLUTION INTRAMUSCULAR; INTRAVENOUS; SUBCUTANEOUS at 15:48

## 2019-01-01 RX ADMIN — HYDROMORPHONE HYDROCHLORIDE 1 MG: 1 INJECTION, SOLUTION INTRAMUSCULAR; INTRAVENOUS; SUBCUTANEOUS at 06:02

## 2019-01-01 RX ADMIN — Medication 10 ML: at 21:17

## 2019-01-01 RX ADMIN — Medication 10 ML: at 23:52

## 2019-01-01 RX ADMIN — ENOXAPARIN SODIUM 40 MG: 40 INJECTION SUBCUTANEOUS at 11:30

## 2019-01-01 RX ADMIN — SODIUM CHLORIDE 150 MG: 900 INJECTION, SOLUTION INTRAVENOUS at 15:35

## 2019-01-01 RX ADMIN — HYDROMORPHONE HYDROCHLORIDE 1 MG: 1 INJECTION, SOLUTION INTRAMUSCULAR; INTRAVENOUS; SUBCUTANEOUS at 05:59

## 2019-01-01 RX ADMIN — METOCLOPRAMIDE 5 MG: 5 INJECTION, SOLUTION INTRAMUSCULAR; INTRAVENOUS at 00:04

## 2019-01-01 RX ADMIN — HYDROMORPHONE HYDROCHLORIDE 1 MG: 1 INJECTION, SOLUTION INTRAMUSCULAR; INTRAVENOUS; SUBCUTANEOUS at 16:38

## 2019-01-01 RX ADMIN — SODIUM CHLORIDE 20 MG: 9 INJECTION INTRAMUSCULAR; INTRAVENOUS; SUBCUTANEOUS at 20:25

## 2019-01-01 RX ADMIN — PANTOPRAZOLE SODIUM 20 MG: 40 INJECTION, POWDER, FOR SOLUTION INTRAVENOUS at 10:24

## 2019-01-01 RX ADMIN — SODIUM CHLORIDE 10 MG: 9 INJECTION INTRAMUSCULAR; INTRAVENOUS; SUBCUTANEOUS at 07:21

## 2019-01-01 RX ADMIN — Medication 500 UNITS: at 17:00

## 2019-01-01 RX ADMIN — HYDROMORPHONE HYDROCHLORIDE 1 MG: 1 INJECTION, SOLUTION INTRAMUSCULAR; INTRAVENOUS; SUBCUTANEOUS at 17:45

## 2019-01-01 RX ADMIN — HYDROMORPHONE HYDROCHLORIDE 2 MG: 2 TABLET ORAL at 09:15

## 2019-01-01 RX ADMIN — METOPROLOL TARTRATE 25 MG: 25 TABLET, FILM COATED ORAL at 10:10

## 2019-01-01 RX ADMIN — DEXAMETHASONE SODIUM PHOSPHATE 12 MG: 4 INJECTION, SOLUTION INTRAMUSCULAR; INTRAVENOUS at 15:10

## 2019-01-01 RX ADMIN — ASCORBIC ACID: 500 INJECTION, SOLUTION INTRAMUSCULAR; INTRAVENOUS; SUBCUTANEOUS at 19:11

## 2019-01-01 RX ADMIN — DEXAMETHASONE SODIUM PHOSPHATE 4 MG: 4 INJECTION, SOLUTION INTRAMUSCULAR; INTRAVENOUS at 05:28

## 2019-01-01 RX ADMIN — SODIUM CHLORIDE 10 MG: 9 INJECTION INTRAMUSCULAR; INTRAVENOUS; SUBCUTANEOUS at 10:36

## 2019-01-01 RX ADMIN — Medication 10 ML: at 21:05

## 2019-01-01 RX ADMIN — Medication 10 ML: at 22:18

## 2019-01-01 RX ADMIN — Medication 10 ML: at 12:45

## 2019-01-01 RX ADMIN — ASCORBIC ACID: 500 INJECTION, SOLUTION INTRAMUSCULAR; INTRAVENOUS; SUBCUTANEOUS at 18:35

## 2019-01-01 RX ADMIN — Medication 10 ML: at 00:14

## 2019-01-01 RX ADMIN — HYDROMORPHONE HYDROCHLORIDE 1 MG: 1 INJECTION, SOLUTION INTRAMUSCULAR; INTRAVENOUS; SUBCUTANEOUS at 08:21

## 2019-01-01 RX ADMIN — DEXAMETHASONE SODIUM PHOSPHATE 4 MG: 4 INJECTION, SOLUTION INTRAMUSCULAR; INTRAVENOUS at 00:03

## 2019-01-01 RX ADMIN — HYDROMORPHONE HYDROCHLORIDE 1 MG: 1 INJECTION, SOLUTION INTRAMUSCULAR; INTRAVENOUS; SUBCUTANEOUS at 10:32

## 2019-01-01 RX ADMIN — METOPROLOL TARTRATE 12.5 MG: 25 TABLET ORAL at 08:09

## 2019-01-01 RX ADMIN — Medication 10 MG: at 09:17

## 2019-01-01 RX ADMIN — I.V. FAT EMULSION 500 ML: 20 EMULSION INTRAVENOUS at 19:11

## 2019-01-01 RX ADMIN — SODIUM CHLORIDE 20 MG: 9 INJECTION INTRAMUSCULAR; INTRAVENOUS; SUBCUTANEOUS at 08:59

## 2019-01-01 RX ADMIN — HYDROMORPHONE HYDROCHLORIDE 1 MG: 1 INJECTION, SOLUTION INTRAMUSCULAR; INTRAVENOUS; SUBCUTANEOUS at 21:16

## 2019-01-01 RX ADMIN — Medication 10 ML: at 08:15

## 2019-01-01 RX ADMIN — DEXTROSE MONOHYDRATE, SODIUM CHLORIDE, AND POTASSIUM CHLORIDE 100 ML/HR: 50; 4.5; 1.49 INJECTION, SOLUTION INTRAVENOUS at 01:36

## 2019-01-01 RX ADMIN — MORPHINE SULFATE 2 MG: 2 INJECTION, SOLUTION INTRAMUSCULAR; INTRAVENOUS at 03:36

## 2019-01-01 RX ADMIN — SODIUM CHLORIDE 40 MG: 9 INJECTION, SOLUTION INTRAMUSCULAR; INTRAVENOUS; SUBCUTANEOUS at 02:24

## 2019-01-01 RX ADMIN — VANCOMYCIN HYDROCHLORIDE 1250 MG: 10 INJECTION, POWDER, LYOPHILIZED, FOR SOLUTION INTRAVENOUS at 07:00

## 2019-01-01 RX ADMIN — POTASSIUM CHLORIDE 10 MEQ: 14.9 INJECTION, SOLUTION INTRAVENOUS at 19:51

## 2019-01-01 RX ADMIN — Medication 10 ML: at 10:00

## 2019-01-01 RX ADMIN — HYDROMORPHONE HYDROCHLORIDE 1 MG: 1 INJECTION, SOLUTION INTRAMUSCULAR; INTRAVENOUS; SUBCUTANEOUS at 03:51

## 2019-01-01 RX ADMIN — DEXAMETHASONE SODIUM PHOSPHATE 4 MG: 4 INJECTION, SOLUTION INTRAMUSCULAR; INTRAVENOUS at 13:00

## 2019-01-01 RX ADMIN — POLYETHYLENE GLYCOL 3350 17 G: 17 POWDER, FOR SOLUTION ORAL at 18:20

## 2019-01-01 RX ADMIN — HYDROMORPHONE HYDROCHLORIDE 1 MG: 1 INJECTION, SOLUTION INTRAMUSCULAR; INTRAVENOUS; SUBCUTANEOUS at 22:50

## 2019-01-01 RX ADMIN — PANTOPRAZOLE SODIUM 20 MG: 40 INJECTION, POWDER, FOR SOLUTION INTRAVENOUS at 21:11

## 2019-01-01 RX ADMIN — METOPROLOL TARTRATE 12.5 MG: 25 TABLET ORAL at 10:55

## 2019-01-01 RX ADMIN — METOCLOPRAMIDE 10 MG: 5 INJECTION, SOLUTION INTRAMUSCULAR; INTRAVENOUS at 06:08

## 2019-01-01 RX ADMIN — ASCORBIC ACID: 500 INJECTION, SOLUTION INTRAMUSCULAR; INTRAVENOUS; SUBCUTANEOUS at 19:01

## 2019-01-01 RX ADMIN — SODIUM CHLORIDE 125 ML/HR: 900 INJECTION, SOLUTION INTRAVENOUS at 00:53

## 2019-01-01 RX ADMIN — SODIUM CHLORIDE 40 MG: 9 INJECTION, SOLUTION INTRAMUSCULAR; INTRAVENOUS; SUBCUTANEOUS at 11:18

## 2019-01-01 RX ADMIN — PANTOPRAZOLE SODIUM 40 MG: 40 TABLET, DELAYED RELEASE ORAL at 07:02

## 2019-01-01 RX ADMIN — ENOXAPARIN SODIUM 40 MG: 40 INJECTION SUBCUTANEOUS at 17:32

## 2019-01-01 RX ADMIN — METOPROLOL TARTRATE 12.5 MG: 25 TABLET ORAL at 21:00

## 2019-01-01 RX ADMIN — MAGNESIUM SULFATE IN WATER 2 G: 40 INJECTION, SOLUTION INTRAVENOUS at 13:59

## 2019-01-01 RX ADMIN — KETOROLAC TROMETHAMINE 15 MG: 30 INJECTION, SOLUTION INTRAMUSCULAR at 01:09

## 2019-01-01 RX ADMIN — PROCHLORPERAZINE MALEATE 10 MG: 10 TABLET ORAL at 09:16

## 2019-01-01 RX ADMIN — METOPROLOL TARTRATE 12.5 MG: 25 TABLET ORAL at 09:16

## 2019-01-01 RX ADMIN — ENOXAPARIN SODIUM 40 MG: 40 INJECTION SUBCUTANEOUS at 17:16

## 2019-01-01 RX ADMIN — METOPROLOL TARTRATE 12.5 MG: 25 TABLET ORAL at 20:32

## 2019-01-01 RX ADMIN — PANTOPRAZOLE SODIUM 20 MG: 40 INJECTION, POWDER, FOR SOLUTION INTRAVENOUS at 21:14

## 2019-01-01 RX ADMIN — SODIUM CHLORIDE 100 ML: 900 INJECTION, SOLUTION INTRAVENOUS at 21:06

## 2019-01-01 RX ADMIN — METOPROLOL TARTRATE 12.5 MG: 25 TABLET ORAL at 20:43

## 2019-01-01 RX ADMIN — ONDANSETRON 8 MG: 2 INJECTION INTRAMUSCULAR; INTRAVENOUS at 21:03

## 2019-01-01 RX ADMIN — HYDROMORPHONE HYDROCHLORIDE 1 MG: 1 INJECTION, SOLUTION INTRAMUSCULAR; INTRAVENOUS; SUBCUTANEOUS at 01:58

## 2019-01-01 RX ADMIN — MIDAZOLAM HYDROCHLORIDE 1 MG: 1 INJECTION, SOLUTION INTRAMUSCULAR; INTRAVENOUS at 14:35

## 2019-01-01 RX ADMIN — HYDROMORPHONE HYDROCHLORIDE 2 MG: 5 LIQUID ORAL at 05:45

## 2019-01-01 RX ADMIN — ONDANSETRON 8 MG: 2 INJECTION INTRAMUSCULAR; INTRAVENOUS at 08:19

## 2019-01-01 RX ADMIN — MORPHINE SULFATE 4 MG: 2 INJECTION, SOLUTION INTRAMUSCULAR; INTRAVENOUS at 20:51

## 2019-01-01 RX ADMIN — Medication 10 ML: at 21:00

## 2019-01-01 RX ADMIN — PANTOPRAZOLE SODIUM 20 MG: 40 INJECTION, POWDER, FOR SOLUTION INTRAVENOUS at 08:55

## 2019-01-01 RX ADMIN — SODIUM CHLORIDE 40 MG: 9 INJECTION, SOLUTION INTRAMUSCULAR; INTRAVENOUS; SUBCUTANEOUS at 11:16

## 2019-01-01 RX ADMIN — SENNOSIDES 17.2 MG: 8.6 TABLET, FILM COATED ORAL at 06:13

## 2019-01-01 RX ADMIN — ENOXAPARIN SODIUM 40 MG: 40 INJECTION SUBCUTANEOUS at 17:17

## 2019-01-01 RX ADMIN — METOCLOPRAMIDE 10 MG: 5 INJECTION, SOLUTION INTRAMUSCULAR; INTRAVENOUS at 12:42

## 2019-01-01 RX ADMIN — HYDROMORPHONE HYDROCHLORIDE 1 MG: 1 INJECTION, SOLUTION INTRAMUSCULAR; INTRAVENOUS; SUBCUTANEOUS at 21:00

## 2019-01-01 RX ADMIN — IOPAMIDOL 100 ML: 755 INJECTION, SOLUTION INTRAVENOUS at 21:05

## 2019-01-01 RX ADMIN — INSULIN LISPRO 2 UNITS: 100 INJECTION, SOLUTION INTRAVENOUS; SUBCUTANEOUS at 00:36

## 2019-01-01 RX ADMIN — HYDROMORPHONE HYDROCHLORIDE 1 MG: 1 INJECTION, SOLUTION INTRAMUSCULAR; INTRAVENOUS; SUBCUTANEOUS at 15:58

## 2019-01-01 RX ADMIN — HYDROMORPHONE HYDROCHLORIDE 1 MG: 1 INJECTION, SOLUTION INTRAMUSCULAR; INTRAVENOUS; SUBCUTANEOUS at 09:39

## 2019-01-01 RX ADMIN — Medication 10 ML: at 06:33

## 2019-01-01 RX ADMIN — ENOXAPARIN SODIUM 40 MG: 40 INJECTION SUBCUTANEOUS at 16:49

## 2019-01-01 RX ADMIN — HYDROMORPHONE HYDROCHLORIDE 1 MG: 1 INJECTION, SOLUTION INTRAMUSCULAR; INTRAVENOUS; SUBCUTANEOUS at 06:38

## 2019-01-01 RX ADMIN — METOPROLOL TARTRATE 12.5 MG: 25 TABLET ORAL at 21:05

## 2019-01-01 RX ADMIN — HYDROMORPHONE HYDROCHLORIDE 4 MG: 2 TABLET ORAL at 22:19

## 2019-01-01 RX ADMIN — METOCLOPRAMIDE 10 MG: 5 INJECTION, SOLUTION INTRAMUSCULAR; INTRAVENOUS at 12:23

## 2019-01-01 RX ADMIN — IOPAMIDOL 100 ML: 612 INJECTION, SOLUTION INTRAVENOUS at 08:11

## 2019-01-01 RX ADMIN — ENOXAPARIN SODIUM 40 MG: 100 INJECTION SUBCUTANEOUS at 08:27

## 2019-01-01 RX ADMIN — HYDROMORPHONE HYDROCHLORIDE 1 MG: 1 INJECTION, SOLUTION INTRAMUSCULAR; INTRAVENOUS; SUBCUTANEOUS at 17:03

## 2019-01-01 RX ADMIN — ENOXAPARIN SODIUM 40 MG: 100 INJECTION SUBCUTANEOUS at 08:54

## 2019-01-01 RX ADMIN — SODIUM CHLORIDE 1000 ML: 900 INJECTION, SOLUTION INTRAVENOUS at 03:00

## 2019-01-01 RX ADMIN — METOCLOPRAMIDE 10 MG: 5 INJECTION, SOLUTION INTRAMUSCULAR; INTRAVENOUS at 06:13

## 2019-01-01 RX ADMIN — ASCORBIC ACID: 500 INJECTION, SOLUTION INTRAMUSCULAR; INTRAVENOUS; SUBCUTANEOUS at 21:54

## 2019-01-01 RX ADMIN — Medication 10 ML: at 06:15

## 2019-01-01 RX ADMIN — METOPROLOL TARTRATE 12.5 MG: 25 TABLET ORAL at 20:15

## 2019-01-01 RX ADMIN — SODIUM CHLORIDE 500 ML: 900 INJECTION, SOLUTION INTRAVENOUS at 22:30

## 2019-01-01 RX ADMIN — ACETAMINOPHEN 650 MG: 650 SOLUTION ORAL at 09:02

## 2019-01-01 RX ADMIN — ONDANSETRON 8 MG: 2 INJECTION INTRAMUSCULAR; INTRAVENOUS at 19:15

## 2019-01-01 RX ADMIN — PROCHLORPERAZINE EDISYLATE 10 MG: 5 INJECTION INTRAMUSCULAR; INTRAVENOUS at 08:47

## 2019-01-01 RX ADMIN — METOPROLOL TARTRATE 12.5 MG: 25 TABLET ORAL at 09:20

## 2019-01-01 RX ADMIN — METOPROLOL TARTRATE 12.5 MG: 25 TABLET ORAL at 20:24

## 2019-01-01 RX ADMIN — METOPROLOL TARTRATE 12.5 MG: 25 TABLET ORAL at 09:07

## 2019-01-01 RX ADMIN — HALOPERIDOL LACTATE 1 MG: 5 INJECTION, SOLUTION INTRAMUSCULAR at 06:13

## 2019-01-01 RX ADMIN — MAGNESIUM SULFATE HEPTAHYDRATE 2 G: 40 INJECTION, SOLUTION INTRAVENOUS at 10:30

## 2019-01-01 RX ADMIN — SODIUM BICARBONATE 2 ML: 42 INJECTION, SOLUTION INTRAVENOUS at 13:02

## 2019-01-01 RX ADMIN — ENOXAPARIN SODIUM 40 MG: 100 INJECTION SUBCUTANEOUS at 09:06

## 2019-01-01 RX ADMIN — METOPROLOL TARTRATE 12.5 MG: 25 TABLET ORAL at 21:41

## 2019-01-01 RX ADMIN — METOCLOPRAMIDE HYDROCHLORIDE 10 MG: 10 TABLET ORAL at 00:36

## 2019-01-01 RX ADMIN — ONDANSETRON 8 MG: 2 INJECTION INTRAMUSCULAR; INTRAVENOUS at 11:13

## 2019-01-01 RX ADMIN — SODIUM CHLORIDE 20 MG: 9 INJECTION INTRAMUSCULAR; INTRAVENOUS; SUBCUTANEOUS at 08:21

## 2019-01-01 RX ADMIN — SODIUM CHLORIDE 10 MG: 9 INJECTION INTRAMUSCULAR; INTRAVENOUS; SUBCUTANEOUS at 16:06

## 2019-01-01 RX ADMIN — HALOPERIDOL LACTATE 1 MG: 5 INJECTION, SOLUTION INTRAMUSCULAR at 17:19

## 2019-01-01 RX ADMIN — HYDROMORPHONE HYDROCHLORIDE 1 MG: 1 INJECTION, SOLUTION INTRAMUSCULAR; INTRAVENOUS; SUBCUTANEOUS at 22:17

## 2019-01-01 RX ADMIN — Medication 10 ML: at 13:05

## 2019-01-01 RX ADMIN — SODIUM CHLORIDE 25 ML/HR: 900 INJECTION, SOLUTION INTRAVENOUS at 13:27

## 2019-01-01 RX ADMIN — ENOXAPARIN SODIUM 40 MG: 40 INJECTION SUBCUTANEOUS at 17:45

## 2019-01-01 RX ADMIN — DEXTROSE MONOHYDRATE, SODIUM CHLORIDE, AND POTASSIUM CHLORIDE 100 ML/HR: 50; 4.5; 1.49 INJECTION, SOLUTION INTRAVENOUS at 14:22

## 2019-01-01 RX ADMIN — SODIUM CHLORIDE 250 ML: 900 INJECTION, SOLUTION INTRAVENOUS at 09:24

## 2019-01-01 RX ADMIN — DEXAMETHASONE SODIUM PHOSPHATE 4 MG: 4 INJECTION, SOLUTION INTRAMUSCULAR; INTRAVENOUS at 17:20

## 2019-01-01 RX ADMIN — SODIUM CHLORIDE 10 MG: 9 INJECTION INTRAMUSCULAR; INTRAVENOUS; SUBCUTANEOUS at 06:31

## 2019-01-01 RX ADMIN — Medication 10 ML: at 08:11

## 2019-01-01 RX ADMIN — HYDROMORPHONE HYDROCHLORIDE 1 MG: 1 INJECTION, SOLUTION INTRAMUSCULAR; INTRAVENOUS; SUBCUTANEOUS at 14:57

## 2019-01-01 RX ADMIN — DEXTROSE MONOHYDRATE, SODIUM CHLORIDE, AND POTASSIUM CHLORIDE 100 ML/HR: 50; 4.5; 1.49 INJECTION, SOLUTION INTRAVENOUS at 22:52

## 2019-01-01 RX ADMIN — METOPROLOL TARTRATE 12.5 MG: 25 TABLET ORAL at 21:33

## 2019-01-01 RX ADMIN — PROCHLORPERAZINE EDISYLATE 10 MG: 5 INJECTION INTRAMUSCULAR; INTRAVENOUS at 01:05

## 2019-01-01 RX ADMIN — ONDANSETRON 4 MG: 2 INJECTION INTRAMUSCULAR; INTRAVENOUS at 02:59

## 2019-01-01 RX ADMIN — Medication 10 ML: at 06:48

## 2019-01-01 RX ADMIN — Medication 300 UNITS: at 11:00

## 2019-01-01 RX ADMIN — ONDANSETRON 8 MG: 2 INJECTION INTRAMUSCULAR; INTRAVENOUS at 16:38

## 2019-01-01 RX ADMIN — Medication 10 ML: at 18:05

## 2019-01-01 RX ADMIN — Medication 10 ML: at 05:17

## 2019-01-01 RX ADMIN — DEXAMETHASONE 4 MG: 1 TABLET ORAL at 08:58

## 2019-01-01 RX ADMIN — CARBOPLATIN 434 MG: 10 INJECTION, SOLUTION INTRAVENOUS at 16:16

## 2019-01-01 RX ADMIN — MAGNESIUM SULFATE IN WATER 2 G: 40 INJECTION, SOLUTION INTRAVENOUS at 15:26

## 2019-01-01 RX ADMIN — HYDROMORPHONE HYDROCHLORIDE 1 MG: 1 INJECTION, SOLUTION INTRAMUSCULAR; INTRAVENOUS; SUBCUTANEOUS at 22:58

## 2019-01-01 RX ADMIN — DEXTROSE MONOHYDRATE 250 ML: 5 INJECTION, SOLUTION INTRAVENOUS at 09:20

## 2019-01-01 RX ADMIN — SENNOSIDES 17.2 MG: 8.6 TABLET, FILM COATED ORAL at 22:03

## 2019-01-01 RX ADMIN — SODIUM CHLORIDE 20 MG: 9 INJECTION INTRAMUSCULAR; INTRAVENOUS; SUBCUTANEOUS at 12:51

## 2019-01-01 RX ADMIN — ENOXAPARIN SODIUM 40 MG: 40 INJECTION SUBCUTANEOUS at 18:20

## 2019-01-01 RX ADMIN — SODIUM CHLORIDE 25 ML/HR: 900 INJECTION, SOLUTION INTRAVENOUS at 14:44

## 2019-01-01 RX ADMIN — HYDROMORPHONE HYDROCHLORIDE 4 MG: 2 TABLET ORAL at 18:07

## 2019-01-01 RX ADMIN — HYDROMORPHONE HYDROCHLORIDE 1 MG: 1 INJECTION, SOLUTION INTRAMUSCULAR; INTRAVENOUS; SUBCUTANEOUS at 12:26

## 2019-01-01 RX ADMIN — METOCLOPRAMIDE 5 MG: 5 INJECTION, SOLUTION INTRAMUSCULAR; INTRAVENOUS at 19:52

## 2019-01-01 RX ADMIN — METOPROLOL TARTRATE 12.5 MG: 25 TABLET ORAL at 21:01

## 2019-01-01 RX ADMIN — METOCLOPRAMIDE HYDROCHLORIDE 10 MG: 10 TABLET ORAL at 06:30

## 2019-01-01 RX ADMIN — Medication 10 ML: at 22:23

## 2019-01-01 RX ADMIN — MIDAZOLAM HYDROCHLORIDE 1 MG: 1 INJECTION, SOLUTION INTRAMUSCULAR; INTRAVENOUS at 14:15

## 2019-01-01 RX ADMIN — ONDANSETRON 8 MG: 2 INJECTION, SOLUTION INTRAMUSCULAR; INTRAVENOUS at 14:29

## 2019-01-01 RX ADMIN — METOCLOPRAMIDE HYDROCHLORIDE 10 MG: 10 TABLET ORAL at 06:34

## 2019-01-01 RX ADMIN — HYDROMORPHONE HYDROCHLORIDE 1 MG: 1 INJECTION, SOLUTION INTRAMUSCULAR; INTRAVENOUS; SUBCUTANEOUS at 21:11

## 2019-01-01 RX ADMIN — DEXAMETHASONE 2 MG: 1 TABLET ORAL at 18:37

## 2019-01-01 RX ADMIN — HYDROMORPHONE HYDROCHLORIDE 1 MG: 1 INJECTION, SOLUTION INTRAMUSCULAR; INTRAVENOUS; SUBCUTANEOUS at 04:05

## 2019-01-02 NOTE — TELEPHONE ENCOUNTER
Pt called [facility] and spoke with Jessica to cancel today's C5 chemotherapy tx. Pt informed to call our office to r/s. C5 and C6 cancelled.

## 2019-01-08 PROBLEM — G89.3 CANCER RELATED PAIN: Status: ACTIVE | Noted: 2019-01-01

## 2019-01-08 NOTE — PROGRESS NOTES
[facility] - Chemotherapy Progress Note    0915 Pt admit to [facility] for Doxil. Chemotherapy Flowsheet 1/8/2019   Cycle C5   Date 1/8/2019   Drug / Regimen Doxil   Pre Meds given   Notes given     Pt ambulatory in stable condition. Assessment completed. Pt with abd pain 5/10 and nausea. Port accessed with positive blood return. Labs drawn and sent. Line connected to D5 at [facility]. Visit Vitals  /68   Pulse 81   Temp 98.4 °F (36.9 °C)   Resp 18   Ht 5' 6.93\" (1.7 m)   Wt 91.8 kg (202 lb 6.4 oz)   BMI 31.77 kg/m²       Medications:  D5 at [facility]  Zofran 8 MG IVP  Doxil    1250 Pt tolerated treatment well. Port maintained positive blood return throughout treatment, flushed with positive blood return at conclusion and de-accessed. D/c home ambulatory in no distress. Pt aware of next [facility] appointment scheduled for 02/05/19.

## 2019-01-08 NOTE — PROGRESS NOTES
[facility] Gynecological Oncology  [address], [place]  Phone: [phone]  Fax: [phone]      Date of visit: 1/8/2019   HISTORY OF PRESENT ILLNESS:  Gloria Briggs is a 60 y.o.  female with a diagnosis of stage IV UPSC.  She underwent a TLH, BSO, with lymphadenectomy in August 2014.  She was found to have evidence of intraperitoneal disease with implants in the cul de sac, but no evidence of tyler metastases.  She was treated with adjuvant chemotherapy with Taxol and Carboplatin.  She then received pelvic radiation therapy.      She presented recently for follow-up. Arn Moritz to that she hadn't followed up since April 2016.  She is doing well and is without complaints.  She denies any vaginal bleeding or discharge, any pelvic or abdominal pain, or any changes in her bowel or bladder habits.  Her last  pap smear was normal.  Her most recent CA-125 was up to 138. She was sent for CT to evaluate disease  Her CT suggested metastatic disease and she was sent her for a PET/CT to get a better feeling for her disease volume.  She is not a candidate for surgery.    At this point, Dr. Beatriz Soto discussed chemotherapy, specifically Doxil, plus the addition of Avastin. [facility] was counseled on the toxicities of treatment and she wishes to proceed. She had her port placed on 7/30        Oncology Tx History  8/22/2014: TOTAL LAPAROSCOPIC HYSTERECTOMY, BILATERAL SALPINGO-OOPHERECTOMY, STAGING LYMPHADENECTOMY, OMENTECTOMY (pathology below)   10/8/2014-12/31/2014: 5 cycles of Carbo/Taxol ( 6th cycle held due to significant, persistent cytopenia)   3/14-5/14: 5 week course of radiation delivered once daily to the pelvis. Whole pelvic external beam radiation therapy.  No vaginal cuff brachy therapy   6/26/18: elevated CA-125   7/6/2018: CT abd/pelvis: Showed recurrence (see report below)  8/8/2018: port placed for chemotherapy  8/14/2018: began Doxorubicin Q28 days/Avastin D1 &D15 for planned 6 cycles    Subjective: Pt presents today for her 6ht cycle of Doxorubicin. Avastin has been held due to side effects (esophageal pain/burning that has stopped since d/c'ing Avastin)   Pt had CT of C/A/P on 11/26 and showed essentially stable disease. Pt days she has noticed lower abd \"cramping\" like pain on and off. She says she has also noticed when she has a bowel movement she get's nauseated. Never vomits and it last for a very short period of time. Says this has only been occurring in the last 2 weeks. Says it does not even last long enough to take medication for it. Her wt is stable this cycle. Says she feels she has been eating fairly well. Denies fevers, chills or other symptoms of infections  Her  is supportive with her again today   . Current Outpatient Medications   Medication Sig    HYDROmorphone (DILAUDID) 2 mg tablet Take 1 Tab by mouth every four (4) hours as needed for Pain. Max Daily Amount: 12 mg.    ondansetron (ZOFRAN ODT) 4 mg disintegrating tablet Take 1 Tab by mouth every eight (8) hours as needed for Nausea.  oxyCODONE-acetaminophen (PERCOCET) 5-325 mg per tablet Take 1 Tab by mouth every four (4) hours as needed for Pain. Max Daily Amount: 6 Tabs.  promethazine (PHENERGAN) 25 mg tablet Take 1 Tab by mouth every six (6) hours as needed for Nausea.  ibuprofen (ADVIL) 200 mg tablet Take 4 tablets by mouth every eight (8) hours as needed. Current Facility-Administered Medications   Medication Dose Route Frequency    dextrose 5% infusion 500 mL  500 mL IntraVENous CONTINUOUS    saline peripheral flush soln 10 mL  10 mL InterCATHeter PRN    sodium chloride 0.9% injection 10 mL  10 mL IntraVENous PRN    heparin (porcine) pf 300-500 Units  300-500 Units InterCATHeter PRN        Review of Systems:  Generalized fatigue unchanged and not inhibiting.  Wt stable and eating well  HEENT: Denies visual changes, dysphagia or headache  Resp: Denies SOB, MALONE, wheezing or cough  CV: Denies CP, palpitations  GI/:See subjective above for abd discomfort/pain with associated nausea. Denies vomiting, diarrhea, constipation or dysuria  MuskSkel: Denies muscle ache or joint pain  Neuro: Denies neuropathy, dizzyness or syncope  Psych: Denies depression or feelings of sadness    Objective:     Patient Vitals for the past 8 hrs:   BP Temp Pulse Resp Height Weight   01/08/19 1244 136/68 -- 81 -- -- --   01/08/19 0915 141/79 98.4 °F (36.9 °C) 87 18 -- --   01/08/19 0913 -- -- -- -- 5' 6.93\" (1.7 m) 202 lb 6.4 oz (91.8 kg)       Physical Exam:  General: A&O X3 in NAD  HEENT: Sclera anicteric, mucosa pink, moist without lesions. Continues without teeth as normal. Pupils equal and reactive to light. Neck: No JVD or cervical adenopathy appreciated. Port Site: without redness, swelling or tenderness; accessed without difficulty  Heart: Regular, without M/R/G  Lungs: CTA Bilat without wheezing or rales, non-labored  Abd: Soft, with mild lower abd tenderness to palpation. None to upper abd. No CVA tenderness. Remains without obvious fluid wave.  + BS throughout  Ext: Without edema and + pedal pulses bilat  Neuro: grossly intact      LABS:  11/29/2018  WBC's 6.0; HGB 11.3; HCT 35.41; Plt's 218; ANC 3.4;   Na 139; K+ 4.1; Cl 106; CO2 25; BUN 11; Creat 0.75; Mag 2.0  LFT's-WNL    CA-125: 1/8/19= 115 (11/29/18=132: 11/1/18= 101;10/9/18= 90:9/6/18= 197; 8/14/18=229; 8/9/18= 371.2)        IMAGING:  CT Chest/Abd/Pelvis 11/26/2018  FINDINGS:      THYROID: No nodule. MEDIASTINUM: Borderline enlarged mediastinal lymph nodes are stable. DELORES: No mass or lymphadenopathy. THORACIC AORTA: No dissection or aneurysm. MAIN PULMONARY ARTERY: Normal in caliber. TRACHEA/BRONCHI: Patent. ESOPHAGUS: No wall thickening or dilatation. HEART: Normal in size. PLEURA: No effusion or pneumothorax. Left diaphragmatic lymph node series 3  image 57 measuring 1.4 cm is stable. LUNGS: No nodule, mass, or airspace disease.   LIVER: Tiny low-density left lobe of the liver are stable. 1.2 cm low-density  segment 5 is stable. GALLBLADDER: Unremarkable. SPLEEN: Low-density masses in the spleen measure 5.3 x 1.9 cm and previously  measured 5.7 x 2.1 cm. Low-density lesion more medially in the spleen measures 3  x 1.6 cm and previously measured 2.9 x 1.5 cm. PANCREAS: No mass or ductal dilatation. ADRENALS: Bilateral adrenal nodules are stable. There is a 3.9 x 2.1 cm right  adrenal nodule and a 1.1 cm left adrenal nodule which are stable. KIDNEYS: Small low-density left kidney measuring 1.2 cm is stable. STOMACH: Unremarkable. SMALL BOWEL: No dilatation or wall thickening. COLON: No dilatation or wall thickening. APPENDIX: Unremarkable. PERITONEUM: Peritoneal carcinomatosis is again noted. The most prominent soft  tissue nodules in the left upper quadrant measuring 3.4 x 1 cm and previously  measured 2.9 x 1 cm. There is a nodule posterior to the tail of the pancreas  measuring 1.7 cm and previously measured 1.9 cm. Soft tissue stranding  transverse mesocolon is stable. Soft tissue stranding in the pelvis is stable. Left pelvic sidewall lymph node measuring 1.3 cm series 3 image 113 is stable. RETROPERITONEUM: No lymphadenopathy or aortic aneurysm. REPRODUCTIVE ORGANS: Patient is status post hysterectomy and oophorectomy  URINARY BLADDER: No mass or calculus. BONES: No destructive bone lesion. ADDITIONAL COMMENTS: N/A    IMPRESSION:     1. CT of the chest demonstrates a left diaphragmatic lymph node which is stable. 2. CT of the abdomen and pelvis has not changed significantly. Peritoneal  carcinomatosis has not changed significantly. . Low-density lesions in the spleen  are stable. Bilateral adrenal nodules are stable. Nodule posterior to the tail  of the pancreas is stable. Lymph node left pelvic sidewall is stable      CT Abd/Pelvis 9/13/2018:  FINDINGS: There is a 1.5 cm left diaphragmatic lymph node.  1.7 g Peritoneal  implants in the left upper quadrant 3 x 1 cm and are smaller and previously  measured 3.5 x 1.7 cm. There is slight soft tissue prominence in the lesser omentum which may be  minimally progressed. .  Nodule posterior to the pancreatic tail measures 1.8 cm and is stable. LUNG BASES: Clear. INCIDENTALLY IMAGED HEART AND MEDIASTINUM: Unremarkable. LIVER: 1.5 cm low-density most likely a cyst the liver is stable. There is  hepatic steatosis. GALLBLADDER: Unremarkable. SPLEEN: Mass infiltrating into the spleen is stable measuring 5.7 x 2 cm and  previously measured 5.6 x 2 cm. Low-density lesion more inferiorly in the spleen  measures 1.5 x 3 cm and previously measured 2.7 x 1.9 cm. PANCREAS: No mass or ductal dilatation. ADRENALS: Bilateral adrenal nodules are stable. KIDNEYS: No mass, calculus, or hydronephrosis. STOMACH: Unremarkable. SMALL BOWEL: No dilatation or wall thickening. COLON: No dilatation or wall thickening. APPENDIX: Normal  PERITONEUM: No ascites or pneumoperitoneum. RETROPERITONEUM: 1.3 cm lymph node left pelvic sidewall is stable. Postoperative  changes in the pelvis are stable. REPRODUCTIVE ORGANS:  URINARY BLADDER: No mass or calculus. BONES: No destructive bone lesion. ADDITIONAL COMMENTS: N/A    IMPRESSION:     1. The mass infiltrating the spleen and a mass more inferiorly in the spleen  are stable in size but are more low density when compared to the most recent  study. Peritoneal implants in the left upper quadrant are slightly improved. Slight soft tissue haziness in the lesser omentum is minimally progressed. Left diaphragmatic lymph node, nodule posterior to the pancreatic body/tail and  1.3 cm left pelvic sidewall lymph node are stable. Adrenal nodules are stable. No acute process is identified. PET/CT 7/17/2018:  FINDINGS:     HEAD/NECK: No apparent foci of abnormal hypermetabolism.  Cerebral evaluation is  limited by normal intense activity.     CHEST: No foci of abnormal hypermetabolism.     ABDOMEN/PELVIS: There is significant increased tracer activity in the splenic  hilum and in the perisplenic region. Hypermetabolic left cardiophrenic soft  tissue nodule is noted. There are small bilateral hypermetabolic internal iliac  lymph nodes and a hypermetabolic left obturator lymph node.     SKELETON: No foci of abnormal hypermetabolism in the axial and visualized  appendicular skeleton.     IMPRESSION: Hypermetabolic peritoneal carcinomatosis predominantly in the left  upper quadrant. Small but hypermetabolic bilateral internal iliac and left  obturator lymph nodes. CT abd/pelvis 7/6/2018  FINDINGS:      THYROID: No nodule. MEDIASTINUM: Mediastinal lymph nodes are stable. There is a left diaphragmatic lymph node measuring 1.7 cm which has increased in  size. DELORES: No mass or lymphadenopathy. THORACIC AORTA: No dissection or aneurysm. MAIN PULMONARY ARTERY: Normal in caliber. TRACHEA/BRONCHI: Patent. ESOPHAGUS: No wall thickening or dilatation. HEART: Normal in size. PLEURA: No effusion or pneumothorax. LUNGS: No nodule, mass, or airspace disease. LIVER: 1.5 cm cyst in the liver is stable. GALLBLADDER: Unremarkable. SPLEEN: There is a mass in the splenic hilus infiltrating the spleen measuring  5.6 x 2 cm and there is an additional mass in the lower pole of the spleen  measuring 2.7 x 1.9 cm and these are new.     There are peritoneal implants in the left upper quadrant with the largest  measuring 3.6 x 1.6 cm.      There is a 0.9 cm implant along the splenic flexure.      There is a 1.9 cm implant gastrocolic ligament.     There is a 1.9 cm nodule posterior to the body of the pancreas which is  unchanged.        PANCREAS: No mass or ductal dilatation. ADRENALS: 3.7 x 2 cm low-density nodule in the right adrenal gland is stable. 1.1 cm nodule left adrenal gland is stable. KIDNEYS: No mass, calculus, or hydronephrosis.  There is a small cyst in the left  kidney  STOMACH: Unremarkable. SMALL BOWEL: No dilatation or wall thickening. COLON: No dilatation or wall thickening. APPENDIX: Unremarkable. PERITONEUM: No ascites or pneumoperitoneum. RETROPERITONEUM: No aortic aneurysm is identified. REPRODUCTIVE ORGANS: Patient status post hysterectomy and bilateral  salpingo-oophorectomy.     There is 1.3 cm left pelvic sidewall nodule. URINARY BLADDER: No mass or calculus. BONES: No destructive bone lesion. ADDITIONAL COMMENTS: N/A    IMPRESSION:  1. CT of the chest demonstrates a 1.7 cm left diaphragmatic lymph node which has  increased in size. 2. CT of the abdomen and pelvis demonstrates a mass in the splenic hilus which  is infiltrating the spleen. There is also peritoneal implants in the left upper  quadrant. There is a peritoneal implant splenic flexure and gastrocolic ligament  and these are new findings. There is a 1.4 cm left pelvic sidewall nodule which is new. These findings are suspicious for current/metastatic disease. 3. Bilateral adrenal nodules are stable. Nodule posterior to this pancreatic  body is stable. 23X           Pathology:  8/22/2014:  FINAL PATHOLOGIC DIAGNOSIS  1. Soft tissue, posterior uterine serosa, biopsy:  Invasive papillary serous carcinoma. 2. Soft tissue cul-de-sac, cul-de-sac, biopsy:  Metastatic carcinoma. 3. Uterus, cervix, fallopian tubes, ovaries, radical hysterectomy and salpingo-oophorectomy:  Cervix: No evidence for dysplasia and malignancy. Endometrium: Poorly differentiated adenocarcinoma, favor FIGO grade 3 endometrioid type. See synoptic report. Myometrium: Invasive differentiated adenocarcinoma. Fallopian tubes, bilateral: No histopathologic abnormality. Ovaries, bilateral: Endosalpingosis.   Synoptic Report:  Specimen: Cervix, uterus, ovaries, fallopian tubes, omentum  Procedure: Radical hysterectomy and salpingo-oophorectomy  Lymph node sampling: Bilateral pelvic lymph nodes  Specimen integrity: Intact hysterectomy specimen  Tumor size: 4.0 cm in greatest dimension  Histologic type: Poorly differentiated adenocarcinoma, favor endometrioid type  Histologic grade: FIGO grade 3  Myometrial invasion:  Depth of invasion: 1.2 cm  Myometrial thickness: To 0.4 cm  Involvement of cervix: Not involved  Extent of involvement of other organs:  Right ovary: Not involved  Left ovary: Not involved  Right fallopian tube: Not involved  Left fallopian tube: Not involved  Lymphovascular invasion: Not identified  Additional pathologic findings: Endosalpingosis    4. Omentum, resection:  No evidence for malignancy. 5. Lymph nodes, right pelvic, dissection:  No evidence for malignancy nine nodes (0/9). 6. Lymph nodes, left pelvic, dissection:  No evidence for malignancy in fourteen nodes (0/14). Pathologic staging (pTNM):  Primary tumor (pT): pT1b  Regional lymph nodes (pN): pN0  Pelvic lymph nodes:  Number examined: 23  Number involved: 0  Distant metastasis (M): pM1, see comment    Comment  The tumor in the uterus is high grade with features favoring endometrioid type; however, the presence of a subclone  demonstrating papillary serous features has not been excluded. Further tissue is submitted for evaluation and the results will be  issued in an addendum. Despite the different histologic appearance between the uterine tumor and the serosal and peritoneal biopsies, a consensus opinion  between Rocky García and Heather considers this to be one process rather than two separate primaries. Assessment:     Patient Active Problem List   Diagnosis Code    Endometrial cancer ([address] C54.1    Early satiety R68.81    Abdominal bloating R14.0    Obesity (BMI 30.0-34. 9) E66.9    Right upper quadrant abdominal pain R10.11    Mediastinal lymphadenopathy R59.0    Elevated tumor markers R97.8    Weight loss, non-intentional R63.4    Cancer related pain G89.3         Plan:    Will go ahead and proceed with 6th cycle of Doxil today. Dr. Tonny Staples plans on repeat CT on Jan 23 with follow up with Dr. Tonny Staples on 1/24 . We will continue to monitor CA-125  Again, stressed eating small, frequent meals would be better for her and if her appetite or early satiety or abd bloating worsen, to call and let us know. If her nausea worsens or abd pain worsen, to call and let us know. Refill on her Dilaudid tabs today  We reviewed nutritional supplements with protein again encouraged as she is able to tolerate. Hydration encouraged  Encouraged to call for any concerns or questions    Greater than 40 minutes spent with pt with greater than 50% spent in face to face counseling.    Rutherford Schwab, NP

## 2019-01-23 NOTE — PROGRESS NOTES
Follow up to review ct scan and for C6 doxil on 2/5/19. Patient request a refill of Dilaudid for intermittent pain. She states the Oxycodone upsets her stomach.

## 2019-01-24 NOTE — PROGRESS NOTES
[facility] GYNECOLOGIC ONCOLOGY  [address] [facility], [address], [address] [facility]  [phone] [facility] [phone]    Office Visit    Patient ID:  Name: Parth Ramos  MRN: [id number]  : 1960/58 y.o. Visit date: 2019    Primary Diagnosis:     ICD-10-CM ICD-9-CM    1. Back pain without radiation M54.9 724.5 HYDROmorphone (DILAUDID) 2 mg tablet   2. Endometrial cancer (HCC) C54.1 182.0 HYDROmorphone (DILAUDID) 2 mg tablet   3. Mediastinal lymphadenopathy R59.0 785.6 HYDROmorphone (DILAUDID) 2 mg tablet   4. Cancer related pain G89.3 338.3 HYDROmorphone (DILAUDID) 2 mg tablet        Problem List:    Patient Active Problem List   Diagnosis Code    Endometrial cancer ([facility] [address] C54.1    Early satiety R68.81    Abdominal bloating R14.0    Obesity (BMI 30.0-34. 9) E66.9    Right upper quadrant abdominal pain R10.11    Mediastinal lymphadenopathy R59.0    Elevated tumor markers R97.8    Weight loss, non-intentional R63.4    Cancer related pain G89.3       INTERVAL HISTORY:  Parth Ramos is a  female with a diagnosis of stage IV UPSC. She underwent a TLH, BSO, with lymphadenectomy in 2014. She was found to have evidence of intraperitoneal disease with implants in the cul de sac, but no evidence of tyler metastases. She was treated with adjuvant chemotherapy with Taxol and Carboplatin. She then received pelvic radiation therapy. In the summer of  she was noted to have an elevated CA-125. She had not been seen in the office for a couple of years prior to that visit. I ordered a CT of the chest/abdomen/pelvis to evaluate for recurrence. It demonstrated peritoneal carcinomatosis. I then sent her for a PET/CT to better evaluate. I recommended combination chemotherapy with Doxil/Avastin. She has completed 6 cycles so far of the Doxil, but has refused further Avastin after the second cycle.   She had a lot of vague complaints at that time and was convinced it was the Avastin causing it. She is doing much better now. Her CA-125 has fluctuated up and down. Her newest CT scan demonstrates stable disease. PMH:  Past Medical History:   Diagnosis Date    Arthritis     Cancer ([address]     uterine    Chronic pain     LOWER BACK    Depression     Nausea & vomiting     PMB (postmenopausal bleeding) 2014     PSH:  Past Surgical History:   Procedure Laterality Date    HX COLONOSCOPY      HX GYN  14    TLH/BSO/lymph node dissection    HX ORTHOPAEDIC Right      LITTLE toe sx    HX VASCULAR ACCESS Left     PORTACATH - LEFT CHEST     SOC:  Social History     Socioeconomic History    Marital status:      Spouse name: Not on file    Number of children: Not on file    Years of education: Not on file    Highest education level: Not on file   Social Needs    Financial resource strain: Not on file    Food insecurity - worry: Not on file    Food insecurity - inability: Not on file   [employer] needs - medical: Not on file   [employer] needs - non-medical: Not on file   Occupational History    Not on file   Tobacco Use    Smoking status: Former Smoker     Packs/day: 0.50     Years: 7.00     Pack years: 3.50     Types: Cigarettes     Last attempt to quit: 1999     Years since quittin.4    Smokeless tobacco: Never Used   Substance and Sexual Activity    Alcohol use:  Yes     Alcohol/week: 0.6 oz     Types: 1 Glasses of wine per week     Comment: rarely    Drug use: No    Sexual activity: No   Other Topics Concern    Not on file   Social History Narrative    Not on file     Family History  Family History   Problem Relation Age of Onset    Cancer Mother 61        liver metastatic disease, unknown primary    Stroke Mother         BRAIN ANEURYSM    Cancer Maternal Uncle 61        bone    Cancer Maternal Grandmother 61        unknown primary    Anesth Problems Neg Hx      Medications: (reviewed)  Current Outpatient Medications on File Prior to Visit   Medication Sig Dispense Refill    promethazine (PHENERGAN) 25 mg tablet Take 1 Tab by mouth every six (6) hours as needed for Nausea. 30 Tab 3    ibuprofen (ADVIL) 200 mg tablet Take 4 tablets by mouth every eight (8) hours as needed. 90 tablet 3    ondansetron (ZOFRAN ODT) 4 mg disintegrating tablet Take 1 Tab by mouth every eight (8) hours as needed for Nausea. 10 Tab 0    oxyCODONE-acetaminophen (PERCOCET) 5-325 mg per tablet Take 1 Tab by mouth every four (4) hours as needed for Pain. Max Daily Amount: 6 Tabs. 20 Tab 0     No current facility-administered medications on file prior to visit. Allergies: (reviewed)  Allergies   Allergen Reactions    Hydrocodone Nausea Only    Oxycodone Nausea and Vomiting       ROS:  Negative     OBJECTIVE:    PHYSICAL EXAM  VITAL SIGNS: Vitals:    01/24/19 1504   BP: 130/79   Pulse: (!) 103   Weight: 198 lb (89.8 kg)   Height: 5' 6.93\" (1.7 m)     Body mass index is 31.08 kg/m². GENERAL FLOWER: Conversant, alert, oriented. NAD   HEENT: Normocephalic. Oropharynx clear. Neck: Trachea midline, no JVD, no thyroid enlargement   RESPIRATORY: Lung fields clear to auscultation and percussion. Adequate respiratory effort   CARDIOVASC: RRR without murmur   GASTROINT: soft, non-tender, without masses or organomegaly. No hernia noted   MUSCULOSKEL: FROM. No focal tenderness. EXTREMITIES: extremities normal, atraumatic, no cyanosis or edema. Pulses appreciated. PELVIC: Deferred   RECTAL: Deferred   DOMINGUEZ SURVEY: Negative throughout---neck, axillae, inguina. NEURO: Grossly intact, no acute deficit. Oriented. Not depressed. Not agitated. CT of the chest/abdomen/pelvis (1/23/19)  There is a left jugular Port-A-Cath with tip in the superior vena cava. LOWER NECK:The visualized portions of the thyroid and structures of the lower  neck are within normal limits. CHEST:  Lungs: The lungs are clear of mass, nodule, airspace disease or edema. Pleura:  There is no pleural effusion or pneumothorax. Aorta: The aorta enhances normally with no evidence of aneurysm or dissection. Mediastinum: There is no mediastinal or hilar adenopathy or mass. There is an  enlarged left pericardial lymph node which measures 12 mm and previously  measured 14 mm. There are two other smaller pericardial nodes. Bones and soft tissues: The bones and soft tissues of the chest wall are normal.  ABDOMEN:  Liver: The liver is normal in size and contour with no focal abnormality. Gallbladder and bile ducts: There is no calcified gallstone or biliary  dilatation. Spleen: The spleen enhances heterogeneously with areas of decreased attenuation  within it. Pancreas: Adjacent to the tail of the pancreas there is a soft tissue nodule  which measures 2 cm and previously measured 1.7 cm. There are several other  nodules lateral to this which are essentially unchanged. Adrenal glands: The right adrenal gland is enlarged and measures 3.4 x 1.9 cm  and previously measured 3.9 x 2.1 cm. Kidneys: There is a small left renal cyst which is unchanged. PELVIS:  Reproductive organs: The uterus and ovaries are absent. Bladder: No abnormality. BOWEL AND MESENTERY: The small bowel is normal.  There is no mesenteric mass or  adenopathy. The appendix is normal.  There is stranding around the sigmoid colon  and some scarring in the pelvis without a discrete mass. PERITONEUM: There is no ascites or free intraperitoneal air. RETROPERITONEUM: The aorta  tapers without aneurysm. There is no retroperitoneal  adenopathy or mass. There is no pelvic mass or adenopathy. BONES AND SOFT TISSUES: There are degenerative disc changes at L2-L3 and L5-S1.     IMPRESSION:   1. Status post hysterectomy and bilateral oophorectomy. 2. Scarring in the pelvis surrounding the sigmoid colon without recurrent mass. 3. Left pericardial lymph node not significantly changed.   4. Peritoneal carcinomatosis in the left upper quadrant and lesser sac  unchanged. 5. Abnormal low attenuation in the central spleen unchanged. 6. Right adrenal mass unchanged. 7. Small left renal cyst unchanged. 8. Atherosclerotic abdominal aorta without aneurysm. 9. Lumbar spondylosis. IMPRESSION AND PLAN:  Daryl Bro has a history of uterine papillary serous carcinoma. She is s/p surgical resection and was recommended adjuvant chemotherapy with Taxol and Carboplatin and she completed 6 cycles. She then received pelvic radiation therapy. She subsequently developed metastatic disease. She was not a candidate for surgery. I discussed chemotherapy, specifically Doxil, plus Avastin. She has completed 6 cycles, but has refused any further Avastin after the second cycle. Her newest CT again demonstrates stable disease. We will continue with the current regimen for now.       Junito Cisneros MD  1/24/2019/2:54 PM

## 2019-02-05 NOTE — PROGRESS NOTES
[facility] [address] [place] [address] Phone: [phone] Fax: [phone] Date of visit: 2/5/2019 HISTORY OF PRESENT ILLNESS: 
Gloria Briggs is a 60 y.o.  female with a diagnosis of stage IV UPSC.  She underwent a TLH, BSO, with lymphadenectomy in August 2014.  She was found to have evidence of intraperitoneal disease with implants in the cul de sac, but no evidence of tyler metastases.  She was treated with adjuvant chemotherapy with Taxol and Carboplatin.  She then received pelvic radiation therapy. 
   
She presented recently for follow-up. Arn Moritz to that she hadn't followed up since April 2016.  She is doing well and is without complaints.  She denies any vaginal bleeding or discharge, any pelvic or abdominal pain, or any changes in her bowel or bladder habits.  Her last  pap smear was normal.  Her most recent CA-125 was up to 138. She was sent for CT to evaluate disease Her CT suggested metastatic disease and she was sent her for a PET/CT to get a better feeling for her disease volume.  She is not a candidate for surgery.   
At this point, Dr. Beatriz Soto discussed chemotherapy, specifically Doxil, plus the addition of Avastin. Khushboo Dover was counseled on the toxicities of treatment and she wishes to proceed. She had her port placed on 7/30 Oncology Tx History 8/22/2014: TOTAL LAPAROSCOPIC HYSTERECTOMY, BILATERAL SALPINGO-OOPHERECTOMY, STAGING LYMPHADENECTOMY, OMENTECTOMY (pathology below)  
10/8/2014-12/31/2014: 5 cycles of Carbo/Taxol ( 6th cycle held due to significant, persistent cytopenia) 3/14-5/14: 5 week course of radiation delivered once daily to the pelvis. Whole pelvic external beam radiation therapy. No vaginal cuff brachy therapy 6/26/18: elevated CA-125  
7/6/2018: CT abd/pelvis: Showed recurrence (see report below) 8/8/2018: port placed for chemotherapy 8/14/2018: began Doxorubicin Q28 days/Avastin D1 &D15 for planned 6 cycles, but had to stop Avastin after C2 (9/11/18) due to pt having side effects that she felt were intolerable. Doxil was continued. Subjective: Pt presents today for her 7th cycle of Doxorubicin. Avastin has been held due to side effects (esophageal pain/burning that has stopped since d/c'ing Avastin) Pt had CT of C/A/P on 1/23 and showed essentially stable disease. Dr. Temo Maldonado reviewed and has decided to continue with current regime Pt says her nause is better and feels she is eating well. Denies fevers, chills or other symptoms of infections Her  is supportive with her again today Marialuisa Escamilla Current Outpatient Medications Medication Sig  
 HYDROmorphone (DILAUDID) 2 mg tablet Take 1 Tab by mouth every four (4) hours as needed for Pain. Max Daily Amount: 12 mg.  
 ondansetron (ZOFRAN ODT) 4 mg disintegrating tablet Take 1 Tab by mouth every eight (8) hours as needed for Nausea.  oxyCODONE-acetaminophen (PERCOCET) 5-325 mg per tablet Take 1 Tab by mouth every four (4) hours as needed for Pain. Max Daily Amount: 6 Tabs.  promethazine (PHENERGAN) 25 mg tablet Take 1 Tab by mouth every six (6) hours as needed for Nausea.  ibuprofen (ADVIL) 200 mg tablet Take 4 tablets by mouth every eight (8) hours as needed. Current Facility-Administered Medications Medication Dose Route Frequency  0.9% sodium chloride infusion 500 mL  500 mL IntraVENous CONTINUOUS  
 dextrose 5% infusion 500 mL  500 mL IntraVENous CONTINUOUS Review of Systems: 
Generalized fatigue unchanged and not inhibiting her from \"most activities\" . Wt stable and eating well HEENT: Denies visual changes, dysphagia or headache Resp: Denies SOB, MALONE, wheezing or cough CV: Denies CP, palpitations GI/:Cotninues with some constipation, but says Senna helps. Denies needing Miralax . Denies vomiting, diarrhea or dysuria MuskSkel: Denies muscle ache or joint pain Neuro: Denies neuropathy, dizzyness or syncope Psych: Denies depression or feelings of sadness Objective:  
 
Patient Vitals for the past 8 hrs: 
 BP Temp Pulse Resp Height Weight  
02/05/19 1338 140/87  92     
02/05/19 1010 131/77 98 °F (36.7 °C) 99 18 5' 6\" (1.676 m) 196 lb 3.2 oz (89 kg) Physical Exam: 
General: A&O X3 in NAD HEENT: Sclera anicteric, mucosa pink, moist without lesions. Continues without teeth as normal which does not inhibit her from eating. Pupils equal and reactive to light. Neck: No JVD or cervical adenopathy appreciated. Port Site: without redness, swelling or tenderness; accessed without difficulty Heart: Regular, without M/R/G Lungs: Essentially, CTA Bilat with some scattered rales that clear with coughing. She remains without wheezing and non-labored Abd: Soft, with mild, unchanged, lower abd tenderness to palpation. None to upper abd. No CVA tenderness. Remains without obvious fluid wave.  + BS throughout Ext: Without edema and + pedal pulses bilat Neuro: grossly intact LABS: 
2/5/19 WBC's 5.9; HGB 11.4; HCT 34.8; Plt's 229; ANC 3.7;  
Na 139; K+ 3.9; Cl 105; CO2 25; BUN 11; Creat 0.79; Mag 1.9  LFT's-WNL 
 
CA-125:2/5/19= 142 (1/8/19= 115: 11/29/18=132: 11/1/18= 101;10/9/18= 90:9/6/18= 197; 8/14/18=229; 8/9/18= 371.2) IMAGING: 
CT Chest/Abd/Pelvis 1/23/19 FINDINGS: 
There is a left jugular Port-A-Cath with tip in the superior vena cava. LOWER NECK:The visualized portions of the thyroid and structures of the lower 
neck are within normal limits. CHEST: 
Lungs: The lungs are clear of mass, nodule, airspace disease or edema. Pleura: There is no pleural effusion or pneumothorax. Aorta: The aorta enhances normally with no evidence of aneurysm or dissection. Mediastinum: There is no mediastinal or hilar adenopathy or mass. There is an 
enlarged left pericardial lymph node which measures 12 mm and previously 
measured 14 mm. There are two other smaller pericardial nodes. Bones and soft tissues: The bones and soft tissues of the chest wall are normal. 
ABDOMEN: 
Liver: The liver is normal in size and contour with no focal abnormality. Gallbladder and bile ducts: There is no calcified gallstone or biliary 
dilatation. Spleen: The spleen enhances heterogeneously with areas of decreased attenuation 
within it. Pancreas: Adjacent to the tail of the pancreas there is a soft tissue nodule 
which measures 2 cm and previously measured 1.7 cm. There are several other 
nodules lateral to this which are essentially unchanged. Adrenal glands: The right adrenal gland is enlarged and measures 3.4 x 1.9 cm 
and previously measured 3.9 x 2.1 cm. Kidneys: There is a small left renal cyst which is unchanged. PELVIS: 
Reproductive organs: The uterus and ovaries are absent. Bladder: No abnormality. BOWEL AND MESENTERY: The small bowel is normal.  There is no mesenteric mass or 
adenopathy. The appendix is normal.  There is stranding around the sigmoid colon 
and some scarring in the pelvis without a discrete mass. PERITONEUM: There is no ascites or free intraperitoneal air. RETROPERITONEUM: The aorta  tapers without aneurysm. There is no retroperitoneal 
adenopathy or mass. There is no pelvic mass or adenopathy. BONES AND SOFT TISSUES: There are degenerative disc changes at L2-L3 and L5-S1. 
  
 
IMPRESSION:  
1. Status post hysterectomy and bilateral oophorectomy. 2. Scarring in the pelvis surrounding the sigmoid colon without recurrent mass. 3. Left pericardial lymph node not significantly changed. 4. Peritoneal carcinomatosis in the left upper quadrant and lesser sac 
unchanged. 5. Abnormal low attenuation in the central spleen unchanged. 6. Right adrenal mass unchanged. 7. Small left renal cyst unchanged. 8. Atherosclerotic abdominal aorta without aneurysm. 9. Lumbar spondylosis. 
  
  
 
 
 
CT Chest/Abd/Pelvis 11/26/2018 FINDINGS:  
  
THYROID: No nodule. MEDIASTINUM: Borderline enlarged mediastinal lymph nodes are stable. DELORES: No mass or lymphadenopathy. THORACIC AORTA: No dissection or aneurysm. MAIN PULMONARY ARTERY: Normal in caliber. TRACHEA/BRONCHI: Patent. ESOPHAGUS: No wall thickening or dilatation. HEART: Normal in size. PLEURA: No effusion or pneumothorax. Left diaphragmatic lymph node series 3 
image 57 measuring 1.4 cm is stable. LUNGS: No nodule, mass, or airspace disease. LIVER: Tiny low-density left lobe of the liver are stable. 1.2 cm low-density 
segment 5 is stable. GALLBLADDER: Unremarkable. SPLEEN: Low-density masses in the spleen measure 5.3 x 1.9 cm and previously 
measured 5.7 x 2.1 cm. Low-density lesion more medially in the spleen measures 3 
x 1.6 cm and previously measured 2.9 x 1.5 cm. PANCREAS: No mass or ductal dilatation. ADRENALS: Bilateral adrenal nodules are stable. There is a 3.9 x 2.1 cm right 
adrenal nodule and a 1.1 cm left adrenal nodule which are stable. KIDNEYS: Small low-density left kidney measuring 1.2 cm is stable. STOMACH: Unremarkable. SMALL BOWEL: No dilatation or wall thickening. COLON: No dilatation or wall thickening. APPENDIX: Unremarkable. PERITONEUM: Peritoneal carcinomatosis is again noted. The most prominent soft 
tissue nodules in the left upper quadrant measuring 3.4 x 1 cm and previously 
measured 2.9 x 1 cm. There is a nodule posterior to the tail of the pancreas 
measuring 1.7 cm and previously measured 1.9 cm. Soft tissue stranding 
transverse mesocolon is stable. Soft tissue stranding in the pelvis is stable. Left pelvic sidewall lymph node measuring 1.3 cm series 3 image 113 is stable. RETROPERITONEUM: No lymphadenopathy or aortic aneurysm. REPRODUCTIVE ORGANS: Patient is status post hysterectomy and oophorectomy URINARY BLADDER: No mass or calculus. BONES: No destructive bone lesion. ADDITIONAL COMMENTS: N/A IMPRESSION: 
  
 1. CT of the chest demonstrates a left diaphragmatic lymph node which is stable. 2. CT of the abdomen and pelvis has not changed significantly. Peritoneal 
carcinomatosis has not changed significantly. . Low-density lesions in the spleen 
are stable. Bilateral adrenal nodules are stable. Nodule posterior to the tail 
of the pancreas is stable. Lymph node left pelvic sidewall is stable CT Abd/Pelvis 9/13/2018: 
FINDINGS: There is a 1.5 cm left diaphragmatic lymph node. 1.7 g Peritoneal 
implants in the left upper quadrant 3 x 1 cm and are smaller and previously 
measured 3.5 x 1.7 cm. There is slight soft tissue prominence in the lesser omentum which may be 
minimally progressed. Radha Radha Nodule posterior to the pancreatic tail measures 1.8 cm and is stable. LUNG BASES: Clear. INCIDENTALLY IMAGED HEART AND MEDIASTINUM: Unremarkable. LIVER: 1.5 cm low-density most likely a cyst the liver is stable. There is 
hepatic steatosis. GALLBLADDER: Unremarkable. SPLEEN: Mass infiltrating into the spleen is stable measuring 5.7 x 2 cm and 
previously measured 5.6 x 2 cm. Low-density lesion more inferiorly in the spleen 
measures 1.5 x 3 cm and previously measured 2.7 x 1.9 cm. PANCREAS: No mass or ductal dilatation. ADRENALS: Bilateral adrenal nodules are stable. KIDNEYS: No mass, calculus, or hydronephrosis. STOMACH: Unremarkable. SMALL BOWEL: No dilatation or wall thickening. COLON: No dilatation or wall thickening. APPENDIX: Normal 
PERITONEUM: No ascites or pneumoperitoneum. RETROPERITONEUM: 1.3 cm lymph node left pelvic sidewall is stable. Postoperative 
changes in the pelvis are stable. REPRODUCTIVE ORGANS: 
URINARY BLADDER: No mass or calculus. BONES: No destructive bone lesion. ADDITIONAL COMMENTS: N/A IMPRESSION: 
  
1. The mass infiltrating the spleen and a mass more inferiorly in the spleen 
are stable in size but are more low density when compared to the most recent 
study. Peritoneal implants in the left upper quadrant are slightly improved. Slight soft tissue haziness in the lesser omentum is minimally progressed. Left diaphragmatic lymph node, nodule posterior to the pancreatic body/tail and 
1.3 cm left pelvic sidewall lymph node are stable. Adrenal nodules are stable. No acute process is identified. PET/CT 7/17/2018: 
FINDINGS: 
  
HEAD/NECK: No apparent foci of abnormal hypermetabolism. Cerebral evaluation is 
limited by normal intense activity. 
  
CHEST: No foci of abnormal hypermetabolism. 
  
ABDOMEN/PELVIS: There is significant increased tracer activity in the splenic 
hilum and in the perisplenic region. Hypermetabolic left cardiophrenic soft 
tissue nodule is noted. There are small bilateral hypermetabolic internal iliac 
lymph nodes and a hypermetabolic left obturator lymph node. 
  
SKELETON: No foci of abnormal hypermetabolism in the axial and visualized 
appendicular skeleton. 
  
IMPRESSION: Hypermetabolic peritoneal carcinomatosis predominantly in the left 
upper quadrant. Small but hypermetabolic bilateral internal iliac and left 
obturator lymph nodes. CT abd/pelvis 7/6/2018 FINDINGS:  
  
THYROID: No nodule. MEDIASTINUM: Mediastinal lymph nodes are stable. There is a left diaphragmatic lymph node measuring 1.7 cm which has increased in 
size. DELORES: No mass or lymphadenopathy. THORACIC AORTA: No dissection or aneurysm. MAIN PULMONARY ARTERY: Normal in caliber. TRACHEA/BRONCHI: Patent. ESOPHAGUS: No wall thickening or dilatation. HEART: Normal in size. PLEURA: No effusion or pneumothorax. LUNGS: No nodule, mass, or airspace disease. LIVER: 1.5 cm cyst in the liver is stable. GALLBLADDER: Unremarkable. SPLEEN: There is a mass in the splenic hilus infiltrating the spleen measuring 5.6 x 2 cm and there is an additional mass in the lower pole of the spleen 
measuring 2.7 x 1.9 cm and these are new. 
  
 There are peritoneal implants in the left upper quadrant with the largest 
measuring 3.6 x 1.6 cm. 
  
 There is a 0.9 cm implant along the splenic flexure.  
  
There is a 1.9 cm implant gastrocolic ligament. 
  
There is a 1.9 cm nodule posterior to the body of the pancreas which is 
unchanged. 
  
  
PANCREAS: No mass or ductal dilatation. ADRENALS: 3.7 x 2 cm low-density nodule in the right adrenal gland is stable. 1.1 cm nodule left adrenal gland is stable. KIDNEYS: No mass, calculus, or hydronephrosis. There is a small cyst in the left 
kidney STOMACH: Unremarkable. SMALL BOWEL: No dilatation or wall thickening. COLON: No dilatation or wall thickening. APPENDIX: Unremarkable. PERITONEUM: No ascites or pneumoperitoneum. RETROPERITONEUM: No aortic aneurysm is identified. REPRODUCTIVE ORGANS: Patient status post hysterectomy and bilateral 
salpingo-oophorectomy. 
  
There is 1.3 cm left pelvic sidewall nodule. URINARY BLADDER: No mass or calculus. BONES: No destructive bone lesion. ADDITIONAL COMMENTS: N/A IMPRESSION: 
1. CT of the chest demonstrates a 1.7 cm left diaphragmatic lymph node which has 
increased in size. 2. CT of the abdomen and pelvis demonstrates a mass in the splenic hilus which 
is infiltrating the spleen. There is also peritoneal implants in the left upper 
quadrant. There is a peritoneal implant splenic flexure and gastrocolic ligament 
and these are new findings. There is a 1.4 cm left pelvic sidewall nodule which is new. These findings are suspicious for current/metastatic disease. 3. Bilateral adrenal nodules are stable. Nodule posterior to this pancreatic 
body is stable. 23X 
  
 
Pathology: 
8/22/2014: 
FINAL PATHOLOGIC DIAGNOSIS 1. Soft tissue, posterior uterine serosa, biopsy: 
Invasive papillary serous carcinoma. 2. Soft tissue cul-de-sac, cul-de-sac, biopsy: 
Metastatic carcinoma.  
 
3. Uterus, cervix, fallopian tubes, ovaries, radical hysterectomy and salpingo-oophorectomy: 
Cervix: No evidence for dysplasia and malignancy. Endometrium: Poorly differentiated adenocarcinoma, favor FIGO grade 3 endometrioid type. See synoptic report. Myometrium: Invasive differentiated adenocarcinoma. Fallopian tubes, bilateral: No histopathologic abnormality. Ovaries, bilateral: Endosalpingosis. Synoptic Report: 
Specimen: Cervix, uterus, ovaries, fallopian tubes, omentum Procedure: Radical hysterectomy and salpingo-oophorectomy Lymph node sampling: Bilateral pelvic lymph nodes Specimen integrity: Intact hysterectomy specimen Tumor size: 4.0 cm in greatest dimension Histologic type: Poorly differentiated adenocarcinoma, favor endometrioid type Histologic grade: FIGO grade 3 Myometrial invasion: 
Depth of invasion: 1.2 cm Myometrial thickness: To 0.4 cm Involvement of cervix: Not involved Extent of involvement of other organs: 
Right ovary: Not involved Left ovary: Not involved Right fallopian tube: Not involved Left fallopian tube: Not involved Lymphovascular invasion: Not identified Additional pathologic findings: Endosalpingosis 4. Omentum, resection: No evidence for malignancy. 5. Lymph nodes, right pelvic, dissection: No evidence for malignancy nine nodes (0/9). 6. Lymph nodes, left pelvic, dissection: No evidence for malignancy in fourteen nodes (0/14). Pathologic staging (pTNM): 
Primary tumor (pT): pT1b Regional lymph nodes (pN): pN0 Pelvic lymph nodes: 
Number examined: 23 Number involved: 0 Distant metastasis (M): pM1, see comment Comment The tumor in the uterus is high grade with features favoring endometrioid type; however, the presence of a subclone 
demonstrating papillary serous features has not been excluded. Further tissue is submitted for evaluation and the results will be 
issued in an addendum.  
Despite the different histologic appearance between the uterine tumor and the serosal and peritoneal biopsies, a consensus opinion 
between Rocky Walton and Heather considers this to be one process rather than two separate primaries. Assessment:  
 
Patient Active Problem List  
Diagnosis Code  Endometrial cancer ([facility] [address] C54.1  Early satiety R68.81  
 Abdominal bloating R14.0  Obesity (BMI 30.0-34. 9) E66.9  Right upper quadrant abdominal pain R10.11  
 Mediastinal lymphadenopathy R59.0  Elevated tumor markers R97.8  Weight loss, non-intentional R63.4  Cancer related pain G89.3 Plan: Will go ahead and proceed with 7th cycle of Doxil today. Plan is to continue with current regimen and monitor CA-125. If continues to rise, may obtain another CT scan Again, stressed eating small, frequent meals would be better for her and if her appetite or early satiety or abd bloating worsen, to call and let us know. If her nausea returns or abd pain returns, to call and let us know. Continue Senna and Miralax if needed. We reviewed nutritional supplements with protein again encouraged as she is able to tolerate. Hydration encouraged Encouraged to call for any concerns or questions Greater than 40 minutes spent with pt with greater than 50% spent in face to face counseling reviewing CA-125 and meaning and plan going forward. She will call for any concerns or questions . Saeed Garrido NP

## 2019-02-05 NOTE — PROGRESS NOTES
[facility] - Chemotherapy Progress Note 1010 Pt admit to [facility] for Doxil ambulatory in stable condition. Assessment completed. No new concerns voiced. Port with positive blood return. Labs drawn Chemotherapy Flowsheet 2/5/2019 Cycle C7 Date 2/5/2019 Drug / Regimen Doxil Pre Meds given Notes given Visit Vitals /77 Pulse 99 Temp 98 °F (36.7 °C) Resp 18 Ht 5' 6\" (1.676 m) Wt 89 kg (196 lb 3.2 oz) BMI 31.67 kg/m² Medications: 
Zofran Doxil 1340 Pt tolerated treatment well. Port maintained positive blood return throughout treatment. Flushed, heparinized and de-accessed per protocol. D/c home ambulatory in no distress. Pt aware of next appointment scheduled for 3/5/10. Recent Results (from the past 12 hour(s)) TROPONIN I Collection Time: 02/05/19 10:16 AM  
Result Value Ref Range Troponin-I, Qt. <0.05 <0.05 ng/mL CBC WITH AUTOMATED DIFF Collection Time: 02/05/19 10:16 AM  
Result Value Ref Range WBC 5.9 3.6 - 11.0 K/uL  
 RBC 3.46 (L) 3.80 - 5.20 M/uL  
 HGB 11.4 (L) 11.5 - 16.0 g/dL HCT 34.8 (L) 35.0 - 47.0 % .6 (H) 80.0 - 99.0 FL  
 MCH 32.9 26.0 - 34.0 PG  
 MCHC 32.8 30.0 - 36.5 g/dL  
 RDW 13.1 11.5 - 14.5 % PLATELET 684 385 - 234 K/uL MPV 10.3 8.9 - 12.9 FL  
 NRBC 0.0 0  WBC ABSOLUTE NRBC 0.00 0.00 - 0.01 K/uL NEUTROPHILS 62 32 - 75 % LYMPHOCYTES 16 12 - 49 % MONOCYTES 20 (H) 5 - 13 % EOSINOPHILS 2 0 - 7 % BASOPHILS 0 0 - 1 % IMMATURE GRANULOCYTES 0 0.0 - 0.5 % ABS. NEUTROPHILS 3.7 1.8 - 8.0 K/UL  
 ABS. LYMPHOCYTES 0.9 0.8 - 3.5 K/UL  
 ABS. MONOCYTES 1.2 (H) 0.0 - 1.0 K/UL  
 ABS. EOSINOPHILS 0.1 0.0 - 0.4 K/UL  
 ABS. BASOPHILS 0.0 0.0 - 0.1 K/UL  
 ABS. IMM. GRANS. 0.0 0.00 - 0.04 K/UL  
 DF SMEAR SCANNED    
 RBC COMMENTS NORMOCYTIC, NORMOCHROMIC METABOLIC PANEL, COMPREHENSIVE Collection Time: 02/05/19 10:16 AM  
Result Value Ref Range  Sodium 139 136 - 145 mmol/L  
 Potassium 3.9 3.5 - 5.1 mmol/L Chloride 105 97 - 108 mmol/L  
 CO2 25 21 - 32 mmol/L Anion gap 9 5 - 15 mmol/L Glucose 112 (H) 65 - 100 mg/dL BUN 11 6 - 20 MG/DL Creatinine 0.79 0.55 - 1.02 MG/DL  
 BUN/Creatinine ratio 14 12 - 20 GFR est AA >60 >60 ml/min/1.73m2 GFR est non-AA >60 >60 ml/min/1.73m2 Calcium 9.0 8.5 - 10.1 MG/DL Bilirubin, total 0.3 0.2 - 1.0 MG/DL  
 ALT (SGPT) 16 12 - 78 U/L  
 AST (SGOT) 16 15 - 37 U/L Alk. phosphatase 100 45 - 117 U/L Protein, total 7.3 6.4 - 8.2 g/dL Albumin 3.2 (L) 3.5 - 5.0 g/dL Globulin 4.1 (H) 2.0 - 4.0 g/dL A-G Ratio 0.8 (L) 1.1 - 2.2 MAGNESIUM Collection Time: 02/05/19 10:16 AM  
Result Value Ref Range Magnesium 1.9 1.6 - 2.4 mg/dL CANCER ANTIGEN 125 Collection Time: 02/05/19 10:16 AM  
Result Value Ref Range CA-125 142 (H) 1.5 - 35.0 U/mL

## 2019-02-21 NOTE — PROGRESS NOTES
Labs ordered to be drawn on 2/28/19 to be used for chemotherapy on 3/5/19 per [address] NP.

## 2019-02-27 NOTE — TELEPHONE ENCOUNTER
Patient canceled pre-chemo appt for 2/28/19. She also requested next chemo to be canceled because she \"wants to give her system a break\".

## 2019-03-04 NOTE — TELEPHONE ENCOUNTER
I tried to return pt call, but her mailbox was full. She has cancelled her pre chemo appt for 2/28/19 and ask to cancel 3/5/19 C7 Doxil appt. I will cancel this with irwin loyd.

## 2019-03-11 NOTE — TELEPHONE ENCOUNTER
Patient is requesting a prescription for Ibuprofen 800 mg to be sent to the [facility] in [address] . She states this is for the intermittent pain she has in her back.

## 2019-03-19 NOTE — TELEPHONE ENCOUNTER
Pt called office stating she still felt like she had URI and had taken 's old Amoxicillin Rx for 2 days and started to feel better, now after 2 days without antibiotics her symptoms are reoccurring. Pt informed she needed to see her PCP, and she was under the impression Dr. Evelin Condon would handle all of her medical needs, and was upset. I took this to Dr. Evelin Condon and he offered for pt to see Jamal PALACIOS. Message left on am per Dr. Genie Santa NP could see pt today for her upper respiratory symptoms.

## 2019-03-28 NOTE — PROGRESS NOTES
[facility]  [address] [facility], [address], [address] [facility]  [phone] [facility] [phone]    Office Visit    Patient ID:  Name: Adithya Mcmahon  MRN: [id number]  : 1960/58 y.o. Visit date: 3/28/2019    Primary Diagnosis:     ICD-10-CM ICD-9-CM    1. Endometrial cancer ([facility] [address] C54.1 182.0 CT CHEST W CONT      CT ABD PELV W CONT        Problem List:    Patient Active Problem List   Diagnosis Code    Endometrial cancer ([address] C54.1    Early satiety R68.81    Abdominal bloating R14.0    Obesity (BMI 30.0-34. 9) E66.9    Right upper quadrant abdominal pain R10.11    Mediastinal lymphadenopathy R59.0    Elevated tumor markers R97.8    Weight loss, non-intentional R63.4    Cancer related pain G89.3       INTERVAL HISTORY:  Adithya Mcmahon is a  female with a diagnosis of stage IV UPSC. She underwent a TLH, BSO, with lymphadenectomy in 2014. She was found to have evidence of intraperitoneal disease with implants in the cul de sac, but no evidence of tyler metastases. She was treated with adjuvant chemotherapy with Taxol and Carboplatin. She then received pelvic radiation therapy. In the summer of  she was noted to have an elevated CA-125. She had not been seen in the office for a couple of years prior to that visit. I ordered a CT of the chest/abdomen/pelvis to evaluate for recurrence. It demonstrated peritoneal carcinomatosis. I then sent her for a PET/CT to better evaluate. I recommended combination chemotherapy with Doxil/Avastin. She has completed 7 cycles so far of the Doxil, but has refused further Avastin after the second cycle. She had a lot of vague complaints at that time and was convinced it was the Avastin causing it. She is doing much better now. Her CA-125 has fluctuated up and down. Her last CT scan demonstrated stable disease. She has been juicing and trying some alternative therapies as well.        PMH:  Past Medical History: Diagnosis Date    Arthritis     Cancer [facility])     uterine    Chronic pain     LOWER BACK    Depression     Nausea & vomiting     PMB (postmenopausal bleeding) 2014     PSH:  Past Surgical History:   Procedure Laterality Date    HX COLONOSCOPY      HX GYN  14    TLH/BSO/lymph node dissection    HX ORTHOPAEDIC Right      LITTLE toe sx    HX VASCULAR ACCESS Left     PORTACATH - LEFT CHEST     SOC:  Social History     Socioeconomic History    Marital status:      Spouse name: Not on file    Number of children: Not on file    Years of education: Not on file    Highest education level: Not on file   Occupational History    Not on file   Social Needs    Financial resource strain: Not on file    Food insecurity:     Worry: Not on file     Inability: Not on file    Transportation needs:     Medical: Not on file     Non-medical: Not on file   Tobacco Use    Smoking status: Former Smoker     Packs/day: 0.50     Years: 7.00     Pack years: 3.50     Types: Cigarettes     Last attempt to quit: 1999     Years since quittin.6    Smokeless tobacco: Never Used   Substance and Sexual Activity    Alcohol use:  Yes     Alcohol/week: 0.6 oz     Types: 1 Glasses of wine per week     Comment: rarely    Drug use: No    Sexual activity: Never   Lifestyle    Physical activity:     Days per week: Not on file     Minutes per session: Not on file    Stress: Not on file   Relationships    Social connections:     Talks on phone: Not on file     Gets together: Not on file     Attends Baptism service: Not on file     Active member of club or organization: Not on file     Attends meetings of clubs or organizations: Not on file     Relationship status: Not on file    Intimate partner violence:     Fear of current or ex partner: Not on file     Emotionally abused: Not on file     Physically abused: Not on file     Forced sexual activity: Not on file   Other Topics Concern    Not on file Social History Narrative    Not on file     Family History  Family History   Problem Relation Age of Onset    Cancer Mother 61        liver metastatic disease, unknown primary    Stroke Mother         BRAIN ANEURYSM    Cancer Maternal Uncle 61        bone    Cancer Maternal Grandmother 61        unknown primary    Anesth Problems Neg Hx      Medications: (reviewed)  Current Outpatient Medications on File Prior to Visit   Medication Sig Dispense Refill    ibuprofen (MOTRIN) 800 mg tablet Take 1 Tab by mouth every eight (8) hours as needed for Pain. 30 Tab 1    ondansetron (ZOFRAN ODT) 4 mg disintegrating tablet Take 1 Tab by mouth every eight (8) hours as needed for Nausea. 10 Tab 0    ibuprofen (ADVIL) 200 mg tablet Take 4 tablets by mouth every eight (8) hours as needed. 90 tablet 3    HYDROmorphone (DILAUDID) 2 mg tablet Take 1 Tab by mouth every four (4) hours as needed for Pain. Max Daily Amount: 12 mg. 20 Tab 0    oxyCODONE-acetaminophen (PERCOCET) 5-325 mg per tablet Take 1 Tab by mouth every four (4) hours as needed for Pain. Max Daily Amount: 6 Tabs. 20 Tab 0     No current facility-administered medications on file prior to visit. Allergies: (reviewed)  Allergies   Allergen Reactions    Hydrocodone Nausea Only    Oxycodone Nausea and Vomiting       ROS:  Negative     OBJECTIVE:    PHYSICAL EXAM  VITAL SIGNS: Vitals:    03/28/19 1133   BP: 122/77   Pulse: 100   Weight: 189 lb 9.6 oz (86 kg)   Height: 5' 5.98\" (1.676 m)     Body mass index is 30.62 kg/m². GENERAL FLOWER: Conversant, alert, oriented. NAD   HEENT: Normocephalic. Oropharynx clear. Neck: Trachea midline, no JVD, no thyroid enlargement   RESPIRATORY: Lung fields clear to auscultation and percussion. Adequate respiratory effort   CARDIOVASC: RRR without murmur   GASTROINT: soft, non-tender, without masses or organomegaly. No hernia noted   MUSCULOSKEL: FROM. No focal tenderness.    EXTREMITIES: extremities normal, atraumatic, no cyanosis or edema. Pulses appreciated. PELVIC: Deferred   RECTAL: Deferred   DOMINGUEZ SURVEY: Negative throughout---neck, axillae, inguina. NEURO: Grossly intact, no acute deficit. Oriented. Not depressed. Not agitated. CT of the chest/abdomen/pelvis (1/23/19)  There is a left jugular Port-A-Cath with tip in the superior vena cava. LOWER NECK:The visualized portions of the thyroid and structures of the lower  neck are within normal limits. CHEST:  Lungs: The lungs are clear of mass, nodule, airspace disease or edema. Pleura: There is no pleural effusion or pneumothorax. Aorta: The aorta enhances normally with no evidence of aneurysm or dissection. Mediastinum: There is no mediastinal or hilar adenopathy or mass. There is an  enlarged left pericardial lymph node which measures 12 mm and previously  measured 14 mm. There are two other smaller pericardial nodes. Bones and soft tissues: The bones and soft tissues of the chest wall are normal.  ABDOMEN:  Liver: The liver is normal in size and contour with no focal abnormality. Gallbladder and bile ducts: There is no calcified gallstone or biliary  dilatation. Spleen: The spleen enhances heterogeneously with areas of decreased attenuation  within it. Pancreas: Adjacent to the tail of the pancreas there is a soft tissue nodule  which measures 2 cm and previously measured 1.7 cm. There are several other  nodules lateral to this which are essentially unchanged. Adrenal glands: The right adrenal gland is enlarged and measures 3.4 x 1.9 cm  and previously measured 3.9 x 2.1 cm. Kidneys: There is a small left renal cyst which is unchanged. PELVIS:  Reproductive organs: The uterus and ovaries are absent. Bladder: No abnormality. BOWEL AND MESENTERY: The small bowel is normal.  There is no mesenteric mass or  adenopathy. The appendix is normal.  There is stranding around the sigmoid colon  and some scarring in the pelvis without a discrete mass. PERITONEUM: There is no ascites or free intraperitoneal air. RETROPERITONEUM: The aorta  tapers without aneurysm. There is no retroperitoneal  adenopathy or mass. There is no pelvic mass or adenopathy. BONES AND SOFT TISSUES: There are degenerative disc changes at L2-L3 and L5-S1.     IMPRESSION:   1. Status post hysterectomy and bilateral oophorectomy. 2. Scarring in the pelvis surrounding the sigmoid colon without recurrent mass. 3. Left pericardial lymph node not significantly changed. 4. Peritoneal carcinomatosis in the left upper quadrant and lesser sac  unchanged. 5. Abnormal low attenuation in the central spleen unchanged. 6. Right adrenal mass unchanged. 7. Small left renal cyst unchanged. 8. Atherosclerotic abdominal aorta without aneurysm. 9. Lumbar spondylosis. IMPRESSION AND PLAN:  Warren Quintanilla has a history of uterine papillary serous carcinoma. She is s/p surgical resection and was recommended adjuvant chemotherapy with Taxol and Carboplatin and she completed 6 cycles. She then received pelvic radiation therapy. She subsequently developed metastatic disease. She was not a candidate for surgery. I discussed chemotherapy, specifically Doxil, plus Avastin. She has completed 6 cycles, but has refused any further Avastin after the second cycle. Her last CT again demonstrated stable disease. We will continue with the current regimen for now and repeat a scan after this cycle. We had a long talk about the potential benefits of alternative therapies in addition to her chemotherapy.         Tatum Castillo MD  3/28/2019/2:54 PM

## 2019-04-26 NOTE — TELEPHONE ENCOUNTER
Call placed to pt as she did not show for her pre chemo appt scheduled for 4/25/19 with Dr. Shala Neri. Her mailbox was full and I could not leave a message. I will cancel 4/30/19 chemotherapy appt.

## 2019-05-15 NOTE — PROGRESS NOTES
Consult to discuss Chemotherapy. Patient c/o lower back pain that is severe at times which causes nausea.

## 2019-05-16 NOTE — PROGRESS NOTES
[facility]  [address] [facility], [address]  [phone] [facility] [phone]    Office Visit    Patient ID:  Name: Nazario Hook  MRN: [id number]  : 1960/58 y.o. Visit date: 2019    Primary Diagnosis:     ICD-10-CM ICD-9-CM    1. Endometrial cancer ([facility] [address] C54.1 182.0         Problem List:    Patient Active Problem List   Diagnosis Code    Endometrial cancer ([facility] [address] C54.1    Early satiety R68.81    Abdominal bloating R14.0    Obesity (BMI 30.0-34. 9) E66.9    Right upper quadrant abdominal pain R10.11    Mediastinal lymphadenopathy R59.0    Elevated tumor markers R97.8    Weight loss, non-intentional R63.4    Cancer related pain G89.3       INTERVAL HISTORY:  Nazario Hook is a  female with a diagnosis of stage IV UPSC. She underwent a TLH, BSO, with lymphadenectomy in 2014. She was found to have evidence of intraperitoneal disease with implants in the cul de sac, but no evidence of tyler metastases. She was treated with adjuvant chemotherapy with Taxol and Carboplatin. She then received pelvic radiation therapy. In the summer of  she was noted to have an elevated CA-125. She had not been seen in the office for a couple of years prior to that visit. I ordered a CT of the chest/abdomen/pelvis to evaluate for recurrence. It demonstrated peritoneal carcinomatosis. I then sent her for a PET/CT to better evaluate. I recommended combination chemotherapy with Doxil/Avastin. She has completed 8 cycles so far of the Doxil, but has refused further Avastin after the second cycle. She had a lot of vague complaints at that time and was convinced it was the Avastin causing it. She is doing much better now. Her CA-125 has fluctuated up and down. She has been juicing and trying some alternative therapies as well. Her last CT in 2019 again demonstrated stable disease on Doxil. Her last chemotherapy was in February.   Her CA-125 at that time was climbing. She did not show up for her last scheduled chemotherapy and wanted to take some time off. She did not discuss that with me before making the decision. She presents today stating that she is feeling better and wants to discuss chemotherapy again. PMH:  Past Medical History:   Diagnosis Date    Arthritis     Cancer ([facility] [address]     uterine    Chronic pain     LOWER BACK    Depression     Nausea & vomiting     PMB (postmenopausal bleeding) 2014     PSH:  Past Surgical History:   Procedure Laterality Date    HX COLONOSCOPY      HX GYN  14    TLH/BSO/lymph node dissection    HX ORTHOPAEDIC Right      LITTLE toe sx    HX VASCULAR ACCESS Left     PORTACATH - LEFT CHEST     SOC:  Social History     Socioeconomic History    Marital status:      Spouse name: Not on file    Number of children: Not on file    Years of education: Not on file    Highest education level: Not on file   Occupational History    Not on file   Social Needs    Financial resource strain: Not on file    Food insecurity:     Worry: Not on file     Inability: Not on file    Transportation needs:     Medical: Not on file     Non-medical: Not on file   Tobacco Use    Smoking status: Former Smoker     Packs/day: 0.50     Years: 7.00     Pack years: 3.50     Types: Cigarettes     Last attempt to quit: 1999     Years since quittin.7    Smokeless tobacco: Never Used   Substance and Sexual Activity    Alcohol use:  Yes     Alcohol/week: 0.6 oz     Types: 1 Glasses of wine per week     Comment: rarely    Drug use: No    Sexual activity: Never   Lifestyle    Physical activity:     Days per week: Not on file     Minutes per session: Not on file    Stress: Not on file   Relationships    Social connections:     Talks on phone: Not on file     Gets together: Not on file     Attends Yazidi service: Not on file     Active member of club or organization: Not on file Attends meetings of clubs or organizations: Not on file     Relationship status: Not on file    Intimate partner violence:     Fear of current or ex partner: Not on file     Emotionally abused: Not on file     Physically abused: Not on file     Forced sexual activity: Not on file   Other Topics Concern    Not on file   Social History Narrative    Not on file     Family History  Family History   Problem Relation Age of Onset    Cancer Mother 61        liver metastatic disease, unknown primary    Stroke Mother         BRAIN ANEURYSM    Cancer Maternal Uncle 61        bone    Cancer Maternal Grandmother 61        unknown primary    Anesth Problems Neg Hx      Medications: (reviewed)  Current Outpatient Medications on File Prior to Visit   Medication Sig Dispense Refill    promethazine (PHENERGAN) 25 mg tablet Take 1 Tab by mouth every six (6) hours as needed for Nausea. 30 Tab 6    HYDROmorphone (DILAUDID) 2 mg tablet Take 1 Tab by mouth every four (4) hours as needed for Pain. Max Daily Amount: 12 mg. 20 Tab 0    ondansetron (ZOFRAN ODT) 4 mg disintegrating tablet Take 1 Tab by mouth every eight (8) hours as needed for Nausea. 10 Tab 0    ibuprofen (MOTRIN) 800 mg tablet Take 1 Tab by mouth every eight (8) hours as needed for Pain. 30 Tab 1    oxyCODONE-acetaminophen (PERCOCET) 5-325 mg per tablet Take 1 Tab by mouth every four (4) hours as needed for Pain. Max Daily Amount: 6 Tabs. 20 Tab 0    ibuprofen (ADVIL) 200 mg tablet Take 4 tablets by mouth every eight (8) hours as needed. 90 tablet 3     No current facility-administered medications on file prior to visit. Allergies: (reviewed)  Allergies   Allergen Reactions    Hydrocodone Nausea Only    Oxycodone Nausea and Vomiting       ROS:  Negative     OBJECTIVE:    PHYSICAL EXAM  VITAL SIGNS: Vitals:    05/16/19 0952   BP: 153/87   Pulse: (!) 111   Weight: 180 lb (81.6 kg)   Height: 5' 5.98\" (1.676 m)     Body mass index is 29.07 kg/m².    GENERAL FLOWER: Conversant, alert, oriented. NAD   HEENT: Normocephalic. Oropharynx clear. Neck: Trachea midline, no JVD, no thyroid enlargement   RESPIRATORY: Lung fields clear to auscultation and percussion. Adequate respiratory effort   CARDIOVASC: RRR without murmur   GASTROINT: soft, non-tender, without masses or organomegaly. No hernia noted   MUSCULOSKEL: FROM. No focal tenderness. EXTREMITIES: extremities normal, atraumatic, no cyanosis or edema. Pulses appreciated. PELVIC: Deferred   RECTAL: Deferred   DOMINGUEZ SURVEY: Negative throughout---neck, axillae, inguina. NEURO: Grossly intact, no acute deficit. Oriented. Not depressed. Not agitated. CT of the chest/abdomen/pelvis (1/23/19)  There is a left jugular Port-A-Cath with tip in the superior vena cava. LOWER NECK:The visualized portions of the thyroid and structures of the lower  neck are within normal limits. CHEST:  Lungs: The lungs are clear of mass, nodule, airspace disease or edema. Pleura: There is no pleural effusion or pneumothorax. Aorta: The aorta enhances normally with no evidence of aneurysm or dissection. Mediastinum: There is no mediastinal or hilar adenopathy or mass. There is an  enlarged left pericardial lymph node which measures 12 mm and previously  measured 14 mm. There are two other smaller pericardial nodes. Bones and soft tissues: The bones and soft tissues of the chest wall are normal.  ABDOMEN:  Liver: The liver is normal in size and contour with no focal abnormality. Gallbladder and bile ducts: There is no calcified gallstone or biliary  dilatation. Spleen: The spleen enhances heterogeneously with areas of decreased attenuation  within it. Pancreas: Adjacent to the tail of the pancreas there is a soft tissue nodule  which measures 2 cm and previously measured 1.7 cm. There are several other  nodules lateral to this which are essentially unchanged. Adrenal glands:  The right adrenal gland is enlarged and measures 3.4 x 1.9 cm  and previously measured 3.9 x 2.1 cm. Kidneys: There is a small left renal cyst which is unchanged. PELVIS:  Reproductive organs: The uterus and ovaries are absent. Bladder: No abnormality. BOWEL AND MESENTERY: The small bowel is normal.  There is no mesenteric mass or  adenopathy. The appendix is normal.  There is stranding around the sigmoid colon  and some scarring in the pelvis without a discrete mass. PERITONEUM: There is no ascites or free intraperitoneal air. RETROPERITONEUM: The aorta  tapers without aneurysm. There is no retroperitoneal  adenopathy or mass. There is no pelvic mass or adenopathy. BONES AND SOFT TISSUES: There are degenerative disc changes at L2-L3 and L5-S1.     IMPRESSION:   1. Status post hysterectomy and bilateral oophorectomy. 2. Scarring in the pelvis surrounding the sigmoid colon without recurrent mass. 3. Left pericardial lymph node not significantly changed. 4. Peritoneal carcinomatosis in the left upper quadrant and lesser sac  unchanged. 5. Abnormal low attenuation in the central spleen unchanged. 6. Right adrenal mass unchanged. 7. Small left renal cyst unchanged. 8. Atherosclerotic abdominal aorta without aneurysm. 9. Lumbar spondylosis. IMPRESSION AND PLAN:  David Barrera has a history of uterine papillary serous carcinoma. She is s/p surgical resection and was recommended adjuvant chemotherapy with Taxol and Carboplatin and she completed 6 cycles. She then received pelvic radiation therapy. She subsequently developed metastatic disease. She was not a candidate for surgery. I discussed chemotherapy, specifically Doxil, plus Avastin. She has completed 8 cycles of the Doxil, but refused any further Avastin after the second cycle. Her last CT in January 2019 again demonstrated stable disease. She has not had chemotherapy in 3 months and now wants to resume therapy.   Since she has been off therapy for 3 months and she has not had imaging in 4 months, I want to repeat a scan before making any decision. Going back on Doxil would be an option. We will have her tumor evaluated by [facility] to see if there are any potential novel therapies or trials available.          Ag Dolan MD  5/16/2019/2:54 PM

## 2019-05-28 NOTE — PROGRESS NOTES
[facility] GYNECOLOGIC ONCOLOGY  [address] [facility], [id number] [facility]  [phone] [facility] [phone]    Office Visit    Patient ID:  Name: Hoang Hollins  MRN: [id number]  : 1960/58 y.o. Visit date: 2019    Primary Diagnosis:     ICD-10-CM ICD-9-CM    1. Endometrial cancer ([facility] [address] C54.1 182.0         Problem List:    Patient Active Problem List   Diagnosis Code    Endometrial cancer ([facility] [address] C54.1    Early satiety R68.81    Abdominal bloating R14.0    Obesity (BMI 30.0-34. 9) E66.9    Right upper quadrant abdominal pain R10.11    Mediastinal lymphadenopathy R59.0    Elevated tumor markers R97.8    Weight loss, non-intentional R63.4    Cancer related pain G89.3       INTERVAL HISTORY:  Hoang Hollins is a  female with a diagnosis of stage IV UPSC. She underwent a TLH, BSO, with lymphadenectomy in 2014. She was found to have evidence of intraperitoneal disease with implants in the cul de sac, but no evidence of tyler metastases. She was treated with adjuvant chemotherapy with Taxol and Carboplatin. She then received pelvic radiation therapy. In the summer of 2018 she was noted to have an elevated CA-125. She had not been seen in the office for a couple of years prior to that visit. I ordered a CT of the chest/abdomen/pelvis to evaluate for recurrence. It demonstrated peritoneal carcinomatosis. I then sent her for a PET/CT to better evaluate. I recommended combination chemotherapy with Doxil/Avastin. She completed 8 cycles of the Doxil, but refused further Avastin after the second cycle. She had a lot of vague complaints at that time and was convinced it was the Avastin causing it. She has been juicing and trying some alternative therapies as well. Her last CT in 2019 again demonstrated stable disease on Doxil. Her last chemotherapy was in 2019. Her CA-125 at that time was climbing.   She did not show up for her last scheduled chemotherapy and wanted to take some time off. She did not discuss that with me before making the decision. She presented in mid-May stating that she was feeling better and wanted to discuss chemotherapy again. I sent her for a CT scan to reassess her disease. She presents today to review those results. Tumor testing ([facility])  Pending      PMH:  Past Medical History:   Diagnosis Date    Arthritis     Cancer ([address]     uterine    Chronic pain     LOWER BACK    Depression     Nausea & vomiting     PMB (postmenopausal bleeding) 2014     PSH:  Past Surgical History:   Procedure Laterality Date    HX COLONOSCOPY      HX GYN  14    TLH/BSO/lymph node dissection    HX ORTHOPAEDIC Right      LITTLE toe sx    HX VASCULAR ACCESS Left     PORTACATH - LEFT CHEST     SOC:  Social History     Socioeconomic History    Marital status:      Spouse name: Not on file    Number of children: Not on file    Years of education: Not on file    Highest education level: Not on file   Occupational History    Not on file   Social Needs    Financial resource strain: Not on file    Food insecurity:     Worry: Not on file     Inability: Not on file    Transportation needs:     Medical: Not on file     Non-medical: Not on file   Tobacco Use    Smoking status: Former Smoker     Packs/day: 0.50     Years: 7.00     Pack years: 3.50     Types: Cigarettes     Last attempt to quit: 1999     Years since quittin.7    Smokeless tobacco: Never Used   Substance and Sexual Activity    Alcohol use:  Yes     Alcohol/week: 0.6 oz     Types: 1 Glasses of wine per week     Comment: rarely    Drug use: No    Sexual activity: Never   Lifestyle    Physical activity:     Days per week: Not on file     Minutes per session: Not on file    Stress: Not on file   Relationships    Social connections:     Talks on phone: Not on file     Gets together: Not on file     Attends Gnosticist service: Not on file Active member of club or organization: Not on file     Attends meetings of clubs or organizations: Not on file     Relationship status: Not on file    Intimate partner violence:     Fear of current or ex partner: Not on file     Emotionally abused: Not on file     Physically abused: Not on file     Forced sexual activity: Not on file   Other Topics Concern    Not on file   Social History Narrative    Not on file     Family History  Family History   Problem Relation Age of Onset    Cancer Mother 61        liver metastatic disease, unknown primary    Stroke Mother         BRAIN ANEURYSM    Cancer Maternal Uncle 61        bone    Cancer Maternal Grandmother 61        unknown primary    Anesth Problems Neg Hx      Medications: (reviewed)  Current Outpatient Medications on File Prior to Visit   Medication Sig Dispense Refill    promethazine (PHENERGAN) 25 mg tablet Take 1 Tab by mouth every six (6) hours as needed for Nausea. 30 Tab 6    ibuprofen (MOTRIN) 800 mg tablet Take 1 Tab by mouth every eight (8) hours as needed for Pain. 30 Tab 1    ondansetron (ZOFRAN ODT) 4 mg disintegrating tablet Take 1 Tab by mouth every eight (8) hours as needed for Nausea. 10 Tab 0    ibuprofen (ADVIL) 200 mg tablet Take 4 tablets by mouth every eight (8) hours as needed. 90 tablet 3    oxyCODONE-acetaminophen (PERCOCET) 5-325 mg per tablet Take 1 Tab by mouth every four (4) hours as needed for Pain. Max Daily Amount: 6 Tabs. 20 Tab 0     No current facility-administered medications on file prior to visit. Allergies: (reviewed)  Allergies   Allergen Reactions    Hydrocodone Nausea Only    Oxycodone Nausea and Vomiting       ROS:  Negative     OBJECTIVE:    PHYSICAL EXAM  VITAL SIGNS: Vitals:    05/28/19 1017   BP: 149/89   Pulse: (!) 119   Weight: 180 lb (81.6 kg)   Height: 5' 5.98\" (1.676 m)     Body mass index is 29.07 kg/m². GENERAL FLOWER: Conversant, alert, oriented. NAD   HEENT: Normocephalic.  Oropharynx clear.   Neck: Trachea midline, no JVD, no thyroid enlargement   RESPIRATORY: Lung fields clear to auscultation and percussion. Adequate respiratory effort   CARDIOVASC: RRR without murmur   GASTROINT: soft, non-tender, without masses or organomegaly. No hernia noted   MUSCULOSKEL: FROM. No focal tenderness. EXTREMITIES: extremities normal, atraumatic, no cyanosis or edema. Pulses appreciated. PELVIC: Deferred   RECTAL: Deferred   DOMINGUEZ SURVEY: Negative throughout---neck, axillae, inguina. NEURO: Grossly intact, no acute deficit. Oriented. Not depressed. Not agitated. CT of the chest/abdomen/pelvis (5/22/19)  THYROID: Not imaged. MEDIASTINUM: Prevascular, right paratracheal, aorticopulmonary and precarinal  lymphadenopathy again shown, increased in the interval. For example, a lower  right paratracheal lymph node currently shows a short axis dimension of 9 mm  compared with 7 mm previously. Central venous catheter terminates in superior  vena cava. DELORES: No mass or lymphadenopathy. THORACIC AORTA: No dissection or aneurysm. MAIN PULMONARY ARTERY: Normal in caliber. TRACHEA/BRONCHI: Patent. ESOPHAGUS: No wall thickening or dilatation. HEART: Size. No pericardial effusion. Interval increased size and number  pericardial lymph nodes with short axis dimension measuring up to 1.1 cm. PLEURA: No effusion or pneumothorax. LUNGS: Subpleural nodule along superior left major fissure measuring 4 mm in  size compared with 2 mm previously. Subpleural lingular nodule demonstrated in  the interval, measuring 4 mm. Interval inferior lingular nodule measuring 4 mm. Interval right middle lobe nodule measuring 3 mm. Findings suspicious for  metastatic disease. LIVER: Hypoattenuating lesion of segment 6 measuring 1.3 cm in size, compared  with 1.0 cm previously. Equivocal peripheral lesion in segment 7 in the interval  measuring 5 mm in size. No biliary ductal dilation. GALLBLADDER: Unremarkable.   SPLEEN: Multiple areas of hypoattenuation in the mid and inferior spleen appear  increased in the interval.  PANCREAS: No mass or ductal dilatation. ADRENALS: Right adrenal mass again shown, measuring 3.6 x 2.3 cm compared with  3.4 x 1.9 cm previously. KIDNEYS: A few small cysts again shown measuring up to 1.2 cm in size. STOMACH: Unremarkable. SMALL BOWEL: Interval nonspecific mural thickening of proximal small bowel loops  with stranding extensively shown in the interval within the small bowel  mesentery. COLON: No dilatation or wall thickening. Stranding within the sigmoid mesentery  similar to that in the small bowel mesentery. APPENDIX: Not demonstrated. PERITONEUM: Interval demonstration of mild to moderate ascites. Suspect interval  omental caking. RETROPERITONEUM: Small renal level para-aortic adenopathy in the interval with  short axis dimension measuring up to 1.3 cm. Small aortocaval lymph nodes again  demonstrated. REPRODUCTIVE ORGANS: Status post hysterectomy. A mass in the region of the  vaginal cuff appears larger in the interval measuring up to 3.5 x 2.5 cm  compared with 2.3 x 1.8 cm previously. URINARY BLADDER: Nonspecific mild diffuse mural thickening. BONES: No destructive bone lesion demonstrated. ADDITIONAL COMMENTS: N/A     IMPRESSION  Worsening of metastatic disease in several locations including mediastinum,  pericardial space, retroperitoneum, liver and spleen, and right adrenal gland. Interval demonstration of pulmonary metastatic disease, ascites, small bowel and  sigmoid colonic mesenteric stranding with proximal small bowel wall thickening,  and omentum. Mass in the region of the vaginal cuff has increased in the  interval.      IMPRESSION AND PLAN:  Emelia Mcdermott has a history of uterine papillary serous carcinoma. She is s/p surgical resection and was recommended adjuvant chemotherapy with Taxol and Carboplatin and she completed 6 cycles.   She then received pelvic radiation therapy. She subsequently developed metastatic disease. She was not a candidate for surgery. I discussed chemotherapy, specifically Doxil, plus Avastin. She completed 8 cycles of the Doxil, but refused any further Avastin after the second cycle. Her last CT in January 2019 again demonstrated stable disease. She has not had chemotherapy in 3 months and now wants to resume therapy. Since she has been off therapy for 3 months and she had not had imaging in 4 months, I wanted to repeat a scan before making any decision. Her scan now demonstrates worsening disease in multiple locations. I recommend starting back on chemotherapy at this time. I suggested single-agent Carboplatin. It has been about 4.5 years since she was last treated with a platinum regimen. Her tumor testing from [facility] is also pending at this time. Perhaps she might be a candidate for pembrolizumab or trastuzumab in the future. We will hopefully get treatment started within the next couple of weeks.       Ag Dolan MD  5/28/2019/2:54 PM

## 2019-06-10 NOTE — TELEPHONE ENCOUNTER
Pt states she has been vomiting all weekend with bloated full abdomen and does not think she will be able to begin chemotherapy this weekend. Appt made to see Dr. Kate Mcgregor today.

## 2019-06-10 NOTE — TELEPHONE ENCOUNTER
Requested Prescriptions     Signed Prescriptions Disp Refills    lidocaine-prilocaine (EMLA) topical cream 30 g 0     Sig: Apply small amount over port area one hour before chemo treatment and cover with a Band-Aid     Authorizing Provider: Myla Solorio     Ordering User: Theresa Cabot    ondansetron (ZOFRAN ODT) 4 mg disintegrating tablet 30 Tab 2     Sig: Take 1 Tab by mouth every eight (8) hours as needed for Nausea. Indications: Nausea and Vomiting caused by Cancer Drugs     Authorizing Provider: Myla Finnegan User: Miracle Cabot     Rx sent to pharmacy  to be used during chemotherapy per vo Dr. Roro Villagran.

## 2019-06-10 NOTE — PROGRESS NOTES
[facility] GYNECOLOGIC ONCOLOGY  [address] [facility], [address], [address]  [phone]    Office Visit    Patient ID:  Name: Franc Cruz  MRN: [id number]  : 1960/59 y.o. Visit date: 6/10/2019    Primary Diagnosis:     ICD-10-CM ICD-9-CM    1. Endometrial cancer (HCC) C54.1 182.0 US PARACENTESIS ABD W IMAGING   2. Malignant ascites R18.0 789.51 US PARACENTESIS ABD W IMAGING        Problem List:    Patient Active Problem List   Diagnosis Code    Endometrial cancer ([facility] [address] C54.1    Early satiety R68.81    Abdominal bloating R14.0    Obesity (BMI 30.0-34. 9) E66.9    Right upper quadrant abdominal pain R10.11    Mediastinal lymphadenopathy R59.0    Elevated tumor markers R97.8    Weight loss, non-intentional R63.4    Cancer related pain G89.3       INTERVAL HISTORY:  Franc Cruz is a  female with a diagnosis of stage IV UPSC. She underwent a TLH, BSO, with lymphadenectomy in 2014. She was found to have evidence of intraperitoneal disease with implants in the cul de sac, but no evidence of tyler metastases. She was treated with adjuvant chemotherapy with Taxol and Carboplatin. She then received pelvic radiation therapy. In the summer of  she was noted to have an elevated CA-125. She had not been seen in the office for a couple of years prior to that visit. I ordered a CT of the chest/abdomen/pelvis to evaluate for recurrence. It demonstrated peritoneal carcinomatosis. I then sent her for a PET/CT to better evaluate. I recommended combination chemotherapy with Doxil/Avastin. She completed 8 cycles of the Doxil, but refused further Avastin after the second cycle. She had a lot of vague complaints at that time and was convinced it was the Avastin causing it. She has been juicing and trying some alternative therapies as well. Her last CT in 2019 again demonstrated stable disease on Doxil.   Her last chemotherapy was in February .  Her CA-125 at that time was climbing. She did not show up for her last scheduled chemotherapy and wanted to take some time off. She did not discuss that with me before making the decision. She presented in mid-May stating that she was feeling better and wanted to discuss chemotherapy again. I sent her for a CT scan to reassess her disease. Her scan now demonstrates worsening disease in multiple locations. I recommend starting back on chemotherapy at this time. I suggested single-agent Carboplatin since it had been about 4.5 years since she was last treated with a platinum regimen. She is scheduled for her first cycle tomorrow. She presents today complaining of abdominal distention and not being able to eat. She has diffuse abdominal pain and feels full.       Tumor testing ([facility])  Pending      PMH:  Past Medical History:   Diagnosis Date    Arthritis     Cancer ([facility] [address]     uterine    Chronic pain     LOWER BACK    Depression     Nausea & vomiting     PMB (postmenopausal bleeding) 2014     PSH:  Past Surgical History:   Procedure Laterality Date    HX COLONOSCOPY      HX GYN  14    TLH/BSO/lymph node dissection    HX ORTHOPAEDIC Right      LITTLE toe sx    HX VASCULAR ACCESS Left     PORTACATH - LEFT CHEST     SOC:  Social History     Socioeconomic History    Marital status:      Spouse name: Not on file    Number of children: Not on file    Years of education: Not on file    Highest education level: Not on file   Occupational History    Not on file   Social Needs    Financial resource strain: Not on file    Food insecurity:     Worry: Not on file     Inability: Not on file    Transportation needs:     Medical: Not on file     Non-medical: Not on file   Tobacco Use    Smoking status: Former Smoker     Packs/day: 0.50     Years: 7.00     Pack years: 3.50     Types: Cigarettes     Last attempt to quit: 1999     Years since quittin.8    Smokeless tobacco: Never Used   Substance and Sexual Activity    Alcohol use: Yes     Alcohol/week: 0.6 oz     Types: 1 Glasses of wine per week     Comment: rarely    Drug use: No    Sexual activity: Never   Lifestyle    Physical activity:     Days per week: Not on file     Minutes per session: Not on file    Stress: Not on file   Relationships    Social connections:     Talks on phone: Not on file     Gets together: Not on file     Attends Yazidi service: Not on file     Active member of club or organization: Not on file     Attends meetings of clubs or organizations: Not on file     Relationship status: Not on file    Intimate partner violence:     Fear of current or ex partner: Not on file     Emotionally abused: Not on file     Physically abused: Not on file     Forced sexual activity: Not on file   Other Topics Concern    Not on file   Social History Narrative    Not on file     Family History  Family History   Problem Relation Age of Onset    Cancer Mother 61        liver metastatic disease, unknown primary    Stroke Mother         BRAIN ANEURYSM    Cancer Maternal Uncle 61        bone    Cancer Maternal Grandmother 61        unknown primary    Anesth Problems Neg Hx      Medications: (reviewed)  Current Outpatient Medications on File Prior to Visit   Medication Sig Dispense Refill    HYDROmorphone (DILAUDID) 2 mg tablet Take  by mouth every four (4) hours as needed for Pain.  promethazine (PHENERGAN) 25 mg tablet Take 1 Tab by mouth every six (6) hours as needed for Nausea. 30 Tab 6    ibuprofen (MOTRIN) 800 mg tablet Take 1 Tab by mouth every eight (8) hours as needed for Pain. 30 Tab 1    ondansetron (ZOFRAN ODT) 4 mg disintegrating tablet Take 1 Tab by mouth every eight (8) hours as needed for Nausea. 10 Tab 0    oxyCODONE-acetaminophen (PERCOCET) 5-325 mg per tablet Take 1 Tab by mouth every four (4) hours as needed for Pain. Max Daily Amount: 6 Tabs.  20 Tab 0    ibuprofen (ADVIL) 200 mg tablet Take 4 tablets by mouth every eight (8) hours as needed. 90 tablet 3     No current facility-administered medications on file prior to visit. Allergies: (reviewed)  Allergies   Allergen Reactions    Hydrocodone Nausea Only    Oxycodone Nausea and Vomiting       ROS:  Negative     OBJECTIVE:    PHYSICAL EXAM  VITAL SIGNS: Vitals:    06/10/19 1028   BP: 160/86   Pulse: (!) 145   Weight: 179 lb 9.6 oz (81.5 kg)   Height: 5' 5.98\" (1.676 m)     Body mass index is 29 kg/m². GENERAL FLOWER: Conversant, alert, oriented. NAD   HEENT: Normocephalic. Oropharynx clear. Neck: Trachea midline, no JVD, no thyroid enlargement   RESPIRATORY: Lung fields clear to auscultation and percussion. Adequate respiratory effort   CARDIOVASC: RRR without murmur   GASTROINT: soft, non-tender, distended with ascites   MUSCULOSKEL: FROM. No focal tenderness. EXTREMITIES: extremities normal, atraumatic, no cyanosis or edema. Pulses appreciated. PELVIC: Deferred   RECTAL: Deferred   DOMINGUEZ SURVEY: Negative throughout---neck, axillae, inguina. NEURO: Grossly intact, no acute deficit. Oriented. Not depressed. Not agitated. Lab Results   Component Value Date/Time    WBC 7.5 05/16/2019 11:06 AM    HGB 11.5 05/16/2019 11:06 AM    HCT 34.6 05/16/2019 11:06 AM    PLATELET 863 21/49/8358 11:06 AM    MCV 96 05/16/2019 11:06 AM     Lab Results   Component Value Date/Time    Sodium 137 05/16/2019 11:06 AM    Potassium 4.7 05/16/2019 11:06 AM    Chloride 100 05/16/2019 11:06 AM    CO2 24 05/16/2019 11:06 AM    Anion gap 9 02/05/2019 10:16 AM    Glucose 99 05/16/2019 11:06 AM    BUN 11 05/16/2019 11:06 AM    Creatinine 0.81 05/16/2019 11:06 AM    BUN/Creatinine ratio 14 05/16/2019 11:06 AM    GFR est AA 92 05/16/2019 11:06 AM    GFR est non-AA 80 05/16/2019 11:06 AM    Calcium 9.7 05/16/2019 11:06 AM    Bilirubin, total <0.2 05/16/2019 11:06 AM    AST (SGOT) 17 05/16/2019 11:06 AM    Alk.  phosphatase 89 05/16/2019 11:06 AM    Protein, total 8.6 (H) 05/16/2019 11:06 AM    Albumin 3.8 05/16/2019 11:06 AM    Globulin 4.1 (H) 02/05/2019 10:16 AM    A-G Ratio 0.8 (L) 05/16/2019 11:06 AM    ALT (SGPT) 8 05/16/2019 11:06 AM     Lab Results   Component Value Date/Time    CA-125 142 (H) 02/05/2019 10:16 AM    Cancer Ag (CA) 125 636.0 (H) 05/16/2019 11:06 AM         CT of the chest/abdomen/pelvis (5/22/19)  THYROID: Not imaged. MEDIASTINUM: Prevascular, right paratracheal, aorticopulmonary and precarinal  lymphadenopathy again shown, increased in the interval. For example, a lower  right paratracheal lymph node currently shows a short axis dimension of 9 mm  compared with 7 mm previously. Central venous catheter terminates in superior  vena cava. DELORES: No mass or lymphadenopathy. THORACIC AORTA: No dissection or aneurysm. MAIN PULMONARY ARTERY: Normal in caliber. TRACHEA/BRONCHI: Patent. ESOPHAGUS: No wall thickening or dilatation. HEART: Size. No pericardial effusion. Interval increased size and number  pericardial lymph nodes with short axis dimension measuring up to 1.1 cm. PLEURA: No effusion or pneumothorax. LUNGS: Subpleural nodule along superior left major fissure measuring 4 mm in  size compared with 2 mm previously. Subpleural lingular nodule demonstrated in  the interval, measuring 4 mm. Interval inferior lingular nodule measuring 4 mm. Interval right middle lobe nodule measuring 3 mm. Findings suspicious for  metastatic disease. LIVER: Hypoattenuating lesion of segment 6 measuring 1.3 cm in size, compared  with 1.0 cm previously. Equivocal peripheral lesion in segment 7 in the interval  measuring 5 mm in size. No biliary ductal dilation. GALLBLADDER: Unremarkable. SPLEEN: Multiple areas of hypoattenuation in the mid and inferior spleen appear  increased in the interval.  PANCREAS: No mass or ductal dilatation. ADRENALS: Right adrenal mass again shown, measuring 3.6 x 2.3 cm compared with  3.4 x 1.9 cm previously.   KIDNEYS: A few small cysts again shown measuring up to 1.2 cm in size. STOMACH: Unremarkable. SMALL BOWEL: Interval nonspecific mural thickening of proximal small bowel loops  with stranding extensively shown in the interval within the small bowel  mesentery. COLON: No dilatation or wall thickening. Stranding within the sigmoid mesentery  similar to that in the small bowel mesentery. APPENDIX: Not demonstrated. PERITONEUM: Interval demonstration of mild to moderate ascites. Suspect interval  omental caking. RETROPERITONEUM: Small renal level para-aortic adenopathy in the interval with  short axis dimension measuring up to 1.3 cm. Small aortocaval lymph nodes again  demonstrated. REPRODUCTIVE ORGANS: Status post hysterectomy. A mass in the region of the  vaginal cuff appears larger in the interval measuring up to 3.5 x 2.5 cm  compared with 2.3 x 1.8 cm previously. URINARY BLADDER: Nonspecific mild diffuse mural thickening. BONES: No destructive bone lesion demonstrated. ADDITIONAL COMMENTS: N/A     IMPRESSION  Worsening of metastatic disease in several locations including mediastinum,  pericardial space, retroperitoneum, liver and spleen, and right adrenal gland. Interval demonstration of pulmonary metastatic disease, ascites, small bowel and  sigmoid colonic mesenteric stranding with proximal small bowel wall thickening,  and omentum. Mass in the region of the vaginal cuff has increased in the  interval.      IMPRESSION AND PLAN:  Estela Field has a history of uterine papillary serous carcinoma. She is s/p surgical resection and was recommended adjuvant chemotherapy with Taxol and Carboplatin and she completed 6 cycles. She then received pelvic radiation therapy. She subsequently developed metastatic disease. She was not a candidate for surgery. I discussed chemotherapy, specifically Doxil, plus Avastin. She completed 8 cycles of the Doxil, but refused any further Avastin after the second cycle.   Her last CT in January 2019 again demonstrated stable disease. She has not had chemotherapy in 3 months and now wants to resume therapy. Since she has been off therapy for 3 months and she had not had imaging in 4 months, I wanted to repeat a scan before making any decision. Her scan now demonstrates worsening disease in multiple locations. I recommend starting back on chemotherapy at this time. I suggested single-agent Carboplatin since it had been about 4.5 years since she was last treated with a platinum regimen. Her tumor testing from [facility] is still pending at this time. Perhaps she might be a candidate for pembrolizumab or trastuzumab in the future. She is scheduled for her first cycle tomorrow. She is very uncomfortable due to her ascites. I think she is going to have a very rough time with chemotherapy unless we area able to drain the fluid. I am going to send her for a paracentesis today. Hopefully she will begin responding to the chemotherapy and we won't have to tap her again, though I explained to her that we may need to repeat a paracentesis at some point, even possibly placing an Aspira catheter.       Migdalia Dukes MD  6/10/2019/2:54 PM

## 2019-06-10 NOTE — DISCHARGE INSTRUCTIONS
[address] of Interventional Radiology  [phone]      PARACENTESIS DISCHARGE INSTRUCTIONS    General Information:  During this procedure, the doctor will insert a needle into the abdomen to drain fluid. After the procedure, you will be able to take a deep breath much easier. The site of the puncture may ooze the first day. This will decrease and eventually stop. Paracentesis (draining fluid from the abdomen) sometimes makes patients hypotensive (low blood pressure). Your doctor may order for you to receive fluids or albumin (a volume booster) during the procedure through an IV site. Home Care Instructions:  Keep the puncture site clean and dry. No tub baths or swimming until puncture site heals. Showering is acceptable. Resume your normal diet, and resume your normal activity slowly and as you tolerate. If you are short of breath, rest. If shortness of breath does not ease, please call your ordering doctor. Fluid can re-accumulate in the chest and/or in the abdomen. If this should occur, your doctor needs to know as you may need to have the procedure done again. Call If:     You should call your Physician and/or the Radiology Nurse if you notice any signs of infection, like pus draining, or if it is swollen or reddened. Also call if you have a fever, or if you are bleeding from the puncture site more than a small amount on the dressing. Call if the puncture site keeps draining fluid. Some oozing is to be expected, but should slow and then stop. Call if you feel like you have pressure in your abdomen. SEEK IMMEDIATE CARE OR CALL 911 IF YOU SUDDENLY HAVE TROUBLE BREATHING, OR IF YOUR LIPS TURN BLUE, OR IF YOU NOTICE BLOOD IN YOUR SPUTUM. Follow-Up Instructions: Please see your ordering doctor as he/she has requested. To Reach Us: Side effects of sedation medications and other medications used today have been reviewed.   Notify us of nausea, itching, hives, dizziness, or anything else out of the ordinary. Should you experience any of these significant changes, please call [phone] between the hours of 7:30 am and 10 pm or [phone] after hours.  After hours, ask the  to page the [address] Technologist, and describe the problem to the technologist.

## 2019-06-11 NOTE — PROGRESS NOTES
[facility] Note:  1300Pt arrived at [facility] ambulatory and in no distress for C1. Assessmentstable*, no new complaints voiced. Had paracentesis yesterday and feels much better. .Still sore and a little queasy. Medications received:  *Emend  Zofran  Decadron  Carboplatin    1700 Tolerated treatment well, no adverse reaction noted. D/Cd from [facility] ambulatory and in no distress accompanied by spouse. Next appt 7/2  1100  Visit Vitals  BP (P) 129/72   Pulse (!) (P) 108   Temp (P) 98.2 °F (36.8 °C)   Resp (P) 18   Ht 5' 6\" (1.676 m)   Wt 77.6 kg (171 lb)   BMI 27.60 kg/m²     Recent Results (from the past 12 hour(s))   CBC WITH AUTOMATED DIFF    Collection Time: 06/11/19  1:25 PM   Result Value Ref Range    WBC 10.7 3.6 - 11.0 K/uL    RBC 3.54 (L) 3.80 - 5.20 M/uL    HGB 10.9 (L) 11.5 - 16.0 g/dL    HCT 33.9 (L) 35.0 - 47.0 %    MCV 95.8 80.0 - 99.0 FL    MCH 30.8 26.0 - 34.0 PG    MCHC 32.2 30.0 - 36.5 g/dL    RDW 12.6 11.5 - 14.5 %    PLATELET 023 (H) 284 - 400 K/uL    MPV 10.1 8.9 - 12.9 FL    NRBC 0.0 0  WBC    ABSOLUTE NRBC 0.00 0.00 - 0.01 K/uL    NEUTROPHILS 84 (H) 32 - 75 %    LYMPHOCYTES 7 (L) 12 - 49 %    MONOCYTES 8 5 - 13 %    EOSINOPHILS 0 0 - 7 %    BASOPHILS 0 0 - 1 %    IMMATURE GRANULOCYTES 1 (H) 0.0 - 0.5 %    ABS. NEUTROPHILS 8.9 (H) 1.8 - 8.0 K/UL    ABS. LYMPHOCYTES 0.8 0.8 - 3.5 K/UL    ABS. MONOCYTES 0.9 0.0 - 1.0 K/UL    ABS. EOSINOPHILS 0.0 0.0 - 0.4 K/UL    ABS. BASOPHILS 0.0 0.0 - 0.1 K/UL    ABS. IMM.  GRANS. 0.1 (H) 0.00 - 0.04 K/UL    DF SMEAR SCANNED      PLATELET COMMENTS Large Platelets      RBC COMMENTS NORMOCYTIC, NORMOCHROMIC     METABOLIC PANEL, COMPREHENSIVE    Collection Time: 06/11/19  1:25 PM   Result Value Ref Range    Sodium 133 (L) 136 - 145 mmol/L    Potassium 4.3 3.5 - 5.1 mmol/L    Chloride 98 97 - 108 mmol/L    CO2 28 21 - 32 mmol/L    Anion gap 7 5 - 15 mmol/L    Glucose 120 (H) 65 - 100 mg/dL    BUN 13 6 - 20 MG/DL    Creatinine 1.02 0.55 - 1.02 MG/DL    BUN/Creatinine ratio 13 12 - 20      GFR est AA >60 >60 ml/min/1.73m2    GFR est non-AA 55 (L) >60 ml/min/1.73m2    Calcium 8.8 8.5 - 10.1 MG/DL    Bilirubin, total 0.3 0.2 - 1.0 MG/DL    ALT (SGPT) 13 12 - 78 U/L    AST (SGOT) 17 15 - 37 U/L    Alk.  phosphatase 112 45 - 117 U/L    Protein, total 7.6 6.4 - 8.2 g/dL    Albumin 2.1 (L) 3.5 - 5.0 g/dL    Globulin 5.5 (H) 2.0 - 4.0 g/dL    A-G Ratio 0.4 (L) 1.1 - 2.2

## 2019-06-14 PROBLEM — R10.9 ABDOMINAL PAIN: Status: ACTIVE | Noted: 2019-01-01

## 2019-06-14 PROBLEM — D72.829 LEUKOCYTOSIS: Status: ACTIVE | Noted: 2019-01-01

## 2019-06-14 PROBLEM — R00.0 TACHYCARDIA: Status: ACTIVE | Noted: 2019-01-01

## 2019-06-14 PROBLEM — K52.9 ENTERITIS: Status: ACTIVE | Noted: 2019-01-01

## 2019-06-14 PROBLEM — R11.2 NAUSEA & VOMITING: Status: ACTIVE | Noted: 2019-01-01

## 2019-06-14 PROBLEM — R65.10 SIRS (SYSTEMIC INFLAMMATORY RESPONSE SYNDROME) (HCC): Status: ACTIVE | Noted: 2019-01-01

## 2019-06-14 PROBLEM — R18.8 ASCITES: Status: ACTIVE | Noted: 2019-01-01

## 2019-06-14 NOTE — ED TRIAGE NOTES
Mayank Shanena is currently being tx with chemo for cancer. Reports n/v and abd pain since treatment Tuesday.

## 2019-06-15 NOTE — CONSULTS
[address] [facility], [address] [place] [address] Shelbie Avila 
P [phone]  F [phone] Purnima Nava       [id number]  1960 Admitted 2019 Date 6/15/2019 History from Dr. Estevan Harman clinic note on 6/10/19: 
Purnima Nava is a 60 y.o.  female with a diagnosis of stage IV UPSC. She underwent a TLH, BSO, with lymphadenectomy in 2014. She was found to have evidence of intraperitoneal disease with implants in the cul de sac, but no evidence of tyler metastases. She was treated with adjuvant chemotherapy with Taxol and Carboplatin. She then received pelvic radiation therapy. In the summer of  she was noted to have an elevated CA-125. She had not been seen in the office for a couple of years prior to that visit. I ordered a CT of the chest/abdomen/pelvis to evaluate for recurrence. It demonstrated peritoneal carcinomatosis. I then sent her for a PET/CT to better evaluate. I recommended combination chemotherapy with Doxil/Avastin. She completed 8 cycles of the Doxil, but refused further Avastin after the second cycle. She had a lot of vague complaints at that time and was convinced it was the Avastin causing it. She has been juicing and trying some alternative therapies as well. Her last CT in 2019 again demonstrated stable disease on Doxil. Her last chemotherapy was in 2019. Her CA-125 at that time was climbing. She did not show up for her last scheduled chemotherapy and wanted to take some time off. She did not discuss that with me before making the decision. She presented in mid-May stating that she was feeling better and wanted to discuss chemotherapy again. I sent her for a CT scan to reassess her disease. Her scan now demonstrates worsening disease in multiple locations. I recommend starting back on chemotherapy at this time.   I suggested single-agent Carboplatin since it had been about 4.5 years since she was last treated with a platinum regimen. HPI: 
Ms. Radha Franco presented to the ER on 19 with worsening burning in her abdomen, along with nausea and vomiting. Denies fevers or chills. Reports severe acid reflux that started in her mid upper abdomen, and then eventually progressed to her lower abdomen. Reports constipation, for which she disimpacted herself the day prior. She feels normal from that now. Mild leukocytosis on admission of 12k. Afebrile. Pain somewhat improved in ER. Given Morphine 4mg, Zofran 8mg, and IVF bolus with some improvement in her symptoms. Admitted to Internal Medicine for concerns of SIRS given tachycardia. On Zosyn, NPO, and IVFs. No further vomiting. Mild nausea this morning. The patient reports feeling much better. Would like some water because the cold water makes her feel better. Denies CP or SOB. Ambulating without issues. Gynecologic Oncology contacted as we Dr. Emily Silver is her managing Gyn Onc. Last received chemotherapy (cycle 1 single-agent carboplatin) on 19. She was given Emend and Zofran at the time of treatment but she did not take any further prophylactic medications in the days following. Past Medical History:  
Diagnosis Date  Arthritis  Cancer [facility])   
 uterine  Chronic pain LOWER BACK  Depression  Nausea & vomiting  PMB (postmenopausal bleeding) 2014 Past Surgical History:  
Procedure Laterality Date  HX COLONOSCOPY    
 HX GYN  14 TLH/BSO/lymph node dissection  HX ORTHOPAEDIC Right [id number] LITTLE toe sx  HX VASCULAR ACCESS Left  PORTACATH - LEFT CHEST Social History Tobacco Use  Smoking status: Former Smoker Packs/day: 0.50 Years: 7.00 Pack years: 3.50 Types: Cigarettes Last attempt to quit: 1999 Years since quittin.8  Smokeless tobacco: Never Used Substance Use Topics  Alcohol use:  Yes  
 Alcohol/week: 0.6 oz Types: 1 Glasses of wine per week Comment: rarely Family History Problem Relation Age of Onset  Cancer Mother 61  
     liver metastatic disease, unknown primary  Stroke Mother BRAIN ANEURYSM  
 Cancer Maternal Uncle 60  
     bone  Cancer Maternal Grandmother 61  
     unknown primary  Anesth Problems Neg Hx Current Facility-Administered Medications Medication Dose Route Frequency  sodium chloride (NS) flush 5-40 mL  5-40 mL IntraVENous Q8H  
 sodium chloride (NS) flush 5-40 mL  5-40 mL IntraVENous PRN  
 ondansetron (ZOFRAN) injection 4 mg  4 mg IntraVENous Q6H PRN  pantoprazole (PROTONIX) 40 mg in sodium chloride 0.9% 10 mL injection  40 mg IntraVENous Q24H  
 aluminum & magnesium hydroxide-simethicone (MYLANTA II) oral suspension 30 mL  30 mL Oral Q4H PRN  
 ondansetron (ZOFRAN) 4 mg/2 mL injection  morphine 2 mg/mL injection  pantoprazole (PROTONIX) 40 mg injection  sodium chloride 0.9% injection  morphine injection 4 mg  4 mg IntraVENous Q4H PRN  
 sodium chloride (NS) flush 5-10 mL  5-10 mL IntraVENous PRN  piperacillin-tazobactam (ZOSYN) 3.375 g in 0.9% sodium chloride (MBP/ADV) 100 mL  3.375 g IntraVENous Q8H  
 0.9% sodium chloride infusion  125 mL/hr IntraVENous CONTINUOUS Allergies Allergen Reactions  Hydrocodone Nausea Only  Oxycodone Nausea and Vomiting Review of Systems A comprehensive review of systems was negative except for that written in the History of Present Illness. , 10 point ROS OBJECTIVE: 
 
Physical Exam 
Visit Vitals /68 Pulse (!) 101 Temp 98.3 °F (36.8 °C) Resp 16 Wt 168 lb 8 oz (76.4 kg) SpO2 97% BMI 27.20 kg/m² General appearance: alert, cooperative, no distress, appears stated age Eyes: negative Back: symmetric, no curvature. ROM normal. No CVA tenderness. Lungs: clear to auscultation bilaterally Heart: regular rate and rhythm, S1, S2 normal, no murmur, click, rub or gallop Abdomen: soft, non-tender. Bowel sounds soft. No masses,  no organomegaly. No tympany or distension. Possibly mild ascites, although no where need the level to require drainage Pelvic: deferred Extremities: extremities normal, atraumatic, no cyanosis or edema Pulses: 2+ and symmetric Skin: Skin color, texture, turgor normal. No rashes or lesions Lymph nodes: Cervical, supraclavicular, and axillary nodes normal. 
Neurologic: Grossly normal 
 
Data Review Recent Results (from the past 24 hour(s)) SAMPLES BEING HELD Collection Time: 06/14/19  8:18 PM  
Result Value Ref Range SAMPLES BEING HELD 1LAV,1PST,1BLU,1RED COMMENT Add-on orders for these samples will be processed based on acceptable specimen integrity and analyte stability, which may vary by analyte. CBC WITH AUTOMATED DIFF Collection Time: 06/14/19  8:18 PM  
Result Value Ref Range WBC 12.1 (H) 3.6 - 11.0 K/uL  
 RBC 3.75 (L) 3.80 - 5.20 M/uL  
 HGB 11.6 11.5 - 16.0 g/dL HCT 34.9 (L) 35.0 - 47.0 % MCV 93.1 80.0 - 99.0 FL  
 MCH 30.9 26.0 - 34.0 PG  
 MCHC 33.2 30.0 - 36.5 g/dL  
 RDW 12.8 11.5 - 14.5 % PLATELET 166 (H) 256 - 400 K/uL MPV 10.6 8.9 - 12.9 FL  
 NRBC 0.0 0  WBC ABSOLUTE NRBC 0.00 0.00 - 0.01 K/uL NEUTROPHILS 82 (H) 32 - 75 % LYMPHOCYTES 5 (L) 12 - 49 % MONOCYTES 12 5 - 13 % EOSINOPHILS 0 0 - 7 % BASOPHILS 0 0 - 1 % IMMATURE GRANULOCYTES 1 (H) 0.0 - 0.5 % ABS. NEUTROPHILS 9.9 (H) 1.8 - 8.0 K/UL  
 ABS. LYMPHOCYTES 0.6 (L) 0.8 - 3.5 K/UL  
 ABS. MONOCYTES 1.5 (H) 0.0 - 1.0 K/UL  
 ABS. EOSINOPHILS 0.0 0.0 - 0.4 K/UL  
 ABS. BASOPHILS 0.0 0.0 - 0.1 K/UL  
 ABS. IMM. GRANS. 0.1 (H) 0.00 - 0.04 K/UL  
 DF AUTOMATED    
 RBC COMMENTS MACROCYTOSIS 1+ LIPASE Collection Time: 06/14/19  8:18 PM  
Result Value Ref Range  Lipase 55 (L) 73 - 910 U/L  
METABOLIC PANEL, COMPREHENSIVE  
 Collection Time: 06/14/19  8:18 PM  
Result Value Ref Range Sodium 132 (L) 136 - 145 mmol/L Potassium 4.2 3.5 - 5.1 mmol/L Chloride 99 97 - 108 mmol/L  
 CO2 24 21 - 32 mmol/L Anion gap 9 5 - 15 mmol/L Glucose 114 (H) 65 - 100 mg/dL BUN 14 6 - 20 MG/DL Creatinine 0.92 0.55 - 1.02 MG/DL  
 BUN/Creatinine ratio 15 12 - 20 GFR est AA >60 >60 ml/min/1.73m2 GFR est non-AA >60 >60 ml/min/1.73m2 Calcium 9.0 8.5 - 10.1 MG/DL Bilirubin, total 0.5 0.2 - 1.0 MG/DL  
 ALT (SGPT) 16 12 - 78 U/L  
 AST (SGOT) 26 15 - 37 U/L Alk. phosphatase 103 45 - 117 U/L Protein, total 7.9 6.4 - 8.2 g/dL Albumin 2.3 (L) 3.5 - 5.0 g/dL Globulin 5.6 (H) 2.0 - 4.0 g/dL A-G Ratio 0.4 (L) 1.1 - 2.2 URINALYSIS W/ RFLX MICROSCOPIC Collection Time: 06/15/19 12:17 AM  
Result Value Ref Range Color YELLOW/STRAW Appearance CLEAR CLEAR Specific gravity <1.005 1.003 - 1.030  
 pH (UA) 7.5 5.0 - 8.0 Protein NEGATIVE  NEG mg/dL Glucose NEGATIVE  NEG mg/dL Ketone TRACE (A) NEG mg/dL Bilirubin NEGATIVE  NEG Blood NEGATIVE  NEG Urobilinogen 0.2 0.2 - 1.0 EU/dL Nitrites NEGATIVE  NEG Leukocyte Esterase NEGATIVE  NEG    
CULTURE, BLOOD Collection Time: 06/15/19 12:17 AM  
Result Value Ref Range Special Requests: NO SPECIAL REQUESTS Culture result: NO GROWTH AFTER 1 HOUR    
LACTIC ACID Collection Time: 06/15/19 12:17 AM  
Result Value Ref Range Lactic acid 0.9 0.4 - 2.0 MMOL/L  
EKG, 12 LEAD, INITIAL Collection Time: 06/15/19 12:30 AM  
Result Value Ref Range Ventricular Rate 101 BPM  
 Atrial Rate 101 BPM  
 P-R Interval 118 ms QRS Duration 96 ms  
 Q-T Interval 358 ms QTC Calculation (Bezet) 464 ms Calculated P Axis 71 degrees Calculated R Axis 69 degrees Calculated T Axis 57 degrees Diagnosis Sinus tachycardia When compared with ECG of 01-JUN-2017 11:29, No significant change was found METABOLIC PANEL, BASIC Collection Time: 06/15/19  4:57 AM  
Result Value Ref Range Sodium 135 (L) 136 - 145 mmol/L Potassium 4.1 3.5 - 5.1 mmol/L Chloride 102 97 - 108 mmol/L  
 CO2 25 21 - 32 mmol/L Anion gap 8 5 - 15 mmol/L Glucose 90 65 - 100 mg/dL BUN 12 6 - 20 MG/DL Creatinine 0.74 0.55 - 1.02 MG/DL  
 BUN/Creatinine ratio 16 12 - 20 GFR est AA >60 >60 ml/min/1.73m2 GFR est non-AA >60 >60 ml/min/1.73m2 Calcium 8.0 (L) 8.5 - 10.1 MG/DL  
CBC WITH AUTOMATED DIFF Collection Time: 06/15/19  4:57 AM  
Result Value Ref Range WBC 9.8 3.6 - 11.0 K/uL  
 RBC 3.34 (L) 3.80 - 5.20 M/uL  
 HGB 10.1 (L) 11.5 - 16.0 g/dL HCT 31.8 (L) 35.0 - 47.0 % MCV 95.2 80.0 - 99.0 FL  
 MCH 30.2 26.0 - 34.0 PG  
 MCHC 31.8 30.0 - 36.5 g/dL  
 RDW 12.7 11.5 - 14.5 % PLATELET 602 222 - 777 K/uL MPV 10.3 8.9 - 12.9 FL  
 NRBC 0.0 0  WBC ABSOLUTE NRBC 0.00 0.00 - 0.01 K/uL NEUTROPHILS 76 (H) 32 - 75 % LYMPHOCYTES 9 (L) 12 - 49 % MONOCYTES 14 (H) 5 - 13 % EOSINOPHILS 0 0 - 7 % BASOPHILS 0 0 - 1 % IMMATURE GRANULOCYTES 1 (H) 0.0 - 0.5 % ABS. NEUTROPHILS 7.5 1.8 - 8.0 K/UL  
 ABS. LYMPHOCYTES 0.8 0.8 - 3.5 K/UL  
 ABS. MONOCYTES 1.3 (H) 0.0 - 1.0 K/UL  
 ABS. EOSINOPHILS 0.0 0.0 - 0.4 K/UL  
 ABS. BASOPHILS 0.0 0.0 - 0.1 K/UL  
 ABS. IMM. GRANS. 0.1 (H) 0.00 - 0.04 K/UL  
 DF AUTOMATED Imaging Review: 
CT A/P 6/14/19:  
FINDINGS:  
LUNG BASES: Clear. INCIDENTALLY IMAGED HEART AND MEDIASTINUM: Unchanged mediastinal 
lymphadenopathy. LIVER: Unchanged hepatic hypodensities measuring up to 1.3 cm. GALLBLADDER: Unremarkable. SPLEEN: Multiple low-attenuation splenic masses are not significantly changed. PANCREAS: No mass or ductal dilatation. ADRENALS: 3.9 x 2.3 cm right adrenal mass, not significantly changed. KIDNEYS: No mass, calculus, or hydronephrosis. STOMACH: Decompressed. SMALL BOWEL: Mild wall thickening in the lower abdomen. No dilatation. COLON: No dilatation or wall thickening. APPENDIX: Unremarkable. PERITONEUM: Small ascites, not significantly changed. Diffuse nodular peritoneal 
enhancement. RETROPERITONEUM: Unchanged retroperitoneal lymphadenopathy. REPRODUCTIVE ORGANS: Status post hysterectomy. URINARY BLADDER: Decompressed BONES: No destructive bone lesion. Degenerative disc disease at L2-3 and L5-S1. ADDITIONAL COMMENTS: N/A IMPRESSION IMPRESSION: 
No evidence of bowel obstruction. Mild small bowel wall thickening in the lower 
abdomen. Ascites with diffuse nodular peritoneal enhancement, compatible with 
carcinomatosis. Unchanged mediastinal and retroperitoneal lymphadenopathy. Unchanged splenic, right adrenal, and hepatic masses. IMPRESSION/PLAN: 
 
Patient Active Problem List  
Diagnosis Code  Endometrial cancer ([facility] [address] C54.1  Early satiety R68.81  
 Abdominal bloating R14.0  Obesity (BMI 30.0-34. 9) E66.9  Right upper quadrant abdominal pain R10.11  
 Mediastinal lymphadenopathy R59.0  Elevated tumor markers R97.8  Weight loss, non-intentional R63.4  Cancer related pain G89.3  Enteritis K52.9  Leukocytosis D72.829  Abdominal pain R10.9  Tachycardia R00.0  Nausea & vomiting R11.2  Ascites R18.8  SIRS (systemic inflammatory response syndrome) (HCC) R65.10  
 
Ms. Johan Catalan is a 60 y.o. female with advanced-progressive uterine papillary serous carcinoma. Recently re-initiated on single-agent carboplatin on 6/11/19. Admitted with nausea, vomiting, tachycardia, abdominal pain, concerns for enteritis on CT. No evidence of bowel obstruction. I reviewed with Johan Catalan her medical records, physical exam, and review of symptoms. I suspect most of her symptoms resulted from nausea related to her chemotherapy.  While she had Emend at the time of her chemotherapy, carboplatin can be very emetogenic and the patient had not taken any further scheduled anti-emetics for the 3-days following chemotherapy which is typically standard. I have reviewed the report and the images personally of her CT A/P. No evidence of a bowel obstruction. S/p paracentesis on 6/10/19 with small-volume ascites still present. The bowel thickening in the lower abdomen could be a result of her disease process, but could also represent enteritis. In the differential would inlcude typhlitis following chemotherapy, although this is typically seen in neutropenia. I believe this is less likely. Normal lactic acid and improving leukocytosis. However, I agree with broad-spectrum antibiotics at this time. If she is afebrile today, this could be discontinued. Her pain and nausea are much improved with medication today. I anticipate she may likely be discharged home tomorrow. In regard to her tachycardia, I believe this is a result of mere dehydration related to her nausea. As her nausea is improved, could likely restart her diet today. Will defer to the primary team. Gynecologic Oncology will continue to follow along. Thank you for taking excellent care of our mutual patient, and please do not hesitate to contact our team with any questions or concerns.   
 
 
Signed By: Meghana Hidalgo MD   
 6/15/2019/7:39 AM

## 2019-06-15 NOTE — PROGRESS NOTES
Problem: Falls - Risk of 
Goal: *Absence of Falls Description Document Lencho Baker Fall Risk and appropriate interventions in the flowsheet. Outcome: Progressing Towards Goal 
  
Problem: Patient Education: Go to Patient Education Activity Goal: Patient/Family Education Outcome: Progressing Towards Goal

## 2019-06-15 NOTE — PROGRESS NOTES
Hospitalist Progress Note Rocio Savage MD 
Answering service: [phone] OR [phone] from in house phone Date of Service:  6/15/2019 NAME:  Mariana Pan :  1960 MRN:  [id number] Admission Summary:  
60 y.o F with PMH of stage IV uterine cancer s/p THL,BSO on chemotherapy came to the hospital because of nausea/vomiting,poor oral intake and abdominal pain. Interval history / Subjective:  
   
Patient said she has chronic abdominal pain related to cancer but was slightly more now. Had nausea/vomiting since last chemo last week. Unable to eat or drink. She did not take any zofran at home. States that abdominal discomfort improved after paracentesis last week Assessment & Plan: #Intractable nausea/vomiting: 
Due to chemotherapy,received carboplatin regimen last Tuesday 
-will give scheduled zofran and start full liquid diet today 
-prn zofran #Dehydration: 
-due to poor PO intake causing leukocytosis and tachycardia 
-continue IVF till PO intake improves #Cancer related pain: 
-abdominal pain related to metastatic cancer. 
-will restart oral dilaudid she takes at home and use prn IV morphine for breakthrough pain. # Stage IV uterine cancer: 
-s/p TLH,BSO and lymphadenectomy in  ,received chemo. Was started on chemo again for recurrence. 
-follows with  Small bowel thickening on CT abd - enteritis vs underlying disease. D/C zosyn soon  She does not have any fever,diarrhea,WBC wnl Her nausea/vomiting are related to recent chemo. Constipation: no stool burden on CT. As she is on narcotics,ordered miralax. Code status: DNR 
DVT prophylaxis: lovenox Care Plan discussed with: Abdoul Mcconnell Disposition: Home when able to take oral diet Hospital Problems  Date Reviewed: 6/10/2019 Codes Class Noted POA Enteritis ICD-10-CM: K52.9 ICD-9-CM: 558.9  2019 Unknown Leukocytosis ICD-10-CM: Y82.318 ICD-9-CM: 288.60  6/14/2019 Unknown Abdominal pain ICD-10-CM: R10.9 ICD-9-CM: 789.00  6/14/2019 Unknown Tachycardia ICD-10-CM: R00.0 ICD-9-CM: 785.0  6/14/2019 Unknown Nausea & vomiting ICD-10-CM: R11.2 ICD-9-CM: 787.01  6/14/2019 Unknown Ascites ICD-10-CM: R18.8 ICD-9-CM: 789.59  6/14/2019 Unknown SIRS (systemic inflammatory response syndrome) (HCC) ICD-10-CM: R65.10 ICD-9-CM: 995.90  6/14/2019 Unknown Review of Systems: As per HPI Vital Signs:  
 Last 24hrs VS reviewed since prior progress note. Most recent are: 
Visit Vitals /73 (BP 1 Location: Left arm, BP Patient Position: At rest) Pulse (!) 101 Temp 98.4 °F (36.9 °C) Resp 14 Wt 76.4 kg (168 lb 8 oz) SpO2 98% BMI 27.20 kg/m² No intake or output data in the 24 hours ending 06/15/19 1800 Physical Examination:  
 
 
     
Constitutional:  No acute distress, cooperative, pleasant   
ENT:  Oral mucous moist, oropharynx benign. Neck supple, Resp:  CTA bilaterally. No wheezing/rhonchi/rales. No accessory muscle use CV:  Regular rhythm, normal rate, no murmurs, gallops, rubs GI:  Soft, tender in the lower abdomen,bowle sounds + Musculoskeletal:  No edema Neurologic:  Moves all extremities. AAOx3 Data Review:  
 Review and/or order of clinical lab test 
Review and/or order of tests in the radiology section of CPT Review and/or order of tests in the medicine section of CPT Labs:  
 
Recent Labs  
  06/15/19 
0457 06/14/19 
2018 WBC 9.8 12.1* HGB 10.1* 11.6 HCT 31.8* 34.9*  
 442* Recent Labs  
  06/15/19 
0457 06/14/19 
2018 * 132* K 4.1 4.2  99 CO2 25 24 BUN 12 14 CREA 0.74 0.92  
GLU 90 114* CA 8.0* 9.0 Recent Labs  
  06/14/19 2018 SGOT 26 ALT 16  
 TBILI 0.5 TP 7.9 ALB 2.3*  
GLOB 5.6* LPSE 55*  
 
 No results for input(s): INR, PTP, APTT in the last 72 hours. No lab exists for component: INREXT No results for input(s): FE, TIBC, PSAT, FERR in the last 72 hours. No results found for: FOL, RBCF No results for input(s): PH, PCO2, PO2 in the last 72 hours. No results for input(s): CPK, CKNDX, TROIQ in the last 72 hours. No lab exists for component: CPKMB No results found for: CHOL, CHOLX, CHLST, CHOLV, HDL, LDL, LDLC, DLDLP, TGLX, TRIGL, TRIGP, CHHD, CHHDX No results found for: [facility] Lab Results Component Value Date/Time Color YELLOW/STRAW 06/15/2019 12:17 AM  
 Appearance CLEAR 06/15/2019 12:17 AM  
 Specific gravity <1.005 06/15/2019 12:17 AM  
 Specific gravity 1.021 10/09/2018 10:23 AM  
 pH (UA) 7.5 06/15/2019 12:17 AM  
 Protein NEGATIVE  06/15/2019 12:17 AM  
 Glucose NEGATIVE  06/15/2019 12:17 AM  
 Ketone TRACE (A) 06/15/2019 12:17 AM  
 Bilirubin NEGATIVE  06/15/2019 12:17 AM  
 Urobilinogen 0.2 06/15/2019 12:17 AM  
 Nitrites NEGATIVE  06/15/2019 12:17 AM  
 Leukocyte Esterase NEGATIVE  06/15/2019 12:17 AM  
 Epithelial cells MODERATE (A) 10/09/2018 10:23 AM  
 Bacteria 1+ (A) 10/09/2018 10:23 AM  
 WBC 0-4 10/09/2018 10:23 AM  
 RBC 0-5 10/09/2018 10:23 AM  
 
 
 
Medications Reviewed:  
 
Current Facility-Administered Medications Medication Dose Route Frequency  sodium chloride (NS) flush 5-40 mL  5-40 mL IntraVENous Q8H  
 sodium chloride (NS) flush 5-40 mL  5-40 mL IntraVENous PRN  
 ondansetron (ZOFRAN) injection 4 mg  4 mg IntraVENous Q6H PRN  pantoprazole (PROTONIX) 40 mg in sodium chloride 0.9% 10 mL injection  40 mg IntraVENous Q24H  
 aluminum & magnesium hydroxide-simethicone (MYLANTA II) oral suspension 30 mL  30 mL Oral Q4H PRN  
 HYDROmorphone (DILAUDID) tablet 2 mg  2 mg Oral Q4H PRN  
 morphine injection 2 mg  2 mg IntraVENous Q4H PRN  
 ondansetron (ZOFRAN ODT) tablet 4 mg  4 mg Oral TID  polyethylene glycol (MIRALAX) packet 17 g  17 g Oral DAILY  sodium chloride (NS) flush 5-10 mL  5-10 mL IntraVENous PRN  piperacillin-tazobactam (ZOSYN) 3.375 g in 0.9% sodium chloride (MBP/ADV) 100 mL  3.375 g IntraVENous Q8H  
 0.9% sodium chloride infusion  50 mL/hr IntraVENous CONTINUOUS  
 
______________________________________________________________________ EXPECTED LENGTH OF STAY: 2d 14h ACTUAL LENGTH OF STAY:          1 Phillip Latham MD

## 2019-06-15 NOTE — ACP (ADVANCE CARE PLANNING)
Advance Care Planning Note Name: Aditi Gallagher YOB: 1960 MRN: [id number] Admission Date: 6/14/2019  8:00 PM 
 
Date of discussion: 6/15/2019 Active Diagnoses: 
 
Stage IV uterine cancer These active diagnoses are of sufficient risk that focused discussion on advance care planning is indicated in order to allow the patient to thoughtfully consider personal goals of care, and if situations arise that prevent the ability to personally give input, to ensure appropriate representation of their personal desires for different levels and aggressiveness of care. Discussion:  
 
Persons present and participating in discussion: Babita Smith MD 
 
Discussion: 
Patient said she understands the palliative intent of the chemotherapy and that cancer is not curable. She said she is thinking about stopping chemotherapy and to focus on symptom management/quality of life. She would like to discuss that with . I discussed about what resuscitation means in case of cardiac or respiratory arrest. She said she does not want to be resuscitated and she discussed about it with her  Her ,Clive is surrogate decision maker. Time Spent:  
 
Total time spent face-to-face in education and discussion: 18 minutes.   
 
Eva Pope MD 
6/15/2019 
6:20 PM

## 2019-06-15 NOTE — PROGRESS NOTES
TRANSFER - IN REPORT: 
 
Verbal report received from sharan(name) on Sloane Albert  being received from ed (unit) for routine progression of care Report consisted of patients Situation, Background, Assessment and  
Recommendations(SBAR). Information from the following report(s) SBAR, Kardex, [facility] and Recent Results was reviewed with the receiving nurse. Opportunity for questions and clarification was provided. Assessment completed upon patients arrival to unit and care assumed.

## 2019-06-15 NOTE — H&P
[address] HISTORY AND PHYSICAL Name:  Connie Iglesias 
MR#:  [id number] :  1960 ACCOUNT #:  [[id number]] ADMIT DATE:  2019 CHIEF COMPLAINT:  Abdominal pain, nausea, and vomiting. HISTORY OF PRESENT ILLNESS:  A 50-year-old white female with past medical history of recurrent endometrial carcinoma, uterine cancer, arthritis, chronic lower back pain, depression, who presented to the emergency department from home with chief complaint of nausea, vomiting, abdominal pain. The patient notes she was started on new round of chemotherapy on Tuesday (2019). She notably has had ongoing nausea, vomiting, and generalized abdominal pain symptoms after treatments. She notes that in the past she has had stopped the chemotherapy as it became overwhelming and started with aforementioned symptoms. She notably has had nausea, vomiting, nonbloody/nonbilious emesis. She complains of pain in both upper as well as bilateral lower quadrant suprapubic region, but she notes that it was difficult to describe in terms of character as it is so severe, the pain is as high as 8-9/10, but currently decreased to 6/10 after having pain medications in the emergency department. She also complains of chronic back pain, which she notes it is not new. She has had some chills, diaphoresis, shortness of breath, and nonproductive cough. On arrival to the emergency department, initially recorded vital signs, blood pressure is 142/74, heart rate of 112, respiratory rate 16, and O2 saturation 98% on room air. Workup including labs showed elevated WBC at 12,100, neutrophils 82%. CT abdomen and pelvis with contrast was negative for bowel obstruction, however, showed small bowel wall thickening in the lower abdomen, ascites with diffuse nodular enhancement consistent with carcinomatosis, unchanged mediastinal and retroperitoneal lymphadenopathy, unchanged splenic right adrenal and hepatic masses.   Per the ED, the patient was given morphine 4 mg plus additional 2 mg IV, Zofran 8 mg IV, and 0.9% normal saline with 1 L of IV fluid bolus. The patient is now seen for admission to the hospitalist service for continued evaluation and treatment. She does not complain of any dizziness, lightheadedness, new onset focal weakness, numbness, paresthesias, slurred speech, facial droop, recurrent headache, neck pain, palpitations, melena, dysuria, hematuria, calf pain, swelling, edema, fever, chills, or rash. Although, she had a paracentesis performed on 06/10/2019 with drainage of 4400 mL of ascitic fluid. No culture results were available from it. Peritoneal drainage was reviewed. PAST MEDICAL HISTORY: 
1. Arthritis. 2.  Recurrent uterine/endometrial carcinoma. 3.  Chronic lower back pain. 4.  Depression. 5.  Nausea and vomiting. 6.  Postmenopausal bleeding. PAST SURGICAL HISTORY: 
1. Colonoscopy. 2.  Total laparoscopic hysterectomy. 3.  Bilateral salpingo-oophorectomy. 4.  Lymph node dissection, 08/22/2014. 
5.  Right fifth toe surgery. 6.  Left chest Port-A-Cath placement. MEDICATIONS:  Medication list reviewed and noted on chart records. HYDROmorphone (DILAUDID) 2 mg tablet  6/7/2019  --  --  Provider, Historical   
 Take  by mouth every four (4) hours as needed for Pain. ibuprofen (MOTRIN) 800 mg tablet  6/7/2019 03/12/19  -- Leila Bhatti MD  
 Take 1 Tab by mouth every eight (8) hours as needed for Pain.  
 lidocaine-prilocaine (EMLA) topical cream  5/14/2019  06/10/19  -- Leila Bhatti MD  
 Apply small amount over port area one hour before chemo treatment and cover with a Band-Aid  
 ondansetron (ZOFRAN ODT) 4 mg disintegrating tablet  6/14/2019  06/10/19  -- Leila Bhatti MD  
 Take 1 Tab by mouth every eight (8) hours as needed for Nausea. Indications: Nausea and Vomiting caused by Cancer Drugs ALLERGIES:  HYDROCODONE, OXYCODONE. SOCIAL HISTORY:  Former smoker of cigarettes, reportedly quit in 1999. Prior occasional alcohol. Denies daily consumption. FAMILY HISTORY:  Metastatic liver cancer in mother. Brain aneurysm in mother. Bone cancer in maternal uncle. Unknown cancer in maternal grandmother. REVIEW OF SYSTEMS:  Pertinent positives were as noted in the HPI, but otherwise negative. PHYSICAL EXAMINATION: 
VITAL SIGNS:  Temperature 98.4 degrees Fahrenheit, blood pressure 140/71, heart rate 101, respiratory rate 14, O2 saturation 97% on room air. GENERAL:  The patient in no acute respiratory distress. PSYCH:  The patient is awake, alert, and oriented x3. NEUROLOGIC:  GCS of 15. Moves extremities x4. Sensation is grossly intact without slurred speech or facial droop. HEENT:  Normocephalic, atraumatic. PERRLA. EOMs intact. Sclerae are anicteric. Conjunctivae are clear. Nares are patent. Oropharynx is clear. Tongue is midline, nonedematous. Oral mucosa is dry. NECK:  Supple without lymphadenopathy, JVD, carotid bruits, or thyromegaly. LYMPH:  Negative for cervical or supraclavicular adenopathy. RESPIRATORY:  Lungs, clear to auscultation bilaterally. CVS:  Heart, tachycardic, regular rhythm. No murmurs, rubs, or gallops. GI:  Abdomen is soft, mild generalized tenderness on palpation without rebound, guarding, or rigidity. No auscultated abdominal bruits. No palpable abdominal mass. BACK:  No CVA tenderness. No step-off deformity. MUSCULOSKELETAL:  No acute palpable bony deformity. Negative for calf tenderness. VASCULAR:  2+ radial and 1+ dorsalis pedis pulses without cyanosis or clubbing. Negative for edema. SKIN:  Warm and dry. LABORATORY DATA:  Reviewed, as follows.   Sodium 132, potassium 4.2, chloride of 99, CO2 of 24, BUN 14, creatinine 0.92, glucose 114, anion gap of 9, calcium is 9.0, GFR greater than 60, total bilirubin 0.5, total protein 0.9, albumin is 3.3, ALT is 16, AST is 26, alkaline phosphatase is 103. Lipase is 55. WBC 12.1, hemoglobin 11.6, hematocrit 34.9, platelets 285, neutrophils 82%. Urinalysis, leukocyte esterase negative, nitrites negative, urobilinogen 0.2, bilirubin negative, blood negative, ketones trace, glucose negative, protein negative, pH is 7.5, specific gravity less than 1.005. 
 
12-lead EKG, sinus tachycardia at 101 beats per minute. Chest x-ray, portable, no acute cardiopulmonary disease. CT abdomen and pelvis with IV contrast, results reviewed. IMPRESSION AND PLAN: 
1. SIRS (systemic inflammatory response syndrome) - with noted tachycardia and fever with 2 of 4 criteria. Admit the patient to remote telemetry floor. Order 0.9% normal saline intravenous fluids. Obtain the patient's weight in order to calculate the patient's 30 mL/kg intravenous fluid requirement. Has had concerns for inflammatory abdominal processes at least with enteritis and with the patient having been on recent chemotherapy. At this time, I have ordered empiric intravenous antibiotics with Zosyn 3.375 g intravenous q.8 hours. May adjust antibiotics accordingly. We will also check lactic acid level. 2.  Generalized abdominal pain. Provide pain management and supportive care. 3.  Nausea and vomiting. Order Zofran 4 mg intravenous q.6 hours p.r.n. 
4.  Enteritis. Continue plan of care as noted. Order intravenous fluid hydration. Consider for gastrointestinal consultation. 5.  Tachycardia. Continue telemetry monitoring. 6.  Lower back pain, chronic. 7.  Leukocytosis. Repeat CBC. 8.  Ascites. The patient had paracentesis performed a few days ago. The body fluid is not available at this time for collection for culture. 9.  Recurrent endometrial/uterine cancer. Consult with oncologist. 
10.  Hyponatremia, mild. Repeat sodium level.  
11.  Carcinomatosis, mediastinal and retroperitoneal lymphadenopathy, splenic right adrenal and hepatic masses - notably unchanged per radiologist report. 12.  Venous thromboembolism prophylaxis. Sequential compression devices to lower extremities. Yvonne Poole MD [email or web address] 
D:  06/15/2019 1:05 
T:  06/15/2019 3:43 JOB #:  [id number]

## 2019-06-15 NOTE — CDMP QUERY
#1 
 
Pt admitted with enteritis /Pt noted to have elevated WBC/SIRS. If possible, please document in the progress notes and d/c summary if you are evaluating and / or treating any of the following: 
 
? Infectious Enteritis ? Non Infectious Enteritis ? Other, please specify ? Clinically unable to determine The medical record reflects the following: 
  Risk Factors: enteritis Clinical Indicators: WBC 12.1  
neutrophils 82 Lactic  acid 0.9  
 mild tachy 102-115 CT-No evidence of bowel obstruction. Mild small bowel wall thickening in the lower abdomen. Ascites with diffuse nodular peritoneal enhancement, compatible with carcinomatosis. Unchanged mediastinal and retroperitoneal lymphadenopathy. Unchanged splenic, right adrenal, and hepatic masses Treatment: zosyn Thank you, 
       Piotr Conde [facility], [address]

## 2019-06-15 NOTE — ED PROVIDER NOTES
60 y.o. female with past medical history significant for PMB, uterine cancer, and chronic back pain who presents from home with chief complaint of vomiting. Patient states onset ~3 days ago of vomiting. Patient was at Chemotherapy for her Uterine cancer treatment and endorses the vomiting began after her treatment. Patient presents to [facility] ED with persisting vomiting, stating she has not ate in ~1 week due to vomiting, and within the last day she can no long drink liquids without vomiting. Patient endorses accompanying nausea, abdominal pain, chills, diaphoresis, SOB, and nonproductive cough. Patient reports lower back pain as well, however endorses it is chronic. Patient states she had recent bowel obstruction. Patient notes she had an \"acidity strain\" ~4 days ago. Patient denies fever. There are no other acute medical concerns at this time. Social hx: (+) Tobacco use (Former), (+) EtOH use, No illicit drug use. PCP: Fortino Ray MD 
 
Note written by Mike Love, as dictated by Judi León MD 8:20 PM  
 
 
The history is provided by the patient. No  was used. Past Medical History:  
Diagnosis Date  Arthritis  Cancer [facility]) 2014  
 uterine  Chronic pain LOWER BACK  Depression  Nausea & vomiting  PMB (postmenopausal bleeding) 4/2014 Past Surgical History:  
Procedure Laterality Date  HX COLONOSCOPY    
 HX GYN  8/22/14 TLH/BSO/lymph node dissection  HX ORTHOPAEDIC Right [id number] LITTLE toe sx  HX VASCULAR ACCESS Left 2014 PORTACATH - LEFT CHEST Family History:  
Problem Relation Age of Onset  Cancer Mother 61  
     liver metastatic disease, unknown primary  Stroke Mother BRAIN ANEURYSM  
 Cancer Maternal Uncle 60  
     bone  Cancer Maternal Grandmother 61  
     unknown primary  Anesth Problems Neg Hx Social History Socioeconomic History  Marital status:   
 Spouse name: Not on file  Number of children: Not on file  Years of education: Not on file  Highest education level: Not on file Occupational History  Not on file Social Needs  Financial resource strain: Not on file  Food insecurity:  
  Worry: Not on file Inability: Not on file  Transportation needs:  
  Medical: Not on file Non-medical: Not on file Tobacco Use  Smoking status: Former Smoker Packs/day: 0.50 Years: 7.00 Pack years: 3.50 Types: Cigarettes Last attempt to quit: 1999 Years since quittin.8  Smokeless tobacco: Never Used Substance and Sexual Activity  Alcohol use: Yes Alcohol/week: 0.6 oz Types: 1 Glasses of wine per week Comment: rarely  Drug use: No  
 Sexual activity: Never Lifestyle  Physical activity:  
  Days per week: Not on file Minutes per session: Not on file  Stress: Not on file Relationships  Social connections:  
  Talks on phone: Not on file Gets together: Not on file Attends [employer] service: Not on file Active member of club or organization: Not on file Attends meetings of clubs or organizations: Not on file Relationship status: Not on file  Intimate partner violence:  
  Fear of current or ex partner: Not on file Emotionally abused: Not on file Physically abused: Not on file Forced sexual activity: Not on file Other Topics Concern  Not on file Social History Narrative  Not on file ALLERGIES: Hydrocodone and Oxycodone Review of Systems Constitutional: Positive for appetite change, chills and diaphoresis. Negative for activity change, fatigue and fever. HENT: Negative for ear pain, facial swelling, sore throat and trouble swallowing. Eyes: Negative for pain, discharge and visual disturbance. Respiratory: Positive for cough and shortness of breath. Negative for chest tightness and wheezing. Cardiovascular: Negative for chest pain and palpitations. Gastrointestinal: Positive for abdominal pain, nausea and vomiting. Negative for blood in stool. Genitourinary: Negative for difficulty urinating, flank pain and hematuria. Musculoskeletal: Positive for back pain. Negative for arthralgias, joint swelling, myalgias and neck pain. Skin: Negative for color change and rash. Neurological: Negative for dizziness, weakness, numbness and headaches. Hematological: Negative for adenopathy. Does not bruise/bleed easily. Psychiatric/Behavioral: Negative for behavioral problems, confusion and sleep disturbance. All other systems reviewed and are negative. Vitals:  
 06/1960 06/14/19 2006 06/14/19 2115 BP:  142/74 137/79 Pulse: (!) 112 99 98 Resp: 16 18 16 Temp:  98.4 °F (36.9 °C) 98.2 °F (36.8 °C) SpO2: 98% 98% 99% Physical Exam  
Constitutional: She is oriented to person, place, and time. She appears well-developed and well-nourished. No distress. HENT:  
Head: Normocephalic. Nose: Nose normal.  
Mouth/Throat: Oropharynx is clear and moist.  
Eyes: Pupils are equal, round, and reactive to light. Conjunctivae and EOM are normal. No scleral icterus. Neck: Normal range of motion. Neck supple. No JVD present. No tracheal deviation present. No thyromegaly present. No carotid bruits noted. Cardiovascular: Normal rate, regular rhythm, normal heart sounds and intact distal pulses. Exam reveals no gallop and no friction rub. No murmur heard. Pulmonary/Chest: Effort normal and breath sounds normal. No respiratory distress. She has no wheezes. She has no rales. She exhibits no tenderness. Abdominal: Soft. Bowel sounds are normal. She exhibits no distension and no mass. There is no rebound. Abdomen protuberant, full, and moderately tender. Mid abdomen more tender in both lower quadrants with some guarding. Musculoskeletal: Normal range of motion. She exhibits no edema or tenderness. Lymphadenopathy:  
  She has no cervical adenopathy. Neurological: She is alert and oriented to person, place, and time. She has normal reflexes. No cranial nerve deficit. Coordination normal.  
Skin: Skin is warm and dry. No rash noted. No erythema. Psychiatric: She has a normal mood and affect. Her behavior is normal. Judgment and thought content normal.  
Nursing note and vitals reviewed. Note written by Mike Arizmendi, as dictated by Estella Romberg, MD 9:40 PM  
 
MDM Number of Diagnoses or Management Options Abdominal pain, generalized: new and requires workup Amount and/or Complexity of Data Reviewed Clinical lab tests: ordered and reviewed Tests in the radiology section of CPT®: ordered and reviewed Decide to obtain previous medical records or to obtain history from someone other than the patient: yes Obtain history from someone other than the patient: yes Review and summarize past medical records: yes Discuss the patient with other providers: yes Independent visualization of images, tracings, or specimens: yes Risk of Complications, Morbidity, and/or Mortality Presenting problems: high Diagnostic procedures: high Management options: high Patient Progress Patient progress: stable Procedures PROGRESS NOTE: 
9:15 PM 
Patient is not obstructed, waiting for Radiology to evaluate. Small bowel thickening in lower abdomen may be cause of abdominal pain. 9:30 PM  
Patient given more Morphine (2 mg). Provider will consult Dr. Moar Brochure about admission. Patient hasn't been eating, endorsing nausea and vomiting however states this is the best she's been feeling in 5 days. Patient having recurrent abdominal pain. Agrees with admission. Consult placed with hosptialist per Dr. Christoper Rinne and he will follow. Patient's last Chemo was Tuesday of this week. Hospitalist Gamaliel for Admission 10:41 PM 
 
ED Room Number: ER09/09 Patient Name and age:  Adrian Ordaz 60 y.o.  female Working Diagnosis: 1. Abdominal pain, generalized Readmission: no 
Isolation Requirements:  no 
Recommended Level of Care:  med/surg Code Status:  full

## 2019-06-15 NOTE — PROGRESS NOTES
Admission Medication Reconciliation: 
Information obtained from: Patient, chart, RX Query Significant PMH/Disease States:  
Past Medical History:  
Diagnosis Date Arthritis Cancer [facility]) 2014  
 uterine Chronic pain LOWER BACK Depression Nausea & vomiting PMB (postmenopausal bleeding) 4/2014 Chief Complaint for this Admission:  Vomiting Allergies:  Hydrocodone and Oxycodone Prior to Admission Medications:  
Prior to Admission Medications Prescriptions Last Dose Informant Patient Reported? Taking? HYDROmorphone (DILAUDID) 2 mg tablet 6/7/2019 at Unknown time  Yes Yes Sig: Take  by mouth every four (4) hours as needed for Pain. ibuprofen (MOTRIN) 800 mg tablet 6/7/2019 at Unknown time  No Yes Sig: Take 1 Tab by mouth every eight (8) hours as needed for Pain.  
lidocaine-prilocaine (EMLA) topical cream 5/14/2019 at Unknown time  No Yes Sig: Apply small amount over port area one hour before chemo treatment and cover with a Band-Aid  
ondansetron (ZOFRAN ODT) 4 mg disintegrating tablet 6/14/2019 at Unknown time  No Yes Sig: Take 1 Tab by mouth every eight (8) hours as needed for Nausea. Indications: Nausea and Vomiting caused by Cancer Drugs Facility-Administered Medications: None Comments/Recommendations: Patient provided medication history. Notes: 
Dilaudid: has not had in a week, \"ran out. \" Note that she states pain levels are high especially with her cancer dx and treatments. Chemotherapy: last dose 6/11/2019 Thank you for allowing me to participate in the care of your patient. Colton Portillo PharmD, RN #[id number]

## 2019-06-15 NOTE — PROGRESS NOTES
Primary Nurse Wade Mendoza RN and TULIO grajeda performed a dual skin assessment on this patient No impairment noted Atul score is 20

## 2019-06-16 NOTE — PROGRESS NOTES
[address] [facility], [address] [place] [address] Shelbie Avila 
P [phone]  F [phone] Marjan Ricci       [id number]  1960 Admitted 2019 Date 2019 History from Dr. Kalani Sanders clinic note on 6/10/19: 
Marjan Ricci is a 60 y.o.  female with a diagnosis of stage IV UPSC. She underwent a TLH, BSO, with lymphadenectomy in 2014. She was found to have evidence of intraperitoneal disease with implants in the cul de sac, but no evidence of tyler metastases. She was treated with adjuvant chemotherapy with Taxol and Carboplatin. She then received pelvic radiation therapy. In the summer of  she was noted to have an elevated CA-125. She had not been seen in the office for a couple of years prior to that visit. I ordered a CT of the chest/abdomen/pelvis to evaluate for recurrence. It demonstrated peritoneal carcinomatosis. I then sent her for a PET/CT to better evaluate. I recommended combination chemotherapy with Doxil/Avastin. She completed 8 cycles of the Doxil, but refused further Avastin after the second cycle. She had a lot of vague complaints at that time and was convinced it was the Avastin causing it. She has been juicing and trying some alternative therapies as well. Her last CT in 2019 again demonstrated stable disease on Doxil. Her last chemotherapy was in 2019. Her CA-125 at that time was climbing. She did not show up for her last scheduled chemotherapy and wanted to take some time off. She did not discuss that with me before making the decision. She presented in mid-May stating that she was feeling better and wanted to discuss chemotherapy again. I sent her for a CT scan to reassess her disease. Her scan now demonstrates worsening disease in multiple locations. I recommend starting back on chemotherapy at this time.   I suggested single-agent Carboplatin since it had been about 4.5 years since she was last treated with a platinum regimen. HPI: 
Ms. Siddharth Michelle presented to the ER on 6/14/19 with worsening burning in her abdomen, along with nausea and vomiting. Denies fevers or chills. Reports severe acid reflux that started in her mid upper abdomen, and then eventually progressed to her lower abdomen. Reports constipation, for which she disimpacted herself the day prior. She feels normal from that now. Mild leukocytosis on admission of 12k. Afebrile. Pain somewhat improved in ER. Given Morphine 4mg, Zofran 8mg, and IVF bolus with some improvement in her symptoms. Admitted to Internal Medicine for concerns of SIRS given tachycardia. On Zosyn, NPO, and IVFs. No further vomiting. Mild nausea this morning. The patient reports feeling much better. Would like some water because the cold water makes her feel better. Denies CP or SOB. Ambulating without issues. Gynecologic Oncology contacted as we Dr. Erasmo Rivas is her managing Gyn Onc. Last received chemotherapy (cycle 1 single-agent carboplatin) on 6/11/19. She was given Emend and Zofran at the time of treatment but she did not take any further prophylactic medications in the days following. 6/16/19: Afebrile yesterday. Still reports \"acid reflux\". Her blood culture returned with gram positive cocci in 1 of 2 vials. Denies nausea or vomiting. Reports flatus. Still reports some discomfort in her abdomen, but overall feels improved. She is interested in talking about no further treatment. Past Medical History:  
Diagnosis Date  Arthritis  Cancer [facility]) 2014  
 uterine  Chronic pain LOWER BACK  Depression  Nausea & vomiting  PMB (postmenopausal bleeding) 4/2014 Past Surgical History:  
Procedure Laterality Date  HX COLONOSCOPY    
 HX GYN  8/22/14 TLH/BSO/lymph node dissection  HX ORTHOPAEDIC Right 18 LITTLE toe sx  HX VASCULAR ACCESS Left 2014  PORTACATH - LEFT CHEST  
  
 Social History Tobacco Use  Smoking status: Former Smoker Packs/day: 0.50 Years: 7.00 Pack years: 3.50 Types: Cigarettes Last attempt to quit: 1999 Years since quittin.8  Smokeless tobacco: Never Used Substance Use Topics  Alcohol use: Yes Alcohol/week: 0.6 oz Types: 1 Glasses of wine per week Comment: rarely Family History Problem Relation Age of Onset  Cancer Mother 61  
     liver metastatic disease, unknown primary  Stroke Mother BRAIN ANEURYSM  
 Cancer Maternal Uncle 60  
     bone  Cancer Maternal Grandmother 61  
     unknown primary  Anesth Problems Neg Hx Current Facility-Administered Medications Medication Dose Route Frequency  Vancomycin Pharmacy Dosing   Other Rx Dosing/Monitoring  vancomycin (VANCOCIN) 2000 mg in  ml infusion  2,000 mg IntraVENous ONCE  
 vancomycin (VANCOCIN) 1250 mg in  ml infusion  1,250 mg IntraVENous Q12H  
 sodium chloride (NS) flush 5-40 mL  5-40 mL IntraVENous Q8H  
 sodium chloride (NS) flush 5-40 mL  5-40 mL IntraVENous PRN  
 ondansetron (ZOFRAN) injection 4 mg  4 mg IntraVENous Q6H PRN  pantoprazole (PROTONIX) 40 mg in sodium chloride 0.9% 10 mL injection  40 mg IntraVENous Q24H  
 aluminum & magnesium hydroxide-simethicone (MYLANTA II) oral suspension 30 mL  30 mL Oral Q4H PRN  
 HYDROmorphone (DILAUDID) tablet 2 mg  2 mg Oral Q4H PRN  
 morphine injection 2 mg  2 mg IntraVENous Q4H PRN  
 ondansetron (ZOFRAN ODT) tablet 4 mg  4 mg Oral TID  polyethylene glycol (MIRALAX) packet 17 g  17 g Oral DAILY  sodium chloride (NS) flush 5-10 mL  5-10 mL IntraVENous PRN  
 0.9% sodium chloride infusion  50 mL/hr IntraVENous CONTINUOUS Allergies Allergen Reactions  Hydrocodone Nausea Only  Oxycodone Nausea and Vomiting Review of Systems A comprehensive review of systems was negative except for that written in the History of Present Illness. , 10 point ROS OBJECTIVE: 
 
Physical Exam 
Visit Vitals /66 (BP 1 Location: Left arm) Pulse 99 Temp 98.3 °F (36.8 °C) Resp 16 Wt 168 lb 8 oz (76.4 kg) SpO2 97% BMI 27.20 kg/m² General appearance: alert, cooperative, no distress, appears stated age Eyes: negative Back: symmetric, no curvature. ROM normal. No CVA tenderness. Lungs: clear to auscultation bilaterally Heart: regular rate and rhythm, S1, S2 normal, no murmur, click, rub or gallop Abdomen: soft, non-tender. Bowel sounds soft. No masses,  no organomegaly. No tympany or distension. Possibly mild ascites, although no where need the level to require drainage Pelvic: deferred Extremities: extremities normal, atraumatic, no cyanosis or edema Pulses: 2+ and symmetric Skin: Skin color, texture, turgor normal. No rashes or lesions Lymph nodes: Cervical, supraclavicular, and axillary nodes normal. 
Neurologic: Grossly normal 
 
Data Review No results found for this or any previous visit (from the past 24 hour(s)). Imaging Review: 
CT A/P 6/14/19:  
FINDINGS:  
LUNG BASES: Clear. INCIDENTALLY IMAGED HEART AND MEDIASTINUM: Unchanged mediastinal 
lymphadenopathy. LIVER: Unchanged hepatic hypodensities measuring up to 1.3 cm. GALLBLADDER: Unremarkable. SPLEEN: Multiple low-attenuation splenic masses are not significantly changed. PANCREAS: No mass or ductal dilatation. ADRENALS: 3.9 x 2.3 cm right adrenal mass, not significantly changed. KIDNEYS: No mass, calculus, or hydronephrosis. STOMACH: Decompressed. SMALL BOWEL: Mild wall thickening in the lower abdomen. No dilatation. COLON: No dilatation or wall thickening. APPENDIX: Unremarkable. PERITONEUM: Small ascites, not significantly changed. Diffuse nodular peritoneal 
enhancement. RETROPERITONEUM: Unchanged retroperitoneal lymphadenopathy. REPRODUCTIVE ORGANS: Status post hysterectomy. URINARY BLADDER: Decompressed BONES: No destructive bone lesion. Degenerative disc disease at L2-3 and L5-S1. ADDITIONAL COMMENTS: N/A IMPRESSION IMPRESSION: 
No evidence of bowel obstruction. Mild small bowel wall thickening in the lower 
abdomen. Ascites with diffuse nodular peritoneal enhancement, compatible with 
carcinomatosis. Unchanged mediastinal and retroperitoneal lymphadenopathy. Unchanged splenic, right adrenal, and hepatic masses. IMPRESSION/PLAN: 
 
Patient Active Problem List  
Diagnosis Code  Endometrial cancer ([facility] [address] C54.1  Early satiety R68.81  
 Abdominal bloating R14.0  Obesity (BMI 30.0-34. 9) E66.9  Right upper quadrant abdominal pain R10.11  
 Mediastinal lymphadenopathy R59.0  Elevated tumor markers R97.8  Weight loss, non-intentional R63.4  Cancer related pain G89.3  Enteritis K52.9  Leukocytosis D72.829  Abdominal pain R10.9  Tachycardia R00.0  Nausea & vomiting R11.2  Ascites R18.8  SIRS (systemic inflammatory response syndrome) (HCC) R65.10  
 
Ms. Jony Askew is a 60 y.o. female with advanced-progressive uterine papillary serous carcinoma. Recently re-initiated on single-agent carboplatin on 6/11/19. Admitted with nausea, vomiting, tachycardia, abdominal pain, concerns for enteritis on CT. No evidence of bowel obstruction. 6/15/19: I reviewed with Jony Askew her medical records, physical exam, and review of symptoms. I suspect most of her symptoms resulted from nausea related to her chemotherapy. While she had Emend at the time of her chemotherapy, carboplatin can be very emetogenic and the patient had not taken any further scheduled anti-emetics for the 3-days following chemotherapy which is typically standard. I have reviewed the report and the images personally of her CT A/P. No evidence of a bowel obstruction. S/p paracentesis on 6/10/19 with small-volume ascites still present.  The bowel thickening in the lower abdomen could be a result of her disease process, but could also represent enteritis. In the differential would inlcude typhlitis following chemotherapy, although this is typically seen in neutropenia. I believe this is less likely. Normal lactic acid and improving leukocytosis. However, I agree with broad-spectrum antibiotics at this time. If she is afebrile today, this could be discontinued. Her pain and nausea are much improved with medication today. I anticipate she may likely be discharged home tomorrow. In regard to her tachycardia, I believe this is a result of mere dehydration related to her nausea. As her nausea is improved, could likely restart her diet today. Will defer to the primary team. Gynecologic Oncology will continue to follow along. Thank you for taking excellent care of our mutual patient, and please do not hesitate to contact our team with any questions or concerns. 6/16/19: Gram-positive cocci in 1 of 2 vials. Manage per primary team. Will follow-up on final results, as this could simply be a contaminant. Plan repeat blood cultures. Remains afebrile with normal WBC. Continue symptomatic management of \"acid reflux\". Nausea resolved for the most part. Patient requesting something different than Zofran, which is appropriate. Will defer change to primary team. Regarding the patient's desire to discuss palliative care, will plan for patient to have this discussion with Dr. Jigar Bonilla while inpatient versus outpatient as he has been her oncologist for years and there is no acuteness to making any decisions today. Noted DNR status, which is appropriate given the incurable nature of her disease. Redd Stacy will continue to follow.   
 
Meghana Hidalgo MD

## 2019-06-16 NOTE — PROGRESS NOTES
Problem: Falls - Risk of 
Goal: *Absence of Falls Description Document Jorge Bradford Fall Risk and appropriate interventions in the flowsheet. Outcome: Progressing Towards Goal 
  
Problem: Patient Education: Go to Patient Education Activity Goal: Patient/Family Education Outcome: Progressing Towards Goal

## 2019-06-16 NOTE — PROGRESS NOTES
Pharmacist Note - Vancomycin Dosing Consult provided for this 60 y.o. female for indication of bloodstream infection. Antibiotic regimen(s): Vancomycin momotherapy Recent Labs  
  06/15/19 
0457 19 
2018 WBC 9.8 12.1*  
CREA 0.74 0.92 BUN 12 14 Frequency of BMP: daily Height: 167.6 cm Weight: 76 kg Est CrCl: 80 ml/min Temp (24hrs), Av.5 °F (36.9 °C), Min:98.3 °F (36.8 °C), Max:98.7 °F (37.1 °C) Cultures: 
blood Goal trough = 15 - 20 mcg/mL Therapy will be initiated with a loading dose of 2000 mg IV x 1 to be followed by a maintenance dose of 1250  mg IV every 12 hours. Pharmacy to follow patient daily and order levels / make dose adjustments as appropriate.

## 2019-06-17 PROBLEM — Z79.899 ON ANTINEOPLASTIC CHEMOTHERAPY: Status: ACTIVE | Noted: 2019-01-01

## 2019-06-17 PROBLEM — C80.0 CARCINOMATOSIS (HCC): Status: ACTIVE | Noted: 2019-01-01

## 2019-06-17 NOTE — PROGRESS NOTES
Hospitalist Progress Note Mary Jane Gould MD 
Answering service: [phone] OR [phone] from in house phone Date of Service:  2019 NAME:  Victoria Keene :  1960 MRN:  [id number] Admission Summary:  
60 y.o F with PMH of stage IV uterine cancer s/p THL,BSO on chemotherapy came to the hospital because of nausea/vomiting,poor oral intake and abdominal pain. Interval history / Subjective:  
   
Patient states that nausea/vomiting has improved but wants to switch zofran to other medication. Abdominal pain tolerable Assessment & Plan: #Intractable nausea/vomiting:improving Due to chemotherapy,received carboplatin regimen last Tuesday 
-switch to compazine,d/c zofran 
-start regular diet 1/2 bottles gram positive clusters 
-most likely contaminant 
-will await final cultures (has a port and on chemotherapy recently) -empiric vanc for now 
-repeat blood cultures #Dehydration:improving 
-due to poor PO intake causing leukocytosis and tachycardia 
-continue IVF for now #Cancer related pain: 
-abdominal pain related to metastatic cancer. 
- she is taking 2-4 mg dilaudid at home,d/c morphine and increase to home dose dialudid. # Stage IV uterine cancer: 
-s/p TLH,BSO and lymphadenectomy in  ,received chemo. Was started on chemo again for recurrence. 
-follows with  Small bowel thickening on CT abd - enteritis vs underlying disease. D/C zosyn soon  She does not have any fever,diarrhea,WBC wnl Her nausea/vomiting are related to recent chemo. Constipation: no stool burden on CT. As she is on narcotics,ordered miralax. Code status: DNR 
DVT prophylaxis: lovenox Care Plan discussed with: Mark Bermeo Disposition: Home tomorrow after blood cultures finalized Hospital Problems  Date Reviewed: 6/10/2019 Codes Class Noted POA Enteritis ICD-10-CM: K52.9 ICD-9-CM: 558.9  6/14/2019 Unknown Leukocytosis ICD-10-CM: K17.699 ICD-9-CM: 288.60  6/14/2019 Unknown Abdominal pain ICD-10-CM: R10.9 ICD-9-CM: 789.00  6/14/2019 Unknown Tachycardia ICD-10-CM: R00.0 ICD-9-CM: 785.0  6/14/2019 Unknown Nausea & vomiting ICD-10-CM: R11.2 ICD-9-CM: 787.01  6/14/2019 Unknown Ascites ICD-10-CM: R18.8 ICD-9-CM: 789.59  6/14/2019 Unknown SIRS (systemic inflammatory response syndrome) (HCC) ICD-10-CM: R65.10 ICD-9-CM: 995.90  6/14/2019 Unknown Review of Systems: As per HPI Vital Signs:  
 Last 24hrs VS reviewed since prior progress note. Most recent are: 
Visit Vitals /82 Pulse (!) 108 Temp 98.6 °F (37 °C) Resp 18 Ht 5' 6\" (1.676 m) Wt 79.1 kg (174 lb 6.1 oz) SpO2 98% BMI 28.15 kg/m² No intake or output data in the 24 hours ending 06/16/19 2100 Physical Examination:  
 
 
     
Constitutional:  No acute distress, cooperative, pleasant   
ENT:  Oral mucous moist, oropharynx benign. Neck supple, Resp:  CTA bilaterally. No wheezing/rhonchi/rales. No accessory muscle use CV:  Regular rhythm, normal rate, no murmurs, gallops, rubs GI:  Soft, tender in the lower abdomen,bowle sounds + Musculoskeletal:  No edema Neurologic:  Moves all extremities. AAOx3 Data Review:  
 Review and/or order of clinical lab test 
Review and/or order of tests in the medicine section of CPT Labs:  
 
Recent Labs  
  06/15/19 
0457 06/14/19 
2018 WBC 9.8 12.1* HGB 10.1* 11.6 HCT 31.8* 34.9*  
 442* Recent Labs  
  06/15/19 
0457 06/14/19 
2018 * 132* K 4.1 4.2  99 CO2 25 24 BUN 12 14 CREA 0.74 0.92  
GLU 90 114* CA 8.0* 9.0 Recent Labs  
  06/14/19 2018 SGOT 26 ALT 16  
 TBILI 0.5 TP 7.9 ALB 2.3*  
GLOB 5.6* LPSE 55* No results for input(s): INR, PTP, APTT in the last 72 hours. No lab exists for component: INREXT, INREXT No results for input(s): FE, TIBC, PSAT, FERR in the last 72 hours. No results found for: FOL, RBCF No results for input(s): PH, PCO2, PO2 in the last 72 hours. No results for input(s): CPK, CKNDX, TROIQ in the last 72 hours. No lab exists for component: CPKMB No results found for: CHOL, CHOLX, CHLST, CHOLV, HDL, LDL, LDLC, DLDLP, TGLX, TRIGL, TRIGP, CHHD, CHHDX No results found for: [facility] Lab Results Component Value Date/Time Color YELLOW/STRAW 06/15/2019 12:17 AM  
 Appearance CLEAR 06/15/2019 12:17 AM  
 Specific gravity <1.005 06/15/2019 12:17 AM  
 Specific gravity 1.021 10/09/2018 10:23 AM  
 pH (UA) 7.5 06/15/2019 12:17 AM  
 Protein NEGATIVE  06/15/2019 12:17 AM  
 Glucose NEGATIVE  06/15/2019 12:17 AM  
 Ketone TRACE (A) 06/15/2019 12:17 AM  
 Bilirubin NEGATIVE  06/15/2019 12:17 AM  
 Urobilinogen 0.2 06/15/2019 12:17 AM  
 Nitrites NEGATIVE  06/15/2019 12:17 AM  
 Leukocyte Esterase NEGATIVE  06/15/2019 12:17 AM  
 Epithelial cells MODERATE (A) 10/09/2018 10:23 AM  
 Bacteria 1+ (A) 10/09/2018 10:23 AM  
 WBC 0-4 10/09/2018 10:23 AM  
 RBC 0-5 10/09/2018 10:23 AM  
 
 
 
Medications Reviewed:  
 
Current Facility-Administered Medications Medication Dose Route Frequency  prochlorperazine (COMPAZINE) tablet 5 mg  5 mg Oral Q6H PRN  
 HYDROmorphone (DILAUDID) tablet 2-4 mg  2-4 mg Oral Q4H PRN  
 vancomycin (VANCOCIN) 1250 mg in  ml infusion  1,250 mg IntraVENous Q12H  
 vancomycin dosing by pharmacy   Other Rx Dosing/Monitoring  [START ON 6/17/2019] pantoprazole (PROTONIX) tablet 40 mg  40 mg Oral ACB  sodium chloride (NS) flush 5-40 mL  5-40 mL IntraVENous Q8H  
 sodium chloride (NS) flush 5-40 mL  5-40 mL IntraVENous PRN  
 aluminum & magnesium hydroxide-simethicone (MYLANTA II) oral suspension 30 mL  30 mL Oral Q4H PRN  polyethylene glycol (MIRALAX) packet 17 g  17 g Oral DAILY  sodium chloride (NS) flush 5-10 mL  5-10 mL IntraVENous PRN  
 0.9% sodium chloride infusion  50 mL/hr IntraVENous CONTINUOUS  
 
______________________________________________________________________ EXPECTED LENGTH OF STAY: 2d 7h 
ACTUAL LENGTH OF STAY:          2 Lois Long MD

## 2019-06-17 NOTE — PROGRESS NOTES
Bedside shift change report given to Norm Harper RN (oncoming nurse) by Nu Cerna RN (offgoing nurse). Report included the following information SBAR, Kardex, Intake/Output, MAR, Recent Results and Cardiac Rhythm NSR.

## 2019-06-17 NOTE — PROGRESS NOTES
[address], [address] [place], [facility]6 Shelbie Avila 
P [phone]  F [phone] Patient: Fatoumata Justice Admit Date: 6/14/2019 Admit Dx: [facility] [R65.10] Enteritis [K52.9] Ascites [R18.8] Leukocytosis [D72.829] Tachycardia [R00.0] Nausea & vomiting [R11.2] Abdominal pain [R10.9] Subjective:  
 
Reports emesis this AM and yesterday AM, small volume. Heart burn continues, worse in evening. Pain in low abd requiring PO dilaudid. Overall she feels better and is looking forward to going home. Voiding well. +flatus. No SOB. Ambulating well. Objective:  
 
 
Physical Exam 
/80   Pulse 98   Temp 98.3 °F (36.8 °C)   Resp 17   Ht 5' 6\" (1.676 m)   Wt 174 lb 6.1 oz (79.1 kg)   SpO2 99%   BMI 28.15 kg/m² General:  alert, cooperative, no distress Cardiac:  Regular rate and rhythm Lungs:  clear to auscultation bilaterally Abdomen:  soft, +BS. nondistended, nontender, without guarding, without rebound. No fluid wave. Extremity: extremities normal, atraumatic, no cyanosis or edema Wt Readings from Last 3 Encounters:  
06/16/19 174 lb 6.1 oz (79.1 kg) 06/11/19 171 lb (77.6 kg) 06/10/19 180 lb (81.6 kg) Data Review Lab Results Component Value Date/Time WBC 9.8 06/15/2019 04:57 AM  
 ABS. NEUTROPHILS 7.5 06/15/2019 04:57 AM  
 HGB 10.1 (L) 06/15/2019 04:57 AM  
 HCT 31.8 (L) 06/15/2019 04:57 AM  
 MCV 95.2 06/15/2019 04:57 AM  
 MCH 30.2 06/15/2019 04:57 AM  
 PLATELET 122 40/89/6382 04:57 AM  
 
Lab Results Component Value Date/Time  Sodium 137 06/17/2019 04:44 AM  
 Potassium 3.9 06/17/2019 04:44 AM  
 Chloride 106 06/17/2019 04:44 AM  
 CO2 22 06/17/2019 04:44 AM  
 Glucose 98 06/17/2019 04:44 AM  
 BUN 16 06/17/2019 04:44 AM  
 Creatinine 1.55 (H) 06/17/2019 04:44 AM  
 Calcium 7.9 (L) 06/17/2019 04:44 AM  
 Albumin 2.3 (L) 06/14/2019 08:18 PM  
 Bilirubin, total 0.5 06/14/2019 08:18 PM  
 AST (SGOT) 26 06/14/2019 08:18 PM  
 ALT (SGPT) 16 06/14/2019 08:18 PM  
 Alk. phosphatase 103 06/14/2019 08:18 PM  
 
CT Results (most recent): 
Results from Hospital Encounter encounter on 06/14/19 CT ABD PELV W CONT Narrative EXAM: CT ABD PELV W CONT INDICATION: Vomiting COMPARISON: 5/22/2019 CONTRAST: 100 mL of Isovue-370. TECHNIQUE:  
Following the uneventful intravenous administration of contrast, thin axial 
images were obtained through the abdomen and pelvis. Coronal and sagittal 
reconstructions were generated. Oral contrast was not administered. CT dose 
reduction was achieved through use of a standardized protocol tailored for this 
examination and automatic exposure control for dose modulation. FINDINGS:  
LUNG BASES: Clear. INCIDENTALLY IMAGED HEART AND MEDIASTINUM: Unchanged mediastinal 
lymphadenopathy. LIVER: Unchanged hepatic hypodensities measuring up to 1.3 cm. GALLBLADDER: Unremarkable. SPLEEN: Multiple low-attenuation splenic masses are not significantly changed. PANCREAS: No mass or ductal dilatation. ADRENALS: 3.9 x 2.3 cm right adrenal mass, not significantly changed. KIDNEYS: No mass, calculus, or hydronephrosis. STOMACH: Decompressed. SMALL BOWEL: Mild wall thickening in the lower abdomen. No dilatation. COLON: No dilatation or wall thickening. APPENDIX: Unremarkable. PERITONEUM: Small ascites, not significantly changed. Diffuse nodular peritoneal 
enhancement. RETROPERITONEUM: Unchanged retroperitoneal lymphadenopathy. REPRODUCTIVE ORGANS: Status post hysterectomy. URINARY BLADDER: Decompressed BONES: No destructive bone lesion. Degenerative disc disease at L2-3 and L5-S1. ADDITIONAL COMMENTS: N/A Impression IMPRESSION: 
No evidence of bowel obstruction. Mild small bowel wall thickening in the lower 
abdomen. Ascites with diffuse nodular peritoneal enhancement, compatible with carcinomatosis. Unchanged mediastinal and retroperitoneal lymphadenopathy. Unchanged splenic, right adrenal, and hepatic masses. Assessment/Plan:  
 
Admitted for SIRS (systemic inflammatory response syndrome) ([facility] [address] [R65.10] Enteritis [K52.9] Ascites [R18.8] Leukocytosis [D72.829] Tachycardia [R00.0] Nausea & vomiting [R11.2] Abdominal pain [R10.9] Active Hospital Problems Diagnosis Date Noted  Enteritis 06/14/2019  Leukocytosis 06/14/2019  Abdominal pain 06/14/2019  Tachycardia 06/14/2019  Nausea & vomiting 06/14/2019  Ascites 06/14/2019  SIRS (systemic inflammatory response syndrome) (HCC) 06/14/2019 Sloane SHARMAINE Gill * No surgery found *  for * No surgery found * Onc: stage IV UPSC 2014 s/p adjuvant taxol/carbo and pelvic RT, recurrence 8/2018 s/p Doxil/Avastin, now with progressive disease recommended carboplatin single agent salvage, cycle one completed 6/11/19 with CINV. Will have to adjust her regimen for CINV ppx with emend/aprepitant and add home regimen including decadron, scheduled antiemetics, H2B or PPI, return to [facility] for hydration following chemo. Consider GI dysfunction d/t carcinomatosis. May need add reglan. Continue antacid regimen and stool softeners. Heme/CV: Hemodynamically stable. Renal: adequate UOP per patient. No I/O record. JENNY? Cr up. Related to abx? FEN/GI: on reg diet. Still small AM emesis, possible delayed emptying. Evidence to consider GI dysfunction and begin scheduled Reglan. Consider SBFT if needed to look for delayed emptying. Continue miralax ID/Wound: afeb, abd improved. SIRS, bacteremia in 1/2 bottles, possible contaminate. Recheck yesterday NGTD. Off abx Neuro: continues on dilaudid PO for abd pain. PPX: ambulate. lovenox if stays. She is high risk for DVT Disposition: Improved overall. Would watch another day. Repeat BMP, start Reglan to help with reflux and suspected GI dysfunction. Angie [place], [place]

## 2019-06-17 NOTE — PROGRESS NOTES
NUTRITION Nutrition screening referral was triggered based on results obtained during nursing admission assessment. The patient's chart was reviewed and nutrition assessment is not indicated at this time. Per notes pt will hopefully be discharged today. If not, then RD will follow up and complete assessment as needed. Thank you. Alisa Mott, [address] [place] [address], [address] [place] [address]

## 2019-06-18 NOTE — PROGRESS NOTES
NUTRITION COMPLETE ASSESSMENT 
 
RECOMMENDATIONS:  
1. Continue reglan and bowel regimen per MD 
2. Encourage PO intake with small meals and snacks 3. Add daily MVI since declining Ensure Shakes 4. Weekly weights **Encourage pt follow-up with oncology RD after discharge - contact phone number provided during visit. Interventions/Plan:  
Food/Nutrient Delivery:    Commercial supplement(Magic Cup daily) Nutrition Education:(symptom mgmt; small/freq meals) Coordination of Care:  referral to oncology RD as needed Assessment:  
Reason for Assessment: [x] Provider Consult/[x]LOS Diet: Regular Supplements: none Nutritionally Significant Medications: [x] Reviewed & Includes: reglan (oral q8hr), protonix, miralax, pericolace, NS @ 125ml/hr; PRN: dilaudid Pre-Hospitalization: 
Usual Appetite: Fair Diet at Home: regular Vitamins/Supplements: No 
 
Current Hospitalization:  
Appetite: Fair PO Ability: Independent Average po intake:50-75%(past few day) Average supplements intake:    
  
Subjective: \"I'm doing much better than when I came in. I had chicken tenders and mashed potatoes at lunch. ... The acid is a major issue and then I get full so quickly. \" 
 
Objective: 
Pt admitted for  SIRS. PMHx: Stage 4 uterine CA s/p RT and chemo. N/V with enteritis s/p chemo noted. JENNY with IVF restarted, discharge delayed until improved. Gyn/onc following. Still with nausea and dry heaving this morning with abd pain. Possible delayed gastric emptying per PA notes. Anti-emetics and reglan in place with bowel regimen. May need SBFT. Abx in place for bacteremia. Pt visited, very pleasant. Report better tolerance of PO over past few days with no nausea today. Tolerating some of meals. Does not like idea of Ensure but agreeable to trial of magic Cup (290kcal, 9g protein).  Discussed likely better tolerance of smaller meals with snacks with abd distention/ascites. Pt verbalizing understanding. Has had contact with oncology RD but never followed up - encourage f/u as needed if wt loss continues. Severe wt loss of 3% x 1 week noted; 12% x 5 months. # prior to start of treatment. Wt Readings from Last 10 Encounters:  
06/18/19 79.2 kg (174 lb 9.7 oz) 06/11/19 77.6 kg (171 lb)  
06/10/19 81.6 kg (180 lb)  
06/10/19 81.5 kg (179 lb 9.6 oz) 05/28/19 81.6 kg (180 lb) 05/16/19 81.6 kg (180 lb)  
03/28/19 86 kg (189 lb 9.6 oz) 02/05/19 89 kg (196 lb 3.2 oz) 01/24/19 89.8 kg (198 lb) 01/08/19 91.8 kg (202 lb 6.4 oz) Will continue to follow for PO intake, GI symptom mgmt. Estimated Nutrition Needs:  
Kcals/day: 1975 Kcals/day(1975-2212kcal) Protein: 79 g(79-95g (1-1.2g/kg) with Audrey) Fluid: 2000 ml(1ml/kcal) Based On: Kcal/kg - specify (Comment)(25-28kcal/kg) Weight Used: Actual wt(79kg) Pt expected to meet estimated nutrient needs:  []   Yes     []  No [x] Unable to predict at this time Nutrition Diagnosis:  
1. Unintended weight loss related to inadequate protein/energy intake; increased energy expenditure as evidenced by severe wt loss x 6 months; stage 4 uterine CA;  poor PO w/ N/V  
 
Goals:   
 Consumption of at least 75% meals and 1 supplement/day in 5-7 days; wt maintenance Monitoring & Evaluation: - Total energy intake, Liquid meal replacement, Protein intake - Weight/weight change, GI, Electrolyte and renal profile Previous Nutrition Goals Met:   N/A Previous Recommendations:    N/A Education & Discharge Needs: 
 [] None Identified 
 [x] Identified and addressed  
 [] Participated in care plan, discharge planning, and/or interdisciplinary rounds Cultural, Holiness and ethnic food preferences identified: None Skin Integrity: [x]Intact  []Other Edema: []None [x]Other: abd distention Last BM: 6/17 Food Allergies: [x]None []Other Diet Restrictions: Cultural/Episcopal Preference(s): None Anthropometrics:   
Weight Loss Metrics 6/18/2019 6/11/2019 6/10/2019 6/10/2019 5/28/2019 5/16/2019 3/28/2019 Today's Wt 174 lb 9.7 oz 171 lb 180 lb 179 lb 9.6 oz 180 lb 180 lb 189 lb 9.6 oz  
BMI 28.18 kg/m2 27.6 kg/m2 28.19 kg/m2 29 kg/m2 29.07 kg/m2 29.07 kg/m2 30.62 kg/m2 Weight Source: Bed Height: 5' 6\" (167.6 cm), Body mass index is 28.18 kg/m². IBW : 59 kg (130 lb), % IBW (Calculated): 134.31 % Usual Body Weight: 99.8 kg (220 lb),   
 
Labs:   
Lab Results Component Value Date/Time Sodium 138 06/18/2019 10:15 AM  
 Potassium 3.8 06/18/2019 10:15 AM  
 Chloride 108 06/18/2019 10:15 AM  
 CO2 23 06/18/2019 10:15 AM  
 Glucose 123 (H) 06/18/2019 10:15 AM  
 BUN 14 06/18/2019 10:15 AM  
 Creatinine 1.96 (H) 06/18/2019 10:15 AM  
 Calcium 8.4 (L) 06/18/2019 10:15 AM  
 Magnesium 1.9 02/05/2019 10:16 AM  
 Albumin 2.3 (L) 06/14/2019 08:18 PM  
 
Carlita Brown, RD [address] , Pager #[phone] or [phone]

## 2019-06-18 NOTE — PROGRESS NOTES
Hospitalist Progress Note Lourdes Juarez MD 
Answering service: [phone] OR [phone] from in house phone Date of Service:  2019 NAME:  Grayson Hartley :  1960 MRN:  [id number] Admission Summary:  
60 y.o F with PMH of stage IV uterine cancer s/p THL,BSO on chemotherapy came to the hospital because of nausea/vomiting,poor oral intake and abdominal pain. Interval history / Subjective:  
 Patient is up and moving,no complaints. She is awake she is not discharged today due to the kidney numbers. Assessment & Plan:  
 
#JENNY,creatinine still rising,likley 2/2 to prerena/ATN due to abx etc.: 
-due to nausea/vomiting and abx (received vanc for 1/2 bottles positive blood cultures) 
-continue IVF -increase rate to 125 ml/hr 
-Monitor Labs. Check urine lytes Intractable nausea/vomiting:improved Due to chemotherapy,received carboplatin regimen last Tuesday 
-switch to compazine,d/c zofran 
-start regular diet 1/2 bottles gram positive clusters 
-coagulase negative staph -d/c vancomycin #Dehydration:improving 
-due to poor PO intake causing leukocytosis and tachycardia 
-continue IVF for now #Cancer related pain: 
-abdominal pain related to metastatic cancer. 
- she is taking 2-4 mg dilaudid at home,d/c morphine and increase to home dose dialudid. - She will need prescriptions at discharge as she is out of dilaudid at home # Stage IV uterine cancer: 
-s/p TLH,BSO and lymphadenectomy in  ,received chemo. Was started on chemo again for recurrence. 
-follows with  Small bowel thickening on CT abd - enteritis vs underlying disease. D/C zosyn soon  She does not have any fever,diarrhea,WBC wnl Her nausea/vomiting are related to recent chemo. Constipation: no stool burden on CT. As she is on narcotics,continue miralax. Code status: DNR 
DVT prophylaxis: lovenox Care Plan discussed with: Leroy Kennedy Disposition:need inpatient treatment and monitoring as creatinine is still rising. Hospital Problems  Date Reviewed: 6/10/2019 Codes Class Noted POA On antineoplastic chemotherapy ICD-10-CM: Z79.899 ICD-9-CM: V58.69  6/17/2019 Unknown Carcinomatosis ([facility] ICD-10-CM: C80.0 ICD-9-CM: 199.0  6/17/2019 Unknown Enteritis ICD-10-CM: K52.9 ICD-9-CM: 558.9  6/14/2019 Unknown Leukocytosis ICD-10-CM: I89.168 ICD-9-CM: 288.60  6/14/2019 Unknown Abdominal pain ICD-10-CM: R10.9 ICD-9-CM: 789.00  6/14/2019 Unknown Tachycardia ICD-10-CM: R00.0 ICD-9-CM: 785.0  6/14/2019 Unknown * (Principal) Nausea & vomiting ICD-10-CM: R11.2 ICD-9-CM: 787.01  6/14/2019 Unknown Ascites ICD-10-CM: R18.8 ICD-9-CM: 789.59  6/14/2019 Unknown SIRS (systemic inflammatory response syndrome) (HCC) ICD-10-CM: R65.10 ICD-9-CM: 995.90  6/14/2019 Unknown Cancer related pain ICD-10-CM: G89.3 ICD-9-CM: 338.3  1/8/2019 Yes Endometrial cancer ([facility] ICD-10-CM: C54.1 ICD-9-CM: 182.0  8/19/2014 Yes Review of Systems: As per HPI Vital Signs:  
 Last 24hrs VS reviewed since prior progress note. Most recent are: 
Visit Vitals /76 (BP 1 Location: Right arm, BP Patient Position: At rest) Pulse (!) 115 Temp 98.4 °F (36.9 °C) Resp 17 Ht 5' 6\" (1.676 m) Wt 79.2 kg (174 lb 9.7 oz) SpO2 98% BMI 28.18 kg/m² No intake or output data in the 24 hours ending 06/18/19 1059 Physical Examination:  
 
 
     
Constitutional:  No acute distress, cooperative, pleasant   
ENT:  Oral mucous moist, oropharynx benign. Neck supple, Resp:  CTA bilaterally. No wheezing/rhonchi/rales. No accessory muscle use Port cath left chest wall. CV:  Regular rhythm, normal rate, no murmurs, gallops, rubs GI:  Soft, tender in the lower abdomen,bowle sounds + Musculoskeletal:  No edema Neurologic:  Moves all extremities. AAOx3 Data Review:  
 Review and/or order of clinical lab test 
Review and/or order of tests in the medicine section of [facility] Labs:  
 
No results for input(s): WBC, HGB, HCT, PLT, HGBEXT, HCTEXT, PLTEXT, HGBEXT, HCTEXT, PLTEXT in the last 72 hours. Recent Labs  
  06/18/19 
0102 06/17/19 
1531 06/17/19 
0444  136 137  
K 3.8 3.7 3.9  105 106 CO2 23 25 22 BUN 15 16 16 CREA 1.86* 1.79* 1.55* * 112* 98  
CA 8.1* 8.2* 7.9* No results for input(s): SGOT, GPT, ALT, AP, TBIL, TBILI, TP, ALB, GLOB, GGT, AML, LPSE in the last 72 hours. No lab exists for component: AMYP, HLPSE No results for input(s): INR, PTP, APTT in the last 72 hours. No lab exists for component: INREXT, INREXT No results for input(s): FE, TIBC, PSAT, FERR in the last 72 hours. No results found for: FOL, RBCF No results for input(s): PH, PCO2, PO2 in the last 72 hours. No results for input(s): CPK, CKNDX, TROIQ in the last 72 hours. No lab exists for component: CPKMB No results found for: CHOL, CHOLX, CHLST, CHOLV, HDL, LDL, LDLC, DLDLP, TGLX, TRIGL, TRIGP, CHHD, CHHDX No results found for: [facility] Lab Results Component Value Date/Time Color YELLOW/STRAW 06/15/2019 12:17 AM  
 Appearance CLEAR 06/15/2019 12:17 AM  
 Specific gravity <1.005 06/15/2019 12:17 AM  
 Specific gravity 1.021 10/09/2018 10:23 AM  
 pH (UA) 7.5 06/15/2019 12:17 AM  
 Protein NEGATIVE  06/15/2019 12:17 AM  
 Glucose NEGATIVE  06/15/2019 12:17 AM  
 Ketone TRACE (A) 06/15/2019 12:17 AM  
 Bilirubin NEGATIVE  06/15/2019 12:17 AM  
 Urobilinogen 0.2 06/15/2019 12:17 AM  
 Nitrites NEGATIVE  06/15/2019 12:17 AM  
 Leukocyte Esterase NEGATIVE  06/15/2019 12:17 AM  
 Epithelial cells MODERATE (A) 10/09/2018 10:23 AM  
 Bacteria 1+ (A) 10/09/2018 10:23 AM  
 WBC 0-4 10/09/2018 10:23 AM  
 RBC 0-5 10/09/2018 10:23 AM  
 
 
 
Medications Reviewed: Current Facility-Administered Medications Medication Dose Route Frequency  HYDROmorphone (DILAUDID) tablet 2 mg  2 mg Oral Q4H PRN  
 senna-docusate (PERICOLACE) 8.6-50 mg per tablet 1 Tab  1 Tab Oral DAILY  pantoprazole (PROTONIX) tablet 40 mg  40 mg Oral ACB&D  
 metoclopramide HCl (REGLAN) tablet 10 mg  10 mg Oral Q8H  
 sodium chloride (NS) flush 5-40 mL  5-40 mL IntraVENous Q8H  
 sodium chloride (NS) flush 5-40 mL  5-40 mL IntraVENous PRN  
 aluminum & magnesium hydroxide-simethicone (MYLANTA II) oral suspension 30 mL  30 mL Oral Q4H PRN  polyethylene glycol (MIRALAX) packet 17 g  17 g Oral DAILY  sodium chloride (NS) flush 5-10 mL  5-10 mL IntraVENous PRN  
 0.9% sodium chloride infusion  125 mL/hr IntraVENous CONTINUOUS  
 
______________________________________________________________________ EXPECTED LENGTH OF STAY: 2d 7h 
ACTUAL LENGTH OF STAY:          4 Lourdes Juarez MD

## 2019-06-18 NOTE — PROGRESS NOTES
Lovenox Monitoring Indication: DVT Prophylaxis Recent Labs  
  06/18/19 
1015 06/18/19 
0102 06/17/19 
1531 CREA 1.96* 1.86* 1.79* Current Weight: 79.2 kg 
Est. CrCl = 32.4 ml/min Current Dose: 30 mg subcutaneously every 24 hours. Plan: Change to 40 mg subcutaneously every 24 hours with respect to renal status (CrCl >30 mL/min) per [facility]/[employer]-approved Renal Dosing Adjustment protocol. Pharmacy will continue to monitor this patient daily for changes in clinical status and renal function. Latosha Lai, PharmD Clinical Pharmacist, 5th Floor () [address] [facility] [phone]

## 2019-06-18 NOTE — PROGRESS NOTES
[address], [address] [place], [address] 
P [phone]  F [phone] Patient: Fatoumata Justice Admit Date: 6/14/2019 Admit Dx: [facility] response [facility] [R65.10] Enteritis [K52.9] Ascites [R18.8] Leukocytosis [D72.829] Tachycardia [R00.0] Nausea & vomiting [R11.2] Abdominal pain [R10.9] Subjective:  
 
Reports reflux better. \"emesis\" this AM with dry heaving and clear small volume in styrofoam cup. Tearful, feeling 'depressed\" Pain remains abd, >right.  +flatus. Little stool output. Pain in low abd requiring PO dilaudid. Overall she feels better and is looking forward to going home. No SOB. Ambulating well. Objective:  
 
 
Physical Exam 
/76 (BP 1 Location: Right arm, BP Patient Position: At rest)   Pulse (!) 115   Temp 98.4 °F (36.9 °C)   Resp 17   Ht 5' 6\" (1.676 m)   Wt 174 lb 9.7 oz (79.2 kg)   SpO2 98%   BMI 28.18 kg/m² General:  alert, cooperative, no distress Cardiac:  Regular rate and rhythm Lungs:  clear to auscultation bilaterally Abdomen:  soft, +BS. Generally tender, nondistended, without guarding, without rebound. No overt fluid wave. Extremity: extremities normal, atraumatic, no cyanosis or edema Wt Readings from Last 3 Encounters:  
06/18/19 174 lb 9.7 oz (79.2 kg) 06/11/19 171 lb (77.6 kg) 06/10/19 180 lb (81.6 kg) Data Review Lab Results Component Value Date/Time WBC 9.8 06/15/2019 04:57 AM  
 ABS. NEUTROPHILS 7.5 06/15/2019 04:57 AM  
 HGB 10.1 (L) 06/15/2019 04:57 AM  
 HCT 31.8 (L) 06/15/2019 04:57 AM  
 MCV 95.2 06/15/2019 04:57 AM  
 MCH 30.2 06/15/2019 04:57 AM  
 PLATELET 749 26/50/3379 04:57 AM  
 
Lab Results Component Value Date/Time  Sodium 139 06/18/2019 01:02 AM  
 Potassium 3.8 06/18/2019 01:02 AM  
 Chloride 108 06/18/2019 01:02 AM  
 CO2 23 06/18/2019 01:02 AM  
 Glucose 101 (H) 06/18/2019 01:02 AM  
 BUN 15 06/18/2019 01:02 AM  
 Creatinine 1.86 (H) 06/18/2019 01:02 AM  
 Calcium 8.1 (L) 06/18/2019 01:02 AM  
 Albumin 2.3 (L) 06/14/2019 08:18 PM  
 Bilirubin, total 0.5 06/14/2019 08:18 PM  
 AST (SGOT) 26 06/14/2019 08:18 PM  
 ALT (SGPT) 16 06/14/2019 08:18 PM  
 Alk. phosphatase 103 06/14/2019 08:18 PM  
 
CT Results (most recent): 
Results from Hospital Encounter encounter on 06/14/19 CT ABD PELV W CONT Narrative EXAM: CT ABD PELV W CONT INDICATION: Vomiting COMPARISON: 5/22/2019 CONTRAST: 100 mL of Isovue-370. TECHNIQUE:  
Following the uneventful intravenous administration of contrast, thin axial 
images were obtained through the abdomen and pelvis. Coronal and sagittal 
reconstructions were generated. Oral contrast was not administered. CT dose 
reduction was achieved through use of a standardized protocol tailored for this 
examination and automatic exposure control for dose modulation. FINDINGS:  
LUNG BASES: Clear. INCIDENTALLY IMAGED HEART AND MEDIASTINUM: Unchanged mediastinal 
lymphadenopathy. LIVER: Unchanged hepatic hypodensities measuring up to 1.3 cm. GALLBLADDER: Unremarkable. SPLEEN: Multiple low-attenuation splenic masses are not significantly changed. PANCREAS: No mass or ductal dilatation. ADRENALS: 3.9 x 2.3 cm right adrenal mass, not significantly changed. KIDNEYS: No mass, calculus, or hydronephrosis. STOMACH: Decompressed. SMALL BOWEL: Mild wall thickening in the lower abdomen. No dilatation. COLON: No dilatation or wall thickening. APPENDIX: Unremarkable. PERITONEUM: Small ascites, not significantly changed. Diffuse nodular peritoneal 
enhancement. RETROPERITONEUM: Unchanged retroperitoneal lymphadenopathy. REPRODUCTIVE ORGANS: Status post hysterectomy. URINARY BLADDER: Decompressed BONES: No destructive bone lesion. Degenerative disc disease at L2-3 and L5-S1. ADDITIONAL COMMENTS: N/A  Impression IMPRESSION: 
 No evidence of bowel obstruction. Mild small bowel wall thickening in the lower 
abdomen. Ascites with diffuse nodular peritoneal enhancement, compatible with 
carcinomatosis. Unchanged mediastinal and retroperitoneal lymphadenopathy. Unchanged splenic, right adrenal, and hepatic masses. Assessment/Plan:  
 
Admitted for SIRS (systemic inflammatory response syndrome) ([facility] [R65.10] Enteritis [K52.9] Ascites [R18.8] Leukocytosis [D72.829] Tachycardia [R00.0] Nausea & vomiting [R11.2] Abdominal pain [R10.9] Active Hospital Problems Diagnosis Date Noted  On antineoplastic chemotherapy 06/17/2019  Carcinomatosis ([facility] 06/17/2019  Enteritis 06/14/2019  Leukocytosis 06/14/2019  Abdominal pain 06/14/2019  Tachycardia 06/14/2019  Nausea & vomiting 06/14/2019  Ascites 06/14/2019  SIRS (systemic inflammatory response syndrome) (HCC) 06/14/2019  Cancer related pain 01/08/2019  Endometrial cancer ([facility] 08/19/2014 Onc: stage IV UPSC 2014 s/p adjuvant taxol/carbo and pelvic RT, recurrence 8/2018 s/p Doxil/Avastin, now with progressive disease recommended carboplatin single agent salvage, cycle one completed 6/11/19 with CINV. Will have to adjust her regimen for CINV ppx with emend/aprepitant and add home regimen including decadron, scheduled antiemetics, H2B or PPI, return to [facility] for hydration following chemo. S/p paracentesis. Monitor abd, sx. She may need another soon. Consider GI dysfunction d/t carcinomatosis. Improved with Reglan. Continue antacid regimen and stool softeners. Heme/CV: Hemodynamically stable. Renal: No I/O record. JENNY. Related to abx? Continue hydration. Ok to resume IVFs. Lost PIV. May access central port for labs/fluids. Difficult lab draw. FEN/GI: on reg diet. Still small AM emesis, Suspect delayed emptying. Scheduled Reglan, BID PPI.  Consider SBFT if needed to look for delayed emptying. Continue GI regimen. Nutrition consult. ID/Wound: afeb, off abx. SIRS, bacteremia in 1/2 bottles, possible contaminate. Recheck NGTD. Neuro: Dilaudid PO for cancer related pain. Controlled with daily to BID dosing. Psych: Depressed. ONN consult establish for oncology LCSW for counseling. Disposition: Improved overall. DC when renal fxn improved. F/u clinic PHILIP Bennett

## 2019-06-18 NOTE — PROGRESS NOTES
Hospitalist Progress Note Joslyn Carrington MD 
Answering service: [phone] OR [phone] from in house phone Date of Service:  2019 NAME:  Nicky Mercado :  1960 MRN:  [id number] Admission Summary:  
60 y.o F with PMH of stage IV uterine cancer s/p THL,BSO on chemotherapy came to the hospital because of nausea/vomiting,poor oral intake and abdominal pain. Interval history / Subjective:  
   
Patient states that nausea/vomiting has improved. Tolerating diet. Assessment & Plan:  
 
#JENNY: 
-due to nausea/vomiting and abx (received vanc for 1/2 bottles positive blood cultures) 
-continue IVF -increase rate to 125 ml/hr 
-repeat labs tomorrow 
-ordered vanc levels Intractable nausea/vomiting:improved Due to chemotherapy,received carboplatin regimen last Tuesday 
-switch to compazine,d/c zofran 
-start regular diet 1/2 bottles gram positive clusters 
-coagulase negative staph -d/c vancomycin #Dehydration:improving 
-due to poor PO intake causing leukocytosis and tachycardia 
-continue IVF for now #Cancer related pain: 
-abdominal pain related to metastatic cancer. 
- she is taking 2-4 mg dilaudid at home,d/c morphine and increase to home dose dialudid. - She will need prescriptions at discharge as she is out of dilaudid at home # Stage IV uterine cancer: 
-s/p TLH,BSO and lymphadenectomy in  ,received chemo. Was started on chemo again for recurrence. 
-follows with  Small bowel thickening on CT abd - enteritis vs underlying disease. D/C zosyn soon  She does not have any fever,diarrhea,WBC wnl Her nausea/vomiting are related to recent chemo. Constipation: no stool burden on CT. As she is on narcotics,continue miralax. Code status: DNR 
DVT prophylaxis: lovenox Care Plan discussed with: Rocio Vo Disposition: d/c 1-2 days when cr improves Hospital Problems  Date Reviewed: 6/10/2019 Codes Class Noted POA On antineoplastic chemotherapy ICD-10-CM: Z79.899 ICD-9-CM: V58.69  6/17/2019 Unknown Carcinomatosis ([address] ICD-10-CM: C80.0 ICD-9-CM: 199.0  6/17/2019 Unknown Enteritis ICD-10-CM: K52.9 ICD-9-CM: 558.9  6/14/2019 Unknown Leukocytosis ICD-10-CM: H56.140 ICD-9-CM: 288.60  6/14/2019 Unknown Abdominal pain ICD-10-CM: R10.9 ICD-9-CM: 789.00  6/14/2019 Unknown Tachycardia ICD-10-CM: R00.0 ICD-9-CM: 785.0  6/14/2019 Unknown Nausea & vomiting ICD-10-CM: R11.2 ICD-9-CM: 787.01  6/14/2019 Unknown Ascites ICD-10-CM: R18.8 ICD-9-CM: 789.59  6/14/2019 Unknown SIRS (systemic inflammatory response syndrome) (HCC) ICD-10-CM: R65.10 ICD-9-CM: 995.90  6/14/2019 Unknown Review of Systems: As per HPI Vital Signs:  
 Last 24hrs VS reviewed since prior progress note. Most recent are: 
Visit Vitals /69 (BP 1 Location: Right arm, BP Patient Position: At rest) Pulse (!) 112 Temp 99.3 °F (37.4 °C) Resp 16 Ht 5' 6\" (1.676 m) Wt 79.1 kg (174 lb 6.1 oz) SpO2 97% BMI 28.15 kg/m² No intake or output data in the 24 hours ending 06/18/19 0121 Physical Examination:  
 
 
     
Constitutional:  No acute distress, cooperative, pleasant   
ENT:  Oral mucous moist, oropharynx benign. Neck supple, Resp:  CTA bilaterally. No wheezing/rhonchi/rales. No accessory muscle use CV:  Regular rhythm, normal rate, no murmurs, gallops, rubs GI:  Soft, tender in the lower abdomen,bowle sounds + Musculoskeletal:  No edema Neurologic:  Moves all extremities. AAOx3 Data Review:  
 Review and/or order of clinical lab test 
Review and/or order of tests in the medicine section of [facility] Labs:  
 
Recent Labs  
  06/15/19 
0457 WBC 9.8 HGB 10.1* HCT 31.8*  
 Recent Labs  
  06/17/19 
1531 06/17/19 
0444 06/15/19 
0457  137 135* K 3.7 3.9 4.1  106 102 CO2 25 22 25 BUN 16 16 12 CREA 1.79* 1.55* 0.74 * 98 90 CA 8.2* 7.9* 8.0* No results for input(s): SGOT, GPT, ALT, AP, TBIL, TBILI, TP, ALB, GLOB, GGT, AML, LPSE in the last 72 hours. No lab exists for component: AMYP, HLPSE No results for input(s): INR, PTP, APTT in the last 72 hours. No lab exists for component: INREXT, INREXT No results for input(s): FE, TIBC, PSAT, FERR in the last 72 hours. No results found for: FOL, RBCF No results for input(s): PH, PCO2, PO2 in the last 72 hours. No results for input(s): CPK, CKNDX, TROIQ in the last 72 hours. No lab exists for component: CPKMB No results found for: CHOL, CHOLX, CHLST, CHOLV, HDL, LDL, LDLC, DLDLP, TGLX, TRIGL, TRIGP, CHHD, CHHDX No results found for: [facility] Lab Results Component Value Date/Time Color YELLOW/STRAW 06/15/2019 12:17 AM  
 Appearance CLEAR 06/15/2019 12:17 AM  
 Specific gravity <1.005 06/15/2019 12:17 AM  
 Specific gravity 1.021 10/09/2018 10:23 AM  
 pH (UA) 7.5 06/15/2019 12:17 AM  
 Protein NEGATIVE  06/15/2019 12:17 AM  
 Glucose NEGATIVE  06/15/2019 12:17 AM  
 Ketone TRACE (A) 06/15/2019 12:17 AM  
 Bilirubin NEGATIVE  06/15/2019 12:17 AM  
 Urobilinogen 0.2 06/15/2019 12:17 AM  
 Nitrites NEGATIVE  06/15/2019 12:17 AM  
 Leukocyte Esterase NEGATIVE  06/15/2019 12:17 AM  
 Epithelial cells MODERATE (A) 10/09/2018 10:23 AM  
 Bacteria 1+ (A) 10/09/2018 10:23 AM  
 WBC 0-4 10/09/2018 10:23 AM  
 RBC 0-5 10/09/2018 10:23 AM  
 
 
 
Medications Reviewed:  
 
Current Facility-Administered Medications Medication Dose Route Frequency  HYDROmorphone (DILAUDID) tablet 2 mg  2 mg Oral Q4H PRN  pantoprazole (PROTONIX) tablet 40 mg  40 mg Oral ACB&D  
 metoclopramide HCl (REGLAN) tablet 10 mg  10 mg Oral Q8H  
 sodium chloride (NS) flush 5-40 mL  5-40 mL IntraVENous Q8H  
 sodium chloride (NS) flush 5-40 mL  5-40 mL IntraVENous PRN  
  aluminum & magnesium hydroxide-simethicone (MYLANTA II) oral suspension 30 mL  30 mL Oral Q4H PRN  polyethylene glycol (MIRALAX) packet 17 g  17 g Oral DAILY  sodium chloride (NS) flush 5-10 mL  5-10 mL IntraVENous PRN  
 0.9% sodium chloride infusion  125 mL/hr IntraVENous CONTINUOUS  
 
______________________________________________________________________ EXPECTED LENGTH OF STAY: 2d 7h 
ACTUAL LENGTH OF STAY:          4 Filomena Burton MD

## 2019-06-19 NOTE — CONSULTS
Palliative Medicine Consult Jose: [phone] Patient Name: Siomara Desai YOB: 1960 Date of Initial Consult: 6/19/19 Reason for Consult: Overwhelming symptoms Requesting Provider: Dr. Maxwell Hartland Primary Care Physician: Alivia Amado MD 
 
 SUMMARY:  
Siomara Desai is a 60 y.o. with a past history of chronic low back pain, depression and stage IV uterine cancer s/p chemo and pelvic XRT (2014); recurrence (2018) s/p chemo and now with progressive disease s/p first cycle on salvage agent (6/11/19), who was admitted on 6/14/2019 from the ED with a diagnosis of intractable nausea, vomiting and abdominal pain. Current medical issues leading to Palliative Medicine involvement include:  Stage IV uterine carcinoma with overwhelming symptoms. Social Hx:   to [place]. Enjoys being active, gardening and shopping. They have two adult children. PALLIATIVE DIAGNOSES:  
1. Cancer associated pain - abdomen 2. Cancer associated nausea with emesis 3. Constipation 4. Peritoneal carcinomatosis PLAN:  
1. Abdominal pain: 1. Suspect primarily related to carcinomatosis, with component of ascites pain and abd distension. Also describes early satiety. 2. She has significant nausea; unclear how much is disease related vs SE of opioids, but this limits her desire to take doses and manage pain well. 3. Plan is for potential repeat paracentesis tomorrow. 4. She used Dilaudid 6 mg / 24 hrs.  
5. Favor keeping with short acting opioids for now, if she receives an Aspira catheter discomfort may be relieved enough to where she doesn't need as much opioid - her preference is to minimize opioids as much as possible. 6. Will start her on scheduled morphine IR 15 mg q4h while awake. 7. Morphine 5 mg IV q4h prn for severe pain / inability to take PO.  
2. Nausea and emesis: 
1. Unclear if opioid SE vs disease induced / slow bowel motility. 2. Increase Reglan to 10 mg q6h 3. OIN should resolve in a few days after opioids initiation 4. Mirtazapine may be a good option given some depressed mood; I will talk to her about this tomorrow. 3. Care plannin. Plan is to resume palliative chemo post discharge with Dr. Carrington Barnard 2. She has no AMD on file; would benefit from this discussion. Can be done in the outpatient setting. 3. She opted for DNR this admission; needs DDNR. 4. Recommend establishing care in palliative clinic post discharge 4. Initial consult note routed to primary continuity provider and/or primary health care team members 5. Communicated plan of care with: Palliative IDT, [address] Team 
 
 GOALS OF CARE / TREATMENT PREFERENCES:  
 
GOALS OF CARE: 
Patient/Health Care Proxy Stated Goals: Prolong life TREATMENT PREFERENCES:  
Code Status: DNR Advance Care Planning: 
[x] [facility] Interdisciplinary Team has updated the ACP Navigator with Jain and Patient Capacity Primary Decision MakerBenton Cerna - [place] - [phone] - serves as NOK. She has no AMD on file. Medical Interventions: Limited additional interventions(DNR/I this admission) Other Instructions: * Other: As far as possible, the palliative care team has discussed with patient / health care proxy about goals of care / treatment preferences for patient. HISTORY:  
 
History obtained from: patient, chart review CHIEF COMPLAINT: nausea and abd pain HPI/SUBJECTIVE: The patient is:  
[x] Verbal and participatory [] Non-participatory due to:  
 
 - Ms. Sanket Burr endorses abdominal pain for the past several weeks accompanied by nausea and vomiting. Pain stems from abdominal distension. She endorses relief after paracentesis on 6/10. Oral Dilaudid given for home use helped some but worsened her nausea and she is hesitant to take. She is having small hard infrequent BMs - currently drinking mag citrate. Clinical Pain Assessment (nonverbal scale for severity on nonverbal patients):  
Clinical Pain Assessment Severity: 5 Duration: for how long has pt been experiencing pain (e.g., 2 days, 1 month, years) Frequency: how often pain is an issue (e.g., several times per day, once every few days, constant) FUNCTIONAL ASSESSMENT:  
 
Palliative Performance Scale (PPS): PPS: 80 PSYCHOSOCIAL/SPIRITUAL SCREENING:  
 
Palliative IDT has assessed this patient for cultural preferences / practices and a referral made as appropriate to needs (Cultural Services, Patient Advocacy, Ethics, etc.) Any spiritual / Scientologist concerns: 
[] Yes /  [x] No 
 
Caregiver Burnout: 
[] Yes /  [x] No /  [] No Caregiver Present Anticipatory grief assessment:  
[x] Normal  / [] Maladaptive ESAS Anxiety: Anxiety: 0 
 
ESAS Depression: Depression: 2 REVIEW OF SYSTEMS:  
 
Positive and pertinent negative findings in ROS are noted above in HPI. The following systems were [x] reviewed / [] unable to be reviewed as noted in HPI Other findings are noted below. Systems: constitutional, ears/nose/mouth/throat, respiratory, gastrointestinal, genitourinary, musculoskeletal, integumentary, neurologic, psychiatric, endocrine. Positive findings noted below. Modified ESAS Completed by: provider Fatigue: 0 Drowsiness: 0 Depression: 2 Pain: 5 Anxiety: 0 Nausea: 8 Anorexia: 10 Dyspnea: 0 Constipation: Yes(infrequent hard BMs - \"I can't go naturally\") PHYSICAL EXAM:  
 
From RN flowsheet: 
Wt Readings from Last 3 Encounters:  
06/19/19 81.2 kg (179 lb 0.2 oz) 06/11/19 77.6 kg (171 lb)  
06/10/19 81.6 kg (180 lb) Blood pressure 165/81, pulse (!) 106, temperature 98.6 °F (37 °C), resp. rate 16, height 5' 6\" (1.676 m), weight 81.2 kg (179 lb 0.2 oz), SpO2 98 %. Pain Scale 1: Numeric (0 - 10) Pain Intensity 1: 5 Pain Onset 1: Tuesday Pain Location 1: Abdomen Pain Orientation 1: Anterior Pain Description 1: Sore Pain Intervention(s) 1: Declines Last bowel movement, if known:  
 
Constitutional: generally well appearing  female sitting up comfortably in bed Eyes: pupils equal, anicteric ENMT: no nasal discharge, moist mucous membranes Cardiovascular: regular rhythm, distal pulses intact Respiratory: breathing not labored, symmetric Gastrointestinal:  Distended, soft, BS active, no tenderness on palpation Musculoskeletal: no deformity, no tenderness to palpation Skin: warm, dry Neurologic: following commands, moving all extremities Psychiatric: full affect, no hallucinations HISTORY:  
 
Principal Problem: 
  Nausea & vomiting (6/14/2019) Active Problems: 
  Endometrial cancer ([address] (8/19/2014) Cancer related pain (1/8/2019) Enteritis (6/14/2019) Leukocytosis (6/14/2019) Abdominal pain (6/14/2019) Tachycardia (6/14/2019) Ascites (6/14/2019) SIRS (systemic inflammatory response syndrome) ([address] (6/14/2019) On antineoplastic chemotherapy (6/17/2019) Carcinomatosis ([address] (6/17/2019) Past Medical History:  
Diagnosis Date  Arthritis  Cancer [facility]) 2014  
 uterine  Chronic pain LOWER BACK  Depression  Nausea & vomiting  PMB (postmenopausal bleeding) 4/2014 Past Surgical History:  
Procedure Laterality Date  HX COLONOSCOPY    
 HX GYN  8/22/14 TLH/BSO/lymph node dissection  HX ORTHOPAEDIC Right [id number] LITTLE toe sx  HX VASCULAR ACCESS Left 2014 PORTACATH - LEFT CHEST Family History Problem Relation Age of Onset  Cancer Mother 61  
     liver metastatic disease, unknown primary  Stroke Mother BRAIN ANEURYSM  
 Cancer Maternal Uncle 60  
     bone  Cancer Maternal Grandmother 61  
     unknown primary  Anesth Problems Neg Hx History reviewed, no pertinent family history. Social History Tobacco Use  Smoking status: Former Smoker Packs/day: 0.50 Years: 7.00 Pack years: 3.50 Types: Cigarettes Last attempt to quit: 1999 Years since quittin.8  Smokeless tobacco: Never Used Substance Use Topics  Alcohol use: Yes Alcohol/week: 0.6 oz Types: 1 Glasses of wine per week Comment: rarely Allergies Allergen Reactions  Hydrocodone Nausea Only  Oxycodone Nausea and Vomiting Current Facility-Administered Medications Medication Dose Route Frequency  prochlorperazine (COMPAZINE) with saline injection 5 mg  5 mg IntraVENous Q6H PRN  
 senna-docusate (PERICOLACE) 8.6-50 mg per tablet 2 Tab  2 Tab Oral DAILY  bisacodyl (DULCOLAX) suppository 10 mg  10 mg Rectal DAILY PRN  
 morphine IR (MS IR) tablet 15 mg  15 mg Oral Q4HWA  metoclopramide HCl (REGLAN) tablet 10 mg  10 mg Oral Q6H  
 morphine 10 mg/ml injection 5 mg  5 mg IntraVENous Q4H PRN  
 enoxaparin (LOVENOX) injection 40 mg  40 mg SubCUTAneous Q24H  pantoprazole (PROTONIX) tablet 40 mg  40 mg Oral ACB&D  
 sodium chloride (NS) flush 5-40 mL  5-40 mL IntraVENous Q8H  
 sodium chloride (NS) flush 5-40 mL  5-40 mL IntraVENous PRN  
 aluminum & magnesium hydroxide-simethicone (MYLANTA II) oral suspension 30 mL  30 mL Oral Q4H PRN  polyethylene glycol (MIRALAX) packet 17 g  17 g Oral DAILY  sodium chloride (NS) flush 5-10 mL  5-10 mL IntraVENous PRN  
 0.9% sodium chloride infusion  100 mL/hr IntraVENous CONTINUOUS  
 
 
 
 LAB AND IMAGING FINDINGS:  
 
Lab Results Component Value Date/Time WBC 9.8 06/15/2019 04:57 AM  
 HGB 10.1 (L) 06/15/2019 04:57 AM  
 PLATELET 601  04:57 AM  
 
Lab Results Component Value Date/Time  Sodium 139 2019 06:49 AM  
 Potassium 3.6 2019 06:49 AM  
 Chloride 109 (H) 2019 06:49 AM  
 CO2 23 2019 06:49 AM  
 BUN 12 2019 06:49 AM  
 Creatinine 1.68 (H) 2019 06:49 AM  
 Calcium 8.3 (L) 2019 06:49 AM  
 Magnesium 1.9 02/05/2019 10:16 AM  
  
Lab Results Component Value Date/Time AST (SGOT) 26 06/14/2019 08:18 PM  
 Alk. phosphatase 103 06/14/2019 08:18 PM  
 Protein, total 7.9 06/14/2019 08:18 PM  
 Albumin 2.3 (L) 06/14/2019 08:18 PM  
 Globulin 5.6 (H) 06/14/2019 08:18 PM  
 
Lab Results Component Value Date/Time INR 1.1 06/10/2019 12:10 PM  
 Prothrombin time 10.8 06/10/2019 12:10 PM  
  
No results found for: IRON, FE, TIBC, IBCT, PSAT, FERR No results found for: PH, PCO2, PO2 No components found for: [place] No results found for: CPK, CKMB Total time:  
Counseling / coordination time, spent as noted above:  
> 50% counseling / coordination?:  
 
Prolonged service was provided for  []30 min   []75 min in face to face time in the presence of the patient, spent as noted above. Time Start:  
Time End:  
Note: this can only be billed with 87157 (initial) or 93931 (follow up). If multiple start / stop times, list each separately.

## 2019-06-19 NOTE — PROGRESS NOTES
Hospitalist Progress Note Tiffany Haddad MD 
Answering service: [phone] OR [phone] from in house phone Date of Service:  2019 NAME:  Juvencio Lujan :  1960 MRN:  [id number] Admission Summary:  
60 y.o F with PMH of stage IV uterine cancer s/p THL,BSO on chemotherapy came to the hospital because of nausea/vomiting,poor oral intake and abdominal pain. Interval history / Subjective: Ms Ivonne Walker says she gets more nausea and abdominal pain in the night. She says she normally takes two of the 2mg dilaudid at home during nights. She says she does not seem pain free. She feels she is constipated. Assessment & Plan:  
 
#JENNY,likley 2/2 to prerena/ATN due to abx etc.:  
-due to nausea/vomiting and abx (received vanc for 1/2 bottles positive blood cultures) -Creatinine started coming down today. Decrease IV fluids,encourage oral intake Intractable nausea/vomiting:improved Due to chemotherapy,received carboplatin regimen last Tuesday 
-switch to compazine,d/c zofran. On scheduled reglan. Prn compazine. 
-start regular diet 1#/2 bottles gram positive clusters 
-coagulase negative staph -d/c vancomycin #Cancer related pain,uncontrolled: 
-abdominal pain related to metastatic cancer. 
- She was getting 2-4 mg dilaudid. I think she needs a pain regimen with long acting scheduled and prn medications. # Stage IV uterine cancer: 
-s/p TLH,BSO and lymphadenectomy in  ,received chemo. Was started on chemo again for recurrence. 
-follows with  Small bowel thickening on CT abd - enteritis vs underlying disease 
-Zosyn was stopped as she did not have evidence of infection. Constipation:bowel regimen adjusted: Citrate of magnesia,bisacodyl supp,miralaz,pericolace. Code status: DNR 
DVT prophylaxis: lovenox Care Plan discussed with: Artist Distance Disposition:need inpatient treatment and monitoring as creatinine is still rising. Hospital Problems  Date Reviewed: 6/10/2019 Codes Class Noted POA On antineoplastic chemotherapy ICD-10-CM: Z79.899 ICD-9-CM: V58.69  6/17/2019 Unknown Carcinomatosis ([facility] ICD-10-CM: C80.0 ICD-9-CM: 199.0  6/17/2019 Unknown Enteritis ICD-10-CM: K52.9 ICD-9-CM: 558.9  6/14/2019 Unknown Leukocytosis ICD-10-CM: K60.519 ICD-9-CM: 288.60  6/14/2019 Unknown Abdominal pain ICD-10-CM: R10.9 ICD-9-CM: 789.00  6/14/2019 Unknown Tachycardia ICD-10-CM: R00.0 ICD-9-CM: 785.0  6/14/2019 Unknown * (Principal) Nausea & vomiting ICD-10-CM: R11.2 ICD-9-CM: 787.01  6/14/2019 Unknown Ascites ICD-10-CM: R18.8 ICD-9-CM: 789.59  6/14/2019 Unknown SIRS (systemic inflammatory response syndrome) (HCC) ICD-10-CM: R65.10 ICD-9-CM: 995.90  6/14/2019 Unknown Cancer related pain ICD-10-CM: G89.3 ICD-9-CM: 338.3  1/8/2019 Yes Endometrial cancer ([facility] ICD-10-CM: C54.1 ICD-9-CM: 182.0  8/19/2014 Yes Review of Systems: As per HPI Vital Signs:  
 Last 24hrs VS reviewed since prior progress note. Most recent are: 
Visit Vitals BP (!) 169/91 Pulse (!) 105 Temp 98.4 °F (36.9 °C) Resp 16 Ht 5' 6\" (1.676 m) Wt 79.2 kg (174 lb 9.7 oz) SpO2 98% BMI 28.18 kg/m² No intake or output data in the 24 hours ending 06/19/19 0902 Physical Examination:  
 
 
     
Constitutional:  No acute distress, cooperative, pleasant   
ENT:  Oral mucous moist, oropharynx benign. Neck supple, Resp:  CTA bilaterally. No wheezing/rhonchi/rales. No accessory muscle use Port cath left chest wall. CV:  Regular rhythm, normal rate, no murmurs, gallops, rubs GI:  Soft, tender in the lower abdomen,bowle sounds + Musculoskeletal:  No edema Neurologic:  Moves all extremities. AAOx3 Data Review:  
 Review and/or order of clinical lab test 
 Review and/or order of tests in the medicine section of [facility] Labs:  
 
No results for input(s): WBC, HGB, HCT, PLT, HGBEXT, HCTEXT, PLTEXT, HGBEXT, HCTEXT, PLTEXT in the last 72 hours. Recent Labs  
  06/19/19 
0649 06/18/19 
1015 06/18/19 
0102  138 139  
K 3.6 3.8 3.8 * 108 108 CO2 23 23 23 BUN 12 14 15 CREA 1.68* 1.96* 1.86* GLU 97 123* 101* CA 8.3* 8.4* 8.1* No results for input(s): SGOT, GPT, ALT, AP, TBIL, TBILI, TP, ALB, GLOB, GGT, AML, LPSE in the last 72 hours. No lab exists for component: AMYP, HLPSE No results for input(s): INR, PTP, APTT in the last 72 hours. No lab exists for component: INREXT, INREXT No results for input(s): FE, TIBC, PSAT, FERR in the last 72 hours. No results found for: FOL, RBCF No results for input(s): PH, PCO2, PO2 in the last 72 hours. No results for input(s): CPK, CKNDX, TROIQ in the last 72 hours. No lab exists for component: CPKMB No results found for: CHOL, CHOLX, CHLST, CHOLV, HDL, LDL, LDLC, DLDLP, TGLX, TRIGL, TRIGP, CHHD, CHHDX No results found for: Sj Poon Lab Results Component Value Date/Time Color YELLOW/STRAW 06/15/2019 12:17 AM  
 Appearance CLEAR 06/15/2019 12:17 AM  
 Specific gravity <1.005 06/15/2019 12:17 AM  
 Specific gravity 1.021 10/09/2018 10:23 AM  
 pH (UA) 7.5 06/15/2019 12:17 AM  
 Protein NEGATIVE  06/15/2019 12:17 AM  
 Glucose NEGATIVE  06/15/2019 12:17 AM  
 Ketone TRACE (A) 06/15/2019 12:17 AM  
 Bilirubin NEGATIVE  06/15/2019 12:17 AM  
 Urobilinogen 0.2 06/15/2019 12:17 AM  
 Nitrites NEGATIVE  06/15/2019 12:17 AM  
 Leukocyte Esterase NEGATIVE  06/15/2019 12:17 AM  
 Epithelial cells MODERATE (A) 10/09/2018 10:23 AM  
 Bacteria 1+ (A) 10/09/2018 10:23 AM  
 WBC 0-4 10/09/2018 10:23 AM  
 RBC 0-5 10/09/2018 10:23 AM  
 
 
 
Medications Reviewed:  
 
Current Facility-Administered Medications Medication Dose Route Frequency  prochlorperazine (COMPAZINE) with saline injection 5 mg  5 mg IntraVENous Q6H PRN  
 HYDROmorphone (DILAUDID) tablet 2-4 mg  2-4 mg Oral Q4H PRN  
 senna-docusate (PERICOLACE) 8.6-50 mg per tablet 2 Tab  2 Tab Oral DAILY  bisacodyl (DULCOLAX) suppository 10 mg  10 mg Rectal DAILY PRN  
 magnesium citrate solution 296 mL  296 mL Oral NOW  enoxaparin (LOVENOX) injection 40 mg  40 mg SubCUTAneous Q24H  pantoprazole (PROTONIX) tablet 40 mg  40 mg Oral ACB&D  
 metoclopramide HCl (REGLAN) tablet 10 mg  10 mg Oral Q8H  
 sodium chloride (NS) flush 5-40 mL  5-40 mL IntraVENous Q8H  
 sodium chloride (NS) flush 5-40 mL  5-40 mL IntraVENous PRN  
 aluminum & magnesium hydroxide-simethicone (MYLANTA II) oral suspension 30 mL  30 mL Oral Q4H PRN  polyethylene glycol (MIRALAX) packet 17 g  17 g Oral DAILY  sodium chloride (NS) flush 5-10 mL  5-10 mL IntraVENous PRN  
 0.9% sodium chloride infusion  100 mL/hr IntraVENous CONTINUOUS  
 
______________________________________________________________________ EXPECTED LENGTH OF STAY: 2d 7h 
ACTUAL LENGTH OF STAY:          5 Kaley Maya MD

## 2019-06-19 NOTE — PALLIATIVE CARE
Palliative Medicine Social Work Ms. Edy Carnes is a 60year old woman with a history significant for chronic low back pain, depression and stage IV uterine cancer s/p chemo and pelvic XRT (2014); recurrence (2018) s/p chemo and now with progressive disease s/p first cycle on salvage agent (6/11/19). Patient was admitted on 6/14 with nausea/vomiting and suprapubic pain secondary to carcinomatosis s/p paracentesis. Patient is struggling with some depression and passive consideration of stopping treatment. She has no AMD on file. During this admission, attending confirmed wishes for DNR. Her , Carlos Alarcon ([phone]) is NOK. Palliative was consulted by attending physician for assistance with symptom management. Will follow up. Thank you for the opportunity to be involved in the care of Ms. Edy Carnes. Anel Douglas, MANASAW, [facility]-SW Palliative Medicine  [employer] ® ACP Facilitator [phone]

## 2019-06-19 NOTE — PROGRESS NOTES
Oncology Nurse [address] [facility]   Phone: [phone] referral from [place], [place], to see patient, offer navigation support and help with accessing integrative supports in [facility]    She is admitted with symptoms related to metastatic endometrial cancer. Briefly, she was dx in 2014 -- s/p THL and BSO, chemo and pelvic RT -- recurred in 2018 treated with palliative doxil and avastin. Rec'ed carboplatin 6/11 for progressed disease. Had recent paracentesis for 5L. Her CINV and obstructive and reflux symptoms are better controlled now, and RFTs are slowly improving. Met with Sloane at bedside. She is pleasant and happy to talk about her situation. She admits her mood is improved since she is feeling better. Hopes for discharge home tomorrow following \"another ascites tap. \"    She recounts learning a lot about her body and disease since her last chemo on 6/11. Didn't know the word \"ascites\" and thought this meant \"acid\" -- related to the her \"acidy stomach. \"  Describes much distress from abd distention prior to paracentesis. She did vomit today so \"it wasn't a perfect day\" but it's slowly improved. She feels like she wants to get up a move around. Hopes to eat. Pain and nausea and heartburn are better managed. Is insightful about her mood -- she wasn't sure where to turn for anything positive for a couple of days -- but feels better now and is open to seeking help but also asks for permission to feel low sometimes. She has not felt she needed help from [facility] up until recently but is open. Cites her personal use of guided imagery to help with relaxation. Describes a group of friends and cancer survivors who once gathered at homes with 'candles and tea to do guided imagery'. She is  -- \"my  and I have been alone for 35 years. \"  \"He's more worried about me than I probably am.\"  Two adult children live in [place] -- 'we rarely see them but I'm happy they can travel and have fun while they are young. \"  Her children are aware she is hospitalized. She has cats and a dog. Lives near [facility]. Retired from a management position, used to [employer]" which makes uncertainty of cancer and feelings of loss of control more acute. Feels grateful she has [place]EN[place] MABEL Montemayor. \"  Denies transportation barriers and anticipates ongoing palliative chemo -- specifically hopes the chemo \"dries up the ascites\" --she wants to feel good for as long as she lives. Actions:  1. Introduced ONN role --exchanged contact info. 2.  Discussed complimentary integrative supports at [facility] including counseling, relaxation/meditation, massage, art and music therapy, acupuncture, yoga. She knows how to contact and feels open. She's aware support is for her , too. She has contact info and I've offered to help her schedule appts. 3.  Discussed role of Pall Med in working with [facility] to help with QOL and symptom mgt -- she is beginning to learn about this service --     Will follow with [address]. Aim to check in with Sloane at next [facility] appt.       Nathalie Duckworth MS RN AOCNS

## 2019-06-19 NOTE — PROGRESS NOTES
[address], [address] [place], [address] [address] 
P [phone]  F [phone] Patient: Christina Elena Admit Date: 6/14/2019 Admit Dx: S[facility]ic [facility] response [facility] [R65.10] Enteritis [K52.9] Ascites [R18.8] Leukocytosis [D72.829] Tachycardia [R00.0] Nausea & vomiting [R11.2] Abdominal pain [R10.9] Subjective:  
 
Reports reflux better. \"emesis\" this AM with dry heaving on waking. Again small volume, clear. Tearful, feeling \"depressed\" yesterday. Happy about her renal function. Pain remains abd, >right/suprapubic. Used ~6mg PO dilaudid yesterday. States it is constant until it gets bad enough to take something. +flatus. Small/firm BM this Am. Overall she feels better and is looking forward to going home. No SOB. Ambulating well. Objective:  
 
 
Physical Exam 
/81   Pulse (!) 106   Temp 98.6 °F (37 °C)   Resp 16   Ht 5' 6\" (1.676 m)   Wt 175 lb 4.3 oz (79.5 kg)   SpO2 98%   BMI 28.29 kg/m² General:  alert, cooperative, no distress Cardiac:  Regular rate and rhythm Lungs:  clear to auscultation bilaterally Abdomen:  soft, +BS. Tender subjectively, mildly protuberant w/o distention/tympani. without guarding, without rebound. Possible slight fluid wave. She feels girth of abd larger Extremity: extremities normal, atraumatic, no cyanosis or edema Wt Readings from Last 3 Encounters:  
06/19/19 175 lb 4.3 oz (79.5 kg) 06/11/19 171 lb (77.6 kg) 06/10/19 180 lb (81.6 kg) Data Review Lab Results Component Value Date/Time WBC 9.8 06/15/2019 04:57 AM  
 ABS. NEUTROPHILS 7.5 06/15/2019 04:57 AM  
 HGB 10.1 (L) 06/15/2019 04:57 AM  
 HCT 31.8 (L) 06/15/2019 04:57 AM  
 MCV 95.2 06/15/2019 04:57 AM  
 MCH 30.2 06/15/2019 04:57 AM  
 PLATELET 363 69/54/2471 04:57 AM  
 
Lab Results Component Value Date/Time  Sodium 139 06/19/2019 06:49 AM  
 Potassium 3.6 06/19/2019 06:49 AM  
 Chloride 109 (H) 06/19/2019 06:49 AM  
 CO2 23 06/19/2019 06:49 AM  
 Glucose 97 06/19/2019 06:49 AM  
 BUN 12 06/19/2019 06:49 AM  
 Creatinine 1.68 (H) 06/19/2019 06:49 AM  
 Calcium 8.3 (L) 06/19/2019 06:49 AM  
 Albumin 2.3 (L) 06/14/2019 08:18 PM  
 Bilirubin, total 0.5 06/14/2019 08:18 PM  
 AST (SGOT) 26 06/14/2019 08:18 PM  
 ALT (SGPT) 16 06/14/2019 08:18 PM  
 Alk. phosphatase 103 06/14/2019 08:18 PM  
 
CT Results (most recent): 
Results from Hospital Encounter encounter on 06/14/19 CT ABD PELV W CONT Narrative EXAM: CT ABD PELV W CONT INDICATION: Vomiting COMPARISON: 5/22/2019 CONTRAST: 100 mL of Isovue-370. TECHNIQUE:  
Following the uneventful intravenous administration of contrast, thin axial 
images were obtained through the abdomen and pelvis. Coronal and sagittal 
reconstructions were generated. Oral contrast was not administered. CT dose 
reduction was achieved through use of a standardized protocol tailored for this 
examination and automatic exposure control for dose modulation. FINDINGS:  
LUNG BASES: Clear. INCIDENTALLY IMAGED HEART AND MEDIASTINUM: Unchanged mediastinal 
lymphadenopathy. LIVER: Unchanged hepatic hypodensities measuring up to 1.3 cm. GALLBLADDER: Unremarkable. SPLEEN: Multiple low-attenuation splenic masses are not significantly changed. PANCREAS: No mass or ductal dilatation. ADRENALS: 3.9 x 2.3 cm right adrenal mass, not significantly changed. KIDNEYS: No mass, calculus, or hydronephrosis. STOMACH: Decompressed. SMALL BOWEL: Mild wall thickening in the lower abdomen. No dilatation. COLON: No dilatation or wall thickening. APPENDIX: Unremarkable. PERITONEUM: Small ascites, not significantly changed. Diffuse nodular peritoneal 
enhancement. RETROPERITONEUM: Unchanged retroperitoneal lymphadenopathy. REPRODUCTIVE ORGANS: Status post hysterectomy. URINARY BLADDER: Decompressed BONES: No destructive bone lesion. Degenerative disc disease at L2-3 and L5-S1. ADDITIONAL COMMENTS: N/A Impression IMPRESSION: 
No evidence of bowel obstruction. Mild small bowel wall thickening in the lower 
abdomen. Ascites with diffuse nodular peritoneal enhancement, compatible with 
carcinomatosis. Unchanged mediastinal and retroperitoneal lymphadenopathy. Unchanged splenic, right adrenal, and hepatic masses. Assessment/Plan:  
 
Admitted for SIRS (systemic inflammatory response syndrome) [facility] [R65.10] Enteritis [K52.9] Ascites [R18.8] Leukocytosis [D72.829] Tachycardia [R00.0] Nausea & vomiting [R11.2] Abdominal pain [R10.9] Active Hospital Problems Diagnosis Date Noted  On antineoplastic chemotherapy 06/17/2019  Carcinomatosis ([facility] 06/17/2019  Enteritis 06/14/2019  Leukocytosis 06/14/2019  Abdominal pain 06/14/2019  Tachycardia 06/14/2019  Nausea & vomiting 06/14/2019  Ascites 06/14/2019  SIRS (systemic inflammatory response syndrome) (H[facility]) 06/14/2019  Cancer related pain 01/08/2019  [facility] 08/19/2014 Onc: stage IV UPSC 2014 s/p adjuvant taxol/carbo and pelvic RT, recurrence 8/2018 s/p Doxil/Avastin, now with progressive disease recommended carboplatin single agent salvage, cycle one completed 6/11/19 with CINV. Will have to adjust her regimen for CINV ppx with emend/aprepitant and add home regimen including decadron, scheduled antiemetics, H2B or PPI, return to [facility] for hydration following chemo. S/p paracentesis 6/10. We can eval for add'l fluid prior to discharge if her sx remain stable. Consider GI dysfunction d/t carcinomatosis. Improved with Reglan. Continue antacid regimen and stool softeners. Heme/CV: Hemodynamically stable. Renal: No I/O record. JENNY. Related to abx? Continue hydration. Cr improving. FEN/GI: on reg diet. Still small AM emesis, Suspect delayed emptying. Scheduled Reglan, BID PPI. Consider SBFT if needed to look for delayed emptying. Continue GI regimen. Nutrition consult. ID/Wound: afeb, off abx. SIRS, bacteremia in 1/2 bottles, possible contaminate. Recheck NGTD. Neuro: Dilaudid PO for cancer related pain. Will start morphine CR. Dilaudid PRN Psych: Depressed. ONN consult establish for oncology LCSW for counseling. Disposition: Improved overall. DC when renal fxn improved. US eval for paracent prior to DC. F/u clinic, continue chemo PHILIP Ramsay

## 2019-06-20 NOTE — PROGRESS NOTES
Gyn Onc Progress note Patient accidentally pulled out IP drain. ~1100mls in bag. Still early AM nausea and \"emesis\" as per prior days. Pain overall today has been better with a good BM. Ambulating/tolerating diet today. US will perform ascites check at bedside. If sig quantity ascites remains, plan to do paracentesis in AM with no drain to be left in place. If only trace amount, plan to DC in AM. Patient is comfortable with the plan.  gone for the evening to work.  
 
PHILIP Sosa

## 2019-06-20 NOTE — CONSULTS
Palliative Medicine Consult Jose: [phone] Patient Name: Vamshi Holloway YOB: 1960 Date of Initial Consult: 6/19/19 Reason for Consult: Overwhelming symptoms Requesting Provider: Dr. Betito Barnett Primary Care Physician: Elida Akers MD 
 
 SUMMARY:  
Vamshi Holloway is a 60 y.o. with a past history of chronic low back pain, depression and stage IV uterine cancer s/p chemo and pelvic XRT (2014); recurrence (2018) s/p chemo and now with progressive disease s/p first cycle on salvage agent (6/11/19), who was admitted on 6/14/2019 from the ED with a diagnosis of intractable nausea, vomiting and abdominal pain. Current medical issues leading to Palliative Medicine involvement include:  Stage IV uterine carcinoma with overwhelming symptoms. Social Hx:   to [place]. Enjoys being active, gardening and shopping. They have two adult children. PALLIATIVE DIAGNOSES:  
1. Cancer associated pain - abdomen 2. Cancer associated nausea with emesis 3. Constipation 4. Peritoneal carcinomatosis PLAN:  
1. Abdominal pain: 1. Suspect primarily related to carcinomatosis, with component of ascites pain and abd distension. Also describes early satiety. 2. She has significant nausea; unclear how much is disease related vs SE of opioids, but this limits her desire to take doses and manage pain well. 3. She is feeling better today - pain resolved. 4. Used morphine IR 15 mg x 2 doses and Dilaudid 4 mg po x 1 dose past 24 hrs. Has not needed any pain management today. 5. Plan is for repeat paracentesis today with possible discharge post procedure. 6. She has no opioid left at home; I provided a script for morphine IR 15 mg q4h prn #30.     
7. I have referred her to palliative care clinic. 2. Nausea and emesis: 
1. Unclear if opioid SE vs disease induced / slow bowel motility. 2. Cont Reglan to 10 mg q6h 
3. OIN should resolve in a few days after opioids initiation 4. Spirits are good today; prefers to minimize medication. Can consider mirtazapine as an outpatient. 3. Care plannin. Plan is to resume palliative chemo post discharge with Dr. Abigail Dickens 2. She has no AMD on file. I talked to her about this today and she is ready to have this conversation, will defer to clinic setting as we focused again on her symptoms today. 3. I did talk to her and her  about her decision surrounding resuscitation and she confirmed her desire against CPR. We completed a DDNR today. 4. Communicated plan of care with: Palliative IDT, [address] Team 
 
 GOALS OF CARE / TREATMENT PREFERENCES:  
 
GOALS OF CARE: 
Patient/Health Care Proxy Stated Goals: Prolong life TREATMENT PREFERENCES:  
Code Status: DNR Advance Care Planning: 
[x] The [facility] Interdisciplinary Team has updated the ACP Navigator with [address] and Patient Capacity Primary Decision MakerDelories Anatoly - [place] - [phone] - serves as NOK. She has no AMD on file. Medical Interventions: Limited additional interventions(DNR/I this admission) Other Instructions: * Other: As far as possible, the palliative care team has discussed with patient / health care proxy about goals of care / treatment preferences for patient. HISTORY:  
 
History obtained from: patient, chart review CHIEF COMPLAINT: nausea and abd pain HPI/SUBJECTIVE: The patient is:  
[x] Verbal and participatory [] Non-participatory due to:  
 
 - abd pain resolved this morning; refused scheduled morphine IR. She is happy we opted not to start long acting agent. Her nausea persists but is better.  - Ms. Calin Lemus endorses abdominal pain for the past several weeks accompanied by nausea and vomiting. Pain stems from abdominal distension. She endorses relief after paracentesis on 6/10.   Oral Dilaudid given for home use helped some but worsened her nausea and she is hesitant to take. She is having small hard infrequent BMs - currently drinking mag citrate. Clinical Pain Assessment (nonverbal scale for severity on nonverbal patients):  
Clinical Pain Assessment Severity: 5 Duration: for how long has pt been experiencing pain (e.g., 2 days, 1 month, years) Frequency: how often pain is an issue (e.g., several times per day, once every few days, constant) FUNCTIONAL ASSESSMENT:  
 
Palliative Performance Scale (PPS): PPS: 80 PSYCHOSOCIAL/SPIRITUAL SCREENING:  
 
Palliative IDT has assessed this patient for cultural preferences / practices and a referral made as appropriate to needs (Cultural Services, Patient Advocacy, Ethics, etc.) Any spiritual / Rastafarian concerns: 
[] Yes /  [x] No 
 
Caregiver Burnout: 
[] Yes /  [x] No /  [] No Caregiver Present Anticipatory grief assessment:  
[x] Normal  / [] Maladaptive ESAS Anxiety: Anxiety: 0 
 
ESAS Depression: Depression: 2 REVIEW OF SYSTEMS:  
 
Positive and pertinent negative findings in ROS are noted above in HPI. The following systems were [x] reviewed / [] unable to be reviewed as noted in HPI Other findings are noted below. Systems: constitutional, ears/nose/mouth/throat, respiratory, gastrointestinal, genitourinary, musculoskeletal, integumentary, neurologic, psychiatric, endocrine. Positive findings noted below. Modified ESAS Completed by: provider Fatigue: 0 Drowsiness: 0 Depression: 2 Pain: 5 Anxiety: 0 Nausea: 8 Anorexia: 10 Dyspnea: 0 Constipation: Yes(infrequent hard BMs - \"I can't go naturally\") PHYSICAL EXAM:  
 
From RN flowsheet: 
Wt Readings from Last 3 Encounters:  
06/19/19 81.2 kg (179 lb 0.2 oz) 06/11/19 77.6 kg (171 lb)  
06/10/19 81.6 kg (180 lb) Blood pressure 162/87, pulse 100, temperature 98.2 °F (36.8 °C), resp. rate 16, height 5' 6\" (1.676 m), weight 81.2 kg (179 lb 0.2 oz), SpO2 98 %. Pain Scale 1: Numeric (0 - 10) Pain Intensity 1: 3 Pain Onset 1: chronic Pain Location 1: Abdomen Pain Orientation 1: Mid 
Pain Description 1: Pressure Pain Intervention(s) 1: Declines Last bowel movement, if known:  
 
Constitutional: generally well appearing  female sitting up comfortably in bed Eyes: pupils equal, anicteric ENMT: no nasal discharge, moist mucous membranes Cardiovascular: regular rhythm, distal pulses intact Respiratory: breathing not labored, symmetric Gastrointestinal:  Distended, soft, BS active, no tenderness on palpation Musculoskeletal: no deformity, no tenderness to palpation Skin: warm, dry Neurologic: following commands, moving all extremities Psychiatric: full affect, no hallucinations HISTORY:  
 
Principal Problem: 
  Nausea & vomiting (6/14/2019) Active Problems: 
  Endometrial cancer ([address] (8/19/2014) Cancer related pain (1/8/2019) Enteritis (6/14/2019) Leukocytosis (6/14/2019) Abdominal pain (6/14/2019) Tachycardia (6/14/2019) Ascites (6/14/2019) SIRS (systemic inflammatory response syndrome) ([address] (6/14/2019) On antineoplastic chemotherapy (6/17/2019) Carcinomatosis ([address] (6/17/2019) Past Medical History:  
Diagnosis Date  Arthritis  Cancer [facility]) 2014  
 uterine  Chronic pain LOWER BACK  Depression  Nausea & vomiting  PMB (postmenopausal bleeding) 4/2014 Past Surgical History:  
Procedure Laterality Date  HX COLONOSCOPY    
 HX GYN  8/22/14 TLH/BSO/lymph node dissection  HX ORTHOPAEDIC Right 18 LITTLE toe sx  HX VASCULAR ACCESS Left 2014 PORTACATH - LEFT CHEST Family History Problem Relation Age of Onset  Cancer Mother 61  
     liver metastatic disease, unknown primary  Stroke Mother BRAIN ANEURYSM  
 Cancer Maternal Uncle 60  
     bone  Cancer Maternal Grandmother 61  
     unknown primary  Anesth Problems Neg Hx History reviewed, no pertinent family history. Social History Tobacco Use  Smoking status: Former Smoker Packs/day: 0.50 Years: 7.00 Pack years: 3.50 Types: Cigarettes Last attempt to quit: 1999 Years since quittin.8  Smokeless tobacco: Never Used Substance Use Topics  Alcohol use: Yes Alcohol/week: 0.6 oz Types: 1 Glasses of wine per week Comment: rarely Allergies Allergen Reactions  Hydrocodone Nausea Only  Oxycodone Nausea and Vomiting Current Facility-Administered Medications Medication Dose Route Frequency  prochlorperazine (COMPAZINE) with saline injection 5 mg  5 mg IntraVENous Q6H PRN  
 senna-docusate (PERICOLACE) 8.6-50 mg per tablet 2 Tab  2 Tab Oral DAILY  bisacodyl (DULCOLAX) suppository 10 mg  10 mg Rectal DAILY PRN  
 morphine IR (MS IR) tablet 15 mg  15 mg Oral Q4HWA  metoclopramide HCl (REGLAN) tablet 10 mg  10 mg Oral Q6H  
 morphine 10 mg/ml injection 5 mg  5 mg IntraVENous Q4H PRN  
 enoxaparin (LOVENOX) injection 40 mg  40 mg SubCUTAneous Q24H  pantoprazole (PROTONIX) tablet 40 mg  40 mg Oral ACB&D  
 sodium chloride (NS) flush 5-40 mL  5-40 mL IntraVENous Q8H  
 sodium chloride (NS) flush 5-40 mL  5-40 mL IntraVENous PRN  
 aluminum & magnesium hydroxide-simethicone (MYLANTA II) oral suspension 30 mL  30 mL Oral Q4H PRN  polyethylene glycol (MIRALAX) packet 17 g  17 g Oral DAILY  sodium chloride (NS) flush 5-10 mL  5-10 mL IntraVENous PRN  
 0.9% sodium chloride infusion  100 mL/hr IntraVENous CONTINUOUS  
 
 
 
 LAB AND IMAGING FINDINGS:  
 
Lab Results Component Value Date/Time WBC 9.8 06/15/2019 04:57 AM  
 HGB 10.1 (L) 06/15/2019 04:57 AM  
 PLATELET 119  04:57 AM  
 
Lab Results Component Value Date/Time  Sodium 140 2019 04:37 AM  
 Potassium 3.5 2019 04:37 AM  
 Chloride 110 (H) 06/20/2019 04:37 AM  
 CO2 23 06/20/2019 04:37 AM  
 BUN 11 06/20/2019 04:37 AM  
 Creatinine 1.53 (H) 06/20/2019 04:37 AM  
 Calcium 8.3 (L) 06/20/2019 04:37 AM  
 Magnesium 1.9 02/05/2019 10:16 AM  
  
Lab Results Component Value Date/Time AST (SGOT) 26 06/14/2019 08:18 PM  
 Alk. phosphatase 103 06/14/2019 08:18 PM  
 Protein, total 7.9 06/14/2019 08:18 PM  
 Albumin 2.3 (L) 06/14/2019 08:18 PM  
 Globulin 5.6 (H) 06/14/2019 08:18 PM  
 
Lab Results Component Value Date/Time INR 1.1 06/10/2019 12:10 PM  
 Prothrombin time 10.8 06/10/2019 12:10 PM  
  
No results found for: IRON, FE, TIBC, IBCT, PSAT, FERR No results found for: PH, PCO2, PO2 No components found for: [place] No results found for: CPK, CKMB Total time:  
Counseling / coordination time, spent as noted above:  
> 50% counseling / coordination?:  
 
Prolonged service was provided for  []30 min   []75 min in face to face time in the presence of the patient, spent as noted above. Time Start:  
Time End:  
Note: this can only be billed with 01550 (initial) or 79488 (follow up). If multiple start / stop times, list each separately.

## 2019-06-20 NOTE — PROGRESS NOTES
[address], [address] [facility], [address] 
P [phone]  F [phone] Patient: Radha Franco Admit Date: 6/14/2019 Admit Dx: SIRS (systemic inflammatory response syndrome) ([facility] [R65.10] Enteritis [K52.9] Ascites [R18.8] Leukocytosis [D72.829] Tachycardia [R00.0] Nausea & vomiting [R11.2] Abdominal pain [R10.9] Subjective:  
 
Feeling much better this morning. Pain under much better control. Reports reflux better. Less depressed than my last visit with her. She is \"in a better place\". Belly remains distended. Overall she feels better and is looking forward to going home. No SOB. Ambulating well. Objective:  
 
Date 06/19/19 0700 - 06/20/19 4084 06/20/19 0700 - 06/21/19 6115 Shift 9738-7264 5819-6763 24 Hour Total 2839-7693 2143-5027 24 Hour Total  
INTAKE  
P.O. 820  820 P. O. 820  820 Shift Total(mL/kg) 820(10.1)  820(10.1) OUTPUT Shift Total(mL/kg)   820 Weight (kg) 81.2 81.2 81.2 81.2 81.2 81.2 Physical Exam 
/74 (BP 1 Location: Left arm, BP Patient Position: At rest)   Pulse (!) 105   Temp 98.6 °F (37 °C)   Resp 16   Ht 5' 6\" (1.676 m)   Wt 179 lb 0.2 oz (81.2 kg)   SpO2 96%   BMI 28.89 kg/m² General:  alert, cooperative, no distress Cardiac:  Regular rate and rhythm Lungs:  clear to auscultation bilaterally Abdomen:  soft, +BS. Tender subjectively, mildly protuberant w/o distention/tympani. without guarding, without rebound. Possible slight fluid wave. She feels girth of abd larger Extremity: extremities normal, atraumatic, no cyanosis or edema Wt Readings from Last 3 Encounters:  
06/19/19 179 lb 0.2 oz (81.2 kg) 06/11/19 171 lb (77.6 kg) 06/10/19 180 lb (81.6 kg) Data Review Lab Results Component Value Date/Time WBC 9.8 06/15/2019 04:57 AM  
 ABS.  NEUTROPHILS 7.5 06/15/2019 04:57 AM  
 HGB 10.1 (L) 06/15/2019 04:57 AM  
 HCT 31.8 (L) 06/15/2019 04:57 AM  
 MCV 95.2 06/15/2019 04:57 AM  
 MCH 30.2 06/15/2019 04:57 AM  
 PLATELET 523 38/34/1021 04:57 AM  
 
Lab Results Component Value Date/Time Sodium 140 06/20/2019 04:37 AM  
 Potassium 3.5 06/20/2019 04:37 AM  
 Chloride 110 (H) 06/20/2019 04:37 AM  
 CO2 23 06/20/2019 04:37 AM  
 Glucose 97 06/20/2019 04:37 AM  
 BUN 11 06/20/2019 04:37 AM  
 Creatinine 1.53 (H) 06/20/2019 04:37 AM  
 Calcium 8.3 (L) 06/20/2019 04:37 AM  
 Albumin 2.3 (L) 06/14/2019 08:18 PM  
 Bilirubin, total 0.5 06/14/2019 08:18 PM  
 AST (SGOT) 26 06/14/2019 08:18 PM  
 ALT (SGPT) 16 06/14/2019 08:18 PM  
 Alk. phosphatase 103 06/14/2019 08:18 PM  
 
CT Results (most recent): 
Results from Hospital Encounter encounter on 06/14/19 CT ABD PELV W CONT Narrative EXAM: CT ABD PELV W CONT INDICATION: Vomiting COMPARISON: 5/22/2019 CONTRAST: 100 mL of Isovue-370. TECHNIQUE:  
Following the uneventful intravenous administration of contrast, thin axial 
images were obtained through the abdomen and pelvis. Coronal and sagittal 
reconstructions were generated. Oral contrast was not administered. CT dose 
reduction was achieved through use of a standardized protocol tailored for this 
examination and automatic exposure control for dose modulation. FINDINGS:  
LUNG BASES: Clear. INCIDENTALLY IMAGED HEART AND MEDIASTINUM: Unchanged mediastinal 
lymphadenopathy. LIVER: Unchanged hepatic hypodensities measuring up to 1.3 cm. GALLBLADDER: Unremarkable. SPLEEN: Multiple low-attenuation splenic masses are not significantly changed. PANCREAS: No mass or ductal dilatation. ADRENALS: 3.9 x 2.3 cm right adrenal mass, not significantly changed. KIDNEYS: No mass, calculus, or hydronephrosis. STOMACH: Decompressed. SMALL BOWEL: Mild wall thickening in the lower abdomen. No dilatation. COLON: No dilatation or wall thickening. APPENDIX: Unremarkable. PERITONEUM: Small ascites, not significantly changed. Diffuse nodular peritoneal 
enhancement. RETROPERITONEUM: Unchanged retroperitoneal lymphadenopathy. REPRODUCTIVE ORGANS: Status post hysterectomy. URINARY BLADDER: Decompressed BONES: No destructive bone lesion. Degenerative disc disease at L2-3 and L5-S1. ADDITIONAL COMMENTS: N/A Impression IMPRESSION: 
No evidence of bowel obstruction. Mild small bowel wall thickening in the lower 
abdomen. Ascites with diffuse nodular peritoneal enhancement, compatible with 
carcinomatosis. Unchanged mediastinal and retroperitoneal lymphadenopathy. Unchanged splenic, right adrenal, and hepatic masses. Assessment/Plan:  
 
Admitted for SIRS (systemic inflammatory response syndrome) ([facility] [R65.10] Enteritis [K52.9] Ascites [R18.8] Leukocytosis [D72.829] Tachycardia [R00.0] Nausea & vomiting [R11.2] Abdominal pain [R10.9] Active Hospital Problems Diagnosis Date Noted  On antineoplastic chemotherapy 06/17/2019  Carcinomatosis ([facility] 06/17/2019  Enteritis 06/14/2019  Leukocytosis 06/14/2019  Abdominal pain 06/14/2019  Tachycardia 06/14/2019  Nausea & vomiting 06/14/2019  Ascites 06/14/2019  SIRS (systemic inflammatory response syndrome) (HCC) 06/14/2019  Cancer related pain 01/08/2019  Endometrial cancer ([facility] 08/19/2014 Onc: stage IV UPSC 2014 s/p adjuvant taxol/carbo and pelvic RT, recurrence 8/2018 s/p Doxil/Avastin, now with progressive disease recommended carboplatin single agent salvage, cycle one completed 6/11/19 with CINV. Will have to adjust her regimen for CINV ppx with emend/aprepitant and add home regimen including decadron, scheduled antiemetics, H2B or PPI, return to [facility] for hydration following chemo. S/p paracentesis 6/10. We can eval for add'l fluid prior to discharge if her sx remain stable. Consider GI dysfunction d/t carcinomatosis. Improved with Reglan. Continue antacid regimen and stool softeners. Heme/CV: Hemodynamically stable. Renal: No I/O record. JENNY. Related to abx? Continue hydration. Cr improving. FEN/GI: on reg diet. Still small AM emesis, Suspect delayed emptying. Scheduled Reglan, BID PPI. Consider SBFT if needed to look for delayed emptying. Continue GI regimen. Nutrition consult. ID/Wound: afeb, off abx. SIRS, bacteremia in 1/2 bottles, possible contaminate. Recheck NGTD. Neuro: Dilaudid PO for cancer related pain. Will start morphine CR. Dilaudid PRN Psych: Depressed. ONN consult establish for oncology LCSW for counseling. Disposition: Clinically improved. Kidneys improving. Will plan on paracentesis today. Should be able to go home after that later today. F/u clinic, continue chemo.    
 
 
Katherine Granger MD

## 2019-06-20 NOTE — PROGRESS NOTES
Hospitalist Progress Note Radha Goins MD 
Answering service: [phone] OR [phone] from in house phone Date of Service:  2019 NAME:  Fatoumata Justice :  1960 MRN:  [id number] Admission Summary:  
60 y.o F with PMH of stage IV uterine cancer s/p THL,BSO on chemotherapy came to the hospital because of nausea/vomiting,poor oral intake and abdominal pain. Interval history / Subjective: [place] feeling better. She just had paracentesis. Assessment & Plan:  
 
#JENNY,likley 2/2 to prerena/ATN due to abx etc.:  
-due to nausea/vomiting and abx (received vanc for 1/2 bottles positive blood cultures) -Creatinine started coming down today. Decrease IV fluids,encourage oral intake Intractable nausea/vomiting:improved Due to chemotherapy,received carboplatin regimen last Tuesday 
-switch to compazine,d/c zofran. On scheduled reglan. Prn compazine. 
-start regular diet 1#/2 bottles gram positive clusters 
-coagulase negative staph -d/c vancomycin #Cancer related pain,uncontrolled: 
-abdominal pain related to metastatic cancer. 
-On morphine IR scheduled and prn. Palliative team help appreciated # Stage IV uterine cancer: 
-s/p TLH,BSO and lymphadenectomy in  ,received chemo. Was started on chemo again for recurrence. 
-follows with  #Ascites,likley malignant: 
- s/p paracentesis Small bowel thickening on CT abd - enteritis vs underlying disease 
-Zosyn was stopped as she did not have evidence of infection. Constipation:bowel regimen adjusted: Citrate of magnesia,bisacodyl supp,miralaz,pericolace. Code status: DNR 
DVT prophylaxis: lovenox Care Plan discussed with: Landon Duarte Disposition:need inpatient treatment and monitoring as creatinine is still rising. Hospital Problems  Date Reviewed: 6/10/2019 Codes Class Noted POA On antineoplastic chemotherapy ICD-10-CM: Z79.899 ICD-9-CM: V58.69  6/17/2019 Unknown Carcinomatosis ([facility] ICD-10-CM: C80.0 ICD-9-CM: 199.0  6/17/2019 Unknown Enteritis ICD-10-CM: K52.9 ICD-9-CM: 558.9  6/14/2019 Unknown Leukocytosis ICD-10-CM: J62.189 ICD-9-CM: 288.60  6/14/2019 Unknown Abdominal pain ICD-10-CM: R10.9 ICD-9-CM: 789.00  6/14/2019 Unknown Tachycardia ICD-10-CM: R00.0 ICD-9-CM: 785.0  6/14/2019 Unknown * (Principal) Nausea & vomiting ICD-10-CM: R11.2 ICD-9-CM: 787.01  6/14/2019 Unknown Ascites ICD-10-CM: R18.8 ICD-9-CM: 789.59  6/14/2019 Unknown SIRS (systemic inflammatory response syndrome) (HCC) ICD-10-CM: R65.10 ICD-9-CM: 995.90  6/14/2019 Unknown Cancer related pain ICD-10-CM: G89.3 ICD-9-CM: 338.3  1/8/2019 Yes Endometrial cancer ([facility] ICD-10-CM: C54.1 ICD-9-CM: 182.0  8/19/2014 Yes Review of Systems: As per HPI Vital Signs:  
 Last 24hrs VS reviewed since prior progress note. Most recent are: 
Visit Vitals BP (P) 165/67 (BP 1 Location: Left arm, BP Patient Position: At rest) Pulse (P) 100 Temp (P) 98.3 °F (36.8 °C) Resp (P) 17 Ht 5' 6\" (1.676 m) Wt 77.1 kg (169 lb 14.4 oz) SpO2 (P) 98% BMI 27.42 kg/m² Intake/Output Summary (Last 24 hours) at 6/20/2019 1933 Last data filed at 6/20/2019 1640 Gross per 24 hour Intake 750 ml Output 1650 ml Net -900 ml Physical Examination:  
 
 
     
Constitutional:  No acute distress, cooperative, pleasant   
ENT:  Oral mucous moist, oropharynx benign. Neck supple, Resp:  CTA bilaterally. No wheezing/rhonchi/rales. No accessory muscle use Port cath left chest wall. CV:  Regular rhythm, normal rate, no murmurs, gallops, rubs GI:  Soft, tender in the lower abdomen,bowle sounds + Paracentesis catheter in place draining lor yellow fluid to bag. Musculoskeletal:  No edema Neurologic:  Moves all extremities. AAOx3 Data Review:  
 Review and/or order of clinical lab test 
Review and/or order of tests in the medicine section of [facility] Labs:  
 
No results for input(s): WBC, HGB, HCT, PLT, HGBEXT, HCTEXT, PLTEXT, HGBEXT, HCTEXT, PLTEXT in the last 72 hours. Recent Labs  
  06/20/19 
0437 06/19/19 
0649 06/18/19 
1015  139 138  
K 3.5 3.6 3.8 * 109* 108 CO2 23 23 23 BUN 11 12 14 CREA 1.53* 1.68* 1.96* GLU 97 97 123* CA 8.3* 8.3* 8.4* No results for input(s): SGOT, GPT, ALT, AP, TBIL, TBILI, TP, ALB, GLOB, GGT, AML, LPSE in the last 72 hours. No lab exists for component: AMYP, HLPSE No results for input(s): INR, PTP, APTT in the last 72 hours. No lab exists for component: INREXT, INREXT No results for input(s): FE, TIBC, PSAT, FERR in the last 72 hours. No results found for: FOL, RBCF No results for input(s): PH, PCO2, PO2 in the last 72 hours. No results for input(s): CPK, CKNDX, TROIQ in the last 72 hours. No lab exists for component: CPKMB No results found for: CHOL, CHOLX, CHLST, CHOLV, HDL, LDL, LDLC, DLDLP, TGLX, TRIGL, TRIGP, CHHD, CHHDX No results found for: Jamshid Mayorga Lab Results Component Value Date/Time Color YELLOW/STRAW 06/15/2019 12:17 AM  
 Appearance CLEAR 06/15/2019 12:17 AM  
 Specific gravity <1.005 06/15/2019 12:17 AM  
 Specific gravity 1.021 10/09/2018 10:23 AM  
 pH (UA) 7.5 06/15/2019 12:17 AM  
 Protein NEGATIVE  06/15/2019 12:17 AM  
 Glucose NEGATIVE  06/15/2019 12:17 AM  
 Ketone TRACE (A) 06/15/2019 12:17 AM  
 Bilirubin NEGATIVE  06/15/2019 12:17 AM  
 Urobilinogen 0.2 06/15/2019 12:17 AM  
 Nitrites NEGATIVE  06/15/2019 12:17 AM  
 Leukocyte Esterase NEGATIVE  06/15/2019 12:17 AM  
 Epithelial cells MODERATE (A) 10/09/2018 10:23 AM  
 Bacteria 1+ (A) 10/09/2018 10:23 AM  
 WBC 0-4 10/09/2018 10:23 AM  
 RBC 0-5 10/09/2018 10:23 AM  
 
 
 
Medications Reviewed: Current Facility-Administered Medications Medication Dose Route Frequency  prochlorperazine (COMPAZINE) with saline injection 5 mg  5 mg IntraVENous Q6H PRN  
 senna-docusate (PERICOLACE) 8.6-50 mg per tablet 2 Tab  2 Tab Oral DAILY  bisacodyl (DULCOLAX) suppository 10 mg  10 mg Rectal DAILY PRN  
 morphine IR (MS IR) tablet 15 mg  15 mg Oral Q4HWA  metoclopramide HCl (REGLAN) tablet 10 mg  10 mg Oral Q6H  
 morphine 10 mg/ml injection 5 mg  5 mg IntraVENous Q4H PRN  
 enoxaparin (LOVENOX) injection 40 mg  40 mg SubCUTAneous Q24H  pantoprazole (PROTONIX) tablet 40 mg  40 mg Oral ACB&D  
 sodium chloride (NS) flush 5-40 mL  5-40 mL IntraVENous Q8H  
 sodium chloride (NS) flush 5-40 mL  5-40 mL IntraVENous PRN  
 aluminum & magnesium hydroxide-simethicone (MYLANTA II) oral suspension 30 mL  30 mL Oral Q4H PRN  polyethylene glycol (MIRALAX) packet 17 g  17 g Oral DAILY  sodium chloride (NS) flush 5-10 mL  5-10 mL IntraVENous PRN  
 0.9% sodium chloride infusion  50 mL/hr IntraVENous CONTINUOUS  
 
______________________________________________________________________ EXPECTED LENGTH OF STAY: 2d 7h 
ACTUAL LENGTH OF STAY:          6 Shaun Solis MD

## 2019-06-20 NOTE — ROUTINE PROCESS
TRANSFER - IN REPORT: 
 
Verbal report received from Kristel(name) on [place]  being received from [employer]) for routine post - op Report consisted of patients Situation, Background, Assessment and  
Recommendations(SBAR). Information from the following report(s) SBAR, Kardex, Procedure Summary, MAR and Recent Results was reviewed with the receiving nurse. Opportunity for questions and clarification was provided. Assessment completed upon patients arrival to unit and care assumed.

## 2019-06-21 NOTE — ROUTINE PROCESS
I have reviewed discharge instructions with the patient and spouse. The patient and spouse verbalized understanding. Edward cath discontinued.

## 2019-06-21 NOTE — DISCHARGE SUMMARY
Discharge Summary PATIENT ID: Aditi Gallagher MRN: [id number] YOB: 1960 DATE OF ADMISSION: 6/14/2019  8:00 PM   
DATE OF DISCHARGE: 6/21/2019 PRIMARY CARE PROVIDER: Cameron Bella MD  
 
ATTENDING PHYSICIAN: Ryan Shannon MD 
DISCHARGING PROVIDER: Ryan Shannon MD   
To contact this individual call [phone] and ask the  to page. If unavailable ask to be transferred the Adult Hospitalist Department. CONSULTATIONS: IP CONSULT TO GYNECOLOGICAL ONCOLOGY 
IP CONSULT TO PALLIATIVE CARE - PROVIDER PROCEDURES/SURGERIES: * No surgery found * [address] COURSE:  
 
  
Admission Summary:  
60 y.o F with PMH of stage IV uterine cancer s/p THL,BSO on chemotherapy came to the hospital because of nausea/vomiting,poor oral intake and abdominal pain. Assessment & Plan:  
 #JENNY,lane 2/2 to prerena/ATN due to abx etc.: improved. -due to nausea/vomiting and abx (received vanc for 1/2 bottles positive blood cultures) -Creatinine continued to improve down to 1.4 on day of discharge from peak of 1. 9. Advised to f/u with pcp,to avoid nsaids. Intractable nausea/vomiting:improved Due to chemotherapy. Improved. Discharged with Reglan. 1#/2 bottles gram positive clusters 
-coagulase negative staph -d/c vancomycin. She remained without infectious symptoms: remained afebrile,wbc normal. 
  
  
#Cancer related pain,uncontrolled: 
-abdominal pain related to metastatic cancer. 
-she feels she may not need much of pain medicine that she is feeling much better. She is prescribed Morphine IR for prn use. # Stage IV uterine cancer: 
-s/p TLH,BSO and lymphadenectomy in 2014 ,received chemo. Was started on chemo again for recurrence. 
-follows with  #Ascites,lane malignant: 
-6/20 s/p paracentesis. Pigtail fell out on 6/20,repeat US showed small ascites so no needed for repeat paracentesis at this point. Small bowel thickening on CT abd - enteritis vs underlying disease 
-Zosyn was stopped as she did not have evidence of infection. Mild sinus tachy and elevated BP due likely to pain. prescribed low dose lopressor. advised pt to check BP as out patient(she usually checks at drug stores) and if BP>140/90,to use the medicine else follow with pcp to determine on going need for antihypertensives. Constipation:resolved. Bowel regimen on discharge She is DNR. She is discharged in stable condition. DISCHARGE MEDICATIONS: 
Current Discharge Medication List  
  
START taking these medications Details  
senna-docusate (PERICOLACE) 8.6-50 mg per tablet Take 2 Tabs by mouth daily. Qty: 60 Tab, Refills: 2  
  
metoclopramide HCl (REGLAN) 10 mg tablet Take 1 Tab by mouth every six (6) hours. Indications: Nausea and Vomiting caused by Cancer Drugs, gastroesophageal reflux disease Qty: 120 Tab, Refills: 1  
  
pantoprazole (PROTONIX) 40 mg tablet Take 1 Tab by mouth two (2) times a day. Indications: heartburn 
Qty: 60 Tab, Refills: 2  
  
metoprolol tartrate (LOPRESSOR) 25 mg tablet Take 0.5 Tabs by mouth every twelve (12) hours for 30 days. Qty: 30 Tab, Refills: 0  
  
morphine IR (MS IR) 15 mg tablet Take 1 Tab by mouth every four (4) hours as needed for Pain for up to 3 days. Max Daily Amount: 90 mg. 
Qty: 30 Tab, Refills: 0 Associated Diagnoses: Cancer related pain  
  
aluminum & magnesium hydroxide-simethicone (MYLANTA II) 400-400-40 mg/5 mL suspension Take 30 mL by mouth every eight (8) hours as needed for Indigestion. Qty: 150 mL, Refills: 0  
  
polyethylene glycol (MIRALAX) 17 gram packet Take 1 Packet by mouth daily. Qty: 10 Each, Refills: 0 CONTINUE these medications which have NOT CHANGED Details  
lidocaine-prilocaine (EMLA) topical cream Apply small amount over port area one hour before chemo treatment and cover with a Band-Aid 
Qty: 30 g, Refills: 0 Associated Diagnoses: Endometrial cancer ([facility]  
  
ondansetron (ZOFRAN ODT) 4 mg disintegrating tablet Take 1 Tab by mouth every eight (8) hours as needed for Nausea. Indications: Nausea and Vomiting caused by Cancer Drugs Qty: 30 Tab, Refills: 2 Associated Diagnoses: [facility] STOP taking these medications  
  
 ibuprofen (MOTRIN) 800 mg tablet Comments:  
Reason for Stopping:   
   
 HYDROmorphone (DILAUDID) 2 mg tablet Comments:  
Reason for Stopping: PENDING TEST RESULTS:  
At the time of discharge the following test results are still pending: none FOLLOW UP APPOINTMENTS:   
Follow-up Information Follow up With Specialties Details Why Contact Info Corina Escobar MD Gynecologic Oncology In 3 days  [address] [address] [address] [place] [address] 
[phone] ADDITIONAL CARE RECOMMENDATIONS:  
 
DIET: Regular Diet ACTIVITY: Activity as tolerated NOTIFY YOUR PHYSICIAN FOR ANY OF THE FOLLOWING:  
Fever over 101 degrees for 24 hours. Chest pain, shortness of breath, fever, chills, nausea, vomiting, diarrhea, change in mentation, falling, weakness, bleeding. Severe pain or pain not relieved by medications. Or, any other signs or symptoms that you may have questions about. DISPOSITION: 
 x Home With: 
 OT  PT  [facility]  RN  
  
 SNF(Name) Inpatient Rehab(name) Independent/assisted living(name) Hospice Other:  
 
 
PATIENT CONDITION AT DISCHARGE:  
 
Functional status Poor Deconditioned   
x Independent Cognition  
   
x Lucid Forgetful Dementia Catheter/Lines(indications) Kulkarni PICC  
 PEG  
x None Code status    
x Full DNR PHYSICAL EXAMINATION AT DISCHARGE: 
  
Visit Vitals BP (!) 156/91 (BP 1 Location: Right arm, BP Patient Position: At rest) Pulse (!) 110 Temp 97.8 °F (36.6 °C) Resp 16 Ht 5' 6\" (1.676 m) Wt 77.1 kg (169 lb 14.4 oz) SpO2 98% BMI 27.42 kg/m² O2 Device: Room air Temp (24hrs), Av.4 °F (36.9 °C), Min:97.8 °F (36.6 °C), Max:98.9 °F (37.2 °C) 
  701 - 1900 In: 4305 [P.O.:720; I.V.:700] Out: 200 [Urine:200]   1901 -  07 In: 750 [P.O.:750] Out: 2904 [Urine:550; Drains:1100] GENERAL:  Alert, oriented, cooperative, no apparent distress HEENT:  Normocephalic, atraumatic, non icteric sclerae, non pallor conjuctivae, EOMs intact, PERRLA. NECK: Supple, trachea midline, no adenopathy, no thyromegally or tenderness, no carotid bruit and no JVD. LUNGS:   Vesicular breath sounds bilaterally, no added sounds. HEART:   S1 and S2 well heard,RRR,  no murmur, click, rub or gallop. ABDOMEB:   Soft, non-tender. Normoactive bowel sounds. No masses,  No organomegaly. EXTREMETIES:  Atraumatic, acyanotic, no edema PULSES: 2+ and symmetric all extremities. SKIN:  No rashes or lesions NEUROLOGY: Alert and oriented to PPT, CNII-XII intact. Motor and sensory exam grossly intact. CHRONIC MEDICAL DIAGNOSES: 
Problem List as of 2019 Date Reviewed: 6/10/2019 Codes Class Noted - Resolved On antineoplastic chemotherapy ICD-10-CM: Z79.899 ICD-9-CM: V58.69  2019 - Present Carcinomatosis ([facility] ICD-10-CM: C80.0 ICD-9-CM: 199.0  2019 - Present Enteritis ICD-10-CM: K52.9 ICD-9-CM: 558.9  2019 - Present Leukocytosis ICD-10-CM: L34.150 ICD-9-CM: 288.60  2019 - Present Abdominal pain ICD-10-CM: R10.9 ICD-9-CM: 789.00  2019 - Present Tachycardia ICD-10-CM: R00.0 ICD-9-CM: 785.0  2019 - Present * (Principal) Nausea & vomiting ICD-10-CM: R11.2 ICD-9-CM: 787.01  2019 - Present Ascites ICD-10-CM: R18.8 ICD-9-CM: 789.59  2019 - Present SIRS (systemic inflammatory response syndrome) (HCC) ICD-10-CM: R65.10 ICD-9-CM: 995.90  2019 - Present Cancer related pain ICD-10-CM: G89.3 ICD-9-CM: 338.3  1/8/2019 - Present Mediastinal lymphadenopathy ICD-10-CM: R59.0 ICD-9-CM: 785.6  11/6/2018 - Present Weight loss, non-intentional ICD-10-CM: R63.4 ICD-9-CM: 783.21  11/6/2018 - Present Obesity (BMI 30.0-34.9) ICD-10-CM: E43.6 ICD-9-CM: 278.00  9/11/2018 - Present Right upper quadrant abdominal pain ICD-10-CM: R10.11 ICD-9-CM: 789.01  9/11/2018 - Present Early satiety ICD-10-CM: R68.81 ICD-9-CM: 780.94  8/14/2018 - Present Abdominal bloating ICD-10-CM: R14.0 ICD-9-CM: 787.3  8/14/2018 - Present Endometrial cancer ([facility] ICD-10-CM: C54.1 ICD-9-CM: 182.0  8/19/2014 - Present RESOLVED: Severe obesity (BMI 35.0-39. 9) ICD-10-CM: E66.01 
ICD-9-CM: 278.01  7/12/2018 - 9/11/2018 Greater than 30 minutes were spent with the patient on counseling and coordination of care Signed:   
Iris Ryan MD 
6/21/2019 
10:39 AM

## 2019-06-21 NOTE — DISCHARGE INSTRUCTIONS
Discharge Instructions       PATIENT ID: Torrey Avendaño  MRN: [id number]   YOB: 1960    DATE OF ADMISSION: 6/14/2019  8:00 PM    DATE OF DISCHARGE: 6/21/2019    PRIMARY CARE PROVIDER: Denisse Romero MD     ATTENDING PHYSICIAN: Angelito Levy MD  DISCHARGING PROVIDER: Valentine Stoner MD    To contact this individual call [phone] and ask the  to page. If unavailable ask to be transferred the Adult Hospitalist Department. DISCHARGE DIAGNOSES  1. Persistent nausea and vomiting thought to be chemotherapy induced. Improved. May use as needed nausea medicine prescribed. 2.Acute kidney failure. Improved but not normalized yet. Hydrate well. Do not take NSAIDS such as Ibuprofen,Motrin etc.get your Labs done next week. CONSULTATIONS: IP CONSULT TO GYNECOLOGICAL ONCOLOGY    PROCEDURES/SURGERIES: * No surgery found *    PENDING TEST RESULTS:   At the time of discharge the following test results are still pending: none    FOLLOW UP APPOINTMENTS:   Follow-up Information     Follow up With Specialties Details Why Contact Info    Denisse Romero MD Gynecologic Oncology In 3 days  [address] [place] [address]  [phone]             ADDITIONAL CARE RECOMMENDATIONS:   -follow up with oncologist as scheduled. Call [phone] if you do not have an appointment  -Continue to take Reglan (metoclopramide) to prevent Nausea and vomiting and help with stomach emptying  -Protonix for gastro esophageal reflux      DIET:regular     ACTIVITY: Activity as tolerated    WOUND CARE: na    EQUIPMENT needed: na      DISCHARGE MEDICATIONS:   See Medication Reconciliation Form    · It is important that you take the medication exactly as they are prescribed. · Keep your medication in the bottles provided by the pharmacist and keep a list of the medication names, dosages, and times to be taken in your wallet.    · Do not take other medications without consulting your doctor. NOTIFY YOUR PHYSICIAN FOR ANY OF THE FOLLOWING:   Fever over 101 degrees for 24 hours. Chest pain, shortness of breath, fever, chills, nausea, vomiting, diarrhea, change in mentation, falling, weakness, bleeding. Severe pain or pain not relieved by medications. Or, any other signs or symptoms that you may have questions about.       DISPOSITION:  X  Home With:   OT  PT  HH  RN       SNF/Inpatient Rehab/LTAC    Independent/assisted living    Hospice    Other:             Signed:   Andry Alicia MD  6/17/2019  7:31 AM

## 2019-06-21 NOTE — ADT AUTH CERT NOTES
Sepsis and Other Febrile Illness, without Focal Infection - Care Day 5 (6/18/2019) by Hayder Pressley RN  
 
   
Review Entered Review Status 6/20/2019 15:06 Completed  
   
Criteria Review Care Day: 5 Care Date: 6/18/2019 Level of Care: Inpatient Floor Guideline Day 4 Level Of Care (X) Floor to discharge[I] 6/20/2019 15:06:52 EDT by Sondra Anderson   
medical floor Clinical Status ( ) * Hemodynamic stability 6/20/2019 15:06:52 EDT by Sondra Anderson   
99.7, , RR 18, /83, 96% on RA   
  
(X) * Fever absent or acceptable for next level of care 6/20/2019 15:06:52 EDT by Sondra Anderson   
99.7   
  
(X) * Hypoxemia absent 6/20/2019 15:06:52 EDT by Sondra Anderson 96% on RA   
  
(X) * Tachypnea absent 6/20/2019 15:06:52 EDT by Sondra Anderson RR 18, 17, 16   
  
(X) * Cultures negative or infection identified and under adequate treatment   
(X) * Mental status at baseline 6/20/2019 15:06:52 EDT by Sondra Anderson AA&O x 3   
  
( ) * Metabolic derangement (eg, dehydration, acidosis) absent   
( ) * End-organ dysfunction (eg, myocardial ischemia, renal failure) absent   
( ) * Discharge plans and education understood Activity  
(X) * Ambulatory 6/20/2019 15:06:52 EDT by Sondra Anderson   
ambulate with assist   
  
  
Routes  
(X) * Oral hydration, medications[J] 6/20/2019 15:06:52 EDT by Sondra Anderson Lovenox 40mg SC q24h Protonix 40mg PO 2x daily Miralax 17g PO daily Tylenol 650mg PO q6h Dilaudid 2mg PO q4h PRN x 2 Dilaudid 4mg PO q4h PRN x 1 Reglan 10mg PO q8h Pericolace PO daily   
  
(X) Usual diet 6/20/2019 15:06:52 EDT by Sondra Anderson   
regular diet Medications  
(X) * Antimicrobial treatment not necessary or treatment at next level of care arranged[D] * Milestone Additional Notes 6/18/19 IM note: Patient is up and moving, no complaints. She is awake she is not discharged today due to the kidney numbers. No acute distress, cooperative, pleasant.  Lungs are CTAB, abdomen is soft, tender in the lower abdomen, bowel sounds +, no edema, alert and oriented x 3. GYN Note:   
Reports reflux better.  \"emesis\" this AM with dry heaving and clear small volume in styrofoam cup. Tearful, feeling 'depressed\" Pain remains abd, >right.  +flatus. Little stool output.  Pain in low abd requiring PO dilaudid.  Overall she feels better and is looking forward to going home. No SOB. Ambulating well. Hemodynamically stable. Still small AM emesis, Suspect delayed emptying.  Afebrile, off abx. SIRS, bacteremia in 1/2 bottles, possible contaminate.  Recheck NGTD. Dilaudid PO for cancer related pain. Controlled with daily to BID dosing Vitals:  99.1, , RR 16, /83, 96% on RA Labs: glucose 101, Creatinine 1.86, BUN/creatinine ratio 8, calcium 8.1, GFR non AA 28, GFR est AA 33 IM plan:  
Ambulate with assist, SCDS, bladder checks, I&O, Neuro assessments, spot check oximetry JENNY,creatinine still rising,likley 2/2 to prerena/ATN due to abx etc -due to nausea/vomiting and abx (received vanc for 1/2 bottles positive blood cultures) -continue IVF -increase rate to 125 ml/hr -Monitor Labs. Check urine lytes  
   
Intractable nausea/vomiting:improved. Due to chemotherapy,received carboplatin regimen last Tuesday -switch to compazine,d/c Zofran -start regular diet  
   
1/2 bottles gram positive clusters -coagulase negative staph -d/c vancomycin  
   
Dehydration:improving -due to poor PO intake causing leukocytosis and tachycardia  
-continue IVF for now  
   
Cancer related pain: abdominal pain related to metastatic cancer.  she is taking 2-4 mg dilaudid at home,d/c morphine and increase to home dose dialudid.  She will need prescriptions at discharge as she is out of dilaudid at home  
   
   
 Stage IV uterine cancer: s/p TLH,BSO and lymphadenectomy in 2014 ,received chemo. Was started on chemo again for recurrence. follows with   
   
   
Small bowel thickening on CT abd - enteritis vs underlying disease. D/C zosyn soon  She does not have any fever,diarrhea,WBC wnl Her nausea/vomiting are related to recent chemo.  
   
   
Constipation: no stool burden on CT. As she is on narcotics, continue miralax.  
   
DVT prophylaxis: lovenox Gynecology plan: Will have to adjust her regimen for CINV ppx with emend/aprepitant and add home regimen including decadron, scheduled antiemetics, H2B or PPI, return to [facility] for hydration following chemo.    
S/p paracentesis. Monitor abd, sx. She may need another soon. Consider GI dysfunction d/t carcinomatosis. Improved with Reglan.  Continue antacid regimen and stool softeners Continue hydration. Ok to resume IVFs. Lost PIV. May access central port for labs/fluids. Difficult lab draw.  
scheduled Reglan, BID PPI. Consider SBFT if needed to look for delayed emptying. Continue GI regimen. Nutrition consult. Depressed. ONN consult establish for oncology LCSW for counseling  
  
   
Sepsis and Other Febrile Illness, without Focal Infection - Care Day 4 (6/17/2019) by Altaf Saldana RN  
 
   
Review Entered Review Status 6/20/2019 14:53 Completed  
   
Criteria Review Care Day: 4 Care Date: 6/17/2019 Level of Care: Inpatient Floor Guideline Day 4 Level Of Care (X) Floor to discharge[I] 6/20/2019 14:53:33 EDT by Tuan Alicia   
transferred to medical floor Clinical Status ( ) * Hemodynamic stability 6/20/2019 14:53:33 EDT by Tuan Alicia 98.6, , RR 16, /81, 97% on RA   
  
(X) * Fever absent or acceptable for next level of care 6/20/2019 14:53:33 EDT by Tuan Alicia   
afebrile 99.3   
  
(X) * Hypoxemia absent 6/20/2019 14:53:33 EDT by Tuan Alicia 97% on RA   
  
 (X) * Tachypnea absent 6/20/2019 14:53:33 EDT by Iain Landaverde RR 16, 17, 16   
  
(X) * Cultures negative or infection identified and under adequate treatment 6/20/2019 14:53:33 EDT by Iain Landaverde   
gram positive cocci in clusters in 1 of 2 bottles on 6/15 
staphylococcus species in 1 of 2 bottles on 6/15 
receiving antibiotics   
  
(X) * Mental status at baseline 6/20/2019 14:53:33 EDT by Iain Landaverde AA&Ox3   
  
(X) * Metabolic derangement (eg, dehydration, acidosis) absent 6/20/2019 14:53:33 EDT by Iain Landaverde   
no labs this care date   
  
(X) * End-organ dysfunction (eg, myocardial ischemia, renal failure) absent   
( ) * Discharge plans and education understood Activity  
(X) * Ambulatory 6/20/2019 14:53:33 EDT by Iain Landaverde   
ambulate with assist   
  
  
Routes  
(X) * Oral hydration, medications[J] 6/20/2019 14:53:33 EDT by Iain Landaverde NS 50ml/hr IV continuous 
protonix 40mg PO 2x daily Dilaudid 4mg PO q4h PRN Reglan 10mg PO q8h Protonix 40mg PO daily Vancomycin 1250mg IV q12h (X) Usual diet 6/20/2019 14:53:33 EDT by Iain Landaverde   
regular diet Medications ( ) * Antimicrobial treatment not necessary or treatment at next level of care arranged[D] * Milestone Additional Notes 6/17/19 IM note:  
Patient states that nausea/vomiting has improved. Tolerating diet.  No acute distress, cooperative and pleasant, lungs are CTA bilaterally, abdomen is soft, tender in the lower abdomen, positive bowel sounds, no edema.  Moves all extremities, pt is alert and oriented x 3. Vitals:  99.3, , RR 16, /81, 97% on RA Labs: Creatinine 1.55, Calcium 7.9, GFR 41, glucose 98 IM plan:  
JENNY: -due to nausea/vomiting and abx (received vanc for 1/2 bottles positive blood cultures)  
continue IVF -increase rate to 125 ml/hr -repeat labs tomorrow-ordered vanc levels  
   
 Intractable nausea/vomiting:improved - Due to chemotherapy,received carboplatin regimen last Tuesday -switch to compazine,d/c Zofran -start regular diet  
   
1/2 bottles gram positive clusters -coagulase negative staph -d/c vancomycin  
   
Dehydration: improving -due to poor PO intake causing leukocytosis and tachycardia  
-continue IVF for now  
   
Cancer related pain: abdominal pain related to metastatic cancer. she is taking 2-4 mg dilaudid at home,d/c morphine and increase to home dose dialudid.  She will need prescriptions at discharge as she is out of dilaudid at home  
   
   
Stage IV uterine cancer: s/p TLH,BSO and lymphadenectomy in 2014 ,received chemo. Was started on chemo again for recurrence. follows with   
   
   
Small bowel thickening on CT abd - enteritis vs underlying disease. D/C zosyn soon  She does not have any fever,diarrhea,WBC wnl. Her nausea/vomiting are related to recent chemo.  
   
   
Constipation: no stool burden on CT. As she is on narcotics,continue miralax.  
   
DVT prophylaxis: lovenox  
   
  
   
Sepsis and Other Febrile Illness, without Focal Infection - Care Day 3 (6/16/2019) by Moises Bhatti RN  
 
   
Review Entered Review Status 6/20/2019 14:32 Completed  
   
Criteria Review Care Day: 3 Care Date: 6/16/2019 Level of Care:   
Guideline Day 3 Level Of Care (X) Floor 6/20/2019 14:32:44 EDT by Madonna Cedeño   
remote telemetry Clinical Status  
(X) * Mental status at baseline 6/20/2019 14:32:44 EDT by Madonna Cedeño   
pt AA&O x3   
  
(X) * Fever absent or reduced 6/20/2019 14:32:44 EDT by Madonna Cedeño 98.7 afebrile Activity (X) Activity as tolerated 6/20/2019 14:32:44 EDT by Madonna Cedeño   
ambulate with assist   
  
  
Routes  
(X) * Oral hydration 6/20/2019 14:32:44 EDT by Madonna Cedeño   
regular diet (X) Diet as tolerated 6/20/2019 14:32:44 EDT by Madonna Cedeño regular diet   
  
(X) Parenteral or oral medication 6/20/2019 14:32:44 EDT by Abigail Mcginnis Dilaudid 2mg PO q4h PRN x 1 Dilaudid 4mg PO q4h PRN x 2 Protonix 40mg IV q24h Compazine 5mg PO q6h PRN x 1 Medications (X) Possible antimicrobial treatment 6/20/2019 14:32:44 EDT by Abigail Mcginnis Zosyn 3.375g IV q8h Vancomycin 2000mg IV x 1 Vancomycin 1250mg IV q12h (X) Possible DVT prophylaxis 6/20/2019 14:32:44 EDT by Abigail Mcginnis SCD's * Milestone Additional Notes 6/16/19 Gynecology note: Afebrile yesterday. Still reports \"acid reflux\". Her blood culture returned with gram positive cocci in 1 of 2 vials. Denies nausea or vomiting. Reports flatus. Still reports some discomfort in her abdomen, but overall feels improved. She is interested in talking about no further treatment IM note:   
Patient states that nausea/vomiting has improved but wants to switch zofran to other medication. Abdominal pain tolerable. Pt in no acute distress, cooperative, pleasant, lungs are CTAB, abdomen is soft, tender in the lower abdomen, positive bowel sounds, no edema, alert and oriented x 3 Labs: blood cultures pending, no growth after 4 days, no additional labs this care date Vitals:  98.6, , RR 18, /81, 98% on RA Gynecology plan:  
Gram-positive cocci in 1 of 2 vials. Manage per primary team. Will follow-up on final results, as this could simply be a contaminant. Plan repeat blood cultures. Remains afebrile with normal WBC. Continue symptomatic management of \"acid reflux\". Nausea resolved for the most part. Patient requesting something different than Zofran, which is appropriate.  Will defer change to primary team. Regarding the patient's desire to discuss palliative care, will plan for patient to have this discussion with Dr. Warden Yanez while inpatient versus outpatient as he has been her oncologist for years and there is no acuteness to making any decisions today. Noted DNR status, which is appropriate given the incurable nature of her disease. Sophia Morgan will continue to follow IM plan: Intractable nausea/vomiting:improving Due to chemotherapy,received carboplatin regimen last Tuesday -switch to compazine,d/c  Zofran -start regular diet  
   
1/2 bottles gram positive clusters -most likely contaminant-will await final cultures (has a port and on chemotherapy recently) -empiric vanc for now-repeat blood cultures  
   
Dehydration:improving -due to poor PO intake causing leukocytosis and tachycardia -continue IVF for now  
   
Cancer related pain: -abdominal pain related to metastatic cancer. she is taking 2-4 mg dilaudid at home,d/c morphine and increase to home dose dialudid.  
   
    
Stage IV uterine cancer: s/p TLH,BSO and lymphadenectomy in 2014 ,received chemo. Was started on chemo again for recurrence. follows with   
   
   
Small bowel thickening on CT abd - enteritis vs underlying disease. D/C zosyn soon  She does not have any fever,diarrhea,WBC wnl Her nausea/vomiting are related to recent chemo.  
   
   
Constipation: no stool burden on CT. As she is on narcotics,ordered miralax.  
   
DVT prophylaxis: lovenox

## 2019-06-21 NOTE — PROGRESS NOTES
[address], [address] [place], [address] Shel[place]ila 
P [phone]  F [phone] Patient: Keren Perkins Admit Date: 6/14/2019 Admit Dx: S[facility]systemic inflammatory response syndrome) ([facility] [R65.10] Enteritis [K52.9] Ascites [R18.8] Leukocytosis [D72.829] Tachycardia [R00.0] Nausea & vomiting [R11.2] Abdominal pain [R10.9] Subjective:  
 
Feels well. No emesis this AM, +nausea which passes. 1100+mls removed yesterday with IP drain prior to accidental removal.  US abd d/w radiology. Low volume <1L remains estimate. She overall feels much better with resolution of her reflux and better nausea control. Minimal opioid pain medication use. Less depressed than my last visit with her. She is \"in a better place\". Overall she feels better and is looking forward to going home, walking her dog. No SOB. Ambulating well. Objective:  
 
Date 06/20/19 0700 - 06/21/19 7604 06/21/19 0700 - 06/22/19 2363 Shift 6760-6394 0735-0577 24 Hour Total 7548-3534 7212-5198 24 Hour Total  
INTAKE  
P.O. 750  750     
  P. O. 750  750 Shift Total(mL/kg) 750(9.7)  750(9.7) OUTPUT Urine(mL/kg/hr) 550(0.6)  550(0.3) Urine Voided 550  550 Drains 1100  1100 Output (ml) (Drain 5 Czech paracentesis drain 06/20/19 Left Abdomen) 1100  1100 Shift Total(mL/kg) [id number])  [id number]) NET -900  -900 Weight (kg) 77.1 77.1 77.1 77.1 77.1 77.1 Physical Exam 
BP (!) 156/91 (BP 1 Location: Right arm, BP Patient Position: At rest)   Pulse (!) 110   Temp 97.8 °F (36.6 °C)   Resp 16   Ht 5' 6\" (1.676 m)   Wt 169 lb 14.4 oz (77.1 kg)   SpO2 98%   BMI 27.42 kg/m² General:  alert, cooperative, no distress Cardiac:  Regular rate and rhythm Lungs:  clear to auscultation bilaterally Abdomen:  soft, +BS.  Mildly tender to palp. mildly protuberant w/o distention/tympani. without guarding, without rebound. IP drain site w/o swelling, hematoma Extremity: extremities normal, atraumatic, no cyanosis or edema Wt Readings from Last 3 Encounters:  
06/20/19 169 lb 14.4 oz (77.1 kg) 06/11/19 171 lb (77.6 kg) 06/10/19 180 lb (81.6 kg) Data Review Lab Results Component Value Date/Time WBC 5.7 06/21/2019 03:26 AM  
 ABS. NEUTROPHILS 3.8 06/21/2019 03:26 AM  
 HGB 8.2 (L) 06/21/2019 03:26 AM  
 HCT 26.2 (L) 06/21/2019 03:26 AM  
 MCV 95.3 06/21/2019 03:26 AM  
 MCH 29.8 06/21/2019 03:26 AM  
 PLATELET 808 05/06/3125 03:26 AM  
 
Lab Results Component Value Date/Time Sodium 140 06/21/2019 03:26 AM  
 Potassium 3.6 06/21/2019 03:26 AM  
 Chloride 109 (H) 06/21/2019 03:26 AM  
 CO2 25 06/21/2019 03:26 AM  
 Glucose 94 06/21/2019 03:26 AM  
 BUN 10 06/21/2019 03:26 AM  
 Creatinine 1.43 (H) 06/21/2019 03:26 AM  
 Calcium 8.3 (L) 06/21/2019 03:26 AM  
 Albumin 2.3 (L) 06/14/2019 08:18 PM  
 Bilirubin, total 0.5 06/14/2019 08:18 PM  
 AST (SGOT) 26 06/14/2019 08:18 PM  
 ALT (SGPT) 16 06/14/2019 08:18 PM  
 Alk. phosphatase 103 06/14/2019 08:18 PM  
 
CT Results (most recent): 
Results from Hospital Encounter encounter on 06/14/19 CT ABD PELV W CONT Narrative EXAM: CT ABD PELV W CONT INDICATION: Vomiting COMPARISON: 5/22/2019 CONTRAST: 100 mL of Isovue-370. TECHNIQUE:  
Following the uneventful intravenous administration of contrast, thin axial 
images were obtained through the abdomen and pelvis. Coronal and sagittal 
reconstructions were generated. Oral contrast was not administered. CT dose 
reduction was achieved through use of a standardized protocol tailored for this 
examination and automatic exposure control for dose modulation. FINDINGS:  
LUNG BASES: Clear. INCIDENTALLY IMAGED HEART AND MEDIASTINUM: Unchanged mediastinal 
lymphadenopathy. LIVER: Unchanged hepatic hypodensities measuring up to 1.3 cm. GALLBLADDER: Unremarkable. SPLEEN: Multiple low-attenuation splenic masses are not significantly changed. PANCREAS: No mass or ductal dilatation. ADRENALS: 3.9 x 2.3 cm right adrenal mass, not significantly changed. KIDNEYS: No mass, calculus, or hydronephrosis. STOMACH: Decompressed. SMALL BOWEL: Mild wall thickening in the lower abdomen. No dilatation. COLON: No dilatation or wall thickening. APPENDIX: Unremarkable. PERITONEUM: Small ascites, not significantly changed. Diffuse nodular peritoneal 
enhancement. RETROPERITONEUM: Unchanged retroperitoneal lymphadenopathy. REPRODUCTIVE ORGANS: Status post hysterectomy. URINARY BLADDER: Decompressed BONES: No destructive bone lesion. Degenerative disc disease at L2-3 and L5-S1. ADDITIONAL COMMENTS: N/A Impression IMPRESSION: 
No evidence of bowel obstruction. Mild small bowel wall thickening in the lower 
abdomen. Ascites with diffuse nodular peritoneal enhancement, compatible with 
carcinomatosis. Unchanged mediastinal and retroperitoneal lymphadenopathy. Unchanged splenic, right adrenal, and hepatic masses. Assessment/Plan:  
 
Admitted for SIRS (systemic inflammatory response syndrome) ([facility] [R65.10] Enteritis [K52.9] Ascites [R18.8] Leukocytosis [D72.829] Tachycardia [R00.0] Nausea & vomiting [R11.2] Abdominal pain [R10.9] Active Hospital Problems Diagnosis Date Noted  On antineoplastic chemotherapy 06/17/2019  Carcinomatosis ([address] 06/17/2019  Enteritis 06/14/2019  Leukocytosis 06/14/2019  Abdominal pain 06/14/2019  Tachycardia 06/14/2019  Nausea & vomiting 06/14/2019  Ascites 06/14/2019  SIRS (systemic inflammatory response syndrome) ([facility]) 06/14/2019  Cancer related pain 01/08/2019  Endometrial cancer ([facility] 08/19/2014 Onc: stage IV UPSC 2014 s/p adjuvant taxol/carbo and pelvic RT, recurrence 8/2018 s/p Doxil/Avastin, now with progressive disease recommended carboplatin single agent salvage, cycle one completed 6/11/19 with CINV. Will have to adjust her regimen for CINV ppx with emend/aprepitant and add home regimen including decadron, scheduled antiemetics, H2B or PPI, return to [facility] for hydration following chemo. S/p paracentesis 6/10. paracent 6/20, drain accidentally removed. Not enough to consider draining. Follow in clinic. May need aspira at some point. GI dysfunction d/t carcinomatosis. Improved with Reglan. Continue antacid regimen and stool softeners. Heme/CV: Hemodynamically stable. Anemia d/t chemo, dz. Asx. May need blood transfusion prior to next chemo. HTN/mild tachy. Any recs as per hosp medicine. Renal:  JENNY. Related to abx. Abx off, Resolving. FEN/GI: on reg diet. Am nausea/\"vomiting\" much improved. Suspect delayed emptying. Scheduled Reglan, BID PPI. Continue GI regimen. Nutrition consult/supplements ID/Wound: afeb, off abx. SIRS, bacteremia in 1/2 bottles, possible contaminate. Recheck NGTD. Neuro: pain much improved. Opioid PRN per pall med. Psych: Depressed. ONN consult establish for oncology LCSW for counseling. F/u outpatient Disposition: Clinically improved. Should be able to go home after that later today from oncology standpoint. F/u clinic, continue chemo, appts made.  
 
 
PHILIP Vyas

## 2019-06-21 NOTE — PROGRESS NOTES
Ms.Cherie Vásquez Suncook is feeling well. Douglas Elliott to go home. Denied sob,chest pain and palpitation. As far as her mild tachy,she says she has that when her abdomen \"feels queasy . \" Creatinine has continued to improve. Will discharge home. Gyn oncology team okay for discharge

## 2019-06-27 PROBLEM — K56.609 SMALL BOWEL OBSTRUCTION (HCC): Status: ACTIVE | Noted: 2019-01-01

## 2019-06-27 PROBLEM — C56.9 OVARIAN CANCER (HCC): Status: ACTIVE | Noted: 2019-01-01

## 2019-06-27 NOTE — ROUTINE PROCESS
TRANSFER - OUT REPORT: 
 
Verbal report given to Mendy Todd RN(name) on Sloane Faye  being transferred to [address] [id number](unit) for routine progression of care Report consisted of patients Situation, Background, Assessment and  
Recommendations(SBAR). Information from the following report(s) SBAR, ED Summary, Intake/Output, MAR, Recent Results and Cardiac Rhythm sinus tach was reviewed with the receiving nurse. Lines:  
Peripheral IV 06/27/19 Left;Upper Forearm (Active) Site Assessment Clean, dry, & intact 6/27/2019  2:41 AM  
Phlebitis Assessment 0 6/27/2019  2:41 AM  
Infiltration Assessment 0 6/27/2019  2:41 AM  
Dressing Status Clean, dry, & intact 6/27/2019  2:41 AM  
Dressing Type Transparent;Tape 6/27/2019  2:41 AM  
Hub Color/Line Status Pink;Patent; Flushed 6/27/2019  2:41 AM  
Action Taken Blood drawn 6/27/2019  2:41 AM  
  
 
Opportunity for questions and clarification was provided. Patient transported with: 
 transport

## 2019-06-27 NOTE — ED TRIAGE NOTES
Pt arrives through triage c/o vomiting, reporting for the last 6 days it has been brown/black. Pt states she has been nauseated since chemo, was recently admitted for ovarian CA and ascites. Prescribed anti-nausea which do not relieve N/V.

## 2019-06-27 NOTE — H&P
27 [facility], [address] [place] [address] Shelbie Avila 
P [phone]  F [phone] History and Physical - Admission Note Marjan Ricci       [id number]  1960 Admitted 2019 Date 2019 History from Dr. Kalani Sanders clinic note on 6/10/19: 
Marjan Ricci is a 60 y.o.  female with a diagnosis of stage IV UPSC. She underwent a TLH, BSO, with lymphadenectomy in 2014. She was found to have evidence of intraperitoneal disease with implants in the cul de sac, but no evidence of tyler metastases. She was treated with adjuvant chemotherapy with Taxol and Carboplatin. She then received pelvic radiation therapy. In the summer of  she was noted to have an elevated CA-125. She had not been seen in the office for a couple of years prior to that visit. I ordered a CT of the chest/abdomen/pelvis to evaluate for recurrence. It demonstrated peritoneal carcinomatosis. I then sent her for a PET/CT to better evaluate. I recommended combination chemotherapy with Doxil/Avastin. She completed 8 cycles of the Doxil, but refused further Avastin after the second cycle. She had a lot of vague complaints at that time and was convinced it was the Avastin causing it. She has been juicing and trying some alternative therapies as well. Her last CT in 2019 again demonstrated stable disease on Doxil. Her last chemotherapy was in 2019. Her CA-125 at that time was climbing. She did not show up for her last scheduled chemotherapy and wanted to take some time off. She did not discuss that with me before making the decision. She presented in mid-May stating that she was feeling better and wanted to discuss chemotherapy again. I sent her for a CT scan to reassess her disease. Her scan now demonstrates worsening disease in multiple locations.   I recommend starting back on chemotherapy at this time. I suggested single-agent Carboplatin since it had been about 4.5 years since she was last treated with a platinum regimen. HPI: 
Ms. Giulia Sexton presented to the ER on 19 with worsening nausea, vomiting, and abdominal pain. Reports her emesis appeared \"coffee ground like\" and was almost blackish. Reports reflux or burning pain in her upper epigastric region. Denies fevers or chills. Using suppositories at home and reports bowel movements yesterday. Last episode of emesis last night in the ER. Denies CP or SOB. Ambulating without issues. Dr. Josias Godoy is her managing Gyn Onc. Last received chemotherapy (cycle 1 single-agent carboplatin) on 19. Past Medical History:  
Diagnosis Date  Arthritis  Cancer [facility])   
 uterine  Chronic pain LOWER BACK  Depression  Nausea & vomiting  PMB (postmenopausal bleeding) 2014 Past Surgical History:  
Procedure Laterality Date  HX COLONOSCOPY    
 HX GYN  14 TLH/BSO/lymph node dissection  HX ORTHOPAEDIC Right [id number] LITTLE toe sx  HX VASCULAR ACCESS Left  PORTACATH - LEFT CHEST Social History Tobacco Use  Smoking status: Former Smoker Packs/day: 0.50 Years: 7.00 Pack years: 3.50 Types: Cigarettes Last attempt to quit: 1999 Years since quittin.8  Smokeless tobacco: Never Used Substance Use Topics  Alcohol use: Yes Alcohol/week: 0.6 oz Types: 1 Glasses of wine per week Comment: rarely Family History Problem Relation Age of Onset  Cancer Mother 61  
     liver metastatic disease, unknown primary  Stroke Mother BRAIN ANEURYSM  
 Cancer Maternal Uncle 60  
     bone  Cancer Maternal Grandmother 61  
     unknown primary  Anesth Problems Neg Hx Current Facility-Administered Medications Medication Dose Route Frequency  metoprolol tartrate (LOPRESSOR) tablet 12.5 mg  12.5 mg Oral Q12H  
 0.9% sodium chloride infusion  100 mL/hr IntraVENous CONTINUOUS  
 HYDROmorphone (PF) (DILAUDID) injection 1 mg  1 mg IntraVENous Q4H PRN  
 ondansetron (ZOFRAN) injection 8 mg  8 mg IntraVENous Q8H PRN  pantoprazole (PROTONIX) injection 20 mg  20 mg IntraVENous Q12H  
 enoxaparin (LOVENOX) injection 40 mg  40 mg SubCUTAneous Q24H Allergies Allergen Reactions  Hydrocodone Nausea Only  Oxycodone Nausea and Vomiting Review of Systems A comprehensive review of systems was negative except for that written in the History of Present Illness. , 10 point ROS OBJECTIVE: 
 
Physical Exam 
Visit Vitals /71 (BP 1 Location: Right arm, BP Patient Position: At rest) Pulse (!) 105 Temp 98.2 °F (36.8 °C) Resp 18 SpO2 97% General appearance: alert, cooperative, no distress, appears stated age Eyes: negative Back: symmetric, no curvature. ROM normal. No CVA tenderness. Lungs: clear to auscultation bilaterally Heart: regular rate and rhythm, S1, S2 normal, no murmur, click, rub or gallop Abdomen: soft, non-tender. Bowel sounds soft. No masses,  no organomegaly. Mild tympany and discussion. No evidence of ascites. Pelvic: deferred Extremities: extremities normal, atraumatic, no cyanosis or edema Pulses: 2+ and symmetric Skin: Skin color, texture, turgor normal. No rashes or lesions Lymph nodes: Cervical, supraclavicular, and axillary nodes normal. 
Neurologic: Grossly normal 
 
Data Review Recent Results (from the past 24 hour(s)) EKG, 12 LEAD, INITIAL Collection Time: 06/27/19  1:59 AM  
Result Value Ref Range Ventricular Rate 119 BPM  
 Atrial Rate 119 BPM  
 P-R Interval 112 ms QRS Duration 86 ms  
 Q-T Interval 338 ms QTC Calculation (Bezet) 475 ms Calculated P Axis 73 degrees Calculated R Axis 71 degrees Calculated T Axis 45 degrees Diagnosis Sinus tachycardia Nonspecific ST abnormality When compared with ECG of 21-JUN-2019 09:06, No significant change was found TYPE & SCREEN Collection Time: 06/27/19  2:18 AM  
Result Value Ref Range Crossmatch Expiration 06/30/2019 ABO/Rh(D) O NEGATIVE Antibody screen NEG   
CBC WITH AUTOMATED DIFF Collection Time: 06/27/19  2:19 AM  
Result Value Ref Range WBC 5.9 3.6 - 11.0 K/uL  
 RBC 3.17 (L) 3.80 - 5.20 M/uL HGB 9.5 (L) 11.5 - 16.0 g/dL HCT 29.4 (L) 35.0 - 47.0 % MCV 92.7 80.0 - 99.0 FL  
 MCH 30.0 26.0 - 34.0 PG  
 MCHC 32.3 30.0 - 36.5 g/dL  
 RDW 13.3 11.5 - 14.5 % PLATELET 408 842 - 514 K/uL MPV 10.4 8.9 - 12.9 FL  
 NRBC 0.0 0  WBC ABSOLUTE NRBC 0.00 0.00 - 0.01 K/uL NEUTROPHILS 80 (H) 32 - 75 % BAND NEUTROPHILS 6 0 - 6 % LYMPHOCYTES 11 (L) 12 - 49 % MONOCYTES 3 (L) 5 - 13 % EOSINOPHILS 0 0 - 7 % BASOPHILS 0 0 - 1 % IMMATURE GRANULOCYTES 0 %  
 ABS. NEUTROPHILS 5.1 1.8 - 8.0 K/UL  
 ABS. LYMPHOCYTES 0.6 (L) 0.8 - 3.5 K/UL  
 ABS. MONOCYTES 0.2 0.0 - 1.0 K/UL  
 ABS. EOSINOPHILS 0.0 0.0 - 0.4 K/UL  
 ABS. BASOPHILS 0.0 0.0 - 0.1 K/UL  
 ABS. IMM. GRANS. 0.0 K/UL  
 DF MANUAL PLATELET COMMENTS Large Platelets RBC COMMENTS ANISOCYTOSIS 
1+ METABOLIC PANEL, COMPREHENSIVE Collection Time: 06/27/19  2:19 AM  
Result Value Ref Range Sodium 136 136 - 145 mmol/L Potassium 3.3 (L) 3.5 - 5.1 mmol/L Chloride 96 (L) 97 - 108 mmol/L  
 CO2 30 21 - 32 mmol/L Anion gap 10 5 - 15 mmol/L Glucose 97 65 - 100 mg/dL BUN 16 6 - 20 MG/DL Creatinine 1.08 (H) 0.55 - 1.02 MG/DL  
 BUN/Creatinine ratio 15 12 - 20 GFR est AA >60 >60 ml/min/1.73m2 GFR est non-AA 52 (L) >60 ml/min/1.73m2 Calcium 8.8 8.5 - 10.1 MG/DL Bilirubin, total 0.4 0.2 - 1.0 MG/DL  
 ALT (SGPT) 16 12 - 78 U/L  
 AST (SGOT) 18 15 - 37 U/L Alk. phosphatase 112 45 - 117 U/L Protein, total 7.9 6.4 - 8.2 g/dL Albumin 2.5 (L) 3.5 - 5.0 g/dL Globulin 5.4 (H) 2.0 - 4.0 g/dL A-G Ratio 0.5 (L) 1.1 - 2.2 SAMPLES BEING HELD Collection Time: 06/27/19  2:19 AM  
Result Value Ref Range SAMPLES BEING HELD 1RED,1BLUE   
 COMMENT Add-on orders for these samples will be processed based on acceptable specimen integrity and analyte stability, which may vary by analyte. TROPONIN I Collection Time: 06/27/19  2:19 AM  
Result Value Ref Range Troponin-I, Qt. <0.05 <0.05 ng/mL LIPASE Collection Time: 06/27/19  2:19 AM  
Result Value Ref Range Lipase 33 (L) 73 - 393 U/L MAGNESIUM Collection Time: 06/27/19  2:19 AM  
Result Value Ref Range Magnesium 1.5 (L) 1.6 - 2.4 mg/dL PROTHROMBIN TIME + INR Collection Time: 06/27/19  2:19 AM  
Result Value Ref Range INR 1.1 0.9 - 1.1 Prothrombin time 11.0 9.0 - 11.1 sec LACTIC ACID Collection Time: 06/27/19  2:34 AM  
Result Value Ref Range Lactic acid 0.9 0.4 - 2.0 MMOL/L  
URINALYSIS W/ RFLX MICROSCOPIC Collection Time: 06/27/19  3:06 AM  
Result Value Ref Range Color YELLOW/STRAW Appearance CLEAR CLEAR Specific gravity 1.019 1.003 - 1.030    
 pH (UA) 6.5 5.0 - 8.0 Protein 30 (A) NEG mg/dL Glucose NEGATIVE  NEG mg/dL Ketone 40 (A) NEG mg/dL Blood NEGATIVE  NEG Urobilinogen 1.0 0.2 - 1.0 EU/dL Nitrites NEGATIVE  NEG Leukocyte Esterase SMALL (A) NEG    
 WBC 0-4 0 - 4 /hpf  
 RBC 0-5 0 - 5 /hpf Epithelial cells FEW FEW /lpf Bacteria NEGATIVE  NEG /hpf URINE CULTURE HOLD SAMPLE Collection Time: 06/27/19  3:06 AM  
Result Value Ref Range Urine culture hold URINE ON HOLD IN MICROBIOLOGY DEPT FOR 3 DAYS. IF UNPRESERVED URINE IS SUBMITTED, IT CANNOT BE USED FOR ADDITIONAL TESTING AFTER 24 HRS, RECOLLECTION WILL BE REQUIRED. BILIRUBIN, CONFIRM Collection Time: 06/27/19  3:06 AM  
Result Value Ref Range Bilirubin UA, confirm NEGATIVE  NEG Imaging Review: 
CT A/P 6/26/19; 
FINDINGS:  
 LUNG BASES: Minimal left-sided pleural effusion is new compared to the June 14 
exam 
Cardiophrenic soft tissue nodule increasing in size. Trace pericardial effusion 
is slightly increased as well. INCIDENTALLY IMAGED HEART AND MEDIASTINUM: Unchanged mediastinal 
lymphadenopathy. LIVER: Hepatic hypodensities unchanged GALLBLADDER: Unremarkable. SPLEEN: Splenic hypodensity/masses not significantly changed PANCREAS: No mass or ductal dilatation. ADRENALS: 3.9 x 2.3 cm right adrenal mass, not significantly changed. KIDNEYS: No mass, calculus, or hydronephrosis. STOMACH: Not significantly distended SMALL BOWEL: There is small bowel distention in the left lower quadrant. Transition to diminished caliber in the deep pelvis. COLON: Fecal stasis. APPENDIX: Unremarkable. PERITONEUM: Nodular peritoneal implants. Minimal ascites. RETROPERITONEUM: Retroperitoneal adenopathy REPRODUCTIVE ORGANS: Status post hysterectomy. URINARY BLADDER: Decompressed BONES: Canal stenosis at L5-S1. Foraminal stenosis at L5-S1. Canal stenosis at 
L2-3 as well. Degenerative disc disease at L2-3 and L5-S1. ADDITIONAL COMMENTS: N/A IMPRESSION IMPRESSION: 
Small bowel distention with transition point in the deep pelvis. There is no 
significant degree of gastric distention. Imaging findings are consistent with 
early/partial small bowel obstruction. Transition point in the deep pelvis. Ascites with diffuse nodular peritoneal enhancement, compatible with peritoneal 
carcinomatosis Mediastinal and retroperitoneal lymphadenopathy. Splenic, adrenal and hepatic mass lesions. IMPRESSION/PLAN: 
 
Patient Active Problem List  
Diagnosis Code  Endometrial cancer ([facility] [address] C54.1  Early satiety R68.81  
 Abdominal bloating R14.0  Obesity (BMI 30.0-34. 9) E66.9  Right upper quadrant abdominal pain R10.11  
 Mediastinal lymphadenopathy R59.0  Weight loss, non-intentional R63.4  Cancer related pain G89.3  Enteritis K52.9  Leukocytosis D72.829  Abdominal pain R10.9  Tachycardia R00.0  Nausea & vomiting R11.2  Ascites R18.8  SIRS (systemic inflammatory response syndrome) (HCC) R65.10  
 On antineoplastic chemotherapy Z79.899  Carcinomatosis ([address] C80.0  Ovarian cancer ([address] C56.9  Small bowel obstruction ([address] C72.738 Ms. Siomara Desai is a 60 y.o. female with advanced-progressive uterine papillary serous carcinoma. Recently re-initiated on single-agent carboplatin on 6/11/19. Presented with nausea, vomiting, abdominal pain. CT 6/26/19 with partial SBO 
 
-Oncology: Restarted single-agent carboplatin on 6/11/19. Continue treatment discussion with Dr. Parth Guevara during this hospitalization and upon discharge. -SBO: Admit to inpatient for bowel reset (NPO) and IVFs. Will hold off on NGT for now, unless emesis continues. Reported to be coffee-ground. Will monitor H&H. IV protonix and zofran. Will hold on oral bowel regimen at this time until no further emesis. -Renal: monitor UOP. Appropriate BMP 
-Heme: Appropriate CBC. Monitor H&H per above given coffee ground emesis. 
-Prophylaxis: SCDs and Lovenox 
-Disposition: Requires inpatient admission and continued supportive care.  
 
Hudson Muller MD

## 2019-06-27 NOTE — PROGRESS NOTES
TRANSFER - IN REPORT: 
 
Verbal report received from HUSEYIN Currie RN(name) on Sloane Sung  being received from ED(unit) for routine progression of care Report consisted of patients Situation, Background, Assessment and  
Recommendations(SBAR). Information from the following report(s) SBAR, Kardex, ED Summary, Procedure Summary, Intake/Output and MAR was reviewed with the receiving nurse. Opportunity for questions and clarification was provided. Assessment completed upon patients arrival to unit and care assumed.

## 2019-06-27 NOTE — PROGRESS NOTES
Bedside and Verbal shift change report given to Ruben Bernal RN (oncoming nurse) by Narda Degroot  (offgoing nurse). Report included the following information SBAR, Kardex, ED Summary, Intake/Output, MAR, Accordion and Recent Results. 1015- Pt offered gown, refused. Wishes to stay in personal clothes. 1515- pt ambulated 2 laps in hallway 1555- MD Lopez updated with pt status. 1600- pt ambulated 1 lap in hallway

## 2019-06-27 NOTE — ED NOTES
QUICKLOOK: Pt arrives reporting vomiting brown/black for 5 days.  States feels weak and was recently admitted for ascites and Ovarian CA

## 2019-06-27 NOTE — PROGRESS NOTES
Gynecologic Oncology Quick Admission Note Ms. Purnima Nava is a 60 y.o. female with recurrent/progressive endometrial cancer who presented to the ER tonight with complaints of abdominal pain, nausea, and vomiting. CT A/P consistent with partial SBO. Admit to [facility] for bowel rest (NPO) and IVFs. Will place NGT if any further vomiting. Will monitor her H&H given concerns for \"coffee ground\" appearance of her emesis. Her Hgb currently is 9.5, which is stable from 8.2 prior to her discharge. Moderate ascites, but does not appear to be enough to drain on review. Continue supportive care. Full H&P to follow in the morning.   
 
Rosette Greer MD

## 2019-06-27 NOTE — ED PROVIDER NOTES
60 y.o. female with past medical history significant for arthritis, PMB, depression, nausea & vomiting, uterine cancer, and chronic low back pain who presents from home via personal vehicle with chief complaint of vomiting. Pt presents in the ED c/o 6 days of nausea and vomiting. Pt reports that she has not been able to keep any food down for the past 6 days and is continuing to get weaker and weaker. Pt also notes that when she vomits, it feels very \"acidic\" in her abdomen just prior to vomiting. Pt also states that her vomit is \"coffee ground\" color and texture. Pt rates her pain at an \"8\"/10. Pt also notes a runny nose. Pt denies fever, bloody stool, dysuria, and frequency. There are no other acute medical concerns at this time. Social hx: Former smoker. PCP: Julia Tucker MD 
 
Note written by panfilo Fraser, as dictated by Viridiana Arellano DO 2:19 AM 
 
 
The history is provided by the patient. No  was used. Past Medical History:  
Diagnosis Date  Arthritis  Cancer [facility]) 2014  
 uterine  Chronic pain LOWER BACK  Depression  Nausea & vomiting  PMB (postmenopausal bleeding) 4/2014 Past Surgical History:  
Procedure Laterality Date  HX COLONOSCOPY    
 HX GYN  8/22/14 TLH/BSO/lymph node dissection  HX ORTHOPAEDIC Right [id number] LITTLE toe sx  HX VASCULAR ACCESS Left 2014 PORTACATH - LEFT CHEST Family History:  
Problem Relation Age of Onset  Cancer Mother 61  
     liver metastatic disease, unknown primary  Stroke Mother BRAIN ANEURYSM  
 Cancer Maternal Uncle 60  
     bone  Cancer Maternal Grandmother 61  
     unknown primary  Anesth Problems Neg Hx Social History Socioeconomic History  Marital status:  Spouse name: Not on file  Number of children: Not on file  Years of education: Not on file  Highest education level: Not on file Occupational History  Not on file Social Needs  Financial resource strain: Not on file  Food insecurity:  
  Worry: Not on file Inability: Not on file  Transportation needs:  
  Medical: Not on file Non-medical: Not on file Tobacco Use  Smoking status: Former Smoker Packs/day: 0.50 Years: 7.00 Pack years: 3.50 Types: Cigarettes Last attempt to quit: 1999 Years since quittin.8  Smokeless tobacco: Never Used Substance and Sexual Activity  Alcohol use: Yes Alcohol/week: 0.6 oz Types: 1 Glasses of wine per week Comment: rarely  Drug use: No  
 Sexual activity: Never Lifestyle  Physical activity:  
  Days per week: Not on file Minutes per session: Not on file  Stress: Not on file Relationships  Social connections:  
  Talks on phone: Not on file Gets together: Not on file Attends Holiness service: Not on file Active member of club or organization: Not on file Attends meetings of clubs or organizations: Not on file Relationship status: Not on file  Intimate partner violence:  
  Fear of current or ex partner: Not on file Emotionally abused: Not on file Physically abused: Not on file Forced sexual activity: Not on file Other Topics Concern  Not on file Social History Narrative  Not on file ALLERGIES: Hydrocodone and Oxycodone Review of Systems Constitutional: Negative for activity change, appetite change, chills and fever. HENT: Positive for rhinorrhea. Negative for congestion, sinus pressure, sneezing and sore throat. Eyes: Negative for photophobia and visual disturbance. Respiratory: Negative for cough and shortness of breath. Cardiovascular: Negative for chest pain. Gastrointestinal: Positive for nausea and vomiting. Negative for abdominal pain, blood in stool, constipation and diarrhea.   
Genitourinary: Negative for difficulty urinating, dysuria, flank pain, frequency, hematuria, menstrual problem, urgency, vaginal bleeding and vaginal discharge. Musculoskeletal: Negative for arthralgias, back pain, myalgias and neck pain. Skin: Negative for rash and wound. Neurological: Negative for syncope, weakness, numbness and headaches. Psychiatric/Behavioral: Negative for self-injury and suicidal ideas. All other systems reviewed and are negative. Vitals:  
 06/26/19 2327 06/27/19 0240 06/27/19 0600 06/27/19 9829 BP:  148/80 144/70 141/71 Pulse: (!) 127 99  (!) 105 Resp:  18  18 Temp:  98.5 °F (36.9 °C) 98.4 °F (36.9 °C) 98.2 °F (36.8 °C) SpO2: 95% 97% 97% 97% Physical Exam  
Constitutional: She is oriented to person, place, and time. No distress. Pleasant Mildly chronically ill appearing. HENT:  
Head: Normocephalic and atraumatic. Mucous membranes dry. Eyes: Pupils are equal, round, and reactive to light. Conjunctivae and EOM are normal.  
Neck: Neck supple. Cardiovascular: Regular rhythm and normal heart sounds. Tachycardia present. Pulmonary/Chest: Effort normal and breath sounds normal.  
Abdominal: Soft. She exhibits distension (mild). There is tenderness (significant generalized). There is no CVA tenderness. Musculoskeletal: She exhibits no edema or tenderness. Neurological: She is alert and oriented to person, place, and time. Skin: Skin is warm and dry. She is not diaphoretic. Nursing note and vitals reviewed. Note written by tenisha Colonibjunior, as dictated by Marlene Cadena MD 2:19 AM 
 
MDM 
  60 y.o. female with intractable vomiting, concerning for coffee ground emesis, possible fifi driscoll tear. Denies any blood in stool or melena. Labs returned showing Hg 9.5, normal plt, no leukocytosis. Normal LFT's and INR. UA neg. CT abd plv shows distention and concern for partial SBO, peritoneal carcinomatosis. Pain improved with pain medication and zofran. Given IVF bolus. Tachycardia improved after fluids. Procedures ED EKG interpretation: 
Rhythm: sinus tachycardia; and regular . Rate (approx.): 119; Axis: normal; non-specific ST depression inferolaterally. No ST elevation. Note written by panfilo Martin, as dictated by Humberto Hirsch MD 2:00 AM 
 
3:50am discussed case with Dr. Kailyn Saunders who agreed to admit the patient to their service for further care and assessment.

## 2019-06-28 NOTE — PROGRESS NOTES
27 [facility], [address] [place] [address] Shelbie THORNTON [phone]  F [phone] Patient Name: Shefali Grajeda Admit Date: 6/27/2019 OR Date: * No surgery found * Visit Date: 6/28/2019 SUBJECTIVE: 
 
Having some pain this morning, but nausea controlled. No further emesis. Passing flatus, though no BM. OBJECTIVE: 
 
Patient Vitals for the past 24 hrs: 
 Temp Pulse Resp BP SpO2  
06/28/19 0851 98.7 °F (37.1 °C) 97 16 177/82 98 %  
06/28/19 0051 98.3 °F (36.8 °C) 97 16 136/72 97 % 06/27/19 2050 97.9 °F (36.6 °C) (!) 101 16 147/81 96 %  
06/27/19 1650 98 °F (36.7 °C) (!) 105 16 152/70 96 % Date 06/27/19 0700 - 06/28/19 1486 06/28/19 0700 - 06/29/19 3248 Shift 1746-8820 9721-9379 24 Hour Total 4938-3912 2437-3483 24 Hour Total  
INTAKE  
P.O.  25 25     
  P.O.  25 25 I.V.  555 555 100  100 Volume (dextrose 5% - 0.45% NaCl with KCl 20 mEq/L infusion)  555 555 100  100 Shift Total(mL/kg)  580 580 100(1.2)  100(1.2) OUTPUT Shift Total(mL/kg) NET  580 580 100  100 Weight (kg)    81.6 81.6 81.6 Physical Exam 
   General:  alert, cooperative, no distress Cardiac:  Regular rate and rhythm Lungs:  clear to auscultation bilaterally Abdomen:  soft, mildly distended, mildly tender Wound:  n/a Extremity: extremities normal, atraumatic, no cyanosis or edema Data Review Lab Results Component Value Date/Time WBC 5.4 06/28/2019 01:55 AM  
 ABS. NEUTROPHILS 4.1 06/28/2019 01:55 AM  
 HGB 8.4 (L) 06/28/2019 01:55 AM  
 HCT 27.0 (L) 06/28/2019 01:55 AM  
 MCV 95.7 06/28/2019 01:55 AM  
 MCH 29.8 06/28/2019 01:55 AM  
 PLATELET 804 73/87/5554 01:55 AM  
 
Lab Results Component Value Date/Time  Sodium 136 06/27/2019 03:34 PM  
 Potassium 3.7 06/27/2019 03:34 PM  
 Chloride 101 06/27/2019 03:34 PM  
 CO2 28 06/27/2019 03:34 PM  
 Glucose 78 06/27/2019 03:34 PM  
 BUN 14 06/27/2019 03:34 PM  
 Creatinine 1.02 06/27/2019 03:34 PM  
 Calcium 8.2 (L) 06/27/2019 03:34 PM  
 Albumin 2.5 (L) 06/27/2019 02:19 AM  
 Bilirubin, total 0.4 06/27/2019 02:19 AM  
 AST (SGOT) 18 06/27/2019 02:19 AM  
 ALT (SGPT) 16 06/27/2019 02:19 AM  
 Alk. phosphatase 112 06/27/2019 02:19 AM  
 
IMPRESSION/PLAN: 
 
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 Currently receiving single agent Carboplatin, s/p one cycle and due for 2nd cycle next Tuesday. Heme/CV:  Hgb trending down to 8.4. She may need a transfusion if this continues. Renal:  Creatinine at baseline. FEN/GI:  Partial small bowel obstruction. Reassured by passage of flatus and lack of vomiting. Will allow clear liquids today. Will also start on Miralax. Patient aware that she might need an NGT if the vomiting returns. ID/Wound:  Afebrile. Normal WBC. PPX:  Ambulation, SCDs, IS. Dispostion:  Looks a little better today. Slowly advance diet. Hopefully we will be able to get her to chemotherapy on Tuesday, or possibly treat in house.   
 
  
Breanne Shultz MD

## 2019-06-28 NOTE — PROGRESS NOTES
Bedside and Verbal shift change report given to Vangie RN (oncoming nurse) by Sydnie Dolan RN (offgoing nurse). Report included the following information SBAR, Kardex, Procedure Summary, Intake/Output, MAR, Accordion and Recent Results. 2114- RN at bedside, lungs clear bilaterally, s1s2 audible, bowel sounds active, not passing flatus, 5/10 pain, no nausea, port site clean dry and intact 2229- Pt complaining of 8/10 pain, gave dialudid. Pt complains of nausea after dilaudid. 0005- Pt sleeping comfortably.

## 2019-06-28 NOTE — PROGRESS NOTES
Bedside and Verbal shift change report given to 114 Leanne Black (oncoming nurse) by Cristobal Jones RN (offgoing nurse). Report included the following information SBAR, Kardex, Intake/Output, MAR and Recent Results. 1030: Per Dr. Libia Villareal, pt ok to have clear liquid diet. Broth given. Tolerated well 
 
1130: Pt ambulating in hallway 
 
1300: pt ambulating in hallway 1315: pt reports having a bowel movement. 1340: Port accessed successfully with sterile technique. positive for blood return 1740: pt states she ate/drank too much and had one episode of vomiting. States she has been sipping slower and less frequently since then and has felt better.

## 2019-06-28 NOTE — PROGRESS NOTES
Bedside shift change report given to HUSEYIN Currie RN (oncoming nurse) by Tamir Pinon RN (offgoing nurse). Report included the following information SBAR, Kardex, ED Summary, Procedure Summary, Intake/Output and MAR.  
 
 
0150: Pt IV infiltrated. New IV started in left hand. Pt c/o of nausea, RN administered zofran as per orders. Pt reports she was sleeping well. RN will continue to monitor. 5345: Pt sleeping. RN will continue to monitor.

## 2019-06-28 NOTE — PROGRESS NOTES
Music Therapy Assessment Brijesh Kessler Self [phone] [id number]  60 y.o.  female Patient Telephone Number: [phone] (home) Christian Affiliation: Spirituality Language: [place] Patient Active Problem List  
 Diagnosis Date Noted  Ovarian cancer ([facility] 2019  Small bowel obstruction ([facility] 2019  On antineoplastic chemotherapy 2019  Carcinomatosis ([facility] 2019  Enteritis 2019  Leukocytosis 2019  Abdominal pain 2019  Tachycardia 2019  Nausea & vomiting 2019  Ascites 2019  SIRS (systemic inflammatory response syndrome) (HCC) 2019  Cancer related pain 2019  Mediastinal lymphadenopathy 2018  Weight loss, non-intentional 2018  Obesity (BMI 30.0-34.9) 2018  Right upper quadrant abdominal pain 2018  Early satiety 2018  Abdominal bloating 2018  Endometrial cancer ([facility] 2014 Date: 2019            Total Time (in minutes): 20          [address]  [facility] Mental Status:   [  ] Alert [  ] Pham Ramus [  ]  Confused  [  ] Minimally responsive  [  ] Sleeping Communication Status: [  ] Impaired Speech [  ] Nonverbal  
 
Physical Status:   [  ] Oxygen in use  [  ] Hard of Hearing [  ] Vision Impaired [  ] Ambulatory  [  ] Ambulatory with assistance [  ] Non-ambulatory Music Preferences, Background: Country, Blues, Classic Rock n' Roll, and some Jazz. Pt doesn't like heavy rock. Clinical Problem addressed: Support healthy coping and self-expression. Goal(s) met in session: 
Physical/Pain management (Scale of 1-10): Pre-session ratin Post-session rating: N/A: Please see Session Observations below. [  ] Increased relaxation   [  ] Affected breathing patterns [  ] Decreased muscle tension   [  ] Decreased agitation [  ] Affected heart rate    [  ] Increased alertness Emotional/Psychological: 
[x] Increased self-expression   [  ] Decreased aggressive behavior [  ] Decreased feelings of stress  [  ] Discussed healthy coping skills [x] Improved mood    [  ] Decreased withdrawn behavior Social: 
[  ] Decreased feelings of isolation/loneliness [x] Positive social interaction [  ] Provided support and/or comfort for family/friends Spiritual: 
[  ] Spiritual support    [  ] Expressed peace [  ] Expressed danica    [  ] Discussed beliefs Techniques Utilized (Check all that apply):  
[  ] Procedural support MT [  ] Music for relaxation [x] Patient preferred music 
[  ] Ena analysis  [x] Song choice  [  ] Music for validation [  ] Entrainment  [  ] Movement to music [  ] Guided visualization [  ] Soren Amis  [  ] Patient instrument playing [  ] Kanchan Wiseman writing 
[x] Rahel Crumb along   [  ] Loreatha Closs  [  ] Sensory stimulation 
[x] Active Listening  [  ] Music for spiritual support [  ] Making of CDs as gifts Session Observations: Referral from Agustin Donahue, Patient Advocate at the [facility]. Patient (pt) was alert lying in bed. After this music therapy intern ([address] [place] [address] intern) asked pt how she was feeling and about her music preferences, MT intern sat at bedside. Pt chose to hear songs by [employer] and the [employer]. MT intern sang and played the [employer] and the [employer] with guitar. Pt's affect brightened and she increased self-expression in response to the music as evidenced by (AEB) singing along and sharing that the latter song speaks so much about life that she and most people can relate. MT intern provided active listening and words of validation. Pt expressed enjoyment in the music and gratitude for the visit. Will follow as able. Peerkin \"Filomena\" Hong Whitt Music Therapy Intern Spiritual Care Department Referral-based service

## 2019-06-29 NOTE — PROGRESS NOTES
Bedside shift change report given to HUSEYIN Currie RN (oncoming nurse) by MABEL Wasserman RN (offgoing nurse). Report included the following information SBAR, Kardex, ED Summary, Procedure Summary, Intake/Output and MAR.  
 
 
1120: Pt ambulating in the hallway. Reported to primary RN that she was not able to keep down any of the liquids she ate for breakfast. RN asked pt if there was anything she need, pt declined. RN will continue to monitor. 1500: RN was at pt bedside, pt reported vomiting after eating some broth. Pt has reported having nausea all day, and states that the zofran has not been helping to control her nausea. RN called Dr. Opal Aguayo and was given order to continue zofran q8 and put pt NPO until he rounds in the morning. PT is tearful and states that this is not how she is feeling frustrated about how she feels. Pt states that she wants to find away to live the best and most comfortable life that she can for the time she has left, and would like to speak to palliative care. RN will continue to monitor pt.

## 2019-06-29 NOTE — PROGRESS NOTES
Problem: Falls - Risk of 
Goal: *Absence of Falls Description Document Brianna Late Fall Risk and appropriate interventions in the flowsheet. Outcome: Progressing Towards Goal 
  
Problem: Patient Education: Go to Patient Education Activity Goal: Patient/Family Education Outcome: Progressing Towards Goal 
  
Problem: Pain Goal: *Control of Pain Outcome: Progressing Towards Goal 
  
Problem: Patient Education: Go to Patient Education Activity Goal: Patient/Family Education Outcome: Progressing Towards Goal 
  
Problem: Discharge Planning Goal: *Discharge to safe environment Outcome: Progressing Towards Goal 
Goal: *Knowledge of medication management Outcome: Progressing Towards Goal 
Goal: *Knowledge of discharge instructions Outcome: Progressing Towards Goal 
  
Problem: Patient Education: Go to Patient Education Activity Goal: Patient/Family Education Outcome: Progressing Towards Goal 
  
Problem: Pressure Injury - Risk of 
Goal: *Prevention of pressure injury Description Document Atul Scale and appropriate interventions in the flowsheet. Outcome: Progressing Towards Goal 
  
Problem: Patient Education: Go to Patient Education Activity Goal: Patient/Family Education Outcome: Progressing Towards Goal

## 2019-06-29 NOTE — PROGRESS NOTES
[address] [facility], [address] [place] [address] Shelbie THORNTON [phone]  F [phone] Patient Name: Miracle Shock Admit Date: 6/27/2019 OR Date: * No surgery found * Visit Date: 6/29/2019 SUBJECTIVE: 
 
Small episode of emesis this morning, but improved with medication. Pain controlled. Continues to pass flatus. Small BM yesterday. OBJECTIVE: 
 
Patient Vitals for the past 24 hrs: 
 Temp Pulse Resp BP SpO2  
06/29/19 0024 98.1 °F (36.7 °C) 99 16 148/84 98 %  
06/28/19 2052 98.5 °F (36.9 °C) 100 16 163/79 98 %  
06/28/19 1652 98.1 °F (36.7 °C) 98 16 153/83 98 %  
06/28/19 1316 98.4 °F (36.9 °C) 97 16 130/84 97 % 06/28/19 0851 98.7 °F (37.1 °C) 97 16 177/82 98 % Date 06/28/19 0700 - 06/29/19 3830 06/29/19 0700 - 06/30/19 0357 Shift 9396-6391 6104-2503 24 Hour Total 4643-5598 3215-6399 24 Hour Total  
INTAKE  
P.O.  240 240     
  P. O.  240 240     
I. V.(mL/kg/hr) 901.7(0.9) 721.7(0.7) 1623.3(0.8) Volume (dextrose 5% - 0.45% NaCl with KCl 20 mEq/L infusion) 901.7 721.7 1623.3 Shift Total(mL/kg) 901.7(11) 961.7(11.8) 9145.5(18.8) OUTPUT Urine(mL/kg/hr) Urine Occurrence(s)  1 x 1 x Emesis/NG output 1  1 Emesis 1  1 Stool Stool Occurrence(s) 1 x  1 x Shift Total(mL/kg) 1(0)  1(0) .7 961.7 1862.3 Weight (kg) 81.6 81.6 81.6 81.6 81.6 81.6 Physical Exam 
   General:  alert, cooperative, no distress Cardiac:  Regular rate and rhythm Lungs:  clear to auscultation bilaterally Abdomen:  soft, mildly distended, mildly tender, hypoactive-distant bowel sounds Wound:  n/a Extremity: extremities normal, atraumatic, no cyanosis or edema Data Review Lab Results Component Value Date/Time WBC 5.4 06/29/2019 04:50 AM  
 ABS.  NEUTROPHILS 4.1 06/29/2019 04:50 AM  
 HGB 8.9 (L) 06/29/2019 04:50 AM  
 HCT 28.2 (L) 06/29/2019 04:50 AM  
 MCV 95.6 06/29/2019 04:50 AM  
 MCH 30.2 06/29/2019 04:50 AM  
 PLATELET 746 35/59/4135 04:50 AM  
 
Lab Results Component Value Date/Time Sodium 135 (L) 06/29/2019 04:50 AM  
 Potassium 4.1 06/29/2019 04:50 AM  
 Chloride 101 06/29/2019 04:50 AM  
 CO2 28 06/29/2019 04:50 AM  
 Glucose 110 (H) 06/29/2019 04:50 AM  
 BUN 7 06/29/2019 04:50 AM  
 Creatinine 1.03 (H) 06/29/2019 04:50 AM  
 Calcium 8.3 (L) 06/29/2019 04:50 AM  
 Albumin 2.5 (L) 06/27/2019 02:19 AM  
 Bilirubin, total 0.4 06/27/2019 02:19 AM  
 AST (SGOT) 18 06/27/2019 02:19 AM  
 ALT (SGPT) 16 06/27/2019 02:19 AM  
 Alk. phosphatase 112 06/27/2019 02:19 AM  
 
IMPRESSION/PLAN: 
 
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 Currently receiving single agent Carboplatin, s/p one cycle and due for 2nd cycle next Tuesday. Heme/CV:  Hgb stable at 8.9. Renal:  Creatinine at baseline. FEN/GI:  Partial small bowel obstruction. Reassured by passage of flatus, although with small episode of emesis this morning. Remain on clear liquid diet. If another episode of emesis, will consider SBFT on Monday. Continue Miralax. ID/Wound:  Afebrile. Normal WBC. PPX:  Ambulation, SCDs, IS. Dispostion:  Stable overall, although with small episode of emesis. Will remain on clear liquid diet today. If emesis continues, will consider SBFT on Monday. Otherwise she is scheduled for her next cycle of chemotherapy on Tuesday. If she improves, may consider treating inpatient versus outpatient.    
 
  
Jody Garcia MD

## 2019-06-30 NOTE — PROGRESS NOTES
Bedside shift change report given to HUSEYIN Currie RN (oncoming nurse) by MABEL Wasserman RN (offgoing nurse). Report included the following information SBAR, Kardex, ED Summary, Procedure Summary, Intake/Output and MAR.  
 
 
1005: Pt sleeping, RN will continue to monitor. 1222: Pt sleeping, RN will continue to monitor. 1420: Pt  at bedside, pt states that this is the best that she has felt in a long time. Pt had a small cup of broth for lunch, as per verbal orders from Dr. Kenneth Humphreys, and has not had increased nausea or any vomiting all day. Pt said that she was able to sleep well this morning, and that she feels like the compazine is working much better to control her nausea. RN will continue to monitor. 1740: Pt had requested to take a shower in the afternoon, and told RN she would call out when she was ready. RN went to round on pt and see if she was ready to shower, but pt was soundly sleeping. RN will help pt shower when she wakes up. RN will continue to monitor.

## 2019-06-30 NOTE — CDMP QUERY
#1 
 
Pt admitted with small bowel obstruction /Pt noted to have peritoneal carcinomatosis. If possible, please clarify in progress notes and d/c summary the relationship, if any, between peritoneal carcinomatosis 
 and small bowel obstruction Are the conditions: 
? Due to or associated with each other ? Unrelated to each other 
? Other, please specify ? Unable to determine ? The medical record reflects the following: 
  Risk Factors: SBO Clinical Indicators:  
peritoneal carcinomatosis H/P- 60 y.o. female with advanced-progressive uterine papillary serous carcinoma. Recently re-initiated on single-agent carboplatin on 6/11/19. Presented with nausea, vomiting, abdominal pain. CT 6/26/19 with partial SBO Treatment: bowel rest, CT scan Thank you, 
       Francesca Yeung [facility], [address] [facility]

## 2019-06-30 NOTE — PROGRESS NOTES
Bedside and Verbal shift change report given to NEY Mckeon RN (oncoming nurse) by Amanda Chauhan RN (offgoing nurse). Report included the following information SBAR, Kardex, Intake/Output, MAR, Accordion and Recent Results.

## 2019-06-30 NOTE — PROGRESS NOTES
Bedside and Verbal shift change report given to Vangie RN (oncoming nurse) by Katie Atkinson RN (offgoing nurse). Report included the following information SBAR, Kardex, Intake/Output, MAR, Accordion and Recent Results.

## 2019-06-30 NOTE — PROGRESS NOTES
[address] [facility], [address] [place] [address] Shelbie THORNTON [phone]  F [phone] Patient Name: Nicky Mercado Admit Date: 6/27/2019 OR Date: * No surgery found * Visit Date: 6/30/2019 SUBJECTIVE: 
 
Multiple small episodes of emesis yesterday. Now NPO. Asking for chicken broth this morning. Pain controlled. Continues to pass flatus. Small BM on Friday. OBJECTIVE: 
 
Patient Vitals for the past 24 hrs: 
 Temp Pulse Resp BP SpO2  
06/30/19 0839 98.1 °F (36.7 °C) (!) 102 18 164/81 97 % 06/30/19 0010 98.4 °F (36.9 °C) (!) 102 16 162/86 98 %  
06/29/19 2025 98.2 °F (36.8 °C) (!) 105 16 124/71 97 % Date 06/29/19 0700 - 06/30/19 0591 06/30/19 0700 - 07/01/19 1430 Shift 2285-3162 2195-4586 24 Hour Total 3521-0232 4756-2523 24 Hour Total  
INTAKE  
P.O. 150  150     
  P. O. 150  150     
I. V.(mL/kg/hr) 900(0.9) 4432.7(7) 2866.7(1.5) Volume (dextrose 5% - 0.45% NaCl with KCl 20 mEq/L infusion) 900 1966.7 2866.7 Shift Total(mL/kg) 2038(99.7) 9478.1(32.1) [id number]) OUTPUT Emesis/NG output Emesis Occurrence(s) 1 x  1 x Shift Total(mL/kg) NET 1050 1966.7 3016.7 Weight (kg) 81.6 81.6 81.6 81.6 81.6 81.6 Physical Exam 
   General:  alert, cooperative, no distress Cardiac:  Regular rate and rhythm Lungs:  clear to auscultation bilaterally Abdomen:  soft, mildly distended, mildly tender, hypoactive-distant bowel sounds Wound:  n/a Extremity: extremities normal, atraumatic, no cyanosis or edema Data Review Lab Results Component Value Date/Time WBC 5.4 06/29/2019 04:50 AM  
 ABS. NEUTROPHILS 4.1 06/29/2019 04:50 AM  
 HGB 8.9 (L) 06/29/2019 04:50 AM  
 HCT 28.2 (L) 06/29/2019 04:50 AM  
 MCV 95.6 06/29/2019 04:50 AM  
 MCH 30.2 06/29/2019 04:50 AM  
 PLATELET 610 68/44/3205 04:50 AM  
 
Lab Results Component Value Date/Time  Sodium 135 (L) 06/29/2019 04:50 AM  
 Potassium 4.1 06/29/2019 04:50 AM  
 Chloride 101 06/29/2019 04:50 AM  
 CO2 28 06/29/2019 04:50 AM  
 Glucose 110 (H) 06/29/2019 04:50 AM  
 BUN 7 06/29/2019 04:50 AM  
 Creatinine 1.03 (H) 06/29/2019 04:50 AM  
 Calcium 8.3 (L) 06/29/2019 04:50 AM  
 Albumin 2.5 (L) 06/27/2019 02:19 AM  
 Bilirubin, total 0.4 06/27/2019 02:19 AM  
 AST (SGOT) 18 06/27/2019 02:19 AM  
 ALT (SGPT) 16 06/27/2019 02:19 AM  
 Alk. phosphatase 112 06/27/2019 02:19 AM  
 
IMPRESSION/PLAN: 
 
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 Currently receiving single agent Carboplatin, s/p one cycle and due for 2nd cycle next Tuesday. Heme/CV:  Hgb stable at 8.9. Renal:  Creatinine at baseline. FEN/GI:  Partial small bowel obstruction. Reassured by passage of flatus, although multiple small episodes of emesis on Saturday. Now NPO. May consider SBFT on Monday, as unable to order on the weekend. ID/Wound:  Afebrile. Normal WBC. PPX:  Ambulation, SCDs, IS. Dispostion:  Stable overall, although with small episodes of emesis. NPO for now with bowel rest. Continue IVFs. Consider SBFT on Monday. Otherwise she is scheduled for her next cycle of chemotherapy on Tuesday. If she improves, may consider treating inpatient versus outpatient.    
 
  
Alejandro Farooq MD

## 2019-06-30 NOTE — PROGRESS NOTES
Problem: Falls - Risk of 
Goal: *Absence of Falls Description Document Lorsydney Hunger Fall Risk and appropriate interventions in the flowsheet. Outcome: Progressing Towards Goal 
  
Problem: Patient Education: Go to Patient Education Activity Goal: Patient/Family Education Outcome: Progressing Towards Goal 
  
Problem: Pain Goal: *Control of Pain Outcome: Progressing Towards Goal 
  
Problem: Patient Education: Go to Patient Education Activity Goal: Patient/Family Education Outcome: Progressing Towards Goal 
  
Problem: Discharge Planning Goal: *Discharge to safe environment Outcome: Progressing Towards Goal 
Goal: *Knowledge of medication management Outcome: Progressing Towards Goal 
Goal: *Knowledge of discharge instructions Outcome: Progressing Towards Goal 
  
Problem: Patient Education: Go to Patient Education Activity Goal: Patient/Family Education Outcome: Progressing Towards Goal 
  
Problem: Pressure Injury - Risk of 
Goal: *Prevention of pressure injury Description Document Atul Scale and appropriate interventions in the flowsheet. Outcome: Progressing Towards Goal 
  
Problem: Patient Education: Go to Patient Education Activity Goal: Patient/Family Education Outcome: Progressing Towards Goal

## 2019-07-01 NOTE — ADT AUTH CERT NOTES
Sepsis and Other Febrile Illness, without Focal Infection - Care Day 7 (6/20/2019) by Ben Barlow RN  
 
   
Review Entered Review Status 6/27/2019 15:05 Completed  
   
Criteria Review Care Day: 7 Care Date: 6/20/2019 Level of Care: Inpatient Floor Guideline Day 4 Level Of Care (X) Floor to discharge[I] 6/27/2019 15:05:03 EDT by Loy Martino [facility] Clinical Status ( ) * Hemodynamic stability 6/27/2019 15:05:03 EDT by Dorian Burton   
  98.4, , 114, 121, RR 16, /95, 96% on RA   
(X) * Fever absent or acceptable for next level of care 6/27/2019 15:05:03 EDT by Dorian Burton   
  afebrile 98.4 (X) * Hypoxemia absent 6/27/2019 15:05:03 EDT by Dorian Burton   
  96% on RA   
(X) * Tachypnea absent 6/27/2019 15:05:03 EDT by Dorian Burton   
  RR 16, 17, 16   
(X) * Cultures negative or infection identified and under adequate treatment   
(X) * Mental status at baseline ( ) * Metabolic derangement (eg, dehydration, acidosis) absent   
( ) * End-organ dysfunction (eg, myocardial ischemia, renal failure) absent   
( ) * Discharge plans and education understood Activity   
(X) * Ambulatory 6/27/2019 15:05:03 EDT by Kevin Mitchell up ad crissy Routes   
(X) * Oral hydration, medications[J] 6/27/2019 15:05:03 EDT by Dorian Burton   
  sodium bicarb 42mg SC x 1 NS 50ml/hr Lovenox 40mg SC q24h Reglan 10mg PO q6h Morphine IR 15mg PO q4h Protonix 40mg PO 2x daily   
(X) Usual diet 6/27/2019 15:05:03 EDT by Kevin Mitchell regular diet Medications   
(X) * Antimicrobial treatment not necessary or treatment at next level of care arranged[D] 6/27/2019 15:05:03 EDT by Dorian Burton Subject: Additional Clinical Information 6/20/19 GYN Oncology assessment/planFeeling much better this morning.   Pain under much better control.   Reports reflux better.     
 Less depressed than my last visit with her.   She is \"in a better place\". Belly remains distended.       
Overall she feels better and is looking forward to going home. No SOB. Ambulating well. 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     
Will have to adjust her regimen for CINV ppx with emend/aprepitant and add home regimen including decadron, scheduled antiemetics, H2B or PPI, return to [facility] for hydration following chemo.     
S/p paracentesis 6/10. We can eval for add'l fluid prior to discharge if her sx remain stable. Consider GI dysfunction d/t carcinomatosis. Improved with Reglan.   Continue antacid regimen and stool softeners. Heme/CV: Hemodynamically stable.     
Renal: No I/O record.   JENNY. Related to abx?   Continue hydration. Cr improving. FEN/GI: on reg diet.   Still small AM emesis, Suspect delayed emptying.   Scheduled Reglan, BID PPI. Consider SBFT if needed to look for delayed emptying. Continue GI regimen. Nutrition consult. ID/Wound: afeb, off abx. SIRS, bacteremia in 1/2 bottles, possible contaminate.   Recheck NGTD. Neuro: Dilaudid PO for cancer related pain. Will start morphine CR. Dilaudid PRN Psych: Depressed. ONN consult establish for oncology LCSW for counseling. Disposition: Clinically improved.   Kidneys improving.   Will plan on paracentesis today.   Should be able to go home after that later today. F/u clinic, continue chemo IM assessment/plan:   
feeling better, just had paracentesis, no acute distress, lungs are CTAB, port cath left chest wall, abdomen is soft, tender in lower abdomen, bowel sounds positive, paracentesis catheter in place draining lor yellow fluid to bad, no edema, AA&O x 3 #JENNY,likley 2/2 to prerena/ATN due to abx etc.:   
 -due to nausea/vomiting and abx (received vanc for 1/2 bottles positive blood cultures) -Creatinine started coming down today. Decrease IV fluids,encourage oral intake   
     
Intractable nausea/vomiting:improved Due to chemotherapy,received carboplatin regimen last Tuesday   
-switch to compazine,d/c zofran. On scheduled reglan. Prn compazine.   
-start regular diet   
     
1#/2 bottles gram positive clusters   
-coagulase negative staph -d/c vancomycin      
     
#Cancer related pain,uncontrolled:   
-abdominal pain related to metastatic cancer.   
-On morphine IR scheduled and prn. Palliative team help appreciated      
     
# Stage IV uterine cancer:   
-s/p TLH,BSO and lymphadenectomy in 2014 ,received chemo. Was started on chemo again for recurrence.   
-follows with    
     
#Ascites,likley malignant:   
-6/20 s/p paracentesis   
     
    
Small bowel thickening on CT abd - enteritis vs underlying disease   
-Zosyn was stopped as she did not have evidence of infection.   
     
  
Constipation:bowel regimen adjusted: Citrate of magnesia,bisacodyl supp,miralaz,pericolace 1659:Gyn Onc Progress note   
     
Patient accidentally pulled out IP drain.   ~1100mls in bag.     
Still early AM nausea and \"emesis\" as per prior days. Pain overall today has been better with a good BM. Ambulating/tolerating diet today.   
     
  
US will perform ascites check at bedside. If sig quantity ascites remains, plan to do paracentesis in AM with no drain to be left in place.   If only trace amount, plan to DC in AM. Patient is comfortable with the place Labs: chloride 110, Creatinine 1.53, Calcium 8.3 Abdominal US - decreased small volume of as   
cites since earlier today US Paracentesis abdomen: successful US guided placement of peritoneal drainage catheter for ascites,   catheter kept to gravity draine, catheter should be removed in 24-48 hours * Milestone    
Sepsis and Other Febrile Illness, without Focal Infection - Care Day 6 (6/19/2019) by Kamila Cabrera RN  
 
   
Review Entered Review Status 6/27/2019 14:50 Completed  
   
Criteria Review Care Day: 6 Care Date: 6/19/2019 Level of Care: Inpatient Floor Guideline Day 4 Level Of Care (X) Floor to discharge[I] 6/27/2019 14:50:17 EDT by Poly Child medical [facility] Clinical Status ( ) * Hemodynamic stability 6/27/2019 14:50:17 EDT by Kristyn Newell   
  100.1, , 109, 105, RR 17, /76   
(X) * Fever absent or acceptable for next level of care 6/27/2019 14:50:17 EDT by Kristyn Newell   
  temp 100.1   
(X) * Hypoxemia absent 6/27/2019 14:50:17 EDT by Kristyn Newell   
  97% on RA   
(X) * Tachypnea absent 6/27/2019 14:50:17 EDT by Kristyn Newell   
  RR 17, 16   
(X) * Cultures negative or infection identified and under adequate treatment   
(X) * Mental status at baseline ( ) * Metabolic derangement (eg, dehydration, acidosis) absent   
( ) * End-organ dysfunction (eg, myocardial ischemia, renal failure) absent   
( ) * Discharge plans and education understood Activity   
(X) * Ambulatory 6/27/2019 14:50:17 EDT by Poly Child ambulating in room and to bathroom Routes   
(X) * Oral hydration, medications[J] 6/27/2019 14:50:17 EDT by Kristyn Newell   
  mag citrate 296ml PO x 1 
NS 100ml/hr Lovenox 40mg SC q24h Dilaudid 2mg PO q4h PRn x 1 Reglan 10mg PO q6h Morphine IR 15mg PO q4h x 2 Protonix 40mg PO 2x daily Compazine 5mg IV q6h PRN x 1   
(X) Usual diet 6/27/2019 14:50:17 EDT by Poly Child regular diet Medications   
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     
Will have to adjust her regimen for CINV ppx with emend/aprepitant and add home regimen including decadron, scheduled antiemetics, H2B or PPI, return to [facility] for hydration following chemo.     
S/p paracentesis 6/10. We can eval for add'l fluid prior to discharge if her sx remain stable. Consider GI dysfunction d/t carcinomatosis. Improved with Reglan.   Continue antacid regimen and stool softeners. Heme/CV: Hemodynamically stable.     
Renal: No I/O record.   JENNY. Related to abx?   Continue hydration. Cr improving. FEN/GI: on reg diet.   Still small AM emesis, Suspect delayed emptying.   Scheduled Reglan, BID PPI. Consider SBFT if needed to look for delayed emptying. Continue GI regimen. Nutrition consult. ID/Wound: afeb, off abx. SIRS, bacteremia in 1/2 bottles, possible contaminate.   Recheck NGTD. Neuro: Dilaudid PO for cancer related pain. Will start morphine CR. Dilaudid PRN Psych: Depressed. ONN consult establish for oncology LCSW for counseling. Disposition: Improved overall. DC when renal fxn improved. US eval for paracent prior to DC. F/u clinic, continue chemo 6/27/2019 14:50:17 EDT by Raj Barger Subject: Additional Clinical Information 6/19/19 IM note:   
says she gets more nausea and abdominal pain in the night. She says she normally takes two of the 2mg dilaudid at home during nights. She says she does not seem pain free. She feels she is constipated. #JENNY,lane 2/2 to prerena/ATN due to abx etc.:   
-due to nausea/vomiting and abx (received vanc for 1/2 bottles positive blood cultures) -Creatinine started coming down today. Decrease IV fluids,encourage oral intake   
     
Intractable nausea/vomiting:improved Due to chemotherapy,received carboplatin regimen last Tuesday -switch to compazine,d/c zofran. On scheduled reglan. Prn compazine.   
-start regular diet   
     
1#/2 bottles gram positive clusters   
-coagulase negative staph -d/c vancomycin      
     
#Cancer related pain,uncontrolled:   
-abdominal pain related to metastatic cancer.   
- She was getting 2-4 mg dilaudid. I think she needs a pain regimen with long acting scheduled and prn medications.   
     
     
# Stage IV uterine cancer:   
-s/p TLH,BSO and lymphadenectomy in 2014 ,received chemo. Was started on chemo again for recurrence.   
-follows with    
     
    
Small bowel thickening on CT abd - enteritis vs underlying disease   
-Zosyn was stopped as she did not have evidence of infection.   
     
     
Constipation:bowel regimen adjusted: Citrate of magnesia,bisacodyl supp,miralaz,pericolace.   
     
Code status: DNR   
DVT prophylaxis: lovenox GYN/Oncology assessment/plan:Reports reflux better.   \"emesis\" this AM with dry heaving on waking. Again small volume, clear.     
Tearful, feeling \"depressed\" yesterday.   Happy about her renal function. Pain remains abd, >right/suprapubic. Used ~6mg PO dilaudid yesterday.   States it is constant until it gets bad enough to take something. +flatus. Small/firm BM this Am.       
Overall she feels better and is looking forward to going home. No SOB. Ambulating well Abdomen:   soft, +BS. Tender subjectively, mildly protuberant w/o distention/tympani. without guarding, without rebound. Possible slight fluid wave.   She feels girth of abd larger * Milestone

## 2019-07-01 NOTE — PROGRESS NOTES
PT Note: 
 
Orders received and acknowledged. Chart reviewed and spoke with nursing. Patient is currently HEATHER for procedure. Will follow up tomorrow for PT evaluation. Thank you.

## 2019-07-01 NOTE — PROGRESS NOTES
Bedside and Verbal shift change report given to Rogers Arambula RN (oncoming nurse) by Jessica Galvez RN (offgoing nurse). Report included the following information SBAR, Kardex, Intake/Output, MAR and Recent Results. 0930: pt off floor for Xray 
 
1420: pt back on floor

## 2019-07-01 NOTE — PROGRESS NOTES
[address] [facility], [address] [place], [address] 
P [phone]  F [phone] Patient Name: Reynold Simon Admit Date: 6/27/2019 OR Date: * No surgery found * Visit Date: 7/1/2019 SUBJECTIVE: 
 
No emesis since Saturday, nausea continues. Improved on scheduled antiemetic. Wants broth. 
+flatus today/small BM yesterday. Continue with more eructation than flatus. Pain is overall better. Last use 6hr ago 1mg dilaudid Ambulating, but feeling more week Her last solid meal without being followed by an emesis has been some time. OBJECTIVE: 
 
Patient Vitals for the past 24 hrs: 
 Temp Pulse Resp BP SpO2  
07/01/19 0519 98.1 °F (36.7 °C) 99 16 159/88 98 % 06/30/19 2008 98.6 °F (37 °C) (!) 103 16 147/77 97 % 06/30/19 1621 98.3 °F (36.8 °C) 96 16 147/83 97 % 06/30/19 0839 98.1 °F (36.7 °C) (!) 102 18 164/81 97 % Date 06/30/19 0700 - 07/01/19 3981 07/01/19 0700 - 07/02/19 3201 Shift 7247-6645 7785-9713 24 Hour Total 1432-4738 0034-5932 24 Hour Total  
INTAKE  
P.O. 0  0     
  P. O. 0  0     
I. V.(mL/kg/hr) 390(0.4) 400(0.4) 790(0.4) Volume (dextrose 5% - 0.45% NaCl with KCl 20 mEq/L infusion) 390 400 790 Shift Total(mL/kg) 390(4.8) 400(4.9) 790(9.7) OUTPUT Shift Total(mL/kg)  400 790 Weight (kg) 81.6 81.6 81.6 81.6 81.6 81.6 Physical Exam 
   General:  alert, cooperative, no distress Cardiac:  Regular rate and rhythm Lungs:  clear to auscultation bilaterally Abdomen:  soft, mildly distended, mildly tender, +tympani upper abd/right abd.  BS+ Wound:  n/a Extremity: extremities normal, atraumatic, no cyanosis or edema Data Review Lab Results Component Value Date/Time WBC 5.4 06/29/2019 04:50 AM  
 ABS.  NEUTROPHILS 4.1 06/29/2019 04:50 AM  
 HGB 8.9 (L) 06/29/2019 04:50 AM  
 HCT 28.2 (L) 06/29/2019 04:50 AM  
 MCV 95.6 06/29/2019 04:50 AM  
 MCH 30.2 06/29/2019 04:50 AM  
 PLATELET 588 12/80/1776 04:50 AM  
 
Lab Results Component Value Date/Time Sodium 135 (L) 06/29/2019 04:50 AM  
 Potassium 4.1 06/29/2019 04:50 AM  
 Chloride 101 06/29/2019 04:50 AM  
 CO2 28 06/29/2019 04:50 AM  
 Glucose 110 (H) 06/29/2019 04:50 AM  
 BUN 7 06/29/2019 04:50 AM  
 Creatinine 1.03 (H) 06/29/2019 04:50 AM  
 Calcium 8.3 (L) 06/29/2019 04:50 AM  
 Albumin 2.5 (L) 06/27/2019 02:19 AM  
 Bilirubin, total 0.4 06/27/2019 02:19 AM  
 AST (SGOT) 18 06/27/2019 02:19 AM  
 ALT (SGPT) 16 06/27/2019 02:19 AM  
 Alk. phosphatase 112 06/27/2019 02:19 AM  
 
IMPRESSION/PLAN: 
 
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 Currently receiving single agent Carboplatin, s/p one cycle and due for 2nd cycle tomorrow. Will need transfer for chemo. Heme/CV:  anemia d/t chronic dz/chemo. Stable, asx. Renal:  Creatinine at baseline. FEN/GI:  Partial small bowel obstruction. Reassured by passage of flatus, although multiple small episodes of emesis on Saturday. Now NPO, continue IVFs. Continues with cycle of N/V, suspect GI dysfunction d/t her disease and partial GI obstruction. SBFT today. She is very adamant about not having an NGT. Continue scheduled antiemetics. Pain overall improved. Likely calorie deficits. Will need to consider parenteral nutrition soon given her pattern. ID/Wound:  Afebrile. Normal WBC, abd stable. PPX:  Ambulation, SCDs, IS, PT for progressive debility/weakness. Dispostion:  Stable overall. Difficult case. She needs chemo to decrease tumor burden and in theory improve her bowel function/relieve obstruction, but her recurrent and disease state continues to make her ill, which will decrease her performance status.    
  
  
PHILIP Marquez

## 2019-07-01 NOTE — PROGRESS NOTES
Oncology Nurse [address] [facility]   Phone: [phone]    Supportive visit with [address] . She was admitted through ED on 6/28 with partial SBO symptoms associated with metastatic endometrial cancer. Has rec'ed C1 palliative carboplatin since recurrence. [address]  is feeling cautiously optimistic about slow improvement -- pain is better and she's had no vomiting. Rec's antiemetics ATC. She is resolved against having the \"tube in my nose\". Disappointed that she wasn't out of hospital longer -- figures eating 'that field corn\" wasn't helpful in preventing GI obstructive problems. Anticipates that she will receive C2 chemo tomorrow; aware she will move to oncology unit.  is supportive -- he visits early in the day as he works evening shift. She enjoys old movies on TV and uses this as distraction when she's alone. Sloane appreciated the visit and has my contact info. Will continue to visit and offer support. Direct to complimentary integrative supports through "[employer] as desired. Consider Pall Med referral while hospitalized as she was to follow up as outpatient. ACP was initiated with Pall Med. Note she has DDNR but no AMD on file.       Deedee Light MS RN AOCNS

## 2019-07-01 NOTE — PROGRESS NOTES
NUTRITION COMPLETE ASSESSMENT 
 
RECOMMENDATIONS:  
1. Allow 6 small meals, High calorie/High Protein diet 2. Begin Homemade Galt Milkshakes once allowed PO 
3. Consider using port for cycling TPN daily to meet needs 4. TPN goal as below, Day #1: 5%AA, D20% at 42mL/hr continuous Day #2: 5%AA, D20% @ 70mL/hr continous Begin cycling once tolerance of TPN established and lytes stable 5. Monitor Lytes closely for refeeding syndrome - replace K, Phos and Mag PRN Interventions/Plan:  
Food/Nutrient Delivery:    Commercial supplement     Initiate parenteral nutrition Begin Strawberry Homemade Milkshake daily Begin TPN (Goal) 5%AA, D20% @ 70mL/hr with 500mL 20% Lipids 3x week would provide on average: ~1900 kcal, 84g Protein, 1.68L/d Suggested 14hr TPN Cycle: 70mL first and last hour, 140mL x12 hours Nutrition Education:    
Coordination of Care:   
 
Assessment:  
Reason for Assessment:  
[] Provider Consult [x]BPA/MST Referral - poor PO, wt loss of 7.2% from UBW in 1 Month []LOS []Reassessment   
[]NPO/Clear Liquid []At Nutrition Risk []Other Diet: NPO Supplements: n/a Nutritionally Significant Medications: [x] Reviewed & Includes: miralax, zofran, compazine, D5% 1/2NS @ 100ml/hr, protonix Meal Intake:  
Patient Vitals for the past 100 hrs: 
 % Diet Eaten 06/30/19 0839 0 %  
06/29/19 0905 50 % 06/27/19 2102 0 % Pre-Hospitalization: 
Usual Appetite: Fair Current Hospitalization:  
Fluid Restriction:   n/a Appetite:   good PO Ability: Independent Average po intake:NPO Average supplements intake:   n/a Subjective: \"I am not sure how they expect me to get Chemo when they haven't let me eat anything to eat\" Objective: 
Pt admitted with nausea and emesis. PMHx:  Stage IV UPSC, recurrent cancer and treatments since 2014. Pt reports she has been able to have broth and some small meals as long as she doesn't have too much.  Pt admits she realizes she has lost a significant amount of weight recently. She is anxious to be able to resume taking PO. Pt asks RD about foods best for her to prevent vomiting and promote stable weight. Education shared with patient - low fiber, high calories (milkshakes, easily digestable proteins, liquid fats; avoid red meat, fiberous foods & tough/grizzly fats). Pt doesn't like or desire Ensure/Boost shakes but is interested in regular Milkshakes. Pt will not likely be able to meet calorie/nutrient needs via PO & may have worsening tolerance with subsequent chemo. Pt at high risk of refeeding. Estimated Nutrition Needs:  
Kcals/day: 1900 Kcals/day( - 3364) Protein: 76 g( - 99g (1-1.3g/kg of current wt) Fluid: (1mL/kcal of intake) Based On: Kcal/kg - specify (Comment)(25 - 30kcal/kg of current wt) Weight Used: Actual wt(75.9 kg (standing wt)) Pt expected to meet estimated nutrient needs:  []   Yes     [x]  No [] Unable to predict at this time Nutrition Diagnosis: 1. Altered GI function related to narrowing colon from mass obstruction as evidenced by review of physical findings, poor intake of adequate PO, vomiting with significant volume of PO, wt loss of 13# from UBW  in 1 month 2. Severe Acute Protein Calorie Malnutrition - wt down 7.2% in 1 month Patient meets criteria for Acute Severe Protein Calorie Malnutrition as evidenced by:  
ASPEN Malnutrition Criteria Acute Illness, Chronic Illness, or Social/Enviornmental: Acute illness Energy Intake: Less than/equal to 50% est energy req for greater than/equal to 5 days Weight Loss: Greater than 5% x 1 mo ASPEN Malnutrition Score - Acute Illness: 12 Acute Illness - Malnutrition Diagnosis: Severe malnutrition. Goals:   
Meet >80% of estimated calorie and protein needs within 1 week; halt further wt loss Monitoring & Evaluation: - Total energy intake, Enteral/parenteral nutrition intake - Weight/weight change, Protein profile  -   
 
 Previous Nutrition Goals Met:   N/A Previous Recommendations:    N/A Education & Discharge Needs: 
 [] None Identified 
 [x] Identified and addressed: Low Fiber, High Calorie Foods [] Participated in care plan, discharge planning, and/or interdisciplinary rounds Cultural, Tenriism and ethnic food preferences identified: None Skin Integrity: [x]Intact  []Other Edema: [x]None []Other Last BM: 7/1/19 - having small daily BMs Food Allergies: [x]None []Other Diet Restrictions: Cultural/Nondenominational Preference(s): None Anthropometrics:   
Weight Loss Metrics 7/1/2019 6/27/2019 6/20/2019 6/11/2019 6/10/2019 6/10/2019 5/28/2019 Today's Wt 167 lb - 169 lb 14.4 oz 171 lb 180 lb 179 lb 9.6 oz 180 lb BMI - 26.95 kg/m2 27.42 kg/m2 27.6 kg/m2 28.19 kg/m2 29 kg/m2 29.07 kg/m2 Weight Source: Standing scale (comment)(RD obtained 7/1) Height: 5' 6\" (167.6 cm), Body mass index is 26.95 kg/m². IBW : 59 kg (130 lb), % IBW (Calculated): 138.46 % 
 ,   
 
Labs:   
Lab Results Component Value Date/Time Sodium 137 07/01/2019 08:36 AM  
 Potassium 4.1 07/01/2019 08:36 AM  
 Chloride 103 07/01/2019 08:36 AM  
 CO2 28 07/01/2019 08:36 AM  
 Glucose 95 07/01/2019 08:36 AM  
 BUN 8 07/01/2019 08:36 AM  
 Creatinine 0.98 07/01/2019 08:36 AM  
 Calcium 8.2 (L) 07/01/2019 08:36 AM  
 Magnesium 1.0 (L) 07/01/2019 08:36 AM  
 Phosphorus 3.2 07/01/2019 08:36 AM  
 Albumin 2.0 (L) 07/01/2019 08:36 AM  
 
No results found for: HBA1C, HGBE8, TDT7AAJI, OYA9MEBN Fauzia Owen, RD, MS, CDE

## 2019-07-02 NOTE — PROCEDURES
PICC Placement Note PRE-PROCEDURE VERIFICATION Order for PICC verified. Consent obtained from patient after risks, benefits, and procedure was explained. Time given to answer questions and/or concerns. Correct Procedure: yes Correct Site:  yes Temperature: Temp: 98.3 °F (36.8 °C), Temperature Source: Temp Source: Oral 
Recent Labs  
  07/01/19 
1602 BUN 8  
CREA 0.98  
 WBC 4.8 Allergies: Hydrocodone and Oxycodone Education materials, including PICC Booklet, for PICC Care given to patient: yes. See Patient Education activity for further details. PROCEDURE DETAIL A double lumen PICC line was started for vascular access, TPN and nurse/physician request. The following documentation is in addition to the PICC properties in the lines/airways flowsheet : 
Lot #: [id number] Was xylocaine 1% used intradermally:  yes Catheter Length: 38 cm External Catheter Length: 0 cm Vein Selection for PICC:right basilic Central Line Bundle followed no Complication Related to Insertion: none The placement was verified by ECG/[employer]: The tip location is in the superior vena cava. See ECG results for PICC tip placement. Report given to Gia Dasilva RN. Line is okay to use. MICHELLE PittmanN, RN, [facility] Vascular Access Team

## 2019-07-02 NOTE — CDMP QUERY
#2 
 
 
Pt admitted with SBO/Pt noted to have nutritional consult meeting ASPEN criteria Jose Mchughemann If possible, please document in the progress notes and d/c summary if you are evaluating and / or treating any of the following: 
 
? Severe Protein Calorie Malnutrition ? Severe protein energy malnutrition ? Other, please specify ? Clinically unable to determine The medical record reflects the following: 
  Risk Factors: wt loss Clinical Indicators: nutritional consult note- 
1. Altered GI function related to narrowing colon from mass obstruction as evidenced by review of physical findings, poor intake of adequate PO, vomiting with significant volume of PO, wt loss of 13# from UBW in 1 month 2. Severe Acute Protein Calorie Malnutrition - wt down 7.2% in 1 month Patient meets criteria for Acute Severe Protein Calorie Malnutrition as evidenced by: ASPEN Malnutrition Criteria Acute Illness, Chronic Illness, or Social/Enviornmental: Acute illness Energy Intake: Less than/equal to 50% est energy req for greater than/equal to 5 days Weight Loss: Greater than 5% x 1 mo ASPEN Malnutrition Score - Acute Illness: 12 Acute Illness - Malnutrition Diagnosis: Severe malnutrition. Treatment: TPN, nutritional consult,nii Thank you, 
       Lyly Tejeda [facility], [address]

## 2019-07-02 NOTE — PROGRESS NOTES
[address] [facility], [address] [place], [address] 
P [phone]  F [phone] Patient Name: Flex Lares Admit Date: 6/27/2019 OR Date: * No surgery found * Visit Date: 7/2/2019 SUBJECTIVE: 
 
Continues to have emesis, though improved on scheduled antiemetic. Vomited with her PO contrast for small bowel follow-through yesterday. Continues to have flatus and occasional BM. Also reporting eructation. Pain is overall better, but still requiring prn Dilaudid Ambulating, but feeling more week. Her last solid meal without being followed by an emesis has been some time. OBJECTIVE: 
 
Patient Vitals for the past 24 hrs: 
 Temp Pulse Resp BP SpO2  
07/02/19 0059 98.2 °F (36.8 °C) 95 16 129/68 96 % 07/01/19 2037 98.5 °F (36.9 °C) 95 16 153/80 98 % 07/01/19 1601 98.5 °F (36.9 °C) 92 16 138/78 96 % 07/01/19 0841 97.5 °F (36.4 °C) 97 16 148/84  Date 07/01/19 0700 - 07/02/19 7325 07/02/19 0700 - 07/03/19 1675 Shift 2837-7006 3539-8735 24 Hour Total 2226-7307 2025-7868 24 Hour Total  
INTAKE  
P.O.  240 240     
  P. O.  240 240     
I. V.(mL/kg/hr) 100(0.1) 690(0.8) 790(0.4) Volume (dextrose 5% - 0.45% NaCl with KCl 20 mEq/L infusion) 100 690 790 Shift Total(mL/kg) 100(1.3) 930(12.3) 1030(13.6) OUTPUT Urine(mL/kg/hr) Urine Occurrence(s)  1 x 1 x Emesis/NG output Emesis Occurrence(s) 1 x 1 x 2 x Shift Total(mL/kg)  753 9927 Weight (kg) 75.7 75.7 75.7 75.7 75.7 75.7 Physical Exam 
   General:  alert, cooperative, no distress Cardiac:  Regular rate and rhythm Lungs:  clear to auscultation bilaterally Abdomen:  soft, mildly distended, mildly tender, +tympani upper abd/right abd.  BS+ Wound:  n/a Extremity: extremities normal, atraumatic, no cyanosis or edema Data Review Lab Results Component Value Date/Time  WBC 4.8 07/01/2019 08:36 AM  
 ABS. NEUTROPHILS 4.0 07/01/2019 08:36 AM  
 HGB 8.6 (L) 07/01/2019 08:36 AM  
 HCT 27.8 (L) 07/01/2019 08:36 AM  
 MCV 96.2 07/01/2019 08:36 AM  
 MCH 29.8 07/01/2019 08:36 AM  
 PLATELET 297 42/25/8273 08:36 AM  
 
Lab Results Component Value Date/Time Sodium 137 07/01/2019 08:36 AM  
 Potassium 4.1 07/01/2019 08:36 AM  
 Chloride 103 07/01/2019 08:36 AM  
 CO2 28 07/01/2019 08:36 AM  
 Glucose 95 07/01/2019 08:36 AM  
 BUN 8 07/01/2019 08:36 AM  
 Creatinine 0.98 07/01/2019 08:36 AM  
 Calcium 8.2 (L) 07/01/2019 08:36 AM  
 Albumin 2.0 (L) 07/01/2019 08:36 AM  
 Bilirubin, total 0.4 07/01/2019 08:36 AM  
 AST (SGOT) 16 07/01/2019 08:36 AM  
 ALT (SGPT) 13 07/01/2019 08:36 AM  
 Alk. phosphatase 92 07/01/2019 08:36 AM  
 
 
Upper GI series with small bowel follow-through (7/1/19) AP  radiograph of the abdomen was obtained and demonstrates 
evidence of a partial small bowel obstruction. The patient was asked to ingest 
effervescent material followed by thick and thin barium and routine 
double-contrast upper GI was then performed. Multiple spot radiographs were 
obtained with and without compression of the bowel. Small bowel follow-through 
was also performed. There is normal course, caliber and motility of the 
esophagus. There is a small hiatal hernia. There is no significant 
gastroesophageal reflux. There is a filling defect involving the proximal 
duodenum, junction of the first and second portions, where, in correlation with 
CT, there is a peritoneal implant. Contrast material does not readily into the 
stomach. The ligament of Treitz is in a normal-appearing location. There are 
dilated loops of proximal small bowel, measuring up to 4.8 cm. There is delayed 
small bowel transit time, with multiple sequential images of the abdomen 
demonstrating slow transit into decompressed normal caliber loops of distal 
small bowel. There is filling of these loops as well as the colon at the 7 hour felecia. This indicates a partial small bowel obstruction. IMPRESSION: 
1. Small bowel follow-through confirms a partial small bowel obstruction with a 
transition point in the right lower quadrant. 2. Dilated loops of proximal small bowel, up to 4.8 cm. Decompressed loops of 
distal small bowel in the right lower quadrant. 3. Delayed small bowel transit time. Contrast material enters the colon at the 7 
hour felecia. 4. Filling defect affecting the proximal duodenum, where, in correlation with 
recent CT, there is a peritoneal implant and associated lymphadenopathy. IMPRESSION/PLAN: 
 
 
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 Currently receiving single agent Carboplatin, s/p one cycle and due for 2nd cycle today. Will need to transfer to  [address] for chemo. Heme/CV:  Anemia d/t chronic dz/chemo. Stable, asx. Renal:  Creatinine at baseline. FEN/GI:  Partial small bowel obstruction. Reassured by passage of flatus, although multiple small episodes of emesis on Saturday. Now NPO, continue IVFs. Continues with cycle of N/V, suspect GI dysfunction d/t her disease and partial GI obstruction. Upper GI with SBFT demonstrates a partial small bowel obstruction with transit time of 7 hours. She is very adamant about not having an NGT. Continue scheduled antiemetics. Pain overall improved. I discussed NGT and PEG tube this morning. She would rather proceed with palliative PEG placement. Will consult IR for that today. I also discussed TPN with her. We will start that today as she has protein-calorie malnutrition due to poor intake secondary to obstruction. ID/Wound:  Afebrile. Normal WBC. PPX:  Ambulation, SCDs, IS, PT for progressive debility/weakness. Dispostion:  Stable overall. Difficult case.   She needs chemo to decrease tumor burden and in theory improve her bowel function/relieve obstruction, but her recurrent and disease state continues to make her ill, which will decrease her performance status. I had a long conversation with her today. I explained that surgery would be unlikely to be of any significant benefit, and in fact could cause more problems. A proximal diversion might the only thing that would help, as her obstruction is secondary to tumor. She understands the situation and states that she is \"realistic\" about her prognosis.    
  
  
Jil Talbot MD

## 2019-07-02 NOTE — PROGRESS NOTES
NUTRITION Recommendations:  
1. Increase TPN tomorrow if labs WNL: 
 - 5%AA, D20 @ 70ml/hr  
 - start lipids Thursday with 500ml, 20% lipids 3x/week 2. Replete Mg per MD 
 - recheck Mg, Phos with BMP x next 3 days 3. Add POC glucose checks q6hr - adjust insulin via TPN pending trends with D5 and decadron rx 4. Reduce D5 infusion as TPN started Diet: NPO 
TPN: 5%AA, D20 @ 42ml/hr + 100mg thiamine + 10 units insulin/liter Meds; decadron, SSI, mag suldate IV, reglan 4x/day, zofran, protonix, miralax, D5 1/2NaCl w/ KCl @ 100ml/hr Chart reviewed for MD consult for TPN. UGI showing PSBO with transit time of 7hrs. Plans for palliative PEG placement to allow for decompression. TPN ordered to start today. If labs WNL tomorrow continue advancement to 5%AA, D20 @ 70ml/hr. Add lipids Thursday, 500ml, 20% lipids 3x/week. Provides: 1906 kcal, 84g Protein, 336g CHO (GIR = 3.1mg/kg/min), 1.68L/d. Meets 100% energy and protein needs. If long-term or home TPN needed will need to switch to cyclic regimen prior to discharge. Suggested 14hr TPN Cycle: 70mL first and last hour, 140mL x12 hours At high risk for refeeding syndrome. Happy to see labs checked today and thiamine added to TPN. Low Mg - continue to check Mg, Phos daily until WNL. See full assessment from yesterday for additional details. Estimated Nutrition Needs:  
Kcals/day: 1900 Kcals/day( - 3389) Protein: 76 g( - 99g (1-1.3g/kg of current wt) Fluid: (1mL/kcal of intake) Based On: Kcal/kg - specify (Comment)(25 - 30kcal/kg of current wt) Weight Used: Actual wt(75.9 kg (standing wt)) Bhumika Gillis, RD [address] , Pager #[phone] or [phone]

## 2019-07-02 NOTE — PROGRESS NOTES
Spoke with Ervin Shames about re-drawing Mg levels. Level does not need to be re-checked at this time. Pt to be transferred to  to receive her chemo.

## 2019-07-02 NOTE — PROGRESS NOTES
Physical Therapy 7/2/2019 Orders received and acknowledged. Chart reviewed and cleared by RN. Per chart pt has been up ambulating the hallways multiple times throughout the day. Spoke with pt who endorses no difficulty with mobility except she was nauseous/vomiting and down for testing all day yesterday so did not get as many walks in. Encouraged continued walking when feeling up to it at least 3x/day if possible. Pt very willing to do so and started off on walk when leaving pt's room. No acute therapy needs at this time. Will sign off.   
 
Thank Mirta Stallings, PT, DPT

## 2019-07-02 NOTE — PROGRESS NOTES
Bedside and Verbal shift change report given to Judi House RN (oncoming nurse) by Caleb Jefferson RN (offgoing nurse). Report included the following information SBAR, Kardex, Procedure Summary, Intake/Output, MAR and Recent Results. Bedside and Verbal shift change report given to Sofia Herndon (oncoming nurse) by Judi House RN (offgoing nurse). Report included the following information SBAR, Kardex, ED Summary, Procedure Summary, Intake/Output, MAR, Accordion and Recent Results. Confirmed with [employer] pt will need PICC placed. Waiting for chemo medication to arrive to unit. Joss Shabazz with sips of clears and broth from floor kitchen. Karely Dunn updated with this info.

## 2019-07-02 NOTE — PROGRESS NOTES
Bedside and Verbal shift change report given to Chel Gordon (oncoming nurse) by Daniel (offgoing nurse). Report included the following information SBAR, Kardex, Procedure Summary, Intake/Output, MAR and Med Rec Status.

## 2019-07-02 NOTE — PROGRESS NOTES
Bedside and Verbal shift change report given to ENY Mckeon RN (oncoming nurse) by Radha Luna RN (offgoing nurse). Report included the following information SBAR, Kardex, Procedure Summary, Intake/Output, MAR, Accordion and Recent Results.

## 2019-07-03 NOTE — PROGRESS NOTES
Physical Therapy Consult received sighting need for RW or cane for home use. Upon entry to room patient states she has been up moving around the hospital and room with no difficulty and is adamant that she does not want a RW or cane for home use. States that if she needs one later she will go buy one. Does not want further PT intervention and is aware of where she can purchase DME if desired. PT to sign off at this time.  
Maybelle Koyanagi, PT, DPT 
 
 Yes

## 2019-07-03 NOTE — PROGRESS NOTES
PLEASE NOTE--Encounter / Re-Admission Within 30 Days This patient has had another encounter or admission within the last 30 days. DAISY Plan: 1. PT re-assessment for cane/walker for long-distance walk use 2. Continue medical care 3. Follow-up with Gynecologist/or PCP Reason for Admission:  worsening nausea, vomiting, and abdominal pain. RRAT Score:   26 Resources/supports as identified by patient/family:   Pt lives with her  in 1 story house.  is the primary caregiver, who works full-time. Pt relies on social security income. Pt has [employer] Medicare A&B and [address] [place] [address] [place]. Pt states no issues with care and medication affordability. Top Challenges facing patient (as identified by patient/family and CM): Finances/Medication cost?    See above Transportation?  Support system or lack thereof? Strong family support, see above Living arrangements? Pt lives with  in 1 story house. Self-care/ADLs/Cognition? Pt is alert and self-oriented, independent Current Advanced Directive/Advance Care Plan:  Has DNR on file, not interested in having MPOA. Plan for utilizing home health:  None Transition of Care Plan:               
 
Radha Franco is a 60 y.o.  female with a diagnosis of stage IV UPSC. Pt presented to to the ER on 6/26/2019 with worsening nausea, vomiting, and abdominal pain. Pt lives in 1 story house with 3 steps to the porch and another 1 step to enter the house.  is the primary caregiver at home. Pt's  works at night. Pt stated that her children lives at [facility] and doesn't;t visit her due to busy work schedule. Pt has no mobility issue. Pt can walk short distances but states having issues walking long distances.  Pt uses an electric chair in the grocery store. Pt requested a cane/walker as appropriate for long distance use. CM updated RN and asked for new PT evaluation. No any other barriers were identified. Pt may benefit from continued gynecologist consult and follow up with PCP. Care Management Interventions PCP Verified by CM: Yes Mode of Transport at Discharge: ALS( ) Physical Therapy Consult: Yes(did evaluation, recommended no PT needed, now pt requests new PT assessment) Occupational Therapy Consult: No 
Speech Therapy Consult: No 
Current Support Network: Own Home( ) Confirm Follow Up Transport: Family( ) Plan discussed with Pt/Family/Caregiver: Yes Discharge Location Discharge Placement: Home [place], [address]

## 2019-07-03 NOTE — PROGRESS NOTES
Rcvd. In Angio prior to G-tube placement. Dr. Annita Casiano at bedside to obtain consent. Patient verbalized understanding of procedure and consent signed by same. G-tube placed by Dr. Annita Casiano. States area around tube is \"sore. \" Report called to [facility] regarding G-tube placement, VS, mental status, pain level and medications given. Post orders reviewed as well. Awaiting transport.

## 2019-07-03 NOTE — ADT AUTH CERT NOTES
Intestinal Obstruction - Care Day 5 (7/1/2019) by Teressa Dunbar RN  
 
   
Review Entered Review Status 7/2/2019 15:46 Completed  
   
Criteria Review Care Day: 5 Care Date: 7/1/2019 Level of Care: Inpatient Floor Guideline Day 3 Level Of Care (X) Floor to discharge Clinical Status   
(X) * Hemodynamic stability 7/2/2019 15:46:51 EDT by Pretty Hartley   
  98.5, HR 95, Rr 16, /80, 98% on RA   
(X) * Nausea and vomiting absent or controlled and acceptable for next level of care 7/2/2019 15:46:51 EDT by Sharon Barrios no emesis since Saturday, continues with nausea ( ) * Electrolytes normal or acceptable for next level of care ( ) * Oral hydration maintained   
(X) * Pain absent or managed   
(X) * Surgery not indicated ( ) * Discharge plans and education understood Activity   
(X) * Ambulatory[B] 7/2/2019 15:46:51 EDT by Sharon Barrios aambulating Routes   
(X) * Oral hydration, medications, and diet 7/2/2019 15:46:51 EDT by Sharon Barrios NPO,  
D5 1/2 NS with 20 KCL 100ml/hr Lovenox 40mg SC q24h Dilaudid 1mg IV q4h PRN x 3 Lopressor 12.5mg PO q12h 
protonix 20mg IV q12h 
compazine 10mg IV q8h   
7/2/2019 15:46:51 EDT by Pretty Hartley Subject: Additional Clinical Information 7/1/19 OB/GYN Oncology note: No emesis since Saturday, nausea continues. Improved on scheduled antiemetic. Wants broth.   
+flatus today/small BM yesterday.   Continue with more eructation than flatus.     
Pain is overall better.   Last use 6hr ago 1mg dilaudid Ambulating, but feeling more week Her last solid meal without being followed by an emesis has been some time. General:   alert, cooperative, no distress     Cardiac:   Regular rate and rhythm   
           Lungs:   clear to auscultation bilaterally Abdomen:   soft, mildly distended, mildly tender, +tympani upper abd/right abd.   BS+   
         Wound:   n/a   
  Extremity: extremities normal, atraumatic, no cyanosis or edema Oncologic:   
  
     
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   Currently receiving single agent Carboplatin, s/p one cycle and due for 2nd cycle tomorrow. Will need transfer for chemo. Heme/CV:   
 anemia d/t chronic dz/chemo.   Stable, asx. Renal:   
 Creatinine at baseline.     
  
       FEN/GI:   
 Partial small bowel obstruction.   Reassured by passage of flatus, although multiple small episodes of emesis on Saturday. Now NPO, continue IVFs. Continues with cycle of N/V, suspect GI dysfunction d/t her disease and partial GI obstruction.   SBFT today.     She is very adamant about not having an NGT.   Continue scheduled antiemetics. Pain overall improved. Likely calorie deficits.   Will need to consider parenteral nutrition soon given her pattern.     
  
ID/Wound:   
 Afebrile.   Normal WBC, abd stable. PPX:   
 Ambulation, SCDs, IS, PT for progressive debility/weakness. Dispostion:   
 Stable overall. Difficult case.   She needs chemo to decrease tumor burden and in theory improve her bowel function/relieve obstruction, but her recurrent and disease state continues to make her ill, which will decrease her performance status Labs: WBC: 4.8 NRBC: 0.0   
 
RBC: 2.89 (L) HGB: 8.6 (L) HCT: 27.8 (L) MCV: 96.2 MCH: 29.8 RDW: 13.8 PLATELET: 733 NEUTROPHILS: 83 (H) LYMPHOCYTES: 13 MONOCYTES: 4 (L) BASOPHILS: 0 IMMATURE GRANULOCYTES: 0   
 
ABSOLUTE NRBC: 0.00   
 
ABS. NEUTROPHILS: 4.0   
 
ABS. IMM. GRANS.: 0.0   
 
ABS. LYMPHOCYTES: 0.6 (L)   
 
ABS. MONOCYTES: 0.2   
 
ABS. BASOPHILS: 0.0 Sodium: 137 Potassium: 4.1 Chloride: 103 Glucose: 95   
 
Creatinine: 0.98   
 
BUN/Creatinine ratio: 8 (L) Calcium: 8.2 (L) Magnesium: 1.0 (L) GFR est non-AA: 58 (L) GFR est AA: >60   
 
 Protein, total: 6.9 Albumin: 2.0 (L) Globulin: 4.9 (H) A-G Ratio: 0.4 (L) ALT (SGPT): 13 AST: 16 Alk. phosphatase: 92   
 
Prealbumin: 6.6 (L) Plan: ambulate in arreola, ambulate with assist, SCD's, I&O, may have ice chips, POC glucose AC and HS, * Milestone  
   
Intestinal Obstruction - Care Day 4 (6/30/2019) by Jose Givens RN  
 
   
Review Entered Review Status 7/1/2019 13:20 Completed  
   
Criteria Review Care Day: 4 Care Date: 6/30/2019 Level of Care: Inpatient Floor Guideline Day 3 Level Of Care (X) Floor to discharge 7/1/2019 13:20:59 EDT by Dante Gong medical bed Clinical Status   
(X) * Hemodynamic stability 7/1/2019 13:20:59 EDT by Ezzie Soulier   
  98.3, , RR 16, /83, 97% on RA   
( ) * Nausea and vomiting absent or controlled and acceptable for next level of care 7/1/2019 13:20:59 EDT by Dante Gong multiple episodes of emesis yesterday, NPO   
( ) * Electrolytes normal or acceptable for next level of care 7/1/2019 13:20:59 EDT by Dante Gong no labs this care date ( ) * Oral hydration maintained   
(X) * Pain absent or managed 7/1/2019 13:20:59 EDT by Ezzie Soulier   
  pain controlled   
(X) * Surgery not indicated ( ) * Discharge plans and education understood Activity   
(X) * Ambulatory[B] 7/1/2019 13:20:59 EDT by Dante Gong ambulating Routes   
(X) * Oral hydration, medications, and diet 7/1/2019 13:21:00 EDT by Ezzie Soulier   
  NPO 
D5 1/2 NS with 20 KCL 100ml/hr Lovenox 40mg SC q24h Dilaudid 1mg IV q4h PRN x 4 Lopressor 12.5mg PO q12h Protonix 20mg IV q12h Compazine 10mg IV q8h   
7/1/2019 13:20:59 EDT by Ezzie Soulier Subject: Additional Clinical Information 6/30/19 Multiple small episodes of emesis yesterday. Now NPO.  Asking for chicken broth this morning. Pain controlled. Continues to pass flatus. Small BM on Friday. General:   alert, cooperative, no distress     Cardiac:   Regular rate and rhythm   
           Lungs:   clear to auscultation bilaterally Abdomen:   soft, mildly distended, mildly tender, hypoactive-distant bowel sounds   
         Wound:   n/a   
 Extremity: extremities normal, atraumatic, no cyanosis or edema Oncologic:   
 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   Currently receiving single agent Carboplatin, s/p one cycle and due for 2nd cycle next Tuesday.     
  
Heme/CV:   
 Hgb stable at 8.9.     
  
Renal:   
 Creatinine at baseline.     
  
       FEN/GI:   
 Partial small bowel obstruction.   Reassured by passage of flatus, although multiple small episodes of emesis on Saturday. Now NPO. May consider SBFT on Monday, as unable to order on the weekend. ID/Wound:   
 Afebrile.   Normal WBC. PPX:   
 Ambulation, SCDs, IS. Dispostion:   
 Stable overall, although with small episodes of emesis. NPO for now with bowel rest. Continue IVFs. Consider SBFT on Monday. Otherwise she is scheduled for her next cycle of chemotherapy on Tuesday. If she improves, may consider treating inpatient versus outpatient * Milestone  
   
Intestinal Obstruction - Care Day 3 (6/29/2019) by Jearlean Barthel, RN  
 
   
Review Entered Review Status 7/1/2019 13:15 Completed  
   
Criteria Review Care Day: 3 Care Date: 6/29/2019 Level of Care:   
Guideline Day 2 Level Of Care (X) Floor Clinical Status   
(X) * Hypotension absent 7/1/2019 13:15:35 EDT by Annel Monroe   
  /81   
( ) * Nausea and vomiting absent or improved 7/1/2019 13:15:35 EDT by Sandy Gallardo multiple episodies of emesis yesterday, now NPO   
(X) * Pain absent or managed 7/1/2019 13:15:35 EDT by Annel Monroe   
  pain controlled   
(X) * Abdominal exam stable or improved 7/1/2019 13:15:35 EDT by Author Khan   
  soft, mildly distended, mildly tender, hypoactive distant bowel sounds Activity (X) Activity as tolerated 7/1/2019 13:15:35 EDT by Lizzy Weinstein ambulate with assist, ambulate in arreola Routes (X) IV fluids, medications 7/1/2019 13:15:35 EDT by Lizzy Weinstein D5 1/2 NS with 20 KCL 100ml/hr Lovenox 40mg SC q24h Dilaudid 1mg IV q4h PRN x 4 Lopressor 12.5mg PO q12h Zofran 8mg IV q8h PRn x 2 Protonix 20mg IV q12h (X) Diet as tolerated 7/1/2019 13:15:35 EDT by Drena Canavan Interventions ( ) * NG tube absent 7/1/2019 13:15:35 EDT by Lizzy Weinstein NG remains in place 7/1/2019 13:15:35 EDT by Author Khan Subject: Additional Clinical Information 6/29/19 GYN/Onc assessment/plan Multiple small episodes of emesis yesterday. Now NPO. Asking for chicken broth this morning. Pain controlled. Continues to pass flatus. Small BM on Friday General:   alert, cooperative, no distress     Cardiac:   Regular rate and rhythm   
           Lungs:   clear to auscultation bilaterally Abdomen:   soft, mildly distended, mildly tender, hypoactive-distant bowel sounds   
         Wound:   n/a   
 Extremity: extremities normal, atraumatic, no cyanosis or edema Oncologic:   
 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   Currently receiving single agent Carboplatin, s/p one cycle and due for 2nd cycle next Tuesday.     
  
Heme/CV:   
 Hgb stable at 8.9.     
  
Renal:   
 Creatinine at baseline.     
  
       
   FEN/GI:   
 Partial small bowel obstruction.   Reassured by passage of flatus, although multiple small episodes of emesis on Saturday. Now NPO. May consider SBFT on Monday, as unable to order on the weekend. ID/Wound:   
 Afebrile.   Normal WBC. PPX:   
 Ambulation, SCDs, IS. Dispostion:  Stable overall, although with small episodes of emesis. NPO for now with bowel rest. Continue IVFs. Consider SBFT on Monday. Otherwise she is scheduled for her next cycle of chemotherapy on Tuesday. If she improves, may consider treating inpatient versus outpatient Vitals: 98.1, , RR 18, /84, 98% on RA Labs: RBC 2.95, HGB 8.9, HCT 28.2, neutrophils 76, Lymphocytes 7, monocytes 16, , glucose 110, creatinine 1.03, BUN 7, calcium 8.3, magnesium 1.5 Plan: ambulate in arreola with assist, SCD's, I&O's, may have ice chips, PT evaluation * Milestone

## 2019-07-03 NOTE — PROGRESS NOTES
Bedside and Verbal shift change report given to NEY Mckeon RN (oncoming nurse) by Eamon Alas RN (offgoing nurse). Report included the following information SBAR, Kardex, OR Summary, Procedure Summary, Intake/Output, MAR, Accordion and Recent Results.

## 2019-07-03 NOTE — PROGRESS NOTES
[address] [facility] [address] [place], [address] [place] [address] Margarita 
P [phone]  F [phone] Patient Name: Julius Rose Admit Date: 6/27/2019 OR Date: * No surgery found * Visit Date: 7/3/2019 SUBJECTIVE: 
 
Chemo yesterday, tolerated well single agent carboplatin cycle 2 
+flatus, +nausea, overall improved on Reglan. Pain controlled ~TID dilaudid TPN initiated yesterday. Overall feeling better. Ambulating, but feeling more week. Her last solid meal without being followed by an emesis has been some time. OBJECTIVE: 
 
Patient Vitals for the past 24 hrs: 
 Temp Pulse Resp BP SpO2  
07/03/19 0044    160/88   
07/03/19 0036    171/83   
07/03/19 0004 97.5 °F (36.4 °C) 96 16 (!) 160/91 96 % 07/02/19 1928 98.1 °F (36.7 °C) (!) 103 16 157/88 97 % 07/02/19 1522 98.3 °F (36.8 °C) 97 16 152/83 97 % 07/02/19 0818 98.4 °F (36.9 °C) (!) 103 16 159/76 97 % Date 07/02/19 0700 - 07/03/19 5304 07/03/19 0700 - 07/04/19 7111 Shift 5243-1715 1035-9838 24 Hour Total 9217-8246 8239-2373 24 Hour Total  
INTAKE  
P.O.  240 240     
  P. O.  240 240     
I. V.(mL/kg/hr)  500.1(0.6) 500.1(0.3) Volume (dextrose 5% - 0.45% NaCl with KCl 20 mEq/L infusion)  400 400 Volume (TPN ADULT - CENTRAL)  100.1 100.1 Shift Total(mL/kg)  740.1(9.8) 740.1(9.8) OUTPUT Urine(mL/kg/hr)  300(0.3) 300(0.2) Urine Voided  300 300 Urine Occurrence(s)  1 x 1 x Shift Total(mL/kg)  300(4) 300(4) NET  440.1 440. 1 Weight (kg) 75.2 75.2 75.2 75.2 75.2 75.2 Physical Exam 
   General:  alert, cooperative, no distress Cardiac:  Regular rate and rhythm Lungs:  nonlabored Abdomen:  soft, mildly distended, tender subjectively w/o rebound/reflux. Wound:  n/a Extremity: extremities normal, atraumatic, no cyanosis or edema Data Review Lab Results Component Value Date/Time  WBC 4.8 07/01/2019 08:36 AM  
 ABS. NEUTROPHILS 4.0 07/01/2019 08:36 AM  
 HGB 8.6 (L) 07/01/2019 08:36 AM  
 HCT 27.8 (L) 07/01/2019 08:36 AM  
 MCV 96.2 07/01/2019 08:36 AM  
 MCH 29.8 07/01/2019 08:36 AM  
 PLATELET 297 22/92/9370 08:36 AM  
 
Lab Results Component Value Date/Time Sodium 137 07/01/2019 08:36 AM  
 Potassium 4.1 07/01/2019 08:36 AM  
 Chloride 103 07/01/2019 08:36 AM  
 CO2 28 07/01/2019 08:36 AM  
 Glucose 95 07/01/2019 08:36 AM  
 BUN 8 07/01/2019 08:36 AM  
 Creatinine 0.98 07/01/2019 08:36 AM  
 Calcium 8.2 (L) 07/01/2019 08:36 AM  
 Albumin 2.0 (L) 07/01/2019 08:36 AM  
 Bilirubin, total 0.4 07/01/2019 08:36 AM  
 AST (SGOT) 16 07/01/2019 08:36 AM  
 ALT (SGPT) 13 07/01/2019 08:36 AM  
 Alk. phosphatase 92 07/01/2019 08:36 AM  
 
 
Upper GI series with small bowel follow-through (7/1/19) AP  radiograph of the abdomen was obtained and demonstrates 
evidence of a partial small bowel obstruction. The patient was asked to ingest 
effervescent material followed by thick and thin barium and routine 
double-contrast upper GI was then performed. Multiple spot radiographs were 
obtained with and without compression of the bowel. Small bowel follow-through 
was also performed. There is normal course, caliber and motility of the 
esophagus. There is a small hiatal hernia. There is no significant 
gastroesophageal reflux. There is a filling defect involving the proximal 
duodenum, junction of the first and second portions, where, in correlation with 
CT, there is a peritoneal implant. Contrast material does not readily into the 
stomach. The ligament of Treitz is in a normal-appearing location. There are 
dilated loops of proximal small bowel, measuring up to 4.8 cm. There is delayed 
small bowel transit time, with multiple sequential images of the abdomen 
demonstrating slow transit into decompressed normal caliber loops of distal 
small bowel. There is filling of these loops as well as the colon at the 7 hour felecia. This indicates a partial small bowel obstruction. IMPRESSION: 
1. Small bowel follow-through confirms a partial small bowel obstruction with a 
transition point in the right lower quadrant. 2. Dilated loops of proximal small bowel, up to 4.8 cm. Decompressed loops of 
distal small bowel in the right lower quadrant. 3. Delayed small bowel transit time. Contrast material enters the colon at the 7 
hour felecia. 4. Filling defect affecting the proximal duodenum, where, in correlation with 
recent CT, there is a peritoneal implant and associated lymphadenopathy. IMPRESSION/PLAN: 
 
 
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 Currently receiving single agent Carboplatin, s/p cycle 2 7/2/19. Heme/CV:  Anemia d/t chronic dz/chemo. Stable, asx. Renal:  Creatinine at baseline. FEN/GI:  Partial small bowel obstruction/delayed transit of 7 hrs on UGI. Improved on Reglan and GI rest. 
2nd admission for same. Reassured by passage of flatus, but with cycle of N/V with PO intake. She has been held NPO. Chemo may improve this, but she is in a salvage state. Plan for IR venting PEG tube today. Following, slow advance of diet. ontinues with cycle of N/V  She is very adamant about not having an NGT. Continue scheduled antiemetics. Pain overall improved. I discussed NGT and PEG tube this morning. She would rather proceed with palliative PEG placement. Continue TPN for protein-calorie malnutrition due to poor intake secondary to obstruction. Daily chem ID/Wound:  Afebrile. Normal WBC. PPX:  Ambulation, SCDs, IS, PT for progressive debility/weakness. Dispostion:  Stable overall. Difficult case. She needs chemo to decrease tumor burden and in theory improve her bowel function/relieve obstruction, but her recurrent and disease state continues to make her ill, which will decrease her performance status.   As discussed on rounds by MD, surgery would be unlikely to be of any significant benefit, and in fact could cause more problems. A proximal diversion might the only thing that would help, as her obstruction is secondary to tumor. She understands the situation and states that she is \"realistic\" about her prognosis.    
  
  
Sis Speaks, PA

## 2019-07-03 NOTE — H&P
Radiology History and Physical 
 
Patient: Fatoumata Justice 60 y.o. female Chief Complaint: Vomiting History of Present Illness: for g tube History: 
 
Past Medical History:  
Diagnosis Date  Arthritis  Cancer [facility])   
 uterine  Chronic pain LOWER BACK  Depression  Nausea & vomiting  PMB (postmenopausal bleeding) 2014 Family History Problem Relation Age of Onset  Cancer Mother 61  
     liver metastatic disease, unknown primary  Stroke Mother BRAIN ANEURYSM  
 Cancer Maternal Uncle 60  
     bone  Cancer Maternal Grandmother 61  
     unknown primary  Anesth Problems Neg Hx Social History Socioeconomic History  Marital status:  Spouse name: Not on file  Number of children: Not on file  Years of education: Not on file  Highest education level: Not on file Occupational History  Not on file Social Needs  Financial resource strain: Not on file  Food insecurity:  
  Worry: Not on file Inability: Not on file  Transportation needs:  
  Medical: Not on file Non-medical: Not on file Tobacco Use  Smoking status: Former Smoker Packs/day: 0.50 Years: 7.00 Pack years: 3.50 Types: Cigarettes Last attempt to quit: 1999 Years since quittin.8  Smokeless tobacco: Never Used Substance and Sexual Activity  Alcohol use: Yes Alcohol/week: 0.6 oz Types: 1 Glasses of wine per week Comment: rarely  Drug use: No  
 Sexual activity: Never Lifestyle  Physical activity:  
  Days per week: Not on file Minutes per session: Not on file  Stress: Not on file Relationships  Social connections:  
  Talks on phone: Not on file Gets together: Not on file Attends Gnosticist service: Not on file Active member of club or organization: Not on file Attends meetings of clubs or organizations: Not on file Relationship status: Not on file  Intimate partner violence:  
  Fear of current or ex partner: Not on file Emotionally abused: Not on file Physically abused: Not on file Forced sexual activity: Not on file Other Topics Concern  Not on file Social History Narrative  Not on file Allergies: Allergies Allergen Reactions  Hydrocodone Nausea Only  Oxycodone Nausea and Vomiting Current Medications: 
Current Facility-Administered Medications Medication Dose Route Frequency  sodium chloride (NS) flush  metoclopramide HCl (REGLAN) injection 10 mg  10 mg IntraVENous Q6H  
 fentaNYL citrate (PF) injection 200 mcg  200 mcg IntraVENous RAD PRN  
 midazolam (VERSED) injection 10 mg  10 mg IntraVENous RAD PRN  
 TPN ADULT - CENTRAL   IntraVENous CONTINUOUS  
 0.9% sodium chloride infusion  40 mL/hr IntraVENous CONTINUOUS  
 [START ON 7/4/2019] fat emulsion 20% (LIPOSYN, INTRAlipid) infusion 500 mL  500 mL IntraVENous Q MON, THU & SAT  glucose chewable tablet 16 g  4 Tab Oral PRN  
 dextrose (D50W) injection syrg 12.5-25 g  12.5-25 g IntraVENous PRN  
 glucagon (GLUCAGEN) injection 1 mg  1 mg IntraMUSCular PRN  
 insulin lispro (HUMALOG) injection   SubCUTAneous Q6H  
 TPN ADULT - CENTRAL   IntraVENous CONTINUOUS  
 polyethylene glycol (MIRALAX) packet 17 g  17 g Oral DAILY  metoprolol tartrate (LOPRESSOR) tablet 12.5 mg  12.5 mg Oral Q12H  
 HYDROmorphone (PF) (DILAUDID) injection 1 mg  1 mg IntraVENous Q4H PRN  
 ondansetron (ZOFRAN) injection 8 mg  8 mg IntraVENous Q8H PRN  pantoprazole (PROTONIX) injection 20 mg  20 mg IntraVENous Q12H  
 [Held by provider] enoxaparin (LOVENOX) injection 40 mg  40 mg SubCUTAneous Q24H Physical Exam: 
Blood pressure 124/76, pulse 95, temperature 97.5 °F (36.4 °C), resp. rate 17, height 5' 6\" (1.676 m), weight 75.2 kg (165 lb 12.8 oz), SpO2 99 %. LUNG: clear to auscultation bilaterally, HEART: regular rate and rhythm Alerts:   
Hospital Problems  Date Reviewed: 6/10/2019 Codes Class Noted POA Ovarian cancer ([facility] ICD-10-CM: C56.9 ICD-9-CM: 183.0  6/27/2019 Unknown Small bowel obstruction ([facility] ICD-10-CM: I33.190 ICD-9-CM: 560.9  6/27/2019 Unknown Laboratory:     
Recent Labs  
  07/03/19 
0616 07/01/19 
5340 HGB  --  8.6* HCT  --  27.8* WBC  --  4.8 PLT  --  216 BUN 10 8 CREA 0.91 0.98  
K 4.3 4.1 Plan of Care/Planned Procedure: 
Risks, benefits, and alternatives reviewed with patient and she agrees to proceed with the procedure.   
 
 
Lesa Lemons MD

## 2019-07-03 NOTE — PROGRESS NOTES
Bedside and Verbal shift change report given to Dane Sharpe RN (oncoming nurse) by Gillian Cortez RN (offgoing nurse). Report included the following information SBAR, Kardex, Intake/Output, MAR and Recent Results. 1020: Pt nauseas despite zofran. Per [facility], pt ok to have 1 more dose of compazine. 1330: off unit for PEG placement 1540: TRANSFER - IN REPORT: 
Verbal report received from (name) on [address]  being received from radiology(unit) for routine post - op Report consisted of patients Situation, Background, Assessment and  
Recommendations(SBAR). Information from the following report(s) SBAR, Kardex, Intake/Output, MAR and Recent Results was reviewed with the receiving nurse. Opportunity for questions and clarification was provided. Assessment completed upon patients arrival to unit and care assumed. 1605: pt back in room resting comfortably

## 2019-07-04 NOTE — PROGRESS NOTES
Bedside and Verbal shift change report given to Tylor Peoples RN (oncoming nurse) by Laz Cortez RN (offgoing nurse). Report included the following information SBAR, Kardex, Intake/Output, MAR and Recent Results.

## 2019-07-04 NOTE — PROGRESS NOTES
Music Therapy Assessment Brijesh Bruno [address] [phone] 1960  60 y.o.  female Patient Telephone Number: [phone] (home) Jew Affiliation: Spirituality Language: [place] Patient Active Problem List  
 Diagnosis Date Noted  [facility] ([facility] 06/27/2019  Small bowel obstruction ([facility] 06/27/2019  On antineoplastic chemotherapy 06/17/2019  Carcinomatosis ([facility] 06/17/2019  Enteritis 06/14/2019  Leukocytosis 06/14/2019  Abdominal pain 06/14/2019  Tachycardia 06/14/2019  Nausea & vomiting 06/14/2019  Ascites 06/14/2019  SIRS (systemic inflammatory response syndrome) (HCC) 06/14/2019  Cancer related pain 01/08/2019  Mediastinal lymphadenopathy 11/06/2018  Weight loss, non-intentional 11/06/2018  Obesity (BMI 30.0-34.9) 09/11/2018  Right upper quadrant abdominal pain 09/11/2018  Early satiety 08/14/2018  Abdominal bloating 08/14/2018  Endometrial cancer ([facility] 08/19/2014 Date: 7/4/2019            Total Time (in minutes): 5          [address]  SPECIALITY UNIT Mental Status:   [x] Alert [  ] Vicky Kipper [  ]  Confused  [  ] Minimally responsive  [  ] Sleeping Communication Status: [  ] Impaired Speech [  ] Nonverbal -N/A Physical Status:   [  ] Oxygen in use  [  ] Hard of Hearing [  ] Vision Impaired [  ] Ambulatory  [  ] Ambulatory with assistance [  ] Non-ambulatory -N/A Music Preferences, Background: Country, Blues, Classic Rock n' Roll, and some Jazz. Pt doesn't like heavy rock. Clinical Problem to be addressed: Relaxation. Goal(s) met in session: N/A: Please see Session Observations below. Physical/Pain management (Scale of 1-10): Pre-session rating ___________    Post-session rating __________  
[  ] Increased relaxation   [  ] Affected breathing patterns [  ] Decreased muscle tension   [  ] Decreased agitation [  ] Affected heart rate    [  ] Increased alertness Emotional/Psychological: 
[  ] Increased self-expression   [  ] Decreased aggressive behavior [  ] Decreased feelings of stress  [  ] Discussed healthy coping skills [  ] Improved mood    [  ] Decreased withdrawn behavior Social: 
[  ] Decreased feelings of isolation/loneliness [  ] Positive social interaction [  ] Provided support and/or comfort for family/friends Spiritual: 
[  ] Spiritual support    [  ] Expressed peace [  ] Expressed danica    [  ] Discussed beliefs Techniques Utilized (Check all that apply): N/A: Please see Session Observations below. [  ] Procedural support MT [  ] Music for relaxation [  ] Patient preferred music 
[  ] Ena analysis  [  ] Song choice  [  ] Music for validation [  ] Entrainment  [  ] Movement to music [  ] Guided visualization [  ] Otf Roup  [  ] Patient instrument playing [  ] [employer] writing [  ] Blease Hose along   [  ] Cm Inoue  [  ] Sensory stimulation [  ] Active Listening  [  ] Music for spiritual support [  ] Making of CDs as gifts Session Observations:  Referral from Igor Castaneda, Nurse Navigator. Patient (pt) had a visit with the music therapy intern last week. Today pt was alert lying in bed. She reported some discomfort around where a catheter was recently placed and denied pain otherwise. This music therapist (MT) offered music therapy and pt declined this for today. She said she feeling tired and was hoping to try to sleep. MT expressed understanding and offered a follow up visit at another time as able. Pt was receptive to this and denied any needs at this time. ATTILA GonsalesBC (Music Therapist-Board Certified) Spiritual Care Department Referral-based service

## 2019-07-04 NOTE — PROGRESS NOTES
Bedside shift change report given to HUSEYIN Currie RN (oncoming nurse) by Miracle Esteban RN (offgoing nurse). Report included the following information SBAR, Kardex, OR Summary, Procedure Summary, Intake/Output and MAR.  
 
 
1690: Pt reported that she slept off and on all night. Reports pain at PEG incision site, managed with dilaudid. No c/o nausea. Pt reports feeling ready for breakfast, and wishes she could try something more solid like cream of wheat. RN will continue to monitor.

## 2019-07-04 NOTE — PROGRESS NOTES
[address], [address] [place], [address] 
P [phone]  F [phone] Patient Name: Radha Franco Admit Date: 6/27/2019 OR Date: * No surgery found * Visit Date: 7/4/2019 SUBJECTIVE: 
 
Chemo on Tuesday - tolerated well, single agent carboplatin cycle 2. Underwent venting PEG placement yesterday. +flatus, +nausea, overall improved on Reglan. Pain controlled ~TID dilaudid TPN running. Overall feeling better. Ambulating, but feeling more week. OBJECTIVE: 
 
Patient Vitals for the past 24 hrs: 
 Temp Pulse Resp BP SpO2  
07/04/19 0505 98 °F (36.7 °C) 100 16 132/73 96 % 07/04/19 0048 97.5 °F (36.4 °C) 89 16 133/75 99 % 07/03/19 2149 98.1 °F (36.7 °C) 96 16 126/74 96 % 07/03/19 1905 98 °F (36.7 °C) 99 16 128/69 97 % 07/03/19 1810 98.3 °F (36.8 °C) 97 16 131/74 96 % 07/03/19 1732 97.8 °F (36.6 °C) 96 16 142/83 98 % 07/03/19 1711 97.9 °F (36.6 °C) 93 16 133/77 98 % 07/03/19 1630 97.6 °F (36.4 °C) 90 16 126/71 98 % 07/03/19 1605 97.7 °F (36.5 °C) 95 16 147/70 98 % 07/03/19 1545  91 16 127/60 96 % 07/03/19 1530  91 14 137/65 96 % 07/03/19 1515  91 15 143/65 96 % 07/03/19 1500  95 15 151/67 97 % 07/03/19 1455  94 14 143/68 98 % 07/03/19 1440  95 17 124/76 99 % 07/03/19 1435  98 17 131/78 100 % 07/03/19 1430  97 16 126/78 100 % 07/03/19 1425  95 14 117/74 99 % 07/03/19 1420  95 13 122/74 99 % 07/03/19 1415  96 11 148/81 100 % 07/03/19 1345 97.5 °F (36.4 °C) 94 12 144/65 100 % 07/03/19 0833 97.6 °F (36.4 °C) 98 16 145/77 98 % Date 07/03/19 0700 - 07/04/19 6565 07/04/19 0700 - 07/05/19 7536 Shift 1225-4914 2092-8225 24 Hour Total 5305-7923 0902-1612 24 Hour Total  
INTAKE  
P.O.  100 100     
  P. O.  100 100     
I. V.(mL/kg/hr)  243.3(0.3) 243.3(0.1) Volume (0.9% sodium chloride infusion)  159.3 159.3 Volume (TPN ADULT - CENTRAL)  84 84 Shift Total(mL/kg)  343. 3(4.6) 343. 3(4.6) OUTPUT Urine(mL/kg/hr)  450(0.5) 450(0.2) Urine Voided  450 450 Shift Total(mL/kg)  450(6) 450(6) NET  -106.7 -106.7 Weight (kg) 75.4 75.4 75.4 75.4 75.4 75.4 Physical Exam 
   General:  alert, cooperative, no distress Cardiac:  Regular rate and rhythm Lungs:  nonlabored Abdomen:  soft, mildly distended, tender subjectively w/o rebound/reflux; PEG in LUQ - site clear Wound:  n/a Extremity: extremities normal, atraumatic, no cyanosis or edema Data Review Lab Results Component Value Date/Time WBC 4.8 07/01/2019 08:36 AM  
 ABS. NEUTROPHILS 4.0 07/01/2019 08:36 AM  
 HGB 8.6 (L) 07/01/2019 08:36 AM  
 HCT 27.8 (L) 07/01/2019 08:36 AM  
 MCV 96.2 07/01/2019 08:36 AM  
 MCH 29.8 07/01/2019 08:36 AM  
 PLATELET 173 15/17/2658 08:36 AM  
 
Lab Results Component Value Date/Time Sodium 134 (L) 07/04/2019 04:10 AM  
 Potassium 4.4 07/04/2019 04:10 AM  
 Chloride 102 07/04/2019 04:10 AM  
 CO2 25 07/04/2019 04:10 AM  
 Glucose 99 07/04/2019 04:10 AM  
 BUN 15 07/04/2019 04:10 AM  
 Creatinine 0.86 07/04/2019 04:10 AM  
 Calcium 8.1 (L) 07/04/2019 04:10 AM  
 Albumin 2.0 (L) 07/04/2019 04:10 AM  
 Bilirubin, total 0.3 07/04/2019 04:10 AM  
 AST (SGOT) 12 (L) 07/04/2019 04:10 AM  
 ALT (SGPT) 12 07/04/2019 04:10 AM  
 Alk. phosphatase 91 07/04/2019 04:10 AM  
 
 
Upper GI series with small bowel follow-through (7/1/19) AP  radiograph of the abdomen was obtained and demonstrates 
evidence of a partial small bowel obstruction. The patient was asked to ingest 
effervescent material followed by thick and thin barium and routine 
double-contrast upper GI was then performed. Multiple spot radiographs were 
obtained with and without compression of the bowel. Small bowel follow-through 
was also performed.   There is normal course, caliber and motility of the 
 esophagus. There is a small hiatal hernia. There is no significant 
gastroesophageal reflux. There is a filling defect involving the proximal 
duodenum, junction of the first and second portions, where, in correlation with 
CT, there is a peritoneal implant. Contrast material does not readily into the 
stomach. The ligament of Treitz is in a normal-appearing location. There are 
dilated loops of proximal small bowel, measuring up to 4.8 cm. There is delayed 
small bowel transit time, with multiple sequential images of the abdomen 
demonstrating slow transit into decompressed normal caliber loops of distal 
small bowel. There is filling of these loops as well as the colon at the 7 hour 
felecia. This indicates a partial small bowel obstruction. IMPRESSION: 
1. Small bowel follow-through confirms a partial small bowel obstruction with a 
transition point in the right lower quadrant. 2. Dilated loops of proximal small bowel, up to 4.8 cm. Decompressed loops of 
distal small bowel in the right lower quadrant. 3. Delayed small bowel transit time. Contrast material enters the colon at the 7 
hour felecia. 4. Filling defect affecting the proximal duodenum, where, in correlation with 
recent CT, there is a peritoneal implant and associated lymphadenopathy. IMPRESSION/PLAN: 
 
 
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 Currently receiving single agent Carboplatin, s/p cycle #2 on 7/2/19. Heme/CV:  Anemia d/t chronic dz/chemo. Stable, asx. Renal:  Creatinine at baseline. FEN/GI:  Partial small bowel obstruction/delayed transit of 7 hrs on UGI. Improved on Reglan and GI rest. 
2nd admission for same. Reassured by passage of flatus, but with cycle of N/V with PO intake. She has been held NPO. Chemo may improve this, but she is in a salvage state. Venting PEG placed by IR yesterday. Not currently open to drainage.   Only plan to place to gravity if she has significant nausea. Slowly advance of diet. Continue scheduled antiemetics. Pain overall improved. Continue TPN for protein-calorie malnutrition due to poor intake secondary to obstruction. Daily BMP, Mg.  
ID/Wound:  Afebrile. Normal WBC. PPX:  Ambulation, SCDs, IS, PT for progressive debility/weakness. Dispostion:  Stable overall. Difficult case. She needs chemo to decrease tumor burden and in theory improve her bowel function/relieve obstruction, but her recurrent and disease state continues to make her ill, which will decrease her performance status. Surgery would be unlikely to be of any significant benefit, and in fact could cause more problems. A proximal diversion might the only thing that would help, as her obstruction is secondary to tumor. She understands the situation and states that she is \"realistic\" about her prognosis.    
  
  
Jil Talbot MD

## 2019-07-05 NOTE — PROGRESS NOTES
Music Therapy Assessment Brijesh Bruno [address] [phone] 1960  60 y.o.  female Patient Telephone Number: [phone] (home) Roman Catholic Affiliation: Spirituality Language: [place] Patient Active Problem List  
 Diagnosis Date Noted  [facility] 06/27/2019  Small bowel obstruction ([facility] 06/27/2019  On antineoplastic chemotherapy 06/17/2019  Carcinomatosis ([facility] 06/17/2019  Enteritis 06/14/2019  Leukocytosis 06/14/2019  Abdominal pain 06/14/2019  Tachycardia 06/14/2019  Nausea & vomiting 06/14/2019  Ascites 06/14/2019  SIRS (systemic inflammatory response syndrome) (HCC) 06/14/2019  Cancer related pain 01/08/2019  Mediastinal lymphadenopathy 11/06/2018  Weight loss, non-intentional 11/06/2018  Obesity (BMI 30.0-34.9) 09/11/2018  Right upper quadrant abdominal pain 09/11/2018  Early satiety 08/14/2018  Abdominal bloating 08/14/2018  Endometrial cancer ([facility] 08/19/2014 Date: 7/5/2019            Total Time (in minutes): 20          [address]  SPECIALITY UNIT Mental Status:   [x] Alert [  ] Delphina Remak [  ]  Confused  [  ] Minimally responsive  [  ] Sleeping Communication Status: [  ] Impaired Speech [  ] Nonverbal -N/A Physical Status:   [  ] Oxygen in use  [  ] Hard of Hearing [  ] Vision Impaired [  ] Ambulatory  [  ] Ambulatory with assistance [  ] Non-ambulatory -N/A Music Preferences, Background: Music from the 1970s. Country, Blues, [id number] [facility], [place]. Pt's favorite singers and bands include Avelino Clinton, Rafita Mac, and the Rolling Stones. Pt enjoy going to concerts and she doesn't like heavy rock. Clinical Problem addressed: Support relaxation, support self-expression. Goal(s) met in session:  
Physical/Pain management (Scale of 1-10): Pre-session rating: Pt reported on pain but did not rate. Post-session rating: Pt reported on pain but did not rate. [x] Increased relaxation   [  ] Affected breathing patterns [x] Decreased muscle tension   [  ] Decreased agitation [  ] Affected heart rate    [  ] Increased alertness Emotional/Psychological: 
[x] Increased self-expression   [  ] Decreased aggressive behavior [  ] Decreased feelings of stress  [  ] Discussed healthy coping skills [x] Improved mood    [  ] Decreased withdrawn behavior Social: 
[  ] Decreased feelings of isolation/loneliness [x] Positive social interaction [  ] Provided support and/or comfort for family/friends Spiritual: 
[  ] Spiritual support    [  ] Expressed peace [  ] Expressed danica    [  ] Discussed beliefs Techniques Utilized (Check all that apply):  
[  ] Procedural support MT [x] Music for relaxation [x] Patient preferred music 
[  ] Ena analysis  [  ] Song choice  [  ] Music for validation [  ] Entrainment  [  ] Movement to music [  ] Guided visualization [  ] Otf Rouhernandez  [  ] Patient instrument playing [  ] Tapad writing 
[x] Sing along   [  ] Improvisation  [x] Sensory stimulation 
[x] Active Listening  [  ] Music for spiritual support [  ] Making of CDs as gifts Session Observations: F/up visit; Patient (pt) was alert lying in bed. Pt remembered this music therapy intern (MT intern) from a previous visit. After MT intern asked pt how she was feeling, MT intern sat at bedside. MT intern sang and played the [employer] song Close To You with guitar. Pt's affect brightened and she increased self-expression in response to the music as evidenced by (AEB) singing along. Pt chose to hear a Tsosie Livestageyle song, which MT intern sang and played the song [address] [facility] with guitar. Pt increased relaxation and self-expression in response to the music AEB decreasing physical muscle tension and reminiscing about the time when she was young and how much she and her bestfriend enjoy going to concerts back at that time.  MT intern provided active listening and words of validation. Pt expressed enjoyment in the music and gratitude for the visit. Will follow as able. Anam \"Filomena\" Hong Whitt Music Therapy Intern Spiritual Care Department Referral-based service

## 2019-07-05 NOTE — PROGRESS NOTES
Bedside and Verbal shift change report given to NEY Mckeon RN (oncoming nurse) by Susi Porter RN (offgoing nurse). Report included the following information SBAR, Kardex, Procedure Summary, Intake/Output, MAR, Accordion and Recent Results.

## 2019-07-05 NOTE — PROGRESS NOTES
[address] [facility], [address] [place], [address] 
P [phone]  F [phone] Patient Name: Johan Catalan Admit Date: 6/27/2019 OR Date: * No surgery found * Visit Date: 7/5/2019 SUBJECTIVE: 
 
Chemo on Tuesdayn (7/2/19) - tolerated well, single agent carboplatin cycle 2. Underwent venting PEG placement yesterday. +flatus, +nausea, overall improved on Reglan. Pain controlled ~TID dilaudid Tolerating full liquids. TPN running. Overall feeling better. Ambulating. OBJECTIVE: 
 
Patient Vitals for the past 24 hrs: 
 Temp Pulse Resp BP SpO2  
07/05/19 0846 97.9 °F (36.6 °C) 97 16 150/70 98 % 07/05/19 0430 98.4 °F (36.9 °C) 99 16 125/71 98 % 07/04/19 2100 98.3 °F (36.8 °C) (!) 112 16 141/75 97 % 07/04/19 1548 98.5 °F (36.9 °C) 100 16 163/80 97 % 07/04/19 1206 98.1 °F (36.7 °C) 100 16 128/82 97 % Date 07/04/19 0700 - 07/05/19 7750 07/05/19 0700 - 07/06/19 8033 Shift 0490-5080 9246-8435 24 Hour Total 9697-0638 3190-5095 24 Hour Total  
INTAKE  
P.O.  240 240     
  P. O.  240 240     
I. V.(mL/kg/hr)  5698.1(6.9) 1296.8(0.7) Volume (0.9% sodium chloride infusion)  460 460 Volume (fat emulsion 20% (LIPOSYN, INTRAlipid) infusion 500 mL)  191.6 191.6 Volume (TPN ADULT - CENTRAL)  645.2 645. 2 Shift Total(mL/kg)  1536. 8(20.4) 1536. 8(20.4) OUTPUT Urine(mL/kg/hr) 650(0.7) 300(0.3) 950(0.5) Urine Voided 650 300 950 Urine Occurrence(s)  2 x 2 x Shift Total(mL/kg) 650(8.6) 300(4) 950(12.6) NET -650 1236.8 586.8 Weight (kg) 75.4 75.4 75.4 75.6 75.6 75.6 Physical Exam 
   General:  alert, cooperative, no distress Cardiac:  Regular rate and rhythm Lungs:  nonlabored Abdomen:  soft, mildly distended, tender subjectively w/o rebound/reflux; PEG in LUQ - site clear Wound:  n/a Extremity: extremities normal, atraumatic, no cyanosis or edema Data Review Lab Results Component Value Date/Time WBC 4.8 07/01/2019 08:36 AM  
 ABS. NEUTROPHILS 4.0 07/01/2019 08:36 AM  
 HGB 8.6 (L) 07/01/2019 08:36 AM  
 HCT 27.8 (L) 07/01/2019 08:36 AM  
 MCV 96.2 07/01/2019 08:36 AM  
 MCH 29.8 07/01/2019 08:36 AM  
 PLATELET 856 38/72/4787 08:36 AM  
 
Lab Results Component Value Date/Time Sodium 132 (L) 07/05/2019 06:19 AM  
 Potassium 4.3 07/05/2019 06:19 AM  
 Chloride 101 07/05/2019 06:19 AM  
 CO2 24 07/05/2019 06:19 AM  
 Glucose 101 (H) 07/05/2019 06:19 AM  
 BUN 22 (H) 07/05/2019 06:19 AM  
 Creatinine 0.91 07/05/2019 06:19 AM  
 Calcium 8.0 (L) 07/05/2019 06:19 AM  
 Albumin 2.0 (L) 07/05/2019 06:19 AM  
 Bilirubin, total 0.3 07/05/2019 06:19 AM  
 AST (SGOT) 14 (L) 07/05/2019 06:19 AM  
 ALT (SGPT) 11 (L) 07/05/2019 06:19 AM  
 Alk. phosphatase 93 07/05/2019 06:19 AM  
 
 
Upper GI series with small bowel follow-through (7/1/19) AP  radiograph of the abdomen was obtained and demonstrates 
evidence of a partial small bowel obstruction. The patient was asked to ingest 
effervescent material followed by thick and thin barium and routine 
double-contrast upper GI was then performed. Multiple spot radiographs were 
obtained with and without compression of the bowel. Small bowel follow-through 
was also performed. There is normal course, caliber and motility of the 
esophagus. There is a small hiatal hernia. There is no significant 
gastroesophageal reflux. There is a filling defect involving the proximal 
duodenum, junction of the first and second portions, where, in correlation with 
CT, there is a peritoneal implant. Contrast material does not readily into the 
stomach. The ligament of Treitz is in a normal-appearing location. There are 
dilated loops of proximal small bowel, measuring up to 4.8 cm.  There is delayed 
small bowel transit time, with multiple sequential images of the abdomen 
demonstrating slow transit into decompressed normal caliber loops of distal 
 small bowel. There is filling of these loops as well as the colon at the 7 hour 
felecia. This indicates a partial small bowel obstruction. IMPRESSION: 
1. Small bowel follow-through confirms a partial small bowel obstruction with a 
transition point in the right lower quadrant. 2. Dilated loops of proximal small bowel, up to 4.8 cm. Decompressed loops of 
distal small bowel in the right lower quadrant. 3. Delayed small bowel transit time. Contrast material enters the colon at the 7 
hour felecia. 4. Filling defect affecting the proximal duodenum, where, in correlation with 
recent CT, there is a peritoneal implant and associated lymphadenopathy. IMPRESSION/PLAN: 
 
 
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 Currently receiving single agent Carboplatin, s/p cycle #2 on 7/2/19. Heme/CV:  Anemia d/t chronic dz/chemo. Stable, asx. Renal:  Creatinine at baseline. FEN/GI:  Partial small bowel obstruction/delayed transit of 7 hrs on UGI. Improved on Reglan and GI rest. 
2nd admission for same. Reassured by passage of flatus, but with cycle of N/V with PO intake. She has been held NPO. Chemo may improve this, but she is in a salvage state. Venting PEG placed by IR on 7/3/19. Not currently open to drainage. Only plan to place to gravity if she has significant nausea. Tolerating full liquid diet. Advance to GI lite today. Continue scheduled antiemetics. Pain overall improved. Continue TPN for protein-calorie malnutrition due to poor intake secondary to obstruction. Daily BMP, Mg.  
ID/Wound:  Afebrile. Normal WBC. PPX:  Ambulation, SCDs, IS, PT for progressive debility/weakness. Dispostion:  Stable overall. Difficult case.   She needs chemo to decrease tumor burden and in theory improve her bowel function/relieve obstruction, but her recurrent and disease state continues to make her ill, which will decrease her performance status. Surgery would be unlikely to be of any significant benefit, and in fact could cause more problems. A proximal diversion might the only thing that would help, as her obstruction is secondary to tumor. She understands the situation and states that she is \"realistic\" about her prognosis.    
  
  
Ingrid Joseph MD

## 2019-07-05 NOTE — PROGRESS NOTES
NUTRITION COMPLETE ASSESSMENT 
 
RECOMMENDATIONS:  
1. Continue with current TPN until oral intake successful. 2. Will add homemake milkshakes BID 3. Check wt weekly Interventions/Plan:  
Food/Nutrient Delivery:    Commercial supplement     parenteral nutrition Assessment:  
 
Diet: GI lite Supplements: milkshake Nutritionally Significant Medications: [x] Reviewed Meal Intake:  
Patient Vitals for the past 100 hrs: 
 % Diet Eaten 07/03/19 2130 100 % Objective: 
Chart reviewed and diet advanced today to GI lite. Suggested to consume small meals and avoid tough, high fiber foods. Continue with TPN until oral intake successful. Adding milkshakes. Estimated Nutrition Needs:  
Kcals/day: 1900 Kcals/day( - 7948) Protein: 76 g( - 99g (1-1.3g/kg of current wt) Fluid: (1mL/kcal of intake) Based On: Kcal/kg - specify (Comment)(25 - 30kcal/kg of current wt) Weight Used: Actual wt(75.9 kg (standing wt)) Pt expected to meet estimated nutrient needs:  []   Yes     []  No [x] Unable to predict at this time Nutrition Diagnosis: 1. Altered GI function related to narrowing colon from mass obstruction as evidenced by review of physical findings, poor intake of adequate PO, vomiting with significant volume of PO, wt loss of 13# from UBW Goals:   
 Meet >80% of estimated calorie and protein needs within 1 week; halt further wt loss Monitoring & Evaluation: - Total energy intake, Enteral/parenteral nutrition intake - Weight/weight change, Protein profile Previous Nutrition Goals Met:   Yes Previous Recommendations:    Progressing Education & Discharge Needs: 
 [x] None Identified 
 [] Identified and addressed  
 [] Participated in care plan, discharge planning, and/or interdisciplinary rounds Cultural, Zoroastrian and ethnic food preferences identified: None Skin Integrity: [x]Intact  []Other Edema: [x]None []Other Food Allergies: [x]None []Other Diet Restrictions: Cultural/Bahai Preference(s): None Anthropometrics:   
Weight Loss Metrics 7/5/2019 6/27/2019 6/20/2019 6/11/2019 6/10/2019 6/10/2019 5/28/2019 Today's Wt 166 lb 11.2 oz - 169 lb 14.4 oz 171 lb 180 lb 179 lb 9.6 oz 180 lb BMI - 26.91 kg/m2 27.42 kg/m2 27.6 kg/m2 28.19 kg/m2 29 kg/m2 29.07 kg/m2 Weight Source: Standing scale (comment) Height: 5' 6\" (167.6 cm), Body mass index is 26.91 kg/m². IBW : 59 kg (130 lb), % IBW (Calculated): 138.46 % 
 ,   
 
Labs:   
Lab Results Component Value Date/Time Sodium 132 (L) 07/05/2019 06:19 AM  
 Potassium 4.3 07/05/2019 06:19 AM  
 Chloride 101 07/05/2019 06:19 AM  
 CO2 24 07/05/2019 06:19 AM  
 Glucose 101 (H) 07/05/2019 06:19 AM  
 BUN 22 (H) 07/05/2019 06:19 AM  
 Creatinine 0.91 07/05/2019 06:19 AM  
 Calcium 8.0 (L) 07/05/2019 06:19 AM  
 Magnesium 1.9 07/04/2019 04:10 AM  
 Phosphorus 3.2 07/01/2019 08:36 AM  
 Albumin 2.0 (L) 07/05/2019 06:19 AM  
 
No results found for: HBA1C, HGBE8, [id number], [id number] [address]

## 2019-07-05 NOTE — PROGRESS NOTES
Bedside and Verbal shift change report given to MABEL Martino RN (oncoming nurse) by Jean Mckeon RN (offgoing nurse). Report included the following information SBAR, Kardex, Intake/Output, MAR and Recent Results.

## 2019-07-06 NOTE — PROGRESS NOTES
27 [facility], [address] [place] [address], [facility] Shelbie Avila 
P [phone]  F [phone] Patient Name: Joselyn Avina Admit Date: 6/27/2019 OR Date: * No surgery found * Visit Date: 7/6/2019 SUBJECTIVE: 
 
Chemo on Tuesdayn (7/2/19) - tolerated well, single agent carboplatin (cycle 2). Underwent venting PEG placement on 7/3/19. Virgen Alina +flatus, +nausea, overall improved on Reglan. Pain controlled ~TID dilaudid Tolerated GI lite yesterday. TPN running. Overall feeling much better. Ambulating. OBJECTIVE: 
 
Patient Vitals for the past 24 hrs: 
 Temp Pulse Resp BP SpO2  
07/06/19 0000 98.2 °F (36.8 °C) 99 16 119/73 97 % 07/05/19 2027 98.1 °F (36.7 °C) (!) 111 16 144/80 97 % 07/05/19 1610 98.7 °F (37.1 °C) 100 16 130/70 96 % 07/05/19 0846 97.9 °F (36.6 °C) 97 16 150/70 98 % Date 07/05/19 0700 - 07/06/19 4965 07/06/19 0700 - 07/07/19 5732 Shift 1458-9123 2839-5435 24 Hour Total 6939-8176 2274-1843 24 Hour Total  
INTAKE  
P.O. 900  900     
  P. O. 900  900     
I. V.(mL/kg/hr) 0771.7(0.2) 526.5(0.6) 1595(0.9) Volume (0.9% sodium chloride infusion) 326.7 161.3 488 Volume (fat emulsion 20% (LIPOSYN, INTRAlipid) infusion 500 mL) 170.1 84 254.1 Volume (TPN ADULT - CENTRAL) 571.7 281.2 852.8 NG/GT 20  20 Water Flush Volume (mL) (PEG/Gastrostomy Tube 07/03/19) 20  20 Shift Total(mL/kg) 8049.6(99.9) 526.5(7) [id number]) OUTPUT Urine(mL/kg/hr) 500(0.6)  500(0.3) Urine Voided 500  500 Shift Total(mL/kg) 500(6.6)  500(6.6) NET 1488.5 526.5 2015 Weight (kg) 75.6 75.6 75.6 75.6 75.6 75.6 Physical Exam 
   General:  alert, cooperative, no distress Cardiac:  Regular rate and rhythm Lungs:  nonlabored Abdomen:  soft, mildly distended, tender subjectively w/o rebound/reflux; PEG in LUQ - site clear Wound:  n/a Extremity: extremities normal, atraumatic, no cyanosis or edema Data Review Lab Results Component Value Date/Time WBC 4.8 07/01/2019 08:36 AM  
 ABS. NEUTROPHILS 4.0 07/01/2019 08:36 AM  
 HGB 8.6 (L) 07/01/2019 08:36 AM  
 HCT 27.8 (L) 07/01/2019 08:36 AM  
 MCV 96.2 07/01/2019 08:36 AM  
 MCH 29.8 07/01/2019 08:36 AM  
 PLATELET 101 68/63/5562 08:36 AM  
 
Lab Results Component Value Date/Time Sodium 132 (L) 07/05/2019 06:19 AM  
 Potassium 4.3 07/05/2019 06:19 AM  
 Chloride 101 07/05/2019 06:19 AM  
 CO2 24 07/05/2019 06:19 AM  
 Glucose 101 (H) 07/05/2019 06:19 AM  
 BUN 22 (H) 07/05/2019 06:19 AM  
 Creatinine 0.91 07/05/2019 06:19 AM  
 Calcium 8.0 (L) 07/05/2019 06:19 AM  
 Albumin 2.0 (L) 07/05/2019 06:19 AM  
 Bilirubin, total 0.3 07/05/2019 06:19 AM  
 AST (SGOT) 14 (L) 07/05/2019 06:19 AM  
 ALT (SGPT) 11 (L) 07/05/2019 06:19 AM  
 Alk. phosphatase 93 07/05/2019 06:19 AM  
 
 
Upper GI series with small bowel follow-through (7/1/19) AP  radiograph of the abdomen was obtained and demonstrates 
evidence of a partial small bowel obstruction. The patient was asked to ingest 
effervescent material followed by thick and thin barium and routine 
double-contrast upper GI was then performed. Multiple spot radiographs were 
obtained with and without compression of the bowel. Small bowel follow-through 
was also performed. There is normal course, caliber and motility of the 
esophagus. There is a small hiatal hernia. There is no significant 
gastroesophageal reflux. There is a filling defect involving the proximal 
duodenum, junction of the first and second portions, where, in correlation with 
CT, there is a peritoneal implant. Contrast material does not readily into the 
stomach. The ligament of Treitz is in a normal-appearing location. There are 
dilated loops of proximal small bowel, measuring up to 4.8 cm. There is delayed 
small bowel transit time, with multiple sequential images of the abdomen demonstrating slow transit into decompressed normal caliber loops of distal 
small bowel. There is filling of these loops as well as the colon at the 7 hour 
felecia. This indicates a partial small bowel obstruction. IMPRESSION: 
1. Small bowel follow-through confirms a partial small bowel obstruction with a 
transition point in the right lower quadrant. 2. Dilated loops of proximal small bowel, up to 4.8 cm. Decompressed loops of 
distal small bowel in the right lower quadrant. 3. Delayed small bowel transit time. Contrast material enters the colon at the 7 
hour felecia. 4. Filling defect affecting the proximal duodenum, where, in correlation with 
recent CT, there is a peritoneal implant and associated lymphadenopathy. IMPRESSION/PLAN: 
 
 
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 Currently receiving single agent Carboplatin, s/p cycle #2 on 7/2/19. Heme/CV:  Anemia d/t chronic dz/chemo. Stable, asx. Renal:  Creatinine at baseline. FEN/GI:  Partial small bowel obstruction/delayed transit of 7 hrs on UGI. Improved on Reglan and GI rest. 
2nd admission for same. Reassured by passage of flatus, but with cycle of N/V with PO intake. She has been held NPO. Chemo may improve this, but she is in a salvage state. Venting PEG placed by IR on 7/3/19. Not currently open to drainage. Only plan to place to gravity if she has significant nausea, but has not been needed since placement. Tolerating GI lite. Continue scheduled antiemetics. Pain overall improved. Continue TPN for protein-calorie malnutrition due to poor intake secondary to obstruction. Daily BMP, Mg. 
Will order calorie counts. Once she is taking in enough PO we can start weaning off TPN. ID/Wound:  Afebrile. Normal WBC. PPX:  Ambulation, SCDs, IS, PT for progressive debility/weakness. Dispostion:  Stable overall. Difficult case.   She needs chemo to decrease tumor burden and in theory improve her bowel function/relieve obstruction, but her recurrent and disease state continues to make her ill, which will decrease her performance status. Surgery would be unlikely to be of any significant benefit, and in fact could cause more problems. A proximal diversion might the only thing that would help, as her obstruction is secondary to tumor. She understands the situation and states that she is \"realistic\" about her prognosis.    
  
  
Johnathan Rivas MD

## 2019-07-06 NOTE — PROGRESS NOTES
1437 Discussed Dilaudid with patient - she feels PO Dilaudid does not work as well as IV Dilaudid or the Dilaudid oral liquid. We discussed trying the PO Dilaudid pill on more of a consistent bases since the last pill was given at 0915 and it is 5 hours later when she requested the IV Dilaudid. Pt agrees. 1500 Updated pt that Mercedes/Dr. Jaosn Muse have already discussed and ordered PO Liquid Dilaudid and it is available for her next dose. She is very appreciative.

## 2019-07-07 NOTE — PROGRESS NOTES
Bedside and Verbal shift change report given to Zeinab Reid (oncoming nurse) by Sapna Alicia RN (offgoing nurse). Report included the following information SBAR, Kardex, Intake/Output, MAR and Recent Results. 1000: pt nauseous. Peg flushed with 20ml and opened to drain to gravity. States she immediately feels a little better. 1030: Dr Terra Vilchis at bedside 1115: PEG clamped. 15ml output 1510: pt nauseous. PEG unclamped. 1645: pt eating dinner. PEG clamped

## 2019-07-07 NOTE — PROGRESS NOTES
27 [facility], [address] [place] [address] Shelbie THORNTON [phone]  F [phone] Patient Name: Kriss Diaz Admit Date: 6/27/2019 OR Date: * No surgery found * Visit Date: 7/7/2019 SUBJECTIVE: 
 
Chemo on Tuesday (7/2/19) - tolerated well, single agent carboplatin (cycle 2). Underwent venting PEG placement on 7/3/19. Rajinder Albert +flatus, +nausea, overall improved on Reglan. Pain controlled ~TID dilaudid Tolerating GI lite, though she had some nausea this morning - PEG opened to gravity with immediate relief. TPN running. Overall feeling much better. Ambulating. OBJECTIVE: 
 
Patient Vitals for the past 24 hrs: 
 Temp Pulse Resp BP SpO2  
07/07/19 0825 98.5 °F (36.9 °C) 100 16 145/76 98 % 07/07/19 0007 98.1 °F (36.7 °C) 95 18 132/73 97 % 07/06/19 2031 98.7 °F (37.1 °C) (!) 101 16 110/51 97 % 07/06/19 1547 98.5 °F (36.9 °C) 100 16 108/65 98 % Date 07/06/19 0700 - 07/07/19 7085 07/07/19 0700 - 07/08/19 0259 Shift 5858-0307 8716-7961 24 Hour Total 3674-2404 8095-2022 24 Hour Total  
INTAKE  
P.O. 625  625     
  P. O. 625  625 NG/GT    20  20 Water Flush Volume (mL) (PEG/Gastrostomy Tube 07/03/19)    20  20 Shift Total(mL/kg) 625(8.3)  625(8.3) 20(0.3)  20(0.3) OUTPUT Shift Total(mL/kg)   625 20  20 Weight (kg) 75.6 75.7 75.7 75.7 75.7 75.7 Physical Exam 
   General:  alert, cooperative, no distress Cardiac:  Regular rate and rhythm Lungs:  nonlabored Abdomen:  soft, mildly distended, tender subjectively w/o rebound/reflux; PEG in LUQ - site clear Wound:  n/a Extremity: extremities normal, atraumatic, no cyanosis or edema Data Review Lab Results Component Value Date/Time WBC 4.8 07/01/2019 08:36 AM  
 ABS.  NEUTROPHILS 4.0 07/01/2019 08:36 AM  
 HGB 8.6 (L) 07/01/2019 08:36 AM  
 HCT 27.8 (L) 07/01/2019 08:36 AM  
 MCV 96.2 07/01/2019 08:36 AM  
 MCH 29.8 07/01/2019 08:36 AM  
 PLATELET 329 45/80/4682 08:36 AM  
 
Lab Results Component Value Date/Time Sodium 132 (L) 07/05/2019 06:19 AM  
 Potassium 4.3 07/05/2019 06:19 AM  
 Chloride 101 07/05/2019 06:19 AM  
 CO2 24 07/05/2019 06:19 AM  
 Glucose 101 (H) 07/05/2019 06:19 AM  
 BUN 22 (H) 07/05/2019 06:19 AM  
 Creatinine 0.91 07/05/2019 06:19 AM  
 Calcium 8.0 (L) 07/05/2019 06:19 AM  
 Albumin 2.0 (L) 07/05/2019 06:19 AM  
 Bilirubin, total 0.3 07/05/2019 06:19 AM  
 AST (SGOT) 14 (L) 07/05/2019 06:19 AM  
 ALT (SGPT) 11 (L) 07/05/2019 06:19 AM  
 Alk. phosphatase 93 07/05/2019 06:19 AM  
 
 
Upper GI series with small bowel follow-through (7/1/19) AP  radiograph of the abdomen was obtained and demonstrates 
evidence of a partial small bowel obstruction. The patient was asked to ingest 
effervescent material followed by thick and thin barium and routine 
double-contrast upper GI was then performed. Multiple spot radiographs were 
obtained with and without compression of the bowel. Small bowel follow-through 
was also performed. There is normal course, caliber and motility of the 
esophagus. There is a small hiatal hernia. There is no significant 
gastroesophageal reflux. There is a filling defect involving the proximal 
duodenum, junction of the first and second portions, where, in correlation with 
CT, there is a peritoneal implant. Contrast material does not readily into the 
stomach. The ligament of Treitz is in a normal-appearing location. There are 
dilated loops of proximal small bowel, measuring up to 4.8 cm. There is delayed 
small bowel transit time, with multiple sequential images of the abdomen 
demonstrating slow transit into decompressed normal caliber loops of distal 
small bowel. There is filling of these loops as well as the colon at the 7 hour 
felecia. This indicates a partial small bowel obstruction. IMPRESSION: 
1.  Small bowel follow-through confirms a partial small bowel obstruction with a 
 transition point in the right lower quadrant. 2. Dilated loops of proximal small bowel, up to 4.8 cm. Decompressed loops of 
distal small bowel in the right lower quadrant. 3. Delayed small bowel transit time. Contrast material enters the colon at the 7 
hour felecia. 4. Filling defect affecting the proximal duodenum, where, in correlation with 
recent CT, there is a peritoneal implant and associated lymphadenopathy. IMPRESSION/PLAN: 
 
 
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 Currently receiving single agent Carboplatin, s/p cycle #2 on 7/2/19. Heme/CV:  Anemia d/t chronic dz/chemo. Stable, asx. Renal:  Creatinine at baseline. FEN/GI:  Partial small bowel obstruction/delayed transit of 7 hrs on UGI. Improved on Reglan and GI rest. 
2nd admission for same. Reassured by passage of flatus, but with cycle of N/V with PO intake. She has been held NPO. Chemo may improve this, but she is in a salvage state. Venting PEG placed by IR on 7/3/19. Opened to gravity for the first time this morning, with immediate improvement. Tolerating GI lite. Continue scheduled antiemetics. Pain overall improved. Continue TPN for protein-calorie malnutrition due to poor intake secondary to obstruction. Daily BMP, Mg. 
Calorie counts ordered. Once she is taking in enough PO we can start weaning off TPN. ID/Wound:  Afebrile. Normal WBC. PPX:  Ambulation, SCDs, IS, PT for progressive debility/weakness. Dispostion:  Stable overall. Difficult case. She needs chemo to decrease tumor burden and in theory improve her bowel function/relieve obstruction, but her recurrent and disease state continues to make her ill, which will decrease her performance status. Surgery would be unlikely to be of any significant benefit, and in fact could cause more problems.   A proximal diversion might the only thing that would help, as her obstruction is secondary to tumor. She understands the situation and states that she is \"realistic\" about her prognosis.    
  
  
Raisa Baxter MD

## 2019-07-08 NOTE — PROGRESS NOTES
Bedside and Verbal shift change report given to MABEL Martino RN (oncoming nurse) by Jenae Perales (offgoing nurse). Report included the following information SBAR, Kardex, Intake/Output, MAR and Recent Results.

## 2019-07-08 NOTE — PROGRESS NOTES
Bedside and Verbal shift change report given to Lorraine Mustafa (oncoming nurse) by Moi Palacios RN (offgoing nurse). Report included the following information SBAR, Kardex, Intake/Output, MAR and Recent Results. 1015: PEG education started. 1115: pt vomited x3. Does not want zofran. States she cannot tolerate PO pain meds. IV dilaudid given. Scheduled IV reglan give. PEG was draining at time of emesis. 1130: pt reports aprox 400mL out of PEG 
 
1145: Case Management at bedside 1205: Nutrition called and voicemail left for calorie count orders. 1300: pt vomited. Compazine given.

## 2019-07-08 NOTE — PROGRESS NOTES
27 [facility], [address] [place] [address], [facility] Shelbie THORNTON [phone]  F [phone] Patient Name: Reynold Simon Admit Date: 6/27/2019 OR Date: * No surgery found * Visit Date: 7/8/2019 SUBJECTIVE: 
 
Chemo on Tuesday (7/2/19) - tolerated well, single agent carboplatin (cycle 2). Underwent venting PEG placement on 7/3/19. +flatus, +nausea/emesis last night. Overall though improved on Reglan. Using PRN compazine BID. Pain controlled, really only around PEG site. Tolerating GI lite for the most part, she may need to be reduced to a liquid diet - PEG  Was closed during last PM episode. TPN running. Overall feeling much better. Ambulating. OBJECTIVE: 
 
Patient Vitals for the past 24 hrs: 
 Temp Pulse Resp BP SpO2  
07/08/19 0019 98.3 °F (36.8 °C) 100 16 123/70 97 % 07/07/19 2003 99 °F (37.2 °C) (!) 108 16 114/65 98 % 07/07/19 1504 98.6 °F (37 °C) (!) 110 16 129/66 98 % 07/07/19 0825 98.5 °F (36.9 °C) 100 16 145/76 98 % Date 07/07/19 0700 - 07/08/19 6379 07/08/19 0700 - 07/09/19 9385 Shift 2484-7271 8776-7075 24 Hour Total 5575-6721 1892-1964 24 Hour Total  
INTAKE  
NG/GT 20  20 Water Flush Volume (mL) (PEG/Gastrostomy Tube 07/03/19) 20  20 Shift Total(mL/kg) 20(0.3)  20(0.3) OUTPUT Emesis/NG output 15  15 Output (ml) (PEG/Gastrostomy Tube 07/03/19) 15  15 Shift Total(mL/kg) 15(0.2)  15(0.2) NET 5  5 Weight (kg) 75.7 75.1 75.1 75.1 75.1 75.1 Physical Exam 
   General:  alert, cooperative, no distress Cardiac:  Regular rate and rhythm Lungs:  nonlabored Abdomen:  soft, mildly distended, BS hypoactive. Tender subjectively w/o rebound/reflux; PEG in LUQ - site clear Extremity: extremities normal, atraumatic, no cyanosis or edema Data Review Lab Results Component Value Date/Time WBC 4.8 07/01/2019 08:36 AM  
 ABS.  NEUTROPHILS 4.0 07/01/2019 08:36 AM  
 HGB 8.6 (L) 07/01/2019 08:36 AM  
 HCT 27.8 (L) 07/01/2019 08:36 AM  
 MCV 96.2 07/01/2019 08:36 AM  
 MCH 29.8 07/01/2019 08:36 AM  
 PLATELET 600 02/57/8748 08:36 AM  
 
Lab Results Component Value Date/Time Sodium 132 (L) 07/05/2019 06:19 AM  
 Potassium 4.3 07/05/2019 06:19 AM  
 Chloride 101 07/05/2019 06:19 AM  
 CO2 24 07/05/2019 06:19 AM  
 Glucose 101 (H) 07/05/2019 06:19 AM  
 BUN 22 (H) 07/05/2019 06:19 AM  
 Creatinine 0.91 07/05/2019 06:19 AM  
 Calcium 8.0 (L) 07/05/2019 06:19 AM  
 Albumin 2.0 (L) 07/05/2019 06:19 AM  
 Bilirubin, total 0.3 07/05/2019 06:19 AM  
 AST (SGOT) 14 (L) 07/05/2019 06:19 AM  
 ALT (SGPT) 11 (L) 07/05/2019 06:19 AM  
 Alk. phosphatase 93 07/05/2019 06:19 AM  
 
 
Upper GI series with small bowel follow-through (7/1/19) AP  radiograph of the abdomen was obtained and demonstrates 
evidence of a partial small bowel obstruction. The patient was asked to ingest 
effervescent material followed by thick and thin barium and routine 
double-contrast upper GI was then performed. Multiple spot radiographs were 
obtained with and without compression of the bowel. Small bowel follow-through 
was also performed. There is normal course, caliber and motility of the 
esophagus. There is a small hiatal hernia. There is no significant 
gastroesophageal reflux. There is a filling defect involving the proximal 
duodenum, junction of the first and second portions, where, in correlation with 
CT, there is a peritoneal implant. Contrast material does not readily into the 
stomach. The ligament of Treitz is in a normal-appearing location. There are 
dilated loops of proximal small bowel, measuring up to 4.8 cm. There is delayed 
small bowel transit time, with multiple sequential images of the abdomen 
demonstrating slow transit into decompressed normal caliber loops of distal 
small bowel. There is filling of these loops as well as the colon at the 7 hour felecia. This indicates a partial small bowel obstruction. IMPRESSION: 
1. Small bowel follow-through confirms a partial small bowel obstruction with a 
transition point in the right lower quadrant. 2. Dilated loops of proximal small bowel, up to 4.8 cm. Decompressed loops of 
distal small bowel in the right lower quadrant. 3. Delayed small bowel transit time. Contrast material enters the colon at the 7 
hour felecia. 4. Filling defect affecting the proximal duodenum, where, in correlation with 
recent CT, there is a peritoneal implant and associated lymphadenopathy. IMPRESSION/PLAN: 
 
 
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 Currently receiving single agent Carboplatin, s/p cycle #2 on 7/2/19. Heme/CV:  Anemia d/t chronic dz/chemo. Stable, asx. Renal:  Creatinine at baseline. FEN/GI:  Partial small bowel obstruction/delayed transit of 7 hrs on UGI. Improved on Reglan and GI rest. 
2nd admission for same. Chemo may improve this, but she is in a salvage state. Venting PEG placed by IR on 7/3/19. Education today for venting relief. Continue GI lite, may need to try puree/liquid diet with supplements. Continue scheduled antiemetics. Pain overall improved. Continue TPN for protein-calorie malnutrition due to poor intake secondary to obstruction. Calorie counts ordered. Once she is taking in enough PO we can start weaning off TPN. ID/Wound:  Afebrile. Normal WBC. PPX:  Ambulation, SCDs, IS, PT for progressive debility/weakness. Dispostion:  Stable overall. Difficult case. She needs chemo to decrease tumor burden and in theory improve her bowel function/relieve obstruction, but her recurrent and disease state continues to make her ill, which will decrease her performance status. Surgery would be unlikely to be of any significant benefit, and in fact could cause more problems.   A proximal diversion might the only thing that would help, as her obstruction is secondary to tumor. She understands the situation and states that she is \"realistic\" about her prognosis.    
  
  
PHILIP Mari

## 2019-07-08 NOTE — PROGRESS NOTES
DAISY Plan: 1. Home with Home Health Nurse; [facility] ([phone]) has accepted the pt. [facility] will call the pt to set up home visit. 2. PEG supplies and education; CM has placed order via allscripts; [employer] ([phone]); awaiting response. Samira Welch [phone]

## 2019-07-08 NOTE — ADT AUTH CERT NOTES
Intestinal Obstruction - Care Day 11 (7/7/2019) by Tabatha Ceja RN  
 
   
Review Entered Review Status 7/8/2019 12:46 Completed  
   
Criteria Review Care Day: 11 Care Date: 7/7/2019 Level of Care: Inpatient Floor Guideline Day 3 Level Of Care (X) Floor to discharge 7/8/2019 12:46:27 EDT by Gege Carolina medical floor Clinical Status   
(X) * Hemodynamic stability 7/8/2019 12:46:27 EDT by Jomarie Cabot   
  98.5, , RR 16, /76, 98% on RA   
(X) * Nausea and vomiting absent or controlled and acceptable for next level of care 7/8/2019 12:46:27 EDT by Jomarie Cabot   
  positive for nausea, overall improved with Reglan   
(X) * Electrolytes normal or acceptable for next level of care 7/8/2019 12:46:27 EDT by Gege Carolina no labs this care date   
(X) * Oral hydration maintained 7/8/2019 12:46:27 EDT by Jomarie Cabot   
  tolerating GI lite diet   
(X) * Pain absent or managed 7/8/2019 12:46:27 EDT by Jomarie Cabot   
  pain controlled with ~TID dilaudid   
(X) * Surgery not indicated 7/8/2019 12:46:27 EDT by Gege Carolina not at this time   
( ) * Discharge plans and education understood Activity   
(X) * Ambulatory[B] 7/8/2019 12:46:27 EDT by Gege Carolina ambulating in hallway and to bathroom Routes   
(X) * Oral hydration, medications, and diet 7/8/2019 12:46:27 EDT by Jomarie Cabot   
  GI lite diet Heparin 300u IK x 1 Lovenox 40mg SC q24h Dilaudid 1mg IV q4h PRN x 4 Humalog SC q6h SSI Reglan 10mg IV q6h x 3 Lopressor 12.5mg PO q12h Protonix 20mg IV q12h Compazine 10mg IV q6 PRN x 1 
NS at 40ml/hr TPN 70ml/hr continuous 7/8/2019 12:46:27 EDT by Jomarie Cabot Subject: Additional Clinical Information 7/7/19 GYN/Oncology assessment/plan:Chemo on Tuesday (7/2/19) - tolerated well, single agent carboplatin (cycle 2). Underwent venting PEG placement on 7/3/19. Judithe [place] +flatus, +nausea, overall improved on Reglan.     
Pain controlled ~TID dilaudid Tolerating GI lite, though she had some nausea this morning - PEG opened to gravity with immediate relief. TPN running. Overall feeling much better. Ambulating. Pt is alert and coopertive, no distress, abdomen is soft, mildly distended, tender subjectively w/o rebound/reflux, PEG in LUQ, site clear Oncologic:   
 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   Currently receiving single agent Carboplatin, s/p cycle #2 on 7/2/19. Heme/CV:   
 Anemia d/t chronic dz/chemo.   Stable, asx. Renal:   
 Creatinine at baseline.     
  
       FEN/GI:   
 Partial small bowel obstruction/delayed transit of 7 hrs on UGI. Im   
proved on Reglan and GI rest.   
2nd admission for same.   Reassured by passage of flatus, but with cycle of N/V with PO intake.   She has been held NPO. Chemo may improve this, but she is in a salvage state.     
Venting PEG placed by IR on 7/3/19.   Opened to gravity for the first time this morning, with immediate improvement. Tolerating GI lite. Continue scheduled antiemetics.   Pain overall improved. Continue TPN for protein-calorie malnutrition due to poor intake secondary to obstruction.   Daily BMP, Mg.   
Calorie counts ordered.   Once she is taking in enough PO we can start weaning off TPN. ID/Wound:   
 Afebrile.   Normal WBC. PPX:   
 Ambulation, SCDs, IS, PT for progressive debility/weakness. Dispostion:   
 Stable overall.  Difficult case.   She needs chemo to decrease tumor burden and in theory improve her bowel function/relieve obstruction, but her recurrent and disease state continues to make her ill, which will decrease her performance status.   Surgery would be unlikely to be of any significant benefit, and in fact could cause more problems.   A proximal diversion might the only thing that would help, as her obstruction is secondary to tumor.   She understands the situation and states that she is \"realistic\" about her prognosis Labs: glucose 121, 129, 115 Plan: ambulate in arreola, SCD's, calorie count, I&O's, spot check oximetry, POC glucose q6h, up ad crissy, weekly weights,     
  
* Milestone  
   
Intestinal Obstruction - Care Day 10 (7/6/2019) by Afia Bah RN  
 
   
Review Entered Review Status 7/8/2019 12:37 Completed  
   
Criteria Review Care Day: 10 Care Date: 7/6/2019 Level of Care: Inpatient Floor Guideline Day 3 Level Of Care (X) Floor to discharge 7/8/2019 12:37:24 EDT by Raudel Colin medical [facility] Clinical Status   
(X) * Hemodynamic stability 7/8/2019 12:37:24 EDT by Dennis Lee   
  98.5, , RR 16, /65, 98% on RA   
(X) * Nausea and vomiting absent or controlled and acceptable for next level of care 7/8/2019 12:37:24 EDT by Dennis Lee   
  positive nausea, overall improved on Reglan   
(X) * Electrolytes normal or acceptable for next level of care   
(X) * Oral hydration maintained 7/8/2019 12:37:24 EDT by Dennis Lee   
  tolerated GI lite diet yesterda   
(X) * Pain absent or managed 7/8/2019 12:37:24 EDT by Dennis Lee   
  pain controled with dilaudid TID   
(X) * Surgery not indicated 7/8/2019 12:37:24 EDT by Raudel Colin not at this time   
( ) * Discharge plans and education understood Activity   
(X) * Ambulatory[B] 7/8/2019 12:37:24 EDT by Raudel Colin ambulating Routes   
(X) * Oral hydration, medications, and diet 7/8/2019 12:37:24 EDT by Dennis Lee   
  GI lite diet Lovenox 40mg SC q24h Liposyn Intralipid 500ml 3x week Dilaudid 1mg IV q4h PRN x 3 Humalog SC q6h SSI Reglan 10mg IV q6h Lopressor 12.5mg PO q12h Protonix 20mg IV q12h 
compazine 10mg PO q6h PRN x1 
 Compazine 10mg IV q6 PRN x 1   
7/8/2019 12:37:24 EDT by Nereyda Elliott Subject: Additional Clinical Information 7/6/19 GYN/Oncology assessment/plan:   
  
Chemo on Tuesday (7/2/19) - tolerated well, single agent carboplatin (cycle 2). Underwent venting PEG placement on 7/3/19. Juan M Serrano +flatus, +nausea, overall improved on Reglan.     
Pain controlled ~TID dilaudid Tolerated GI lite yesterday. TPN running. Overall feeling much better. Ambulating Pt is alert and cooperative, no distress, abdomen is sfot, mildly distended, tender subjectively w/o reboud/reflux, PEG in LUQ, site clear Oncologic:   
 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   Currently receiving single agent Carboplatin, s/p cycle #2 on 7/2/19. Heme/CV:   
 Anemia d/t chronic dz/chemo.   Stable, asx. Renal:   
 Creatinine at baseline.     
  
       FEN/GI:   
 Partial small bowel obstruction/delayed transit of 7 hrs on UGI. Improved on Reglan and GI rest.   
2nd admission for same.   Reassured by passage of flatus, but with cycle of N/V with PO intake.   She has been held NPO. Chemo may improve this, but she is in a salvage state.     
Venting PEG placed by IR on 7/3/19.   Not currently open to drainage.   Only plan to place to gravity if she has significant nausea, but has not been needed since placement.     
Tolerating GI lite. Continue scheduled antiemetics.   Pain overall improved. Continue TPN for protein-calorie malnutrition due to poor intake secondary to obstruction.   Daily BMP, Mg.   
Will order calorie counts.   Once she is taking in enough PO we can start weaning off TPN. ID/Wound:   
 Afebrile.   Normal WBC. PPX:   
 Ambulation, SCDs, IS, PT for progressive debility/weakness. Dispostion:   
 Stable overall.  Difficult case.   She needs chemo to decrease tumor burden and in theory improve her bowel function/relieve obstruction, but her recurrent and disease state continues to make her ill, which will decrease her performance status.   Surgery would be unlikely to be of any significant benefit, and in fact could cause more problems.   A proximal diversion might the only thing that would help, as her obstruction is secondary to tumor.   She understands the situation and states that she is \"realistic\" about her prognosis Meds: Dilaudid 2mg PO q4h PRN x 2, TPN at 70ml/hr continuous Labs: POC glucose, 105, 112, 122, 114, 125 Plan: ambulate in arreola, SCD's, calorie count, I&O's, spot check oximetry, POC glucose q6h, up ad crissy, weekly weights,   
  
* Milestone  
   
Intestinal Obstruction - Care Day 9 (7/5/2019) by Qi Carranza RN  
 
   
Review Entered Review Status 7/5/2019 12:17 Completed  
   
Criteria Review Care Day: 9 Care Date: 7/5/2019 Level of Care: Inpatient Floor Guideline Day 3 Level Of Care (X) Floor to discharge Clinical Status   
(X) * Hemodynamic stability 7/5/2019 12:17:30 EDT by Jose C Haque   
  VS 97.9 â 97 â 16 â 150/70 O2 sat 98% RA   
( ) * Nausea and vomiting absent or controlled and acceptable for next level of care   
(X) * Electrolytes normal or acceptable for next level of care 7/5/2019 12:17:30 EDT by Jose C Haque   
  Na 132 
K 4.3 Glucose 101 â 108 Creatinine 0.91 Ca 8.0 Albumin2.0 (L) Globulin4.6 (H) A-G Ratio0.4 (L) ALT (SGPT)11 (L) AST14 (L)   
( ) * Oral hydration maintained   
(X) * Pain absent or managed   
(X) * Surgery not indicated ( ) * Discharge plans and education understood Activity   
(X) * Ambulatory[B] Routes   
(X) * Oral hydration, medications, and diet 7/5/2019 12:17:30 EDT by Jose C rodriguez diet * Milestone Additional Notes DOS 7/5/19:   
Chemo on Tuesdayn (7/2/19) - tolerated well, single agent carboplatin cycle 2. Underwent venting PEG placement yesterday. +flatus, +nausea, overall improved on Reglan.  Pain controlled ~TID dilaudid Tolerating full liquids. TPN running. Overall feeling better. Ambulating. Physical Exam   
   General:  alert, cooperative, no distress   
   Cardiac:  Regular rate and rhythm   
      Lungs:  nonlabored Abdomen:  soft, mildly distended, tender subjectively w/o rebound/reflux; PEG in LUQ - site clear   
     Wound:  n/a   
 Extremity: extremities normal, atraumatic, no cyanosis or edema   
    
IMPRESSION/PLAN:   
    
    
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  Currently receiving single agent Carboplatin, s/p cycle #2 on 7/2/19. Heme/CV: Anemia d/t chronic dz/chemo.  Stable, asx. Renal: Creatinine at baseline.     
    FEN/GI: Partial small bowel obstruction/delayed transit of 7 hrs on UGI. Improved on Reglan and GI rest.   
2nd admission for same.  Reassured by passage of flatus, but with cycle of N/V with PO intake.  She has been held NPO. Chemo may improve this, but she is in a salvage state.     
Venting PEG placed by IR on 7/3/19.  Not currently open to drainage.  Only plan to place to gravity if she has significant nausea.     
Tolerating full liquid diet.  Advance to GI lite today. Continue scheduled antiemetics.  Pain overall improved. Continue TPN for protein-calorie malnutrition due to poor intake secondary to obstruction.  Daily BMP, Mg.   
ID/Wound: Afebrile.  Normal WBC. PPX: Ambulation, SCDs, IS, PT for progressive debility/weakness. Dispostion: Stable overall.  Difficult case.  She needs chemo to decrease tumor burden and in theory improve her bowel function/relieve obstruction, but her recurrent and disease state continues to make her ill, which will decrease her performance status.  Surgery would be unlikely to be of any significant benefit, and in fact could cause more problems.  A proximal diversion might the only thing that would help, as her obstruction is secondary to tumor.  She understands the situation and states that she is \"realistic\" about her prognosis.     
NS @ 40 Lovenox 40mg SQ q 24hrs Fat emulsion 20% infusion 3x/wk (Mon, Thurs and Fri) Dilaudid 1mg IV q 4hrs prn x 2 doses SSI Reglan 10mg IV q 6hrs Lopressor 12.5mg po q 12hrs Zofran 8mg IV q 8hrs prn x 1 dose Protonix 20mg IV q 12hrs TPN @ 70  
   
Intestinal Obstruction - Care Day 8 (7/4/2019) by Marisa Lamb RN  
 
   
Review Entered Review Status 7/5/2019 12:10 Completed  
   
Criteria Review Care Day: 8 Care Date: 7/4/2019 Level of Care: Inpatient Floor Guideline Day 3 Level Of Care (X) Floor to discharge Clinical Status   
(X) * Hemodynamic stability 7/5/2019 12:10:17 EDT by Georgette Venegas   
  VS 98.7 â 100 â 16 â 132/73 O2 sat 96% RA   
( ) * Nausea and vomiting absent or controlled and acceptable for next level of care   
(X) * Electrolytes normal or acceptable for next level of care 7/5/2019 12:10:17 EDT by Georgette Venegas   
  Na 134 
K 4.4 Glucose 99 â 101 â 109 â 118 â 123 Creatinine 0.86   
( ) * Oral hydration maintained ( ) * Pain absent or managed   
(X) * Surgery not indicated ( ) * Discharge plans and education understood Activity   
(X) * Ambulatory[B] Routes   
(X) * Oral hydration, medications, and diet 7/5/2019 12:10:17 EDT by Georgette Venegas   
  Fat emulsion 20% infusion 3x/wk (Mon, Thurs and Fri) Dilaudid 1mg IV q 4hrs prn x 4 doses Reglan 10mg IV q 6hrs Zofran 8mg IV q 8hrs prn x 1 dose Protonix 20mg IV q 12hrs TPN @ 70 NS @ 40 * Milestone Additional Notes DOS 7/4/19 Chemo on Tuesday - tolerated well, single agent carboplatin cycle 2. Underwent venting PEG placement yesterday. +flatus, +nausea, overall improved on Reglan.  Pain controlled ~TID dilaudid TPN running. Overall feeling better. Ambulating, but feeling more week. Physical Exam   
   General:  alert, cooperative, no distress   
   Cardiac:  Regular rate and rhythm   
      Lungs:  nonlabored Abdomen:  soft, mildly distended, tender subjectively w/o rebound/reflux; PEG in LUQ - site clear   
     Wound:  n/a   
 Extremity: extremities normal, atraumatic, no cyanosis or edema IMPRESSION/PLAN:   
    
    
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  Currently receiving single agent Carboplatin, s/p cycle #2 on 7/2/19. Heme/CV: Anemia d/t chronic dz/chemo.  Stable, asx. Renal: Creatinine at baseline.     
    FEN/GI: Partial small bowel obstruction/delayed transit of 7 hrs on UGI. Improved on Reglan and GI rest.   
2nd admission for same.  Reassured by passage of flatus, but with cycle of N/V with PO intake.  She has been held NPO. Chemo may improve this, but she is in a salvage state.     
Venting PEG placed by IR yesterday. Hillery Severin currently open to drainage.  Only plan to place to gravity if she has significant nausea.     
Slowly advance of diet.     
Continue scheduled antiemetics.  Pain overall improved. Continue TPN for protein-calorie malnutrition due to poor intake secondary to obstruction.  Daily BMP, Mg.   
ID/Wound: Afebrile.  Normal WBC. PPX: Ambulation, SCDs, IS, PT for progressive debility/weakness. Dispostion: Stable overall. Difficult case.  She needs chemo to decrease tumor burden and in theory improve her bowel function/relieve obstruction, but her recurrent and disease state continues to make her ill, which will decrease her performance status.  Surgery would be unlikely to be of any significant benefit, and in fact could cause more problems.  A proximal diversion might the only thing that would help, as her obstruction is secondary to tumor.  She understands the situation and states that she is \"realistic\" about her prognosis.     
Orders:   
Lovenox 40mg SQ q day SSI

## 2019-07-09 NOTE — PROGRESS NOTES
Bedside shift change report given to HUSEYIN Currie RN (oncoming nurse) by Jazmine Zarco. Brigette Carvalho RN (offgoing nurse). Report included the following information SBAR, Kardex, OR Summary, Procedure Summary, Intake/Output and MAR.  
 
 
2005: Pt sleeping, RN will continue to monitor. 2140: RN at bedside, pt reporting pain 8/10 and nausea, requesting IV dilaudid. PEG draining to gravity. RN will continue to monitor. 0600: Pt had PEG tube draining to gravity all night, but unhooked it around 0500. Pt vomiting in bathroom when nurse went into pt room to check BG. RN assisted pt to get cleaned up and changed pt sheets. RN encouraged pt to hook PEG tube back up to drain. Pt declined any zofran or compazine, but requested IV dilaudid. RN will continue to monitor.

## 2019-07-09 NOTE — PROGRESS NOTES
Bedside and Verbal shift change report given to BRYNN Hammond RN (oncoming nurse) by Jazmine Zarco. Guadalupe Castro RN (offgoing nurse). Report included the following information SBAR, Kardex, Procedure Summary, Intake/Output, MAR, Accordion and Recent Results.   
 
Previous RN reported Peg unclamped and allowed to drain from 02:00am to 03:00am

## 2019-07-09 NOTE — PROGRESS NOTES
Bedside and Verbal shift change report given to Rashaad Esposito RN (oncoming nurse) by Shania Washington (offgoing nurse). Report included the following information SBAR, Kardex, Procedure Summary, Intake/Output, MAR, Accordion and Recent Results. Pt given new levy drainage bag. Pt understands how to properly connect/disconnect, vent , and masseuse PEG out put.

## 2019-07-09 NOTE — PROGRESS NOTES
Bedside and Verbal shift change report given to Donel Congress. (oncoming nurse) by Iggy Peterson. (offgoing nurse). Report included the following information SBAR, Kardex, Procedure Summary, Intake/Output, MAR and Recent Results.

## 2019-07-09 NOTE — PROGRESS NOTES
CM spoke with representative from [employer] for PEG supplies. [employer] doesn't supply PEG supplies. CM to place another order after proper consult. ANDREW Ladd, [facility] [phone]

## 2019-07-09 NOTE — PROGRESS NOTES
Bedside and Verbal shift change report given to MABEL Martino RN (oncoming nurse) by Rei Garcia (offgoing nurse). Report included the following information SBAR, Kardex, Intake/Output, MAR and Recent Results.

## 2019-07-09 NOTE — PROGRESS NOTES
[address] [facility], [address] [place], [address] [place] 
P [phone]  F [phone] Patient Name: Joselyn Avina Admit Date: 6/27/2019 OR Date: * No surgery found * Visit Date: 7/9/2019 SUBJECTIVE: 
Rough evening. Nausea with emesis late afternoon. Began decadron overnight, remains nauseated and feels more reflux with steroid so wants to stop it. PEG was not vented. +Bm yesterday, some flatus. Chemo on Tuesday (7/2/19) - tolerated well, single agent carboplatin (cycle 2). Underwent venting PEG placement on 7/3/19. On PO Reglan. Using PRN compazine BID. Pain controlled, really only around PEG site. Tolerating GI lite for the most part, she may need to be reduced to a liquid diet TPN running Ambulating OBJECTIVE: 
 
Patient Vitals for the past 24 hrs: 
 Temp Pulse Resp BP SpO2  
07/08/19 2322 98.2 °F (36.8 °C) (!) 106 16 148/73 98 % 07/08/19 2101  (!) 103  149/77   
07/08/19 2018 98.5 °F (36.9 °C) (!) 102 16 148/70 98 % 07/08/19 1633 98.6 °F (37 °C) 100 16 147/75 98 % 07/08/19 0839 98 °F (36.7 °C) (!) 104 16 123/64 100 % Date 07/08/19 0700 - 07/09/19 6996 07/09/19 0700 - 07/10/19 5056 Shift 9362-6155 6724-9747 24 Hour Total 1759-8174 9988-1043 24 Hour Total  
INTAKE  
P.O. 220 120 340     
  P. O. 220 120 340     
I. V.(mL/kg/hr) 904. 3(1) 8298.0(5.8) 3712.2(2) Volume (0.9% sodium chloride infusion) 430.7 677.3 1108 Volume (fat emulsion 20% (LIPOSYN, INTRAlipid) infusion 500 mL)  1166.8 1166.8 Volume (TPN ADULT - CENTRAL) 473.7 320.8 794.5 Volume (TPN ADULT - CENTRAL)  642.8 642.8 NG/GT 40  40 Water Flush Volume (mL) (PEG/Gastrostomy Tube 07/03/19) 40  40 Shift Total(mL/kg) 1164. 3(15.2) 2927. 8(38.3) [id number]. 2(53.5) OUTPUT Urine(mL/kg/hr) 1(0) 300(0.3) 301(0.2) Urine Voided 1 300 301 Urine Occurrence(s) 2 x  2 x Emesis/NG output 430 275 705 Emesis  200 200 Emesis Occurrence(s) 4 x  4 x Output (ml) (PEG/Gastrostomy Tube 07/03/19) 430 75 505 Stool Stool Occurrence(s) 1 x  1 x Shift Total(mL/kg) 431(5.6) 575(7.5) [id number]) .3 2352.8 3086. 2 Weight (kg) 76.5 76.5 76.5 76.5 76.5 76.5 Physical Exam 
   General:  alert, cooperative, no distress Cardiac:  Regular rate and rhythm Lungs:  nonlabored Abdomen:  soft, mildly distended, BS hypoactive. Tender subjectively w/o rebound/reflux; PEG in LUQ - site clear Extremity: extremities normal, atraumatic, no cyanosis or edema Data Review Lab Results Component Value Date/Time WBC 4.2 07/09/2019 06:12 AM  
 ABS. NEUTROPHILS 4.0 07/01/2019 08:36 AM  
 HGB 7.0 (L) 07/09/2019 06:12 AM  
 HCT 22.4 (L) 07/09/2019 06:12 AM  
 MCV 95.3 07/09/2019 06:12 AM  
 MCH 29.8 07/09/2019 06:12 AM  
 PLATELET 359 90/73/7596 06:12 AM  
 
Lab Results Component Value Date/Time Sodium 133 (L) 07/08/2019 08:47 AM  
 Potassium 3.8 07/08/2019 08:47 AM  
 Chloride 103 07/08/2019 08:47 AM  
 CO2 23 07/08/2019 08:47 AM  
 Glucose 118 (H) 07/08/2019 08:47 AM  
 BUN 20 07/08/2019 08:47 AM  
 Creatinine 0.69 07/08/2019 08:47 AM  
 Calcium 8.4 (L) 07/08/2019 08:47 AM  
 Albumin 1.8 (L) 07/08/2019 08:47 AM  
 Bilirubin, total 0.3 07/08/2019 08:47 AM  
 AST (SGOT) 20 07/08/2019 08:47 AM  
 ALT (SGPT) 11 (L) 07/08/2019 08:47 AM  
 Alk. phosphatase 100 07/08/2019 08:47 AM  
 
 
Upper GI series with small bowel follow-through (7/1/19) AP  radiograph of the abdomen was obtained and demonstrates 
evidence of a partial small bowel obstruction. The patient was asked to ingest 
effervescent material followed by thick and thin barium and routine 
double-contrast upper GI was then performed. Multiple spot radiographs were 
obtained with and without compression of the bowel. Small bowel follow-through 
was also performed.   There is normal course, caliber and motility of the 
 esophagus. There is a small hiatal hernia. There is no significant 
gastroesophageal reflux. There is a filling defect involving the proximal 
duodenum, junction of the first and second portions, where, in correlation with 
CT, there is a peritoneal implant. Contrast material does not readily into the 
stomach. The ligament of Treitz is in a normal-appearing location. There are 
dilated loops of proximal small bowel, measuring up to 4.8 cm. There is delayed 
small bowel transit time, with multiple sequential images of the abdomen 
demonstrating slow transit into decompressed normal caliber loops of distal 
small bowel. There is filling of these loops as well as the colon at the 7 hour 
felecia. This indicates a partial small bowel obstruction. IMPRESSION: 
1. Small bowel follow-through confirms a partial small bowel obstruction with a 
transition point in the right lower quadrant. 2. Dilated loops of proximal small bowel, up to 4.8 cm. Decompressed loops of 
distal small bowel in the right lower quadrant. 3. Delayed small bowel transit time. Contrast material enters the colon at the 7 
hour felecia. 4. Filling defect affecting the proximal duodenum, where, in correlation with 
recent CT, there is a peritoneal implant and associated lymphadenopathy. IMPRESSION/PLAN: 
 
 
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 Currently receiving single agent Carboplatin, s/p cycle #2 on 7/2/19. Heme/CV:  Anemia d/t chronic dz/chemo. Asx. Renal:  Creatinine at baseline. FEN/GI:  Partial small bowel obstruction/delayed transit of 7 hrs on UGI. Improved on Reglan and GI rest. 
2nd admission for same. Chemo may improve this, but she is in a salvage state. Venting PEG placed by IR on 7/3/19. Ongoing education for use. Continue GI lite, may need to try puree/liquid diet with supplements. Continue scheduled antiemetics, PPI and H2B (TPN). Abd pain stable. Previously did not like dilaudid PO, now does not like morphine PO, returned to dilaudid PO. Continue TPN for protein-calorie malnutrition due to poor intake secondary to obstruction. Calorie counts ordered. Once she is taking in enough PO we can start weaning off TPN. Nausea persists. Would like to use decadron, but she continues to make quick choices about her medications, often wanting to change them based on real or perceived side effects. May reintroduce again later. Work on scheduled venting, patient education and nocturnal venting. ID/Wound:  Afebrile. Normal WBC. PPX:  Ambulation, SCDs, IS, PT for progressive debility/weakness. Dispostion:  Stable overall. Difficult case. She needs chemo to decrease tumor burden and in theory improve her bowel function/relieve obstruction, but her recurrent and disease state continues to make her ill, which will decrease her performance status. Surgery would be unlikely to be of any significant benefit, and in fact could cause more problems. A proximal diversion might the only thing that would help, as her obstruction is secondary to tumor. She understands the situation and states that she is \"realistic\" about her prognosis.    
  
  
PHILIP Perez

## 2019-07-10 NOTE — PROGRESS NOTES
CM spoke with representative from [address] [place] [address] ([phone]) and inquire about levy catheter bag for the pt. [facility] does supply levy catheter bag. CM to fax the order at [phone]. CM also placed referral to [address] [phone] to check if they can ship the product. CM seeked help of Yen Sullivan RN (Care Manager) to visit the pt at bedside and confirm levy catheter bag for order. CM also discussed with Kain Perales (TULIO) about the product. CM called [place], [place] [phone]) and left a message asking to write a order for the product. Heaven Calvo, [facility] [address]  
Formerly [facility] [phone]

## 2019-07-10 NOTE — PROGRESS NOTES
Spiritual Care Assessment/Progress Note [address] 
 
 
NAME: Leah Cortez      MRN: [id number] AGE: 60 y.o. SEX: female Anabaptism Affiliation: Spirituality Language: [place] 7/10/2019     Total Time (in minutes): 10 Spiritual Assessment begun in [address] through conversation with: 
  
    [x]Patient        [] Family    [] Friend(s) Reason for Consult: Palliative Care, Initial/Spiritual Assessment Spiritual beliefs: (Please include comment if needed) [x] Identifies with a danica tradition:     
   [] Supported by a danica community:        
   [] Claims no spiritual orientation:       
   [] Seeking spiritual identity:            
   [] Adheres to an individual form of spirituality:       
   [] Not able to assess:                   
 
    
Identified resources for coping:  
   [] Prayer                           
   [] Music                  [] Guided Imagery [x] Family/friends                 [] Pet visits [] Devotional reading                         [] Unknown 
   [] Other:                                          
 
 
Interventions offered during this visit: (See comments for more details) Patient Interventions: Affirmation of emotions/emotional suffering, Normalization of emotional/spiritual concerns Plan of Care: 
 
 [x] Support spiritual and/or cultural needs  
 [] Support AMD and/or advance care planning process    
 [] Support grieving process 
 [] Coordinate Rites and/or Rituals  
 [] Coordination with community clergy [] No spiritual needs identified at this time 
 [] Detailed Plan of Care below (See Comments)  [] Make referral to Music Therapy 
[] Make referral to Pet Therapy    
[] Make referral to Addiction services 
[] Make referral to [facility] 
[] Make referral to Spiritual Care Partner 
[] No future visits requested       
[x] Follow up visits as needed Comments:  visit per palliative consult. Pt was in bed and shared that she has been trying to rest. Let her know of  support and availability. Please contact [phone] [facility] for further support.  follow up as needed. Tamir Hodge, MACE 
 [phone])

## 2019-07-10 NOTE — PROGRESS NOTES
1008:  Patient called out with complaint of severe nausea and reports episode of vomiting earlier this morning. Given PRN IV compazine. Patient also asking for IV dilaudid for 7/10 pain. Explained to patient it is too soon to give IV dilaudid, verbalizes understanding. Primary RN made aware of patient nausea, medication administered and request for IV dilaudid when it is time.

## 2019-07-10 NOTE — CDMP QUERY
#3 
 
Pt admitted with ovarian cancer /Pt noted to have protein calorie malnutrition documented If possible, please document in the progress notes and d/c summary the acuity  if you are evaluating and / or treating any of the following: 
 
Severe Acute Protein Calorie Malnutrition Severe Chronic  Protein Calorie Malnutrition Other, please specify Clinically unable to determine The medical record reflects the following: 
  Risk Factors: protein calorie malnutrition Clinical Indicators: nutritional consult note- 
1. Altered GI function related to narrowing colon from mass obstruction as evidenced by review of physical findings, poor intake of adequate PO, vomiting with significant volume of PO, wt loss of 13# from UBW in 1 month 2. Severe Acute Protein Calorie Malnutrition - wt down 7.2% in 1 month Patient meets criteria for Acute Severe Protein Calorie Malnutrition as evidenced by: ASPEN Malnutrition Criteria Acute Illness, Chronic Illness, or Social/Enviornmental: Acute illness Energy Intake: Less than/equal to 50% est energy req for greater than/equal to 5 days Weight Loss: Greater than 5% x 1 mo ASPEN Malnutrition Score - Acute Illness: 12 Acute Illness - Malnutrition Diagnosis: Severe malnutrition. 
  
  Treatment: TPN, nutritional consult,nii Thank you, 
       Dread Maravilla [facility], [address] [facility]

## 2019-07-10 NOTE — PROGRESS NOTES
[address] [facility] [place], [address] 
P [phone]  F [phone] Patient Name: Kya Vasquez Admit Date: 6/27/2019 OR Date: * No surgery found * Visit Date: 7/10/2019 SUBJECTIVE: 
Rough evening again, despite keeping tube open, fairly persistent nausea. States over 24 hr, 4-5 episodes of rather sudden N/V. Pain is fairly persistent gen abd/LUQ. Eating mix of solid/liquid diet. +flatus and \"healthy\" BM yesterday. She has stopped her compazine, zofran and requesting only IV pain medications 1mg dilaudid ~4x/day, consistent. Voices concerns she doesn't see much point to all this and would consider stopping treatment. 7/2/19 - carboplatin (cycle 2) tolerated well 7/3/19 - IR venting PEG placement On PO Reglan QID. Using PRN compazine BID. N/V cycle difficult to discern, happens despite PEG vented Ambulating in halls/room OBJECTIVE: 
 
Patient Vitals for the past 24 hrs: 
 Temp Pulse Resp BP SpO2  
07/10/19 0630 98.4 °F (36.9 °C) (!) 114 16 159/75 96 % 07/09/19 2113 98.4 °F (36.9 °C) (!) 112 16 151/74 98 % 07/09/19 1925 98.4 °F (36.9 °C) (!) 110 16 124/62 98 % 07/09/19 1611 98.4 °F (36.9 °C) (!) 118 17 150/72 97 % 07/09/19 1208 98.2 °F (36.8 °C) (!) 107 16 167/81 98 % 07/09/19 0833 98.2 °F (36.8 °C) (!) 104 16 158/66 97 % Date 07/09/19 0700 - 07/10/19 1056 07/10/19 0700 - 07/11/19 5792 Shift 3737-1915 8624-5596 24 Hour Total 2705-5624 2449-0541 24 Hour Total  
INTAKE  
P.O.  75 75     
  P. O.  75 75     
I. V.(mL/kg/hr) 204.7(0.2) 413.9(0.5) 618.6(0.3) Volume (0.9% sodium chloride infusion) 204.7 293.8 498.4 Volume (TPN ADULT - CENTRAL)  120.2 120.2 Shift Total(mL/kg) 204.7(2.7) 488. 9(6.4) 693. 6(9.1) OUTPUT Urine(mL/kg/hr) 500(0.5)  500(0.3) Urine Voided 500  500 Urine Occurrence(s) 1 x  1 x Emesis/NG output 225 150 375 Emesis Occurrence(s) 1 x  1 x Output (ml) (PEG/Gastrostomy Tube 07/03/19) 225 150 375 Stool Stool Occurrence(s) 1 x  1 x Shift Total(mL/kg) 725(9.5) 150(2) 875(11.4) NET -520.3 338.9 -181.4 Weight (kg) 76.5 76.5 76.5 76.5 76.5 76.5 Physical Exam 
   General:  alert, cooperative, no distress Cardiac:  Regular rate and rhythm Lungs:  nonlabored Abdomen:  soft, mildly distended, BS hypoactive. Tender subjectively w/o rebound/guarding; PEG in LUQ - site clear Extremity: extremities normal, atraumatic, no cyanosis or edema Data Review Lab Results Component Value Date/Time WBC 4.2 07/09/2019 06:12 AM  
 ABS. NEUTROPHILS 4.0 07/01/2019 08:36 AM  
 HGB 7.0 (L) 07/09/2019 06:12 AM  
 HCT 22.4 (L) 07/09/2019 06:12 AM  
 MCV 95.3 07/09/2019 06:12 AM  
 MCH 29.8 07/09/2019 06:12 AM  
 PLATELET 632 29/53/8550 06:12 AM  
 
Lab Results Component Value Date/Time Sodium 135 (L) 07/10/2019 05:16 AM  
 Potassium 3.8 07/10/2019 05:16 AM  
 Chloride 103 07/10/2019 05:16 AM  
 CO2 25 07/10/2019 05:16 AM  
 Glucose 97 07/10/2019 05:16 AM  
 BUN 18 07/10/2019 05:16 AM  
 Creatinine 0.63 07/10/2019 05:16 AM  
 Calcium 8.7 07/10/2019 05:16 AM  
 Albumin 1.8 (L) 07/08/2019 08:47 AM  
 Bilirubin, total 0.3 07/08/2019 08:47 AM  
 AST (SGOT) 20 07/08/2019 08:47 AM  
 ALT (SGPT) 11 (L) 07/08/2019 08:47 AM  
 Alk. phosphatase 100 07/08/2019 08:47 AM  
 
 
Upper GI series with small bowel follow-through (7/1/19) AP  radiograph of the abdomen was obtained and demonstrates 
evidence of a partial small bowel obstruction. The patient was asked to ingest 
effervescent material followed by thick and thin barium and routine 
double-contrast upper GI was then performed. Multiple spot radiographs were 
obtained with and without compression of the bowel. Small bowel follow-through 
was also performed.   There is normal course, caliber and motility of the 
 esophagus. There is a small hiatal hernia. There is no significant 
gastroesophageal reflux. There is a filling defect involving the proximal 
duodenum, junction of the first and second portions, where, in correlation with 
CT, there is a peritoneal implant. Contrast material does not readily into the 
stomach. The ligament of Treitz is in a normal-appearing location. There are 
dilated loops of proximal small bowel, measuring up to 4.8 cm. There is delayed 
small bowel transit time, with multiple sequential images of the abdomen 
demonstrating slow transit into decompressed normal caliber loops of distal 
small bowel. There is filling of these loops as well as the colon at the 7 hour 
felecia. This indicates a partial small bowel obstruction. IMPRESSION: 
1. Small bowel follow-through confirms a partial small bowel obstruction with a 
transition point in the right lower quadrant. 2. Dilated loops of proximal small bowel, up to 4.8 cm. Decompressed loops of 
distal small bowel in the right lower quadrant. 3. Delayed small bowel transit time. Contrast material enters the colon at the 7 
hour felecia. 4. Filling defect affecting the proximal duodenum, where, in correlation with 
recent CT, there is a peritoneal implant and associated lymphadenopathy. IMPRESSION/PLAN: 
 
 
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 Currently receiving single agent Carboplatin, s/p cycle #2 on 7/2/19. CT of head to r/o central lesion contributing to N/V. Heme/CV:  Anemia d/t chronic dz/chemo. Asx. Renal:  renal function baseline. FEN/GI:  Partial small bowel obstruction/delayed transit of 7 hrs on UGI. Improved on Reglan and GI rest. 
2nd admission for same. Chemo may improve this, but she is in a salvage state. Venting PEG placed by IR on 7/3/19. Ongoing education for use. Despite venting, getting ill. Change to full liquid diet for now with supplements. Continue scheduled antiemetics, refusing some. PPI and H2B (TPN). Nausea persists. Would like to use decadron, but she continues to make quick choices about her medications, often wanting to change them based on real or perceived side effects. May reintroduce again later. CT of head today. Continue TPN for protein-calorie malnutrition due to poor intake secondary to obstruction. Calorie counts ordered. We can weaning TPN if she begins to consistently tolerate diet. Neuro: Pain persistent. Previously did not like dilaudid PO, now does not like morphine PO, returned to dilaudid PO, now using IV exclusively. Consult to palliative medicine. ID/Wound:  Afebrile. Normal WBC. PPX:  Ambulation, SCDs, IS, PT for progressive debility/weakness. Dispostion:  Stable overall. Difficult case. She needs chemo to decrease tumor burden and in theory improve her bowel function/relieve obstruction, but her recurrent and disease state continues to make her ill, which will decrease her performance status. Surgery would be unlikely to be of any significant benefit, and in fact could cause more problems. A proximal diversion might the only thing that would help, as her obstruction is secondary to tumor. She understands the situation and states that she is \"realistic\" about her prognosis. She herself has brought up EOL discussions, may need to revisit again if there is no change.  
  
  
PHILIP Oviedo

## 2019-07-10 NOTE — PROGRESS NOTES
Consult called to Palliative care per [facility] . Dr. Nu Pendleton will be see pt today.

## 2019-07-11 PROBLEM — D72.829 LEUKOCYTOSIS: Status: RESOLVED | Noted: 2019-01-01 | Resolved: 2019-01-01

## 2019-07-11 PROBLEM — R10.11 RIGHT UPPER QUADRANT ABDOMINAL PAIN: Status: RESOLVED | Noted: 2018-09-11 | Resolved: 2019-01-01

## 2019-07-11 PROBLEM — E66.9 OBESITY (BMI 30.0-34.9): Status: RESOLVED | Noted: 2018-09-11 | Resolved: 2019-01-01

## 2019-07-11 PROBLEM — E43 UNSPECIFIED SEVERE PROTEIN-CALORIE MALNUTRITION (HCC): Status: ACTIVE | Noted: 2019-01-01

## 2019-07-11 PROBLEM — K52.9 ENTERITIS: Status: RESOLVED | Noted: 2019-01-01 | Resolved: 2019-01-01

## 2019-07-11 NOTE — PROGRESS NOTES
Music Therapy Assessment Brijesh Bruno [address] [phone] 1960  60 y.o.  female Patient Telephone Number: [phone] (home) [place]ian Affiliation: Spirituality Language: [place] Patient Active Problem List  
 Diagnosis Date Noted  Unspecified severe protein-calorie malnutrition ([facility] [address] 07/11/2019  Small bowel obstruction ([facility] [address] 06/27/2019  On antineoplastic chemotherapy 06/17/2019  Carcinomatosis ([address] 06/17/2019  Abdominal pain 06/14/2019  Tachycardia 06/14/2019  Nausea & vomiting 06/14/2019  Ascites 06/14/2019  SIRS (systemic inflammatory response syndrome) (HCC) 06/14/2019  Cancer related pain 01/08/2019  Mediastinal lymphadenopathy 11/06/2018  Weight loss, non-intentional 11/06/2018  Early satiety 08/14/2018  Endometrial cancer ([facility] [address] 08/19/2014 Date: 7/11/2019            Total Time (in minutes): 35          [address]  [facility] Mental Status:   [x] Alert [  ] Aleene Flattery [  ]  Confused  [  ] Minimally responsive  [  ] Sleeping Communication Status: [  ] Impaired Speech [  ] Nonverbal -N/A Physical Status:   [  ] Oxygen in use  [  ] Hard of Hearing [  ] Vision Impaired [  ] Ambulatory  [  ] Ambulatory with assistance [  ] Non-ambulatory -N/A Music Preferences, Background: Music from the 1970s. Country, Blues, [address] [facility]. Pt's favorite singers and bands include Eliz Malcom, [place], and the [employer]. Pt enjoy going to concerts and she doesn't like heavy rock. Clinical Problem addressed: Support healthy pt/family coping, self-expression, and meaningful positive social interaction. Goal(s) met in session: 
Physical/Pain management (Scale of 1-10): Pre-session rating: Pt reported on pain but did not rate. Post-session rating: N/A: Please see Session Observations below. [  ] Increased relaxation   [  ] Affected breathing patterns [  ] Decreased muscle tension   [  ] Decreased agitation [  ] Affected heart rate    [  ] Increased alertness Emotional/Psychological: 
[x] Increased self-expression   [  ] Decreased aggressive behavior [  ] Decreased feelings of stress  [  ] Discussed healthy coping skills [  ] Improved mood    [  ] Decreased withdrawn behavior Social: 
[  ] Decreased feelings of isolation/loneliness [x] Positive social interaction  
[x] Provided support and/or comfort for family/friends Spiritual: 
[  ] Spiritual support    [  ] Expressed peace [  ] Expressed danica    [  ] Discussed beliefs Techniques Utilized (Check all that apply):  
[  ] Procedural support MT [  ] Music for relaxation [x] Patient preferred music 
[  ] Ena analysis  [x] Song choice  [  ] Music for validation [  ] Entrainment  [  ] Movement to music [  ] Guided visualization [  ] Fortino Hammed  [  ] Patient instrument playing [  ] Brigette Nicholson writing 
[x] Sing along   [  ] Improvisation  [x] Sensory stimulation 
[x] Active Listening  [  ] Music for spiritual support [  ] Making of CDs as gifts Session Observations: F/up visit and referral from Dr. Dasia Rockwell. Patient (pt) was alert lying bed and pt's spouse Kaylee Hudson was at bedside. Pt remembered this music therapy intern (MT intern) from a previous visit. After MT intern asked pt how she was feeling, MT intern sat at bedside. Pt chose to hear a Rebecca Angry song, which MT intern sang and played the song [address] [facility] with guitar. Pt increased self-expression in response to the music as evidenced by (AEB) singing along and tapping her foot to the beat of the music. Pt chose to hear an [place] and the [employer]. MT intern sang and played the song Treva Coronel [address] with guitar. Pt and pt's spouse increased meaningful social interaction AEB looking and smiling at each other.  Pt chose to hear a [employer], which MT intern sang and played the song Sarah with gonzález. Pt's affect appeared to be pensive while was she listening and she increased emotional expression and self-expression in response to the music AEB becoming tearful and sharing that the message of this song resonated well with her. MT intern provided active listening and words of validation and support. Pt and pt's spouse expressed enjoyment in the music and gratitude for the music. Will follow as able to support healthy coping and self-expression. Anam \"Filomena\" Hong Whitt Music Therapy Intern Spiritual Care Department Referral-based service

## 2019-07-11 NOTE — PROGRESS NOTES
Bedside and Verbal shift change report given to BRYNN Dhillon RN (oncoming nurse) by Darlyn Santiago. Sandy Madden RN (offgoing nurse). Report included the following information SBAR, Kardex, Procedure Summary, Intake/Output, MAR, Accordion and Recent Results. 21:00 Pt unclamped self and allowed peg to drain 22:00 Peg clamped for HS medications 22:30 Peg unclamped 04:00 Pt reclamped peg

## 2019-07-11 NOTE — PROGRESS NOTES
Bedside and Verbal shift change report given to MABEL Martino RN (oncoming nurse) by Roly Sood (offgoing nurse). Report included the following information SBAR, Kardex, MAR and Recent Results. 6409  Muse score of 3 - no apparent distress. Patient is alert and oriented. No further intervention needed at this time.

## 2019-07-11 NOTE — CONSULTS
Palliative Medicine Consult Jose: [phone] ([phone]) Patient Name: Purnima Nava YOB: 1960 Date of Initial Consult: 7/10/19 Reason for Consult: Overwhelming symptoms Requesting Provider: PHILIP Alvarez 
Primary Care Physician: Landon Tsai MD 
 
 SUMMARY:  
Purnima Nava is a 60 y.o. with a past history of chronic low back pain, depression and stage IV uterine cancer s/p chemo and pelvic XRT (2014); recurrence (2018) now on salvage chemotherapy with Carboplatin. I met her last admission while she was here 6/14-6/21 for cancer related symptoms of nausea and abdominal pain. She did not have an SBO at that time. She was readmitted 6/27 for same symptoms along with coffee ground emesis. CT scan on this admit showed early / partial SBO with transition point in the deep pelvis, no significant gastric distension. 7/1 - UGI series w/ small bowel follow through. 7 hr small bowel transit time, partial SBO w/ transition in RLQ, filling defect in proximal duodenum associated with implant/LAD 
7/2- 2nd cycle of salvage chemotherapy w/ Carboplatin 7/3 - venting PEG placed by IR  
  
Current medical issues leading to Palliative Medicine involvement include: persistent nausea/vomiting. Social Hx:   to [place], he works the night shift at [employer]. They have two children who both live in [place].  She enjoys being active, gardening and shopping. PALLIATIVE DIAGNOSES:  
1. Intractable nausea and vomiting 2. Partial SBO s/p venting PEG 3. Peritoneal carcinomatosis 4. Recurrent uterine carcinoma w/ liver, peritoneal mets PLAN:  
1. Intractable nausea/vomiting. 1. Emesis ~ 4 x / day; 100cc on last episode per nursing, dark fluid. 2. She has been venting for 30-60 min after eating. 3. Did not tolerate steroid yesterday (?) 4.  Compazine minimally helpful, Zofran hasn't worked in past. 
 5. She is technically not obstructed today as she has had small BMs the last day or so, but suspect obstruction is coming and going. 6. [facility] guidelines for n/v in patients with advanced cancer favor Haldol. Will schedule 1 mg IV q4h.   
7. Will talk to her tomorrow again about the steroid. 8. Change Reglan to IV. 9. Consider octreotide. 10. Bowel regimen:  Refusing her Miralax regularly, will d/c, suspect the volume is an issue. .  Takes her Pericolace. Will use senna only (no benefit to the docusate) and increase to 2 tabs BID. 2. Cancer related abdominal pain: 1. Dilaudid 1 mg IV q4h prn; using consistent doses. She states it works well but lasts only 2-3 hrs.   
2. Will reduce frequency to q3h today. 3. Overall this is quite a change from last admission when she was more than leery of opioid use. 4. Music therapy consult. 3. GOC. 1. On salvage chemo for her recurrent disease, with goal of reducing tumor burden. 2. She is getting tired of being sick and in the hospital.  
3. She confirmed her DNR status with me again today; we had signed a DDNR last admission. I placed a DNR order today to reflect this. 4. We talked about her kids. They are not very well aware of her illness, she states they are living their lives and expresses sadness about lack of visits. I offered to help facilitate a conversation with her kids by phone so that they know the extent of her illness. She will think about it. 4. Initial consult note routed to primary continuity provider and/or primary health care team members 5. Communicated plan of care with: Palliative Jairo GEORGE 192 Team 
 
 GOALS OF CARE / TREATMENT PREFERENCES:  
 
GOALS OF CARE: 
Patient/Health Care Proxy Stated Goals: Other (comment)(not yet discussed, this may hinge on treatment options moving forward) TREATMENT PREFERENCES:  
Code Status: DNR Advance Care Planning: [x] The [facility] Interdisciplinary Team has updated the ACP Navigator with Health Care Decision Maker and Patient Capacity Primary Decision MakerEleuterio Watson - [phone] Medical Interventions: Limited additional interventions(DNR/I) Artificially Administered Nutrition: (she has a venting PEG in place) Other: As far as possible, the palliative care team has discussed with patient / health care proxy about goals of care / treatment preferences for patient. HISTORY:  
 
History obtained from:  Patient, bedside RN, chart review CHIEF COMPLAINT: nausea HPI/SUBJECTIVE: The patient is:  
[x] Verbal and participatory [] Non-participatory due to: Ms. Pollo Jarrett was readmitted to [facility] two weeks ago after presenting for worsening nausea, vomiting and abdominal pain. CT scan showed a partial SBO. Her symptoms have waxed and waned throughout admission but worsened 2-3 days ago with now persistent nausea despite use of venting PEG and having small BMs the last two days. She is tired and not getting good rest due to hospital interventions. Clinical Pain Assessment (nonverbal scale for severity on nonverbal patients):  
Clinical Pain Assessment Severity: 2 Duration: for how long has pt been experiencing pain (e.g., 2 days, 1 month, years) Frequency: how often pain is an issue (e.g., several times per day, once every few days, constant) FUNCTIONAL ASSESSMENT:  
 
Palliative Performance Scale (PPS): PPS: 60 PSYCHOSOCIAL/SPIRITUAL SCREENING:  
 
Palliative IDT has assessed this patient for cultural preferences / practices and a referral made as appropriate to needs (Cultural Services, Patient Advocacy, Ethics, etc.) Any spiritual / Cheondoism concerns: 
[] Yes /  [x] No 
 
Caregiver Burnout: 
[] Yes /  [] No /  [x] No Caregiver Present Anticipatory grief assessment:  
[x] Normal  / [] Maladaptive ESAS Anxiety: Anxiety: 4 ESAS Depression: Depression: 4 REVIEW OF SYSTEMS:  
 
Positive and pertinent negative findings in ROS are noted above in HPI. The following systems were [x] reviewed / [] unable to be reviewed as noted in HPI Other findings are noted below. Systems: constitutional, ears/nose/mouth/throat, respiratory, gastrointestinal, genitourinary, musculoskeletal, integumentary, neurologic, psychiatric, endocrine. Positive findings noted below. Modified ESAS Completed by: provider Depression: 4 Pain: 2 Anxiety: 4 Nausea: 7 Anorexia: 5 Dyspnea: 0 Stool Occurrence(s): 1 PHYSICAL EXAM:  
 
From RN flowsheet: 
Wt Readings from Last 3 Encounters:  
07/10/19 75 kg (165 lb 6.4 oz) 06/20/19 77.1 kg (169 lb 14.4 oz) 06/11/19 77.6 kg (171 lb) Blood pressure 143/77, pulse (!) 108, temperature 97.8 °F (36.6 °C), resp. rate 16, height 5' 6\" (1.676 m), weight 75 kg (165 lb 6.4 oz), SpO2 96 %. Pain Scale 1: Numeric (0 - 10) Pain Intensity 1: 6 Pain Onset 1: POA Pain Location 1: Abdomen Pain Orientation 1: Anterior Pain Description 1: Aching Pain Intervention(s) 1: Medication (see MAR) Last bowel movement, if known:  
 
Constitutional: chronically ill, no acute distress Eyes: pupils equal, anicteric ENMT: no nasal discharge, moist mucous membranes Cardiovascular: regular rhythm, distal pulses intact Respiratory: breathing not labored, symmetric Gastrointestinal: obese, soft non-tender, paucity of bowel sounds, PEG in place (clamped), bag at edge of bed with dark fluid acting as drainage bag periodically for PEG. Musculoskeletal: no deformity, no tenderness to palpation Skin: warm, dry Neurologic: following commands, cognition intact, moving all extremities Psychiatric: full affect, no agitation or restlessness HISTORY:  
 
Active Problems: 
  Ovarian cancer ([address] (6/27/2019) Small bowel obstruction ([address] (6/27/2019) Past Medical History:  
Diagnosis Date  Arthritis  Cancer [facility])   
 uterine  Chronic pain LOWER BACK  Depression  Nausea & vomiting  PMB (postmenopausal bleeding) 2014 Past Surgical History:  
Procedure Laterality Date  HX COLONOSCOPY    
 HX GYN  14 TLH/BSO/lymph node dissection  HX ORTHOPAEDIC Right [id number] LITTLE toe sx  HX VASCULAR ACCESS Left  PORTACATH - LEFT CHEST  
 IR INSERT GASTROSTOMY TUBE [facility]  7/3/2019 Family History Problem Relation Age of Onset  Cancer Mother 61  
     liver metastatic disease, unknown primary  Stroke Mother BRAIN ANEURYSM  
 Cancer Maternal Uncle 60  
     bone  Cancer Maternal Grandmother 61  
     unknown primary  Anesth Problems Neg Hx History reviewed, no pertinent family history. Social History Tobacco Use  Smoking status: Former Smoker Packs/day: 0.50 Years: 7.00 Pack years: 3.50 Types: Cigarettes Last attempt to quit: 1999 Years since quittin.9  Smokeless tobacco: Never Used Substance Use Topics  Alcohol use: Yes Alcohol/week: 0.6 oz Types: 1 Glasses of wine per week Comment: rarely Allergies Allergen Reactions  Hydrocodone Nausea Only  Oxycodone Nausea and Vomiting Current Facility-Administered Medications Medication Dose Route Frequency  TPN ADULT - CENTRAL   IntraVENous CONTINUOUS  
 haloperidol lactate (HALDOL) injection 1 mg  1 mg IntraVENous Q4H  
 metoclopramide HCl (REGLAN) tablet 10 mg  10 mg Oral Q6H  
 senna-docusate (PERICOLACE) 8.6-50 mg per tablet 2 Tab  2 Tab Oral DAILY  HYDROmorphone (DILAUDID) tablet 1 mg  1 mg Oral Q6H PRN  pantoprazole (PROTONIX) 20 mg in sodium chloride 0.9% 5 mL injection  20 mg IntraVENous Q12H  
 heparin (porcine) pf 300 Units  300 Units InterCATHeter PRN  
 sodium chloride (NS) flush 5-10 mL  5-10 mL InterCATHeter Q8H  
 sodium chloride (NS) flush 5-10 mL  5-10 mL InterCATHeter PRN  
  prochlorperazine (COMPAZINE) tablet 10 mg  10 mg Oral Q6H PRN  prochlorperazine (COMPAZINE) with saline injection 10 mg  10 mg IntraVENous Q6H PRN  
 0.9% sodium chloride infusion  25 mL/hr IntraVENous CONTINUOUS  
 fat emulsion 20% (LIPOSYN, INTRAlipid) infusion 500 mL  500 mL IntraVENous Q MON, THU & SAT  enoxaparin (LOVENOX) injection 40 mg  40 mg SubCUTAneous Q24H  
 glucose chewable tablet 16 g  4 Tab Oral PRN  
 dextrose (D50W) injection syrg 12.5-25 g  12.5-25 g IntraVENous PRN  
 glucagon (GLUCAGEN) injection 1 mg  1 mg IntraMUSCular PRN  
 insulin lispro (HUMALOG) injection   SubCUTAneous Q6H  
 polyethylene glycol (MIRALAX) packet 17 g  17 g Oral DAILY  metoprolol tartrate (LOPRESSOR) tablet 12.5 mg  12.5 mg Oral Q12H  
 HYDROmorphone (PF) (DILAUDID) injection 1 mg  1 mg IntraVENous Q4H PRN  
 ondansetron (ZOFRAN) injection 8 mg  8 mg IntraVENous Q8H PRN  
 
 
 
 LAB AND IMAGING FINDINGS:  
 
Lab Results Component Value Date/Time WBC 4.2 07/09/2019 06:12 AM  
 HGB 7.0 (L) 07/09/2019 06:12 AM  
 PLATELET 746 39/16/3346 06:12 AM  
 
Lab Results Component Value Date/Time Sodium 135 (L) 07/10/2019 05:16 AM  
 Potassium 3.8 07/10/2019 05:16 AM  
 Chloride 103 07/10/2019 05:16 AM  
 CO2 25 07/10/2019 05:16 AM  
 BUN 18 07/10/2019 05:16 AM  
 Creatinine 0.63 07/10/2019 05:16 AM  
 Calcium 8.7 07/10/2019 05:16 AM  
 Magnesium 1.7 07/08/2019 08:47 AM  
 Phosphorus 3.2 07/01/2019 08:36 AM  
  
Lab Results Component Value Date/Time AST (SGOT) 20 07/08/2019 08:47 AM  
 Alk. phosphatase 100 07/08/2019 08:47 AM  
 Protein, total 6.6 07/08/2019 08:47 AM  
 Albumin 1.8 (L) 07/08/2019 08:47 AM  
 Globulin 4.8 (H) 07/08/2019 08:47 AM  
 
Lab Results Component Value Date/Time INR 1.1 06/27/2019 02:19 AM  
 Prothrombin time 11.0 06/27/2019 02:19 AM  
  
No results found for: IRON, FE, TIBC, IBCT, PSAT, FERR No results found for: PH, PCO2, PO2 No components found for: [place] No results found for: CPK, CKMB Total time:   
Counseling / coordination time, spent as noted above: 
> 50% counseling / coordination?:  
 
Prolonged service was provided for  []30 min   []75 min in face to face time in the presence of the patient, spent as noted above. Time Start:  
Time End:  
Note: this can only be billed with 96094 (initial) or 09255 (follow up). If multiple start / stop times, list each separately.

## 2019-07-11 NOTE — CONSULTS
Palliative Medicine Consult Jose: [phone] ([phone]) Patient Name: Keren Perkins YOB: 1960 Date of Initial Consult: 7/10/19 Reason for Consult: Overwhelming symptoms Requesting Provider: PHILIP Potts 
Primary Care Physician: Fredy Brewer MD 
 
 SUMMARY:  
Keren Perkins is a 60 y.o. with a past history of chronic low back pain, depression and stage IV uterine cancer s/p chemo and pelvic XRT (2014); recurrence (2018) now on salvage chemotherapy with Carboplatin. I met her last admission while she was here 6/14-6/21 for cancer related symptoms of nausea and abdominal pain. She did not have an SBO at that time. She was readmitted 6/27 for same symptoms along with coffee ground emesis. CT scan on this admit showed early / partial SBO with transition point in the deep pelvis, no significant gastric distension. 7/1 - UGI series w/ small bowel follow through. 7 hr small bowel transit time, partial SBO w/ transition in RLQ, filling defect in proximal duodenum associated with implant/LAD 
7/2- 2nd cycle of salvage chemotherapy w/ Carboplatin 7/3 - venting PEG placed by IR  
  
Current medical issues leading to Palliative Medicine involvement include: persistent nausea/vomiting. Social Hx:   to [place], he works the night shift at [employer]. They have two children who both live in [place].  She enjoys being active, gardening and shopping. PALLIATIVE DIAGNOSES:  
1. Intractable nausea and vomiting 2. Partial SBO s/p venting PEG 3. Peritoneal carcinomatosis 4. Recurrent uterine carcinoma w/ liver, peritoneal mets PLAN:  
1. Intractable nausea/vomiting. 1. Improved today with IV Haldol overnight, states she slept well. Still required Compazine 10 IV x 3 doses (15:00, 22:00, 6 am). 2. Nausea still present, improved to 5 from rating of 7 on ESAS, no emesis today thus far 3. Will continue IV Haldol 1 mg q4h, Reglan 10 mg IV q6h. 4. Consider transition both to oral tomorrow morning if she dose well overnight. 5. Continue IV Reglan. 6. Has venting PEG; not following a strict schedule, venting prn based on symptoms. 2. Cancer related abdominal pain: 1. Dilaudid 1 mg IV q4h prn; using consistent doses. She states it works well but lasts only 2-3 hrs. 2. Used 6 doses / 24 hrs. 3. Continue IV until tomorrow morning; attempt oral regimen in am.  
4. Appreciate music therapy consult. 3. Nutrition: 1. Started on TPN this admission. 2. She is tolerated meals intermittently; able to keep PEG clamped for 2.5 hrs post lunch today. 3. She is hopeful to get off the TPN soon. Wants to get home. 4. Stage IV Uterine cancer. 1. On salvage chemo for her recurrent disease, with goal of reducing tumor burden. 2. She is getting tired of being sick and in the hospital.  
3. DNR status / DDNR done last admission. 4. I think there is some existential distress, overall she leydi well but there are some background dynamics with her children who are out in [place]. 5. Will reach out to Dr. Wojciech Cruz to talk about plan. 5. Initial consult note routed to primary continuity provider and/or primary health care team members 6. Communicated plan of care with: Palliative Jairo GEORGE 192 Team 
 
 GOALS OF CARE / TREATMENT PREFERENCES:  
 
GOALS OF CARE: 
Patient/Health Care Proxy Stated Goals: Other (comment)(not yet discussed, this may hinge on treatment options moving forward) TREATMENT PREFERENCES:  
Code Status: DNR Advance Care Planning: 
[x] The [facility] Interdisciplinary Team has updated the ACP Navigator with Justin 8 and Patient Capacity Primary Decision MakerClerance Mel - Spouse - [phone] Medical Interventions: Limited additional interventions(DNR/I) Artificially Administered Nutrition: (she has a venting PEG in place) Other: As far as possible, the palliative care team has discussed with patient / health care proxy about goals of care / treatment preferences for patient. HISTORY:  
 
History obtained from:  Patient, bedside RN, chart review CHIEF COMPLAINT: nausea HPI/SUBJECTIVE: The patient is:  
[x] Verbal and participatory [] Non-participatory due to:  
 
7/11 - feeling better today. Slept well overnight. Prefers to stay on the IV meds but does want to get home and understands need for oral agents to be at home. Is ambulating well. Ms. Ollie Tang was readmitted [facility] two weeks ago after presenting for worsening nausea, vomiting and abdominal pain. CT scan showed a partial SBO. Her symptoms have waxed and waned throughout admission but worsened 2-3 days ago with now persistent nausea despite use of venting PEG and having small BMs the last two days. She is tired and not getting good rest due to hospital interventions. Clinical Pain Assessment (nonverbal scale for severity on nonverbal patients):  
Clinical Pain Assessment Severity: 2 Duration: for how long has pt been experiencing pain (e.g., 2 days, 1 month, years) Frequency: how often pain is an issue (e.g., several times per day, once every few days, constant) FUNCTIONAL ASSESSMENT:  
 
Palliative Performance Scale (PPS): PPS: 60 PSYCHOSOCIAL/SPIRITUAL SCREENING:  
 
Palliative IDT has assessed this patient for cultural preferences / practices and a referral made as appropriate to needs (Cultural Services, Patient Advocacy, Ethics, etc.) Any spiritual / Nondenominational concerns: 
[] Yes /  [x] No 
 
Caregiver Burnout: 
[] Yes /  [] No /  [x] No Caregiver Present Anticipatory grief assessment:  
[x] Normal  / [] Maladaptive ESAS Anxiety: Anxiety: 4 ESAS Depression: Depression: 4 REVIEW OF SYSTEMS:  
 
Positive and pertinent negative findings in ROS are noted above in HPI. The following systems were [x] reviewed / [] unable to be reviewed as noted in HPI Other findings are noted below. Systems: constitutional, ears/nose/mouth/throat, respiratory, gastrointestinal, genitourinary, musculoskeletal, integumentary, neurologic, psychiatric, endocrine. Positive findings noted below. Modified ESAS Completed by: provider Depression: 4 Pain: 2 Anxiety: 4 Nausea: 7 Anorexia: 5 Dyspnea: 0 Stool Occurrence(s): 1 PHYSICAL EXAM:  
 
From RN flowsheet: 
Wt Readings from Last 3 Encounters:  
07/10/19 75 kg (165 lb 6.4 oz) 06/20/19 77.1 kg (169 lb 14.4 oz) 06/11/19 77.6 kg (171 lb) Blood pressure 126/68, pulse (!) 116, temperature 98.8 °F (37.1 °C), resp. rate 16, height 5' 6\" (1.676 m), weight 75 kg (165 lb 6.4 oz), SpO2 97 %. Pain Scale 1: Numeric (0 - 10) Pain Intensity 1: 3 Pain Onset 1: poa Pain Location 1: Abdomen Pain Orientation 1: Anterior, Lower Pain Description 1: Aching Pain Intervention(s) 1: Medication (see MAR) Last bowel movement, if known:  
 
Constitutional: chronically ill, no acute distress Eyes: pupils equal, anicteric ENMT: no nasal discharge, moist mucous membranes Cardiovascular: regular rhythm, distal pulses intact Respiratory: breathing not labored, symmetric Gastrointestinal: obese, soft non-tender, paucity of bowel sounds, PEG in place (clamped), bag at edge of bed with dark fluid acting as drainage bag periodically for PEG. Musculoskeletal: no deformity, no tenderness to palpation Skin: warm, dry Neurologic: following commands, cognition intact, moving all extremities Psychiatric: full affect, no agitation or restlessness HISTORY:  
 
Active Problems: 
  Small bowel obstruction ([address] (6/27/2019) Past Medical History:  
Diagnosis Date  Arthritis  Cancer [facility]) 2014  
 uterine  Chronic pain LOWER BACK  Depression  Nausea & vomiting  PMB (postmenopausal bleeding) 4/2014 Past Surgical History:  
Procedure Laterality Date  HX COLONOSCOPY    
 HX GYN  14 TLH/BSO/lymph node dissection  HX ORTHOPAEDIC Right [id number] LITTLE toe sx  HX VASCULAR ACCESS Left  PORTACATH - LEFT CHEST  
 IR INSERT GASTROSTOMY TUBE [facility]  7/3/2019 Family History Problem Relation Age of Onset  Cancer Mother 61  
     liver metastatic disease, unknown primary  Stroke Mother BRAIN ANEURYSM  
 Cancer Maternal Uncle 60  
     bone  Cancer Maternal Grandmother 61  
     unknown primary  Anesth Problems Neg Hx History reviewed, no pertinent family history. Social History Tobacco Use  Smoking status: Former Smoker Packs/day: 0.50 Years: 7.00 Pack years: 3.50 Types: Cigarettes Last attempt to quit: 1999 Years since quittin.9  Smokeless tobacco: Never Used Substance Use Topics  Alcohol use: Yes Alcohol/week: 0.6 oz Types: 1 Glasses of wine per week Comment: rarely Allergies Allergen Reactions  Hydrocodone Nausea Only  Oxycodone Nausea and Vomiting Current Facility-Administered Medications Medication Dose Route Frequency  TPN ADULT - CENTRAL   IntraVENous CONTINUOUS  
 haloperidol lactate (HALDOL) injection 1 mg  1 mg IntraVENous Q4H  
 metoclopramide HCl (REGLAN) injection 10 mg  10 mg IntraVENous Q6H  
 haloperidol lactate (HALDOL) injection 1 mg  1 mg IntraVENous Q4H PRN  
 HYDROmorphone (PF) (DILAUDID) injection 1 mg  1 mg IntraVENous Q3H PRN  
 senna (SENOKOT) tablet 17.2 mg  2 Tab Oral ACB/HS  
 HYDROmorphone (DILAUDID) tablet 1 mg  1 mg Oral Q6H PRN  pantoprazole (PROTONIX) 20 mg in sodium chloride 0.9% 5 mL injection  20 mg IntraVENous Q12H  
 heparin (porcine) pf 300 Units  300 Units InterCATHeter PRN  
 sodium chloride (NS) flush 5-10 mL  5-10 mL InterCATHeter Q8H  
 sodium chloride (NS) flush 5-10 mL  5-10 mL InterCATHeter PRN  
  prochlorperazine (COMPAZINE) tablet 10 mg  10 mg Oral Q6H PRN  prochlorperazine (COMPAZINE) with saline injection 10 mg  10 mg IntraVENous Q6H PRN  
 0.9% sodium chloride infusion  25 mL/hr IntraVENous CONTINUOUS  
 fat emulsion 20% (LIPOSYN, INTRAlipid) infusion 500 mL  500 mL IntraVENous Q MON, THU & SAT  enoxaparin (LOVENOX) injection 40 mg  40 mg SubCUTAneous Q24H  
 glucose chewable tablet 16 g  4 Tab Oral PRN  
 dextrose (D50W) injection syrg 12.5-25 g  12.5-25 g IntraVENous PRN  
 glucagon (GLUCAGEN) injection 1 mg  1 mg IntraMUSCular PRN  
 insulin lispro (HUMALOG) injection   SubCUTAneous Q6H  
 metoprolol tartrate (LOPRESSOR) tablet 12.5 mg  12.5 mg Oral Q12H  
 
 
 
 LAB AND IMAGING FINDINGS:  
 
Lab Results Component Value Date/Time WBC 4.1 07/11/2019 10:03 AM  
 HGB 7.3 (L) 07/11/2019 10:03 AM  
 PLATELET 600 36/40/1148 10:03 AM  
 
Lab Results Component Value Date/Time Sodium 135 (L) 07/10/2019 05:16 AM  
 Potassium 3.8 07/10/2019 05:16 AM  
 Chloride 103 07/10/2019 05:16 AM  
 CO2 25 07/10/2019 05:16 AM  
 BUN 18 07/10/2019 05:16 AM  
 Creatinine 0.63 07/10/2019 05:16 AM  
 Calcium 8.7 07/10/2019 05:16 AM  
 Magnesium 1.7 07/08/2019 08:47 AM  
 Phosphorus 3.2 07/01/2019 08:36 AM  
  
Lab Results Component Value Date/Time AST (SGOT) 20 07/08/2019 08:47 AM  
 Alk. phosphatase 100 07/08/2019 08:47 AM  
 Protein, total 6.6 07/08/2019 08:47 AM  
 Albumin 1.8 (L) 07/08/2019 08:47 AM  
 Globulin 4.8 (H) 07/08/2019 08:47 AM  
 
Lab Results Component Value Date/Time INR 1.1 06/27/2019 02:19 AM  
 Prothrombin time 11.0 06/27/2019 02:19 AM  
  
No results found for: IRON, FE, TIBC, IBCT, PSAT, FERR No results found for: PH, PCO2, PO2 No components found for: [place] No results found for: CPK, CKMB Total time:   
Counseling / coordination time, spent as noted above: 
> 50% counseling / coordination?:  
 
 Prolonged service was provided for  []30 min   []75 min in face to face time in the presence of the patient, spent as noted above. Time Start:  
Time End:  
Note: this can only be billed with 24743 (initial) or 67846 (follow up). If multiple start / stop times, list each separately.

## 2019-07-11 NOTE — ACP (ADVANCE CARE PLANNING)
Ms. Nima Rodriguez does not have an AMD on file. We have had clear discussions about her code status and she desires DNR status. DDNR on file. Her goal is to spend time at home as much as possible. She enjoys being outside and active. She desires chemotherapy if it will work to reduce her tumor burden. She is hopeful not to need TPN very much longer. I will bring an AMD and discuss with her tomorrow.

## 2019-07-11 NOTE — PROGRESS NOTES
[address] [facility], [address] [facility], [facility] Shelbie THORNTON [phone]  F [phone] Patient Name: Kasandra Denver Admit Date: 6/27/2019 OR Date: * No surgery found * Visit Date: 7/11/2019 SUBJECTIVE: 
Better evening, nausea improved, no emesis. She did vent once, but otherwise kept it closed. Pain improved as well. Eating mix of solid/liquid diet, remains modest.  +flatus/BM. She has voiced concerns she doesn't see much point to all this and would consider stopping treatment. 7/2/19 - palliative carboplatin (cycle 2) tolerated well 7/3/19 - IR venting PEG placement Sx control with IV reglan, haldol scheduled with IV dilaudid PRN Ambulating in halls/room OBJECTIVE: 
 
Patient Vitals for the past 24 hrs: 
 Temp Pulse Resp BP SpO2  
07/11/19 0602 98.1 °F (36.7 °C) (!) 103 16 123/67 98 % 07/11/19 0000 98.1 °F (36.7 °C) 92 16 131/70 96 % 07/10/19 2203 97.8 °F (36.6 °C) (!) 108 16 143/77 96 % 07/10/19 1928 98.1 °F (36.7 °C) (!) 107 16 136/75 96 % 07/10/19 1638 98.2 °F (36.8 °C) (!) 107 16 169/76 96 % 07/10/19 1218 97.9 °F (36.6 °C) 100 16 148/63 98 % 07/10/19 0912 97.4 °F (36.3 °C) (!) 110 16 166/84 97 % Date 07/10/19 0700 - 07/11/19 8022 07/11/19 0700 - 07/12/19 6353 Shift [id number] 1900-0659 24 Hour Total 5932-7075 6268-1220 24 Hour Total  
INTAKE  
P.O.  120 120     
  P. O.  120 120     
I. V.(mL/kg/hr)  849.5(0.9) 849.5(0.5) Volume (0.9% sodium chloride infusion)  620.8 620.8 Volume (TPN ADULT - CENTRAL)  228.7 228.7 NG/GT  20 20 Water Flush Volume (mL) (PEG/Gastrostomy Tube 07/03/19)  20 20 Shift Total(mL/kg)  989. 5(13.2) 989. 5(13.2) OUTPUT Urine(mL/kg/hr) Urine Occurrence(s) 3 x 1 x 4 x Emesis/NG output Emesis Occurrence(s) 2 x  2 x Stool 100  100 Stool 100  100 Shift Total(mL/kg) 100(1.3)  100(1.3) NET -100 989.5 889.5 Weight (kg) 75 75 75 75 75 75 Physical Exam 
   General:  alert, cooperative, no distress Cardiac:  Regular rate and rhythm Lungs:  nonlabored Abdomen:  soft, mildly distended, BS hypoactive. Tender subjectively w/o rebound/guarding; PEG in LUQ - site clear, capped Extremity: extremities normal, atraumatic, no cyanosis or edema Data Review Lab Results Component Value Date/Time WBC 4.2 07/09/2019 06:12 AM  
 ABS. NEUTROPHILS 4.0 07/01/2019 08:36 AM  
 HGB 7.0 (L) 07/09/2019 06:12 AM  
 HCT 22.4 (L) 07/09/2019 06:12 AM  
 MCV 95.3 07/09/2019 06:12 AM  
 MCH 29.8 07/09/2019 06:12 AM  
 PLATELET 257 61/33/8883 06:12 AM  
 
Lab Results Component Value Date/Time Sodium 135 (L) 07/10/2019 05:16 AM  
 Potassium 3.8 07/10/2019 05:16 AM  
 Chloride 103 07/10/2019 05:16 AM  
 CO2 25 07/10/2019 05:16 AM  
 Glucose 97 07/10/2019 05:16 AM  
 BUN 18 07/10/2019 05:16 AM  
 Creatinine 0.63 07/10/2019 05:16 AM  
 Calcium 8.7 07/10/2019 05:16 AM  
 Albumin 1.8 (L) 07/08/2019 08:47 AM  
 Bilirubin, total 0.3 07/08/2019 08:47 AM  
 AST (SGOT) 20 07/08/2019 08:47 AM  
 ALT (SGPT) 11 (L) 07/08/2019 08:47 AM  
 Alk. phosphatase 100 07/08/2019 08:47 AM  
 
 
Upper GI series with small bowel follow-through (7/1/19) AP  radiograph of the abdomen was obtained and demonstrates 
evidence of a partial small bowel obstruction. The patient was asked to ingest 
effervescent material followed by thick and thin barium and routine 
double-contrast upper GI was then performed. Multiple spot radiographs were 
obtained with and without compression of the bowel. Small bowel follow-through 
was also performed. There is normal course, caliber and motility of the 
esophagus. There is a small hiatal hernia. There is no significant 
gastroesophageal reflux. There is a filling defect involving the proximal 
duodenum, junction of the first and second portions, where, in correlation with CT, there is a peritoneal implant. Contrast material does not readily into the 
stomach. The ligament of Treitz is in a normal-appearing location. There are 
dilated loops of proximal small bowel, measuring up to 4.8 cm. There is delayed 
small bowel transit time, with multiple sequential images of the abdomen 
demonstrating slow transit into decompressed normal caliber loops of distal 
small bowel. There is filling of these loops as well as the colon at the 7 hour 
felecia. This indicates a partial small bowel obstruction. IMPRESSION: 
1. Small bowel follow-through confirms a partial small bowel obstruction with a 
transition point in the right lower quadrant. 2. Dilated loops of proximal small bowel, up to 4.8 cm. Decompressed loops of 
distal small bowel in the right lower quadrant. 3. Delayed small bowel transit time. Contrast material enters the colon at the 7 
hour felecia. 4. Filling defect affecting the proximal duodenum, where, in correlation with 
recent CT, there is a peritoneal implant and associated lymphadenopathy. IMPRESSION/PLAN: 
 
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 Currently receiving single agent Carboplatin, s/p cycle #2 on 7/2/19. CT of head to r/o central lesion contributing to N/V. Heme/CV: Anemia d/t chronic dz/chemo. Follow s/p chemo. Renal: renal function baseline. FEN/GI: Partial small bowel obstruction/delayed transit of 7 hrs on UGI. Improved on Reglan and GI rest. 
2nd admission for same. Chemo may improve this, but she is in a salvage state. Venting PEG placed by IR on 7/3/19. Ongoing education for use. Despite venting, getting ill. Change to full liquid diet for now with supplements. Continue scheduled antiemetics, refusing some. PPI and H2B (TPN). Nausea persists.  Would like to use decadron, but she continues to make quick choices about her medications, often wanting to change them based on real or perceived side effects. May reintroduce again later. CT of head today. Continue TPN for Severe Chronic  Protein Calorie Malnutrition secondary to obstruction/disease. Consider stopping TPN soon as she will either improve with PO intake or fail and need to consider hospice. Neuro: Cancer related/obstruction related pain, nausea and vomiting improved on IV regimen. Palliative medicine Following ID/Wound:  Afebrile. abd improved. No WBC PPX:  Ambulation, SCDs, IS, PT for progressive debility/weakness. Dispostion: Improved overall, appreciate Palliative medicine. Difficult case. She needs chemo to decrease tumor burden and in theory improve her bowel function/relieve obstruction, but her recurrent and disease state continues to make her ill, which will decrease her performance status. Surgery would be unlikely to be of any significant benefit, and in fact could cause more problems. A proximal diversion might the only thing that would help, as her obstruction is secondary to tumor, but this is a risky undertaken in the setting of carcinomatosis and she has indicated she does not want to be that aggressive. She understands the situation and states that she is \"realistic\" about her prognosis. She herself has brought up EOL discussions, and hospice has been broached previously. May need to revisit again if there is no change.    
  
  
PHILIP Paiz

## 2019-07-11 NOTE — PROGRESS NOTES
Bedside and Verbal shift change report given to Rey Roberts. (oncoming nurse) by Jyoti Toney. (offgoing nurse). Report included the following information SBAR, Kardex, ED Summary, Procedure Summary, Intake/Output, MAR and Recent Results.

## 2019-07-12 NOTE — PROGRESS NOTES
NUTRITION 
     
RECOMMENDATIONS:  
Continue with current diet and gladly send preferred items. SUBJECTIVE/OBJECTIVE:  
Po intake improving and no c/o nausea. Does not like Ensure Compact or milkshakes. Prefers to make milkshakes at home. Today is the last day of TPN. Pt is requesting Hospice information. Diet: GI Lite Supplements: milkshakes/ Ensure Compact Intake: []           Good     [x]           Fair      []           Poor Patient Vitals for the past 100 hrs: 
 % Diet Eaten  
07/11/19 1500 50 % 07/11/19 1000 75 % 07/09/19 2113 50 % 07/08/19 1810 50 % 07/08/19 1425 50 % Weight Changes: Wt Readings from Last 10 Encounters:  
07/11/19 75.9 kg (167 lb 6.4 oz) 06/20/19 77.1 kg (169 lb 14.4 oz) 06/11/19 77.6 kg (171 lb)  
06/10/19 81.6 kg (180 lb)  
06/10/19 81.5 kg (179 lb 9.6 oz) 05/28/19 81.6 kg (180 lb) 05/16/19 81.6 kg (180 lb)  
03/28/19 86 kg (189 lb 9.6 oz) 02/05/19 89 kg (196 lb 3.2 oz) 01/24/19 89.8 kg (198 lb) PLAN:  
[x]           Obtained/adjusted food preferences/tolerances and/or snacks options Jose Mayen RD

## 2019-07-12 NOTE — CONSULTS
Palliative Medicine Consult Jose: [phone] Patient Name: Johan Catalan YOB: 1960 Date of Initial Consult: 7/10/19 Reason for Consult: Overwhelming symptoms Requesting Provider: PHILIP Alfaro 
Primary Care Physician: Nabila Smith MD 
 
 SUMMARY:  
Johan Catalan is a 60 y.o. with a past history of chronic low back pain, depression and stage IV uterine cancer s/p chemo and pelvic XRT (2014); recurrence (2018) now on salvage chemotherapy with Carboplatin. I met her last admission while she was here 6/14-6/21 for cancer related symptoms of nausea and abdominal pain. She did not have an SBO at that time. She was readmitted 6/27 for same symptoms along with coffee ground emesis. CT scan on this admit showed early / partial SBO with transition point in the deep pelvis, no significant gastric distension. 7/1 - UGI series w/ small bowel follow through. 7 hr small bowel transit time, partial SBO w/ transition in RLQ, filling defect in proximal duodenum associated with implant/LAD 
7/2- 2nd cycle of salvage chemotherapy w/ Carboplatin 7/3 - venting PEG placed by IR  
  
Current medical issues leading to Palliative Medicine involvement include: persistent nausea/vomiting. Social Hx:   to [place], he works the night shift at [employer]. They have two children who both live in [place].  She enjoys being active, gardening and shopping. PALLIATIVE DIAGNOSES:  
1. Intractable nausea and vomiting 2. Partial SBO s/p venting PEG 3. Peritoneal carcinomatosis 4. Recurrent uterine carcinoma w/ liver, peritoneal mets PLAN:  
1. Overall doing well - using less Compazine for nausea; but had recurrent emesis x 2 (appx 200 cc per nurse) this am shortly after having a large BM and then taking her oral Dilaudid. She is not having any major side effects from her meds; continues to be alert, interactive and ambulatory. 2. I reviewed her case with Dr. Boom Aguirre, carboplatin still an option moving forward with goal of reducing tumor burden. 3. Ms. Chantelle Spencer is raising many questions today about her life expectancy. She doesn't want to continue chemotherapy if it will extend her life by months only; she would consider if it extends her life by several years. Her preference is to spend more time out of the hospital and enjoy quality time as much as she can, rather than be \"sick\" here. She has had experience with Hospice with her mother who had cancer and  with Hospice services at home, she was given 6 months to live and  within 1. It was very abrupt and family had little time to process the experience, but they very much respected her choices. Markus Mejia states she is similar and that her preference is to take less medication. She states she has discussed her preferences with her  Dario Acevedo and that he supports her. We talked about consideration of Hospice at this junction; and that next step would be an information session. She very much desires this. I stressed to her that either way; we need to trial a good oral regimen in order to ensure her symptoms are well managed at home. She does prefer IV meds as they work quickly and effectively; but understands need for oral.   See changes in meds below. 4. Intractable nausea/vomiting. 1. Overall improving - one episode of emesis this am 
2. Continue IV Haldol for today, 1 mg q4h prn 
3. Change Reglan to liquid PO, 10 mg AC-HS 4. Has a venting PEG; periodically venting for symptoms. 5. Cancer related abdominal pain: 1. Change oral Dilaudid to liquid formulation, increase to 2 mg, and schedule it q4h during the day. 2. Will keep IV dose as back up. 3. Appreciate music therapy consult. 6. Nutrition: 1. Started on TPN this admission. 2. She is tolerating her diet and clamping for 1-2 hours post meals. 3. She is hopeful to get off the TPN soon. Wants to get home. 7. Stage IV Uterine cancer. 1. On salvage chemo for her recurrent disease, with goal of reducing tumor burden. 2. She is getting tired of being sick and in the hospital.  
3. DNR status / DDNR done last admission. 4. I think there is some existential distress, overall she leydi well but there are some background dynamics with her children who are out in [place]. 8. Please call me over the weekend with questions as I am on call. [phone]. 
9. Communicated plan of care with: Palliative IDTJairo 192 Team 
 
 GOALS OF CARE / TREATMENT PREFERENCES:  
 
GOALS OF CARE: 
Patient/Health Care Proxy Stated Goals: Other (comment)(not yet discussed, this may hinge on treatment options moving forward) TREATMENT PREFERENCES:  
Code Status: DNR Advance Care Planning: 
[x] [facility] Interdisciplinary Team has updated the ACP Navigator with [place] and Patient Capacity Primary Decision MakerTBradley Randal [facility] - [phone] Medical Interventions: Limited additional interventions(DNR/I) Artificially Administered Nutrition: (she has a venting PEG in place) Other: As far as possible, the palliative care team has discussed with patient / health care proxy about goals of care / treatment preferences for patient. HISTORY:  
 
History obtained from:  Patient, bedside RN, chart review CHIEF COMPLAINT: nausea HPI/SUBJECTIVE: The patient is:  
[x] Verbal and participatory [] Non-participatory due to:  
 
7/12 - overall stable. Slept well again but morning a little rough as she had recurrent emesis. Thinks is was prompted by having a large BM and bowels being \"off. \"  Also occurred 30 min after taking the oral Dilaudid. 7/11 - feeling better today. Slept well overnight.   Prefers to stay on the IV meds but does want to get home and understands need for oral agents to be at home. Is ambulating well. Ms. Marycarmen Walsh was readmitted to [facility] two weeks ago after presenting for worsening nausea, vomiting and abdominal pain. CT scan showed a partial SBO. Her symptoms have waxed and waned throughout admission but worsened 2-3 days ago with now persistent nausea despite use of venting PEG and having small BMs the last two days. She is tired and not getting good rest due to hospital interventions. Clinical Pain Assessment (nonverbal scale for severity on nonverbal patients):  
Clinical Pain Assessment Severity: 2 Duration: for how long has pt been experiencing pain (e.g., 2 days, 1 month, years) Frequency: how often pain is an issue (e.g., several times per day, once every few days, constant) FUNCTIONAL ASSESSMENT:  
 
Palliative Performance Scale (PPS): PPS: 60 PSYCHOSOCIAL/SPIRITUAL SCREENING:  
 
Palliative IDT has assessed this patient for cultural preferences / practices and a referral made as appropriate to needs (Cultural Services, Patient Advocacy, Ethics, etc.) Any spiritual / Christianity concerns: 
[] Yes /  [x] No 
 
Caregiver Burnout: 
[] Yes /  [] No /  [x] No Caregiver Present Anticipatory grief assessment:  
[x] Normal  / [] Maladaptive ESAS Anxiety: Anxiety: 4 ESAS Depression: Depression: 4 REVIEW OF SYSTEMS:  
 
Positive and pertinent negative findings in ROS are noted above in HPI. The following systems were [x] reviewed / [] unable to be reviewed as noted in HPI Other findings are noted below. Systems: constitutional, ears/nose/mouth/throat, respiratory, gastrointestinal, genitourinary, musculoskeletal, integumentary, neurologic, psychiatric, endocrine. Positive findings noted below. Modified ESAS Completed by: provider Depression: 4 Pain: 2 Anxiety: 4 Nausea: 7 Anorexia: 5 Dyspnea: 0 Stool Occurrence(s): 1 PHYSICAL EXAM:  
 
From RN flowsheet: Wt Readings from Last 3 Encounters:  
07/11/19 75.9 kg (167 lb 6.4 oz) 06/20/19 77.1 kg (169 lb 14.4 oz) 06/11/19 77.6 kg (171 lb) Blood pressure 127/71, pulse (!) 117, temperature 98.1 °F (36.7 °C), resp. rate 16, height 5' 6\" (1.676 m), weight 75.9 kg (167 lb 6.4 oz), SpO2 97 %. Pain Scale 1: Numeric (0 - 10) Pain Intensity 1: 4 Pain Onset 1: poa Pain Location 1: Abdomen Pain Orientation 1: Anterior Pain Description 1: Aching Pain Intervention(s) 1: Medication (see MAR) Last bowel movement, if known:  
 
Constitutional: chronically ill, no acute distress Eyes: pupils equal, anicteric ENMT: no nasal discharge, moist mucous membranes Cardiovascular: regular rhythm, distal pulses intact Respiratory: breathing not labored, symmetric Gastrointestinal: obese, soft non-tender, bs very active today, PEG in place (clamped), bag at edge of bed with dark fluid acting as drainage bag periodically for PEG. Musculoskeletal: no deformity, no tenderness to palpation Skin: warm, dry Neurologic: following commands, cognition intact, moving all extremities Psychiatric: full affect, no agitation or restlessness HISTORY:  
 
Active Problems: 
  Small bowel obstruction ([address] (6/27/2019) Past Medical History:  
Diagnosis Date  Arthritis  Cancer [facility]) 2014  
 uterine  Chronic pain LOWER BACK  Depression  Nausea & vomiting  PMB (postmenopausal bleeding) 4/2014 Past Surgical History:  
Procedure Laterality Date  HX COLONOSCOPY    
 HX GYN  8/22/14 TLH/BSO/lymph node dissection  HX ORTHOPAEDIC Right [id number] LITTLE toe sx  HX VASCULAR ACCESS Left 2014 PORTACATH - LEFT CHEST  
 IR INSERT GASTROSTOMY TUBE [facility]  7/3/2019 Family History Problem Relation Age of Onset  Cancer Mother 61  
     liver metastatic disease, unknown primary  Stroke Mother BRAIN ANEURYSM  
 Cancer Maternal Uncle 60  
     bone  Cancer Maternal Grandmother 61  
 unknown primary  Anesth Problems Neg Hx History reviewed, no pertinent family history. Social History Tobacco Use  Smoking status: Former Smoker Packs/day: 0.50 Years: 7.00 Pack years: 3.50 Types: Cigarettes Last attempt to quit: 1999 Years since quittin.9  Smokeless tobacco: Never Used Substance Use Topics  Alcohol use: Yes Alcohol/week: 0.6 oz Types: 1 Glasses of wine per week Comment: rarely Allergies Allergen Reactions  Hydrocodone Nausea Only  Oxycodone Nausea and Vomiting Current Facility-Administered Medications Medication Dose Route Frequency  TPN ADULT - CENTRAL   IntraVENous CONTINUOUS  
 haloperidol lactate (HALDOL) injection 1 mg  1 mg IntraVENous Q4H  
 metoclopramide HCl (REGLAN) injection 10 mg  10 mg IntraVENous Q6H  
 haloperidol lactate (HALDOL) injection 1 mg  1 mg IntraVENous Q4H PRN  
 HYDROmorphone (PF) (DILAUDID) injection 1 mg  1 mg IntraVENous Q3H PRN  
 senna (SENOKOT) tablet 17.2 mg  2 Tab Oral ACB/HS  
 HYDROmorphone (DILAUDID) tablet 1 mg  1 mg Oral Q6H PRN  pantoprazole (PROTONIX) 20 mg in sodium chloride 0.9% 5 mL injection  20 mg IntraVENous Q12H  
 heparin (porcine) pf 300 Units  300 Units InterCATHeter PRN  
 sodium chloride (NS) flush 5-10 mL  5-10 mL InterCATHeter Q8H  
 sodium chloride (NS) flush 5-10 mL  5-10 mL InterCATHeter PRN  prochlorperazine (COMPAZINE) tablet 10 mg  10 mg Oral Q6H PRN  prochlorperazine (COMPAZINE) with saline injection 10 mg  10 mg IntraVENous Q6H PRN  
 0.9% sodium chloride infusion  25 mL/hr IntraVENous CONTINUOUS  
 fat emulsion 20% (LIPOSYN, INTRAlipid) infusion 500 mL  500 mL IntraVENous Q MON, THU & SAT  enoxaparin (LOVENOX) injection 40 mg  40 mg SubCUTAneous Q24H  
 glucose chewable tablet 16 g  4 Tab Oral PRN  
 dextrose (D50W) injection syrg 12.5-25 g  12.5-25 g IntraVENous PRN  
  glucagon (GLUCAGEN) injection 1 mg  1 mg IntraMUSCular PRN  
 insulin lispro (HUMALOG) injection   SubCUTAneous Q6H  
 metoprolol tartrate (LOPRESSOR) tablet 12.5 mg  12.5 mg Oral Q12H  
 
 
 
 LAB AND IMAGING FINDINGS:  
 
Lab Results Component Value Date/Time WBC 4.1 07/11/2019 10:03 AM  
 HGB 7.3 (L) 07/11/2019 10:03 AM  
 PLATELET 819 65/75/2115 10:03 AM  
 
Lab Results Component Value Date/Time Sodium 133 (L) 07/12/2019 04:06 AM  
 Potassium 4.1 07/12/2019 04:06 AM  
 Chloride 104 07/12/2019 04:06 AM  
 CO2 24 07/12/2019 04:06 AM  
 BUN 16 07/12/2019 04:06 AM  
 Creatinine 0.61 07/12/2019 04:06 AM  
 Calcium 8.6 07/12/2019 04:06 AM  
 Magnesium 1.7 07/08/2019 08:47 AM  
 Phosphorus 3.2 07/01/2019 08:36 AM  
  
Lab Results Component Value Date/Time AST (SGOT) 20 07/08/2019 08:47 AM  
 Alk. phosphatase 100 07/08/2019 08:47 AM  
 Protein, total 6.6 07/08/2019 08:47 AM  
 Albumin 1.8 (L) 07/08/2019 08:47 AM  
 Globulin 4.8 (H) 07/08/2019 08:47 AM  
 
Lab Results Component Value Date/Time INR 1.1 06/27/2019 02:19 AM  
 Prothrombin time 11.0 06/27/2019 02:19 AM  
  
No results found for: IRON, FE, TIBC, IBCT, PSAT, FERR No results found for: PH, PCO2, PO2 No components found for: [place] No results found for: CPK, CKMB Total time:   
Counseling / coordination time, spent as noted above: 
> 50% counseling / coordination?:  
 
Prolonged service was provided for  []30 min   []75 min in face to face time in the presence of the patient, spent as noted above. Time Start:  
Time End:  
Note: this can only be billed with 62061 (initial) or 35251 (follow up). If multiple start / stop times, list each separately.

## 2019-07-12 NOTE — PROGRESS NOTES
Transitions of Care : 
Patient would like to speak with the hospice team when her  Arnoldo Torrez is available over weekend. She wants to have a better quality of life and be able to return home with her nausea and pain under control.

## 2019-07-12 NOTE — PROGRESS NOTES
spoke with Palliative care MD and patient is leaning towards hospice services. Patient initially wanted to consider [employer], however I note now that patient lives in [facility]. I am not sure they cover that geographic location. Referral sent via cc link and I will call our hospice to check later today.

## 2019-07-12 NOTE — HOSPICE
[address] 
[phone] Patient Name: Jony Askew YOB: 1960 Age: 60 y.o. [address] RN Note:  Hospice consult received, reviewing chart. Will follow up with Unit Nurse and Care Manager to discuss plan of care, patient status and discharge disposition within the hour. Thank you for the opportunity to be of service to this patient.

## 2019-07-12 NOTE — HOSPICE
In to see patient who requests that we come back for info session when her  is present. We will return tomorrow between 11-12 noon for session. Thank you for the opportunity to serve this patient and her family.

## 2019-07-12 NOTE — PROGRESS NOTES
[address], [address], [address] 
P [phone]  F [phone] Patient Name: Shefali Grajeda Admit Date: 6/27/2019 OR Date: * No surgery found * Visit Date: 7/12/2019 SUBJECTIVE: 
Better evening, nausea improved, no emesis. Venting PEG only occasionally, but otherwise kept it closed. Pain improved as well. Eating mix of solid/liquid diet. Intake improving.  +flatus/BM. 7/2/19 - palliative carboplatin (cycle 2) tolerated well 7/3/19 - IR venting PEG placement Sx control with IV reglan, haldol scheduled with IV dilaudid PRN Ambulating in halls/room OBJECTIVE: 
 
Patient Vitals for the past 24 hrs: 
 Temp Pulse Resp BP SpO2  
07/12/19 0000 98.4 °F (36.9 °C) (!) 107 16 121/60 97 % 07/11/19 2141  (!) 106  118/64   
07/11/19 2100     95 % 07/11/19 1957 98.3 °F (36.8 °C) (!) 106 16 134/70 96 % 07/11/19 1646 98.4 °F (36.9 °C) (!) 104 16 124/67 98 % Date 07/11/19 0700 - 07/12/19 4016 07/12/19 0700 - 07/13/19 0222 Shift 7037-7428 1681-0797 24 Hour Total 1930-6060 4892-0941 24 Hour Total  
INTAKE  
P.O. 480 75 555     
  P. O. 480 75 555     
I.V.(mL/kg/hr) 1610.3(1.8) 395.8(0.4) 2006(1.1) Volume (0.9% sodium chloride infusion) 423.8 231.3 655 Volume (TPN ADULT - CENTRAL) 1186.5  1186.5 Volume (TPN ADULT - CENTRAL)  164.5 164.5 NG/GT 20 20 40 Water Flush Volume (mL) (PEG/Gastrostomy Tube 07/03/19) 20 20 40 Shift Total(mL/kg) 7457.5(58.7) 490.8(6.5) 7048(02.7) OUTPUT Shift Total(mL/kg) NET 2110.3 490.8 2601 Weight (kg) 75 75.9 75.9 75.9 75.9 75.9 Physical Exam 
   General:  alert, cooperative, no distress Cardiac:  Regular rate and rhythm Lungs:  nonlabored Abdomen:  soft, mildly distended, BS hypoactive. Tender subjectively w/o rebound/guarding; PEG in LUQ - site clear, capped Extremity: extremities normal, atraumatic, no cyanosis or edema Data Review Lab Results Component Value Date/Time WBC 4.1 07/11/2019 10:03 AM  
 ABS. NEUTROPHILS 4.0 07/01/2019 08:36 AM  
 HGB 7.3 (L) 07/11/2019 10:03 AM  
 HCT 22.9 (L) 07/11/2019 10:03 AM  
 MCV 94.2 07/11/2019 10:03 AM  
 MCH 30.0 07/11/2019 10:03 AM  
 PLATELET 540 10/74/0313 10:03 AM  
 
Lab Results Component Value Date/Time Sodium 133 (L) 07/12/2019 04:06 AM  
 Potassium 4.1 07/12/2019 04:06 AM  
 Chloride 104 07/12/2019 04:06 AM  
 CO2 24 07/12/2019 04:06 AM  
 Glucose 90 07/12/2019 04:06 AM  
 BUN 16 07/12/2019 04:06 AM  
 Creatinine 0.61 07/12/2019 04:06 AM  
 Calcium 8.6 07/12/2019 04:06 AM  
 Albumin 1.8 (L) 07/08/2019 08:47 AM  
 Bilirubin, total 0.3 07/08/2019 08:47 AM  
 AST (SGOT) 20 07/08/2019 08:47 AM  
 ALT (SGPT) 11 (L) 07/08/2019 08:47 AM  
 Alk. phosphatase 100 07/08/2019 08:47 AM  
 
 
Upper GI series with small bowel follow-through (7/1/19) AP  radiograph of the abdomen was obtained and demonstrates 
evidence of a partial small bowel obstruction. The patient was asked to ingest 
effervescent material followed by thick and thin barium and routine 
double-contrast upper GI was then performed. Multiple spot radiographs were 
obtained with and without compression of the bowel. Small bowel follow-through 
was also performed. There is normal course, caliber and motility of the 
esophagus. There is a small hiatal hernia. There is no significant 
gastroesophageal reflux. There is a filling defect involving the proximal 
duodenum, junction of the first and second portions, where, in correlation with 
CT, there is a peritoneal implant. Contrast material does not readily into the 
stomach. The ligament of Treitz is in a normal-appearing location. There are 
dilated loops of proximal small bowel, measuring up to 4.8 cm.  There is delayed 
small bowel transit time, with multiple sequential images of the abdomen 
demonstrating slow transit into decompressed normal caliber loops of distal 
small bowel. There is filling of these loops as well as the colon at the 7 hour 
felecia. This indicates a partial small bowel obstruction. IMPRESSION: 
1. Small bowel follow-through confirms a partial small bowel obstruction with a 
transition point in the right lower quadrant. 2. Dilated loops of proximal small bowel, up to 4.8 cm. Decompressed loops of 
distal small bowel in the right lower quadrant. 3. Delayed small bowel transit time. Contrast material enters the colon at the 7 
hour felecia. 4. Filling defect affecting the proximal duodenum, where, in correlation with 
recent CT, there is a peritoneal implant and associated lymphadenopathy. IMPRESSION/PLAN: 
 
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 Currently receiving single agent Carboplatin, s/p cycle #2 on 7/2/19. CT of head to r/o central lesion contributing to N/V. Heme/CV: Anemia d/t chronic dz/chemo. Follow s/p chemo. Renal: renal function baseline. FEN/GI: Partial small bowel obstruction/delayed transit of 7 hrs on UGI.  2nd admission for same. Chemo may improve this, but she is in a salvage state. Venting PEG placed by IR on 7/3/19. Ongoing education for use. Despite venting, getting ill. Change to full liquid diet for now with supplements. Continue scheduled antiemetics, refusing some. PPI and H2B (TPN). Nausea improving. Appreciate palliative medicine recommendations. Continue TPN for Severe Chronic  Protein Calorie Malnutrition secondary to obstruction/disease. We will begin weaning TPN today, as her PO intake appears to be improving, her nausea has nearly resolved, and she continues to have flatus and BMs. Neuro: Cancer related/obstruction related pain, nausea and vomiting improved on IV regimen. Palliative medicine following. Head CT negative. ID/Wound:  Afebrile. abd improved. No WBC PPX:  Ambulation, SCDs, IS, PT for progressive debility/weakness. Dispostion: Improved overall, appreciate Palliative medicine. Difficult case. She needs chemo to decrease tumor burden and in theory improve her bowel function/relieve obstruction, but her recurrent and disease state continues to make her ill, which will decrease her performance status. Surgery would be unlikely to be of any significant benefit, and in fact could cause more problems. A proximal diversion might the only thing that would help, as her obstruction is secondary to tumor, but this is a risky undertaken in the setting of carcinomatosis and she has indicated she does not want to be that aggressive. She understands the situation and states that she is \"realistic\" about her prognosis. She herself has brought up EOL discussions, and hospice has been broached previously. May need to revisit again if there is no change.    
  
  
Ingrid Joseph MD

## 2019-07-12 NOTE — PROGRESS NOTES
1000 Patient requesting dilaudid IV reporting that she vomited. 150 mL of tan emesis noted. Phone call placed to Dr. Marielena Villasenor, awaiting return phone call. 46 Dr. Raul Hope at the bedside discussing pain management and plan of care. Goal to try dilaudid PO again later today.

## 2019-07-12 NOTE — ACP (ADVANCE CARE PLANNING)
Code status - DNR/I. DDNR signed and uploaded. Metastatic uterine cancer - currently on salvage chemotherapy, has had 2 cycles. She is questioning her desire to continue with this as a goal.  We talked clearly about the fact that chemotherapy does have the potential to continue to shrink her tumor burden. Would like more details about her life expectancy but understands this is difficult to know and prognosticate. She has a strong preference to consider returning home with Hospice support; if it will mean more time at home out of the hospital and with good symptom management. She requested a Hospice information session. Discussed with case management and consult placed per her wishes.

## 2019-07-12 NOTE — PROGRESS NOTES
Bedside shift change report given to HUSEYIN Currie RN (oncoming nurse) by Sarah Castaneda RN (offgoing nurse). Report included the following information SBAR, Kardex, Procedure Summary, Intake/Output and MAR.

## 2019-07-13 NOTE — PROGRESS NOTES
[address], [address], [address] 
P [phone]  F [phone] Patient Name: Victoria Keene Admit Date: 6/27/2019 OR Date: * No surgery found * Visit Date: 7/13/2019 SUBJECTIVE: 
Nausea has improved. No emesis. Venting PEG occasionally, but otherwise keeping it closed most of the time. Pain improved as well. Eating mix of solid/liquid diet. Intake improving.  +flatus/BM. Off TPN and lipids at this time. Meeting with Hospice today. 7/2/19 - palliative carboplatin (cycle 2) tolerated well 7/3/19 - IR venting PEG placement Ambulating in halls/room OBJECTIVE: 
 
Patient Vitals for the past 24 hrs: 
 Temp Pulse Resp BP SpO2  
07/13/19 0822 98.4 °F (36.9 °C) 95 16 120/73 96 % 07/13/19 0012 98.5 °F (36.9 °C) (!) 109 16 108/58 99 % 07/12/19 2021 98.6 °F (37 °C) (!) 110 16 109/62 98 % 07/12/19 1552 98.5 °F (36.9 °C) (!) 112 16 130/80 97 % Date 07/12/19 0700 - 07/13/19 2463 07/13/19 0700 - 07/14/19 8069 Shift 6643-8963 7449-8452 24 Hour Total 4409-0267 3435-4736 24 Hour Total  
INTAKE Shift Total(mL/kg) OUTPUT Emesis/NG output 300  300 Emesis 200  200 Output (ml) (PEG/Gastrostomy Tube 07/03/19) 100  100 Shift Total(mL/kg) 300(4)  300(4) NET -300  -300 Weight (kg) 75.9 75.9 75.9 75.9 75.9 75.9 Physical Exam 
   General:  alert, cooperative, no distress Cardiac:  Regular rate and rhythm Lungs:  nonlabored Abdomen:  soft, mildly distended, BS hypoactive. Tender subjectively w/o rebound/guarding; PEG in LUQ - site clear, capped Extremity: extremities normal, atraumatic, no cyanosis or edema Data Review Lab Results Component Value Date/Time WBC 4.1 07/11/2019 10:03 AM  
 ABS.  NEUTROPHILS 4.0 07/01/2019 08:36 AM  
 HGB 7.3 (L) 07/11/2019 10:03 AM  
 HCT 22.9 (L) 07/11/2019 10:03 AM  
 MCV 94.2 07/11/2019 10:03 AM  
 MCH 30.0 07/11/2019 10:03 AM  
 PLATELET 542 31/62/7399 10:03 AM  
 
Lab Results Component Value Date/Time Sodium 133 (L) 07/12/2019 04:06 AM  
 Potassium 4.1 07/12/2019 04:06 AM  
 Chloride 104 07/12/2019 04:06 AM  
 CO2 24 07/12/2019 04:06 AM  
 Glucose 90 07/12/2019 04:06 AM  
 BUN 16 07/12/2019 04:06 AM  
 Creatinine 0.61 07/12/2019 04:06 AM  
 Calcium 8.6 07/12/2019 04:06 AM  
 Albumin 1.8 (L) 07/08/2019 08:47 AM  
 Bilirubin, total 0.3 07/08/2019 08:47 AM  
 AST (SGOT) 20 07/08/2019 08:47 AM  
 ALT (SGPT) 11 (L) 07/08/2019 08:47 AM  
 Alk. phosphatase 100 07/08/2019 08:47 AM  
 
 
Upper GI series with small bowel follow-through (7/1/19) AP  radiograph of the abdomen was obtained and demonstrates 
evidence of a partial small bowel obstruction. The patient was asked to ingest 
effervescent material followed by thick and thin barium and routine 
double-contrast upper GI was then performed. Multiple spot radiographs were 
obtained with and without compression of the bowel. Small bowel follow-through 
was also performed. There is normal course, caliber and motility of the 
esophagus. There is a small hiatal hernia. There is no significant 
gastroesophageal reflux. There is a filling defect involving the proximal 
duodenum, junction of the first and second portions, where, in correlation with 
CT, there is a peritoneal implant. Contrast material does not readily into the 
stomach. The ligament of Treitz is in a normal-appearing location. There are 
dilated loops of proximal small bowel, measuring up to 4.8 cm. There is delayed 
small bowel transit time, with multiple sequential images of the abdomen 
demonstrating slow transit into decompressed normal caliber loops of distal 
small bowel. There is filling of these loops as well as the colon at the 7 hour 
felecia. This indicates a partial small bowel obstruction. IMPRESSION: 
1.  Small bowel follow-through confirms a partial small bowel obstruction with a 
 transition point in the right lower quadrant. 2. Dilated loops of proximal small bowel, up to 4.8 cm. Decompressed loops of 
distal small bowel in the right lower quadrant. 3. Delayed small bowel transit time. Contrast material enters the colon at the 7 
hour felecia. 4. Filling defect affecting the proximal duodenum, where, in correlation with 
recent CT, there is a peritoneal implant and associated lymphadenopathy. IMPRESSION/PLAN: 
 
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 Currently receiving single agent Carboplatin, s/p cycle #2 on 7/2/19. CT of head to r/o central lesion contributing to N/V. Heme/CV: Anemia d/t chronic dz/chemo. Follow s/p chemo. Renal: renal function baseline. FEN/GI: Partial small bowel obstruction/delayed transit of 7 hrs on UGI.  2nd admission for same. Chemo may improve this, but she is in a salvage state. Venting PEG placed by IR on 7/3/19. Ongoing education for use. Despite venting, getting ill. Change to full liquid diet for now with supplements. Continue scheduled antiemetics, refusing some. PPI and H2B (TPN). Nausea improving. Appreciate palliative medicine recommendations. Continue TPN for Severe Chronic  Protein Calorie Malnutrition secondary to obstruction/disease. TPN and lipids d/c'd. Her PO intake appears to be improving. Her nausea has markedly improved and she continues to have flatus and BMs. Neuro: Cancer related/obstruction related pain, nausea and vomiting improved on IV regimen. Palliative medicine following. Head CT negative. ID/Wound:  Afebrile. abd improved. No WBC PPX:  Ambulation, SCDs, IS, PT for progressive debility/weakness. Dispostion: Improved overall, appreciate Palliative medicine. Difficult case.   She needs chemo to decrease tumor burden and in theory improve her bowel function/relieve obstruction, but her recurrent and disease state continues to make her ill, which will decrease her performance status. Surgery would be unlikely to be of any significant benefit, and in fact could cause more problems. A proximal diversion might the only thing that would help, as her obstruction is secondary to tumor, but this is a risky undertaken in the setting of carcinomatosis and she has indicated she does not want to be that aggressive. She understands the situation and states that she is \"realistic\" about her prognosis. She herself has brought up EOL discussions, and hospice has been broached previously. She is to meet with hospice later today. If she wishes, I would be OK with letting her go home even later today, that is if she has made a decision to proceed with hospice. I know she would like to go home and I think she would be ready to go if we are moving towards hospice. I will make sure she has scripts on the chart for possible discharge.   
  
Johnathan Rivas MD

## 2019-07-13 NOTE — PROGRESS NOTES
Bedside and Verbal shift change report given to MABEL MartinoRN (oncoming nurse) by BRYNN Anders RN (offgoing nurse). Report included the following information SBAR, Kardex, Intake/Output and MAR.

## 2019-07-13 NOTE — PROGRESS NOTES
Bedside and Verbal shift change report given to MABEL Martino RN (oncoming nurse) by BRYNN Castro RN (offgoing nurse). Report included the following information SBAR, Kardex and MAR.

## 2019-07-13 NOTE — DISCHARGE INSTRUCTIONS
[address] [facility] [address] [phone]  F [phone]      Patient Discharge Instructions    Nicky Mercado / [id number] : 1960    Admitted 2019 Discharged: 2019     Take Home Medications     No current facility-administered medications on file prior to encounter. Current Outpatient Medications on File Prior to Encounter   Medication Sig Dispense Refill    metoclopramide HCl (REGLAN) 10 mg tablet Take 1 Tab by mouth every six (6) hours. Indications: Nausea and Vomiting caused by Cancer Drugs, gastroesophageal reflux disease 120 Tab 1    metoprolol tartrate (LOPRESSOR) 25 mg tablet Take 0.5 Tabs by mouth every twelve (12) hours for 30 days. 30 Tab 0    aluminum & magnesium hydroxide-simethicone (MYLANTA II) 400-400-40 mg/5 mL suspension Take 30 mL by mouth every eight (8) hours as needed for Indigestion. 150 mL 0    lidocaine-prilocaine (EMLA) topical cream Apply small amount over port area one hour before chemo treatment and cover with a Band-Aid 30 g 0       · It is important that you take the medication exactly as they are prescribed. · Keep your medication in the bottles provided by the pharmacist and keep a list of the medication names, dosages, and times to be taken in your wallet. · Do not take other medications without consulting your doctor. What to do at Home    Recommended diet: Regular Diet, stay hydrated. You should take a stool softener such as colace for constipation. If constipation persists for >24 hours you should take a dose of Milk of Magnesia. Call if your constipation persists. If you have had a hysterectomy or vaginal surgery you may experience vaginal spotting. This is acceptable. Should the bleeding require more that 4 pads a day please call our office. Nothing per vagina for 6-8 weeks.     Recommended activity:   No driving for 1 week and/or while on pain medication  Walk at least 6 times per day  Showers okay, no tub bathing/swimming for two weeks  Activity as able, stairs okay. If you had a laparoscopic procedure, no lifting greater than 25 lbs for 3 weeks. If you had an open procedure, no heavy lifting greater than 15 lbs for 6 weeks. If you experience any of the following persisting symptoms:    Nausea/vomiting, fever > 101 F, diarrhea/constipation, increasing pain despite medication, increasing vaginal discharge or any concerns, please call our office. Follow-up with Dr. Noel Vilchis in prn. Call [phone] for an appointment. Information obtained by :  I understand that if any problems occur once I am at home I am to contact my physician. I understand and acknowledge receipt of the instructions indicated above.                                                                                                                                            Physician's or R.N.'s Signature                                                                  Date/Time                                                                                                                                              Patient or Representative Signature                                                          Date/Time

## 2019-07-13 NOTE — PROGRESS NOTES
1320  PICC line discontinued. Bacitracin ointment applied at site with sterile dressing reapplied. [id number]  Discharge instructions given and reviewed with patient and her . All questions answered. Rxs given.  here to take patient home ( once 30 minute has passed from removal of PICC line. ). Patient and  understand how to care for peg tube. Supplies give.

## 2019-07-13 NOTE — HOSPICE
In to meet with patient and her  at bedside. Patient is alert and oriented x4. Provided verbal and written hospice information. Patient states that she is not sure if she will continue treatment at this point or not. She does not think that she wants to be discharged with hospice, rather, she and her  will discuss and call us when they are ready to admit. Thank you for the opportunity to serve this patient and her family.

## 2019-07-16 NOTE — DISCHARGE SUMMARY
[facility], [address] [place] 
P [phone]  F [phone] Discharge Summary Patient ID: 
Giulia Sexton [id number] 
10 y.o. 
1960 Admit date: 6/27/2019 PCP: Michelle Guadalupe MD 
 
Admit MD: Tracey Lawrence MD 
 
Discharge MD: Natalie Ramos MD 
 
Discharge date and time: 7/13/2019  4:45 PM 
 
Admission Diagnoses:  
 
Ovarian cancer ([address] [C56.9] Small bowel obstruction ([address] [R98.222] Patient Active Problem List  
Diagnosis Code  Endometrial cancer ([address] C54.1  Early satiety R68.81  
 Mediastinal lymphadenopathy R59.0  Weight loss, non-intentional R63.4  Cancer related pain G89.3  Abdominal pain R10.9  Tachycardia R00.0  Nausea & vomiting R11.2  Ascites R18.8  SIRS (systemic inflammatory response syndrome) (HCC) R65.10  
 On antineoplastic chemotherapy Z79.899  Carcinomatosis ([address] C80.0  Small bowel obstruction ([address] C25.566  Unspecified severe protein-calorie malnutrition ([address] E43 Discharge Diagnoses:   
Same End stage ovarian cancer Procedure: IR placed venting gastric tube (non-feeding) Hospital Course:  
Giulia Sexton is a 60 y.o.  female with a diagnosis of stage IV UPSC. She underwent a TLH, BSO, with lymphadenectomy in August 2014. She was found to have evidence of intraperitoneal disease with implants in the cul de sac, but no evidence of tyler metastases. She was treated with adjuvant chemotherapy with Taxol and Carboplatin. She then received pelvic radiation therapy. 
  
In the summer of 2018 she was noted to have an elevated CA-125. She had not been seen in the office for a couple of years prior to that visit. I ordered a CT of the chest/abdomen/pelvis to evaluate for recurrence. It demonstrated peritoneal carcinomatosis. I then sent her for a PET/CT to better evaluate. 
  
I recommended combination chemotherapy with Doxil/Avastin.   She completed 8 cycles of the Doxil, but refused further Avastin after the second cycle. She had a lot of vague complaints at that time and was convinced it was the Avastin causing it. She has been juicing and trying some alternative therapies as well. Her last CT in January 2019 again demonstrated stable disease on Doxil. Her last chemotherapy was in February 2019. Her CA-125 at that time was climbing. She did not show up for her last scheduled chemotherapy and wanted to take some time off. She did not discuss that with me before making the decision. She presented in mid-May stating that she was feeling better and wanted to discuss chemotherapy again. I sent her for a CT scan to reassess her disease. Her scan now demonstrates worsening disease in multiple locations. I recommend starting back on chemotherapy at this time. I suggested single-agent Carboplatin since it had been about 4.5 years since she was last treated with a platinum regimen. 
  
HPI: 
Ms. Nicky Mercado presented to the ER on 6/26/19 with worsening nausea, vomiting, and abdominal pain. Reports her emesis appeared \"coffee ground like\" and was almost blackish. Reports reflux or burning pain in her upper epigastric region. Denies fevers or chills. Using suppositories at home and reports bowel movements yesterday. Last episode of emesis last night in the ER. Denies CP or SOB. Ambulating without issues. She was admitted for symptom mgmt, requiring conservative mgmt. She was considered for palliative mgmt as well as more aggressive care including surgery, ultimately hoping she could resume meaningful salvage chemotherapy. Upper GI showed partial SBO and delayed emptying. She failed to improve with medical mgmt, including motility agents and antiemetics, TPN with GI rest followed by attempts at PO. Ultimately an IR placed venting PEG was placed due to persisting N/V cycles.   Her pain and nausea was not well controlled despite these measures. Palliative medicine consult requested, medical therapy did control them with controlled venting measures. We discussed surgery and advised against this. She did not desire aggressive care and decided to pursue hospice. She was previously DNR/DDNR. 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 Currently receiving single agent Carboplatin, s/p cycle #2 on 7/2/19. CT of head was negative. Heme/CV: Anemia d/t chronic dz/chemo. Renal: renal function baseline. FEN/GI: Partial small bowel obstruction/delayed transit of 7 hrs on UGI.  2nd admission for same. Chemo may improve this, but she is in a salvage state. Now considering Hospice, no further treatment planned. Venting PEG placed by IR on 7/3/19. Ongoing education for use. Despite venting, getting ill. liquid diet PRN with venting for relief/reflux of contents. instructed on use, supplies provided. Continue scheduled antiemetics/haldol Nausea improving. Appreciate palliative medicine recommendations. No role for TPN. Neuro: Cancer related/obstruction related pain, improved, medications as below. ID/Wound:  Afebrile. abd improved. No WBC PPX:  Ambulation, SCDs, IS, PT for progressive debility/weakness. Dispostion: Improved overall. Decision RE: hospice TBD. Currently sx controlled on palliative regimen. She will call our office to discuss her decision. Consults: Palliative Care Significant Diagnostic Studies: 
Lab Results Component Value Date/Time WBC 4.1 07/11/2019 10:03 AM  
 ABS. NEUTROPHILS 4.0 07/01/2019 08:36 AM  
 HGB 7.3 (L) 07/11/2019 10:03 AM  
 HCT 22.9 (L) 07/11/2019 10:03 AM  
 MCV 94.2 07/11/2019 10:03 AM  
 MCH 30.0 07/11/2019 10:03 AM  
 PLATELET 071 95/69/6510 10:03 AM  
 
Lab Results Component Value Date/Time  Sodium 133 (L) 07/12/2019 04:06 AM  
 Potassium 4.1 07/12/2019 04:06 AM  
 Chloride 104 07/12/2019 04:06 AM  
 CO2 24 07/12/2019 04:06 AM  
 Glucose 90 07/12/2019 04:06 AM  
 BUN 16 07/12/2019 04:06 AM  
 Creatinine 0.61 07/12/2019 04:06 AM  
 Calcium 8.6 07/12/2019 04:06 AM  
 Albumin 1.8 (L) 07/08/2019 08:47 AM  
 Bilirubin, total 0.3 07/08/2019 08:47 AM  
 AST (SGOT) 20 07/08/2019 08:47 AM  
 ALT (SGPT) 11 (L) 07/08/2019 08:47 AM  
 Alk. phosphatase 100 07/08/2019 08:47 AM  
 
 
Condition on Discharge: good Disposition: home Patient Instructions:  
Discharge Medication List as of 7/13/2019 12:52 PM  
  
START taking these medications Details  
metoclopramide (REGLAN) 5 mg/5 mL syrup Take 10 mL by mouth Before breakfast, lunch, dinner and at bedtime. , Print, Disp-300 mL, R-3, Hospice coverage not set HYDROmorphone (DILAUDID) 1 mg/mL liqd oral solution Take 2 mL by mouth every four (4) hours (while awake) for 30 days. Max Daily Amount: 10 mg., Print, Disp-300 mL, R-0, Hospice coverage not set  
  
prochlorperazine (COMPAZINE) 10 mg tablet Take 1 Tab by mouth every six (6) hours as needed for Nausea for up to 7 days. , Print, Disp-90 Tab, R-3, Hospice coverage not set  
  
haloperidol (HALDOL) 2 mg tablet Take 1 Tab by mouth three (3) times daily. , Print, Disp-90 Tab, R-3, Hospice coverage not set  
  
naloxone [facility]) 4 mg/actuation nasal spray Use 1 spray intranasally, then discard. Repeat with new spray every 2 min as needed for opioid overdose symptoms, alternating nostrils. , Print, Disp-1 Each, R-3, Hospice coverage not set CONTINUE these medications which have CHANGED Details  
ondansetron (ZOFRAN ODT) 4 mg disintegrating tablet Take 1 Tab by mouth every six (6) hours as needed for Nausea. Indications: Nausea and Vomiting caused by Cancer Drugs, Print, Disp-60 Tab, R-3, Hospice coverage not set  
  
pantoprazole (PROTONIX) 40 mg tablet Take 1 Tab by mouth two (2) times a day. Indications: heartburn, Print, Disp-60 Tab, R-3, Hospice coverage not set polyethylene glycol (MIRALAX) 17 gram packet Take 1 Packet by mouth daily. , Print, Disp-30 Packet, R-3, Hospice coverage not set  
  
senna-docusate (PERICOLACE) 8.6-50 mg per tablet Take 2 Tabs by mouth daily. , Print, Disp-60 Tab, R-3, Hospice coverage not set CONTINUE these medications which have NOT CHANGED Details  
metoprolol tartrate (LOPRESSOR) 25 mg tablet Take 0.5 Tabs by mouth every twelve (12) hours for 30 days. , Print, Disp-30 Tab, R-0  
  
aluminum & magnesium hydroxide-simethicone (MYLANTA II) 400-400-40 mg/5 mL suspension Take 30 mL by mouth every eight (8) hours as needed for Indigestion. , Print, Disp-150 mL, R-0  
  
lidocaine-prilocaine (EMLA) topical cream Apply small amount over port area one hour before chemo treatment and cover with a Band-Aid, Normal, Disp-30 g, R-0  
  
  
STOP taking these medications  
  
 metoclopramide HCl (REGLAN) 10 mg tablet Comments:  
Reason for Stopping:   
   
  
 
Activity: as tolerated, no driving while on pain medication Walk four or more times daily Showers okay Diet: liquid diet, venting PEG instructed Wound Care: Keep wound clean and dry Follow-up with Frank Triplett MD in 2 weeks. Follow-up Appointments Procedures  FOLLOW UP VISIT Appointment in: Other (Specify) No follow-up appointment needed if patient has decided upon hospice. No follow-up appointment needed if patient has decided upon hospice. Standing Status:   Standing Number of Occurrences:   1 Order Specific Question:   Appointment in Answer: Other (Specify) Signed: 
PHILIP Sosa 
7/16/2019/9:11 AM

## 2019-07-22 NOTE — TELEPHONE ENCOUNTER
Message left for pt for an appt to see Dr. Terra Vilchis 7/23/19 at 8:4am as a pre chemo appt. She is to call to confirm this appt.

## 2019-07-22 NOTE — TELEPHONE ENCOUNTER
[facility]  Nursing Note  [phone]  Fax [phone]      Name:  Flex Lares  YOB: 1960    Received outpatient Palliative Medicine referral from Dr. Dedrick Schaeffer to see pt for symptom management and supportive care. Chart  reviewed. Flex Lares is a 60 y.o. female with recurrent stage IV uterine cancer. Pt's most recent hospitalization was 6/27/19-7/13/19 at [facility] for SBO. Pt was followed by inpatient Palliative Medicine team during her hospitalization. Pt was seen by the hospice liaison nurse and pt decided that she is not ready for hospice yet. Nurse called pt to explain Palliative Medicine services and to offer pt an outpatient Palliative appt.   No answer, nurse left message requesting pt call our office.   ALBERT Umanzor, RN-BC  Clinical Referral Navigator  [phone]

## 2019-07-23 PROBLEM — E83.42 HYPOMAGNESEMIA: Status: ACTIVE | Noted: 2019-01-01

## 2019-07-23 PROBLEM — D64.9 ANEMIA: Status: ACTIVE | Noted: 2019-01-01

## 2019-07-23 PROBLEM — R65.10 SIRS (SYSTEMIC INFLAMMATORY RESPONSE SYNDROME) (HCC): Status: RESOLVED | Noted: 2019-01-01 | Resolved: 2019-01-01

## 2019-07-23 NOTE — PROGRESS NOTES
[facility]  [address] [facility], [address], [address]  [phone] [facility] [phone]    Office Visit    Patient ID:  Name: Tavo Penny  MRN: [id number]  : 1960/59 y.o. Visit date: 2019    Primary Diagnosis:     ICD-10-CM ICD-9-CM    1. Endometrial cancer ([address] C54.1 182.0         Problem List:    Patient Active Problem List   Diagnosis Code    Endometrial cancer ([address] C54.1    Early satiety R68.81    Mediastinal lymphadenopathy R59.0    Weight loss, non-intentional R63.4    Cancer related pain G89.3    Abdominal pain R10.9    Tachycardia R00.0    Nausea & vomiting R11.2    Ascites R18.8    SIRS (systemic inflammatory response syndrome) (HCC) R65.10    On antineoplastic chemotherapy Z79.899    Carcinomatosis ([address] C80.0    Small bowel obstruction ([address] K56.609    Unspecified severe protein-calorie malnutrition ([address] E43       INTERVAL HISTORY:  Tavo Penny is a  female with a diagnosis of stage IV UPSC. She underwent a TLH, BSO, with lymphadenectomy in 2014. She was found to have evidence of intraperitoneal disease with implants in the cul de sac, but no evidence of tyler metastases. She was treated with adjuvant chemotherapy with Taxol and Carboplatin. She then received pelvic radiation therapy. In the summer of  she was noted to have an elevated CA-125. She had not been seen in the office for a couple of years prior to that visit. I ordered a CT of the chest/abdomen/pelvis to evaluate for recurrence. It demonstrated peritoneal carcinomatosis. I then sent her for a PET/CT to better evaluate. I recommended combination chemotherapy with Doxil/Avastin. She completed 8 cycles of the Doxil, but refused further Avastin after the second cycle. She had a lot of vague complaints at that time and was convinced it was the Avastin causing it. She has been juicing and trying some alternative therapies as well.   Her last CT in January 2019 again demonstrated stable disease on Doxil. Her CA-125 at that time was climbing. She did not show up for her last scheduled chemotherapy and wanted to take some time off. She did not discuss that with me before making the decision. She presented in mid-May stating that she was feeling better and wanted to discuss chemotherapy again. I sent her for a CT scan to reassess her disease. Her scan demonstrated worsening disease in multiple locations. I recommend starting back on chemotherapy at this time. I suggested single-agent Carboplatin since it had been about 4.5 years since she was last treated with a platinum regimen. Following her first cycle of Carboplatin she was admitted to [facility] on two occasions with small bowel obstruction. During the second admission we decided to give her the second cycle of Carboplatin in the hopes that if her disease responded that her obstructive symptoms would improve. We also placed a palliative PEG tube during that admission. She did begin to improve and was discharged home to follow up in the office. We did discuss hospice during the admission, but she had yet to make a decision. She is feeling much better at this time. She is eating better. Her nausea has improved. She is really not even having to use the PEG tube. She is continuing to have regular bowel movements.       Tumor testing ([facility])  ER + (90%)  NM + (50%)  MSI indeterminate  Tumor mutational burden indeterminate  Mismatch repair proficient  PDL1 negative      PMH:  Past Medical History:   Diagnosis Date    Arthritis     Cancer [facility] 2014    uterine    Chronic pain     LOWER BACK    Depression     Nausea & vomiting     PMB (postmenopausal bleeding) 4/2014     PSH:  Past Surgical History:   Procedure Laterality Date    HX COLONOSCOPY      HX GYN  8/22/14    TLH/BSO/lymph node dissection    HX ORTHOPAEDIC Right 1982     LITTLE toe sx    HX VASCULAR ACCESS Left 2014 PORTACATH - LEFT CHEST    IR INSERT GASTROSTOMY TUBE PERC  7/3/2019     SOC:  Social History     Socioeconomic History    Marital status:      Spouse name: Not on file    Number of children: Not on file    Years of education: Not on file    Highest education level: Not on file   Occupational History    Not on file   Social Needs    Financial resource strain: Not on file    Food insecurity:     Worry: Not on file     Inability: Not on file    Transportation needs:     Medical: Not on file     Non-medical: Not on file   Tobacco Use    Smoking status: Former Smoker     Packs/day: 0.50     Years: 7.00     Pack years: 3.50     Types: Cigarettes     Last attempt to quit: 1999     Years since quittin.9    Smokeless tobacco: Never Used   Substance and Sexual Activity    Alcohol use:  Yes     Alcohol/week: 1.0 standard drinks     Types: 1 Glasses of wine per week     Comment: rarely    Drug use: No    Sexual activity: Never   Lifestyle    Physical activity:     Days per week: Not on file     Minutes per session: Not on file    Stress: Not on file   Relationships    Social connections:     Talks on phone: Not on file     Gets together: Not on file     Attends Cheondo[employer] service: Not on file     Active member of club or organization: Not on file     Attends meetings of clubs or organizations: Not on file     Relationship status: Not on file    Intimate partner violence:     Fear of current or ex partner: Not on file     Emotionally abused: Not on file     Physically abused: Not on file     Forced sexual activity: Not on file   Other Topics Concern    Not on file   Social History Narrative    Not on file     Family History  Family History   Problem Relation Age of Onset    Cancer Mother 61        liver metastatic disease, unknown primary    Stroke Mother         BRAIN ANEURYSM    Cancer Maternal Uncle 60        bone    Cancer Maternal Grandmother 61        unknown primary    Anesth Problems Neg Hx      Medications: (reviewed)  Current Outpatient Medications on File Prior to Visit   Medication Sig Dispense Refill    prochlorperazine (COMPAZINE) 10 mg tablet Take 5 mg by mouth every six (6) hours as needed.  pantoprazole (PROTONIX) 40 mg tablet Take 1 Tab by mouth two (2) times a day. Indications: heartburn 60 Tab 3    metoclopramide (REGLAN) 5 mg/5 mL syrup Take 10 mL by mouth Before breakfast, lunch, dinner and at bedtime. 300 mL 3    lidocaine-prilocaine (EMLA) topical cream Apply small amount over port area one hour before chemo treatment and cover with a Band-Aid 30 g 0    ondansetron (ZOFRAN ODT) 4 mg disintegrating tablet Take 1 Tab by mouth every six (6) hours as needed for Nausea. Indications: Nausea and Vomiting caused by Cancer Drugs 60 Tab 3    polyethylene glycol (MIRALAX) 17 gram packet Take 1 Packet by mouth daily. 30 Packet 3    senna-docusate (PERICOLACE) 8.6-50 mg per tablet Take 2 Tabs by mouth daily. 60 Tab 3    HYDROmorphone (DILAUDID) 1 mg/mL liqd oral solution Take 2 mL by mouth every four (4) hours (while awake) for 30 days. Max Daily Amount: 10 mg. 300 mL 0    haloperidol (HALDOL) 2 mg tablet Take 1 Tab by mouth three (3) times daily. 90 Tab 3    naloxone (NARCAN) 4 mg/actuation nasal spray Use 1 spray intranasally, then discard. Repeat with new spray every 2 min as needed for opioid overdose symptoms, alternating nostrils. 1 Each 3    aluminum & magnesium hydroxide-simethicone (MYLANTA II) 400-400-40 mg/5 mL suspension Take 30 mL by mouth every eight (8) hours as needed for Indigestion. 150 mL 0     No current facility-administered medications on file prior to visit.       Allergies: (reviewed)  Allergies   Allergen Reactions    Hydrocodone Nausea Only    Oxycodone Nausea and Vomiting       ROS:  Negative     OBJECTIVE:    PHYSICAL EXAM  VITAL SIGNS: Vitals:    07/23/19 0852   BP: 127/75   Pulse: (!) 126   Weight: 161 lb (73 kg)   Height: 5' 5.98\" (1.676 m)     Body mass index is 26 kg/m². GENERAL FLOWER: Conversant, alert, oriented. NAD   HEENT: Normocephalic. Oropharynx clear. Neck: Trachea midline, no JVD, no thyroid enlargement   RESPIRATORY: Lung fields clear to auscultation and percussion. Adequate respiratory effort   CARDIOVASC: RRR without murmur   GASTROINT: soft, non-tender, distended with ascites   MUSCULOSKEL: FROM. No focal tenderness. EXTREMITIES: extremities normal, atraumatic, no cyanosis or edema. Pulses appreciated. PELVIC: Deferred   RECTAL: Deferred   DOMINGUEZ SURVEY: Negative throughout---neck, axillae, inguina. NEURO: Grossly intact, no acute deficit. Oriented. Not depressed. Not agitated. Lab Results   Component Value Date/Time    WBC 4.1 07/11/2019 10:03 AM    HGB 7.3 (L) 07/11/2019 10:03 AM    HCT 22.9 (L) 07/11/2019 10:03 AM    PLATELET 301 12/40/7189 10:03 AM    MCV 94.2 07/11/2019 10:03 AM     Lab Results   Component Value Date/Time    Sodium 133 (L) 07/12/2019 04:06 AM    Potassium 4.1 07/12/2019 04:06 AM    Chloride 104 07/12/2019 04:06 AM    CO2 24 07/12/2019 04:06 AM    Anion gap 5 07/12/2019 04:06 AM    Glucose 90 07/12/2019 04:06 AM    BUN 16 07/12/2019 04:06 AM    Creatinine 0.61 07/12/2019 04:06 AM    BUN/Creatinine ratio 26 (H) 07/12/2019 04:06 AM    GFR est AA >60 07/12/2019 04:06 AM    GFR est non-AA >60 07/12/2019 04:06 AM    Calcium 8.6 07/12/2019 04:06 AM    Bilirubin, total 0.3 07/08/2019 08:47 AM    AST (SGOT) 20 07/08/2019 08:47 AM    Alk. phosphatase 100 07/08/2019 08:47 AM    Protein, total 6.6 07/08/2019 08:47 AM    Albumin 1.8 (L) 07/08/2019 08:47 AM    Globulin 4.8 (H) 07/08/2019 08:47 AM    A-G Ratio 0.4 (L) 07/08/2019 08:47 AM    ALT (SGPT) 11 (L) 07/08/2019 08:47 AM     Lab Results   Component Value Date/Time    CA-125 367 (H) 07/02/2019 10:28 AM    Cancer Ag (CA) 125 636.0 (H) 05/16/2019 11:06 AM         CT of the chest/abdomen/pelvis (5/22/19)  THYROID: Not imaged.   MEDIASTINUM: Prevascular, right paratracheal, aorticopulmonary and precarinal  lymphadenopathy again shown, increased in the interval. For example, a lower  right paratracheal lymph node currently shows a short axis dimension of 9 mm  compared with 7 mm previously. Central venous catheter terminates in superior  vena cava. DELORES: No mass or lymphadenopathy. THORACIC AORTA: No dissection or aneurysm. MAIN PULMONARY ARTERY: Normal in caliber. TRACHEA/BRONCHI: Patent. ESOPHAGUS: No wall thickening or dilatation. HEART: Size. No pericardial effusion. Interval increased size and number  pericardial lymph nodes with short axis dimension measuring up to 1.1 cm. PLEURA: No effusion or pneumothorax. LUNGS: Subpleural nodule along superior left major fissure measuring 4 mm in  size compared with 2 mm previously. Subpleural lingular nodule demonstrated in  the interval, measuring 4 mm. Interval inferior lingular nodule measuring 4 mm. Interval right middle lobe nodule measuring 3 mm. Findings suspicious for  metastatic disease. LIVER: Hypoattenuating lesion of segment 6 measuring 1.3 cm in size, compared  with 1.0 cm previously. Equivocal peripheral lesion in segment 7 in the interval  measuring 5 mm in size. No biliary ductal dilation. GALLBLADDER: Unremarkable. SPLEEN: Multiple areas of hypoattenuation in the mid and inferior spleen appear  increased in the interval.  PANCREAS: No mass or ductal dilatation. ADRENALS: Right adrenal mass again shown, measuring 3.6 x 2.3 cm compared with  3.4 x 1.9 cm previously. KIDNEYS: A few small cysts again shown measuring up to 1.2 cm in size. STOMACH: Unremarkable. SMALL BOWEL: Interval nonspecific mural thickening of proximal small bowel loops  with stranding extensively shown in the interval within the small bowel  mesentery. COLON: No dilatation or wall thickening. Stranding within the sigmoid mesentery  similar to that in the small bowel mesentery. APPENDIX: Not demonstrated.   PERITONEUM: Interval demonstration of mild to moderate ascites. Suspect interval  omental caking. RETROPERITONEUM: Small renal level para-aortic adenopathy in the interval with  short axis dimension measuring up to 1.3 cm. Small aortocaval lymph nodes again  demonstrated. REPRODUCTIVE ORGANS: Status post hysterectomy. A mass in the region of the  vaginal cuff appears larger in the interval measuring up to 3.5 x 2.5 cm  compared with 2.3 x 1.8 cm previously. URINARY BLADDER: Nonspecific mild diffuse mural thickening. BONES: No destructive bone lesion demonstrated. ADDITIONAL COMMENTS: N/A     IMPRESSION  Worsening of metastatic disease in several locations including mediastinum,  pericardial space, retroperitoneum, liver and spleen, and right adrenal gland. Interval demonstration of pulmonary metastatic disease, ascites, small bowel and  sigmoid colonic mesenteric stranding with proximal small bowel wall thickening,  and omentum. Mass in the region of the vaginal cuff has increased in the  interval.      IMPRESSION AND PLAN:  Aditi Gallagher has a diagnosis of recurrent uterine papillary serous carcinoma. She has been receiving single agent Carboplatin and has completed 2 cycles. Her last cycle was completed in the hospital when she was admitted for a bowel obstruction. She is doing much better at this time. Her GI symptoms have significantly improved. She wishes to continue with chemotherapy at this time. I agree with her that it is a good idea since she is looking so much better. We will plan on reimaging after another couple of cycles, unless we have a reason to image sooner. We also discussed her [facility] report. She is strongly ER and ME positive, suggesting that hormonal therapy might be an option for her after completion of chemotherapy.         Otf Stallings MD  7/23/2019/2:54 PM

## 2019-07-23 NOTE — PROGRESS NOTES
Let her know her CA-125 is a little better today. It's not much lower, but it might make her feel better. She needs the encouragement.

## 2019-07-23 NOTE — PROGRESS NOTES
0945 Pt admit to [facility] for C3 Carboplatin ambulatory in stable condition. Assessment completed. No new concerns voiced. Port accessed and flushed with positive blood return. Labs drawn per order and sent for processing. Normal Saline started at [facility]. Labs reviewed, patients hgb 6.2 and magnesium 1.2, Herbert PALACIOS notified. Additional labs drawn.     Visit Vitals  /72   Pulse (!) 104   Temp 98.2 °F (36.8 °C)   Resp 16   Ht 5' 5.98\" (1.676 m)   Wt 73 kg (161 lb)   BMI 26.00 kg/m²       Medications:  Medications Administered     0.9% sodium chloride infusion     Admin Date  07/23/2019 Action  New Bag Dose  25 mL/hr Rate  25 mL/hr Route  IntraVENous Administered By  Jay Deleon RN          CARBOplatin (PARAPLATIN) 562 mg in 0.9% sodium chloride 250 mL, overfill volume 25 mL chemo infusion (GOG)     Admin Date  07/23/2019 Action  New Bag Dose  562 mg Rate  662.4 mL/hr Route  IntraVENous Administered By  Jay Deleon RN          dexamethasone (DECADRON) 12 mg in 0.9% sodium chloride 50 mL, overfill volume 5 mL IVPB     Admin Date  07/23/2019 Action  Given Dose  12 mg Rate  232 mL/hr Route  IntraVENous Administered By  Jay Deleon RN          fosaprepitant (EMEND) 150 mg in 0.9% sodium chloride 150 mL IVPB     Admin Date  07/23/2019 Action  Given Dose  150 mg Rate  450 mL/hr Route  IntraVENous Administered By  Jay Deleon RN          magnesium sulfate 2 g/50 ml IVPB (premix or compounded)     Admin Date  07/23/2019 Action  New Bag Dose  2 g Rate  50 mL/hr Route  IntraVENous Administered By  Jay Deleon RN           Admin Date  07/23/2019 Action  New Bag Dose  2 g Rate  50 mL/hr Route  IntraVENous Administered By  Jay Deleon RN          [facility] 8 mg     Admin Date  07/23/2019 Action  Given Dose  8 mg Route  IntraVENous Administered By  Jay Deleon RN          prochlorperazine (COMPAZINE) injection 10 mg     Admin Date  07/23/2019 Action  Given Dose  10 mg Route  IntraVENous Administered By  Kaylin Conroy RN                  7587 Pt tolerated treatment well. Port  maintained positive blood return throughout treatment, flushed with positive blood return at conclusion and port heparinized and de-accessed per pro. D/c home ambulatory in no distress. Pt aware of next hospitals appointment scheduled for 8/13/19. Recent Results (from the past 12 hour(s))   CBC WITH AUTOMATED DIFF    Collection Time: 07/23/19  9:43 AM   Result Value Ref Range    WBC 5.5 3.6 - 11.0 K/uL    RBC 2.08 (L) 3.80 - 5.20 M/uL    HGB 6.2 (L) 11.5 - 16.0 g/dL    HCT 20.1 (L) 35.0 - 47.0 %    MCV 96.6 80.0 - 99.0 FL    MCH 29.8 26.0 - 34.0 PG    MCHC 30.8 30.0 - 36.5 g/dL    RDW 17.2 (H) 11.5 - 14.5 %    PLATELET 777 909 - 458 K/uL    MPV 10.6 8.9 - 12.9 FL    NRBC 0.0 0  WBC    ABSOLUTE NRBC 0.00 0.00 - 0.01 K/uL    NEUTROPHILS 83 (H) 32 - 75 %    LYMPHOCYTES 4 (L) 12 - 49 %    MONOCYTES 10 5 - 13 %    EOSINOPHILS 0 0 - 7 %    BASOPHILS 1 0 - 1 %    METAMYELOCYTES 1 (H) 0 %    MYELOCYTES 1 (H) 0 %    IMMATURE GRANULOCYTES 0 %    ABS. NEUTROPHILS 4.6 1.8 - 8.0 K/UL    ABS. LYMPHOCYTES 0.2 (L) 0.8 - 3.5 K/UL    ABS. MONOCYTES 0.6 0.0 - 1.0 K/UL    ABS. EOSINOPHILS 0.0 0.0 - 0.4 K/UL    ABS. BASOPHILS 0.1 0.0 - 0.1 K/UL    ABS. IMM.  GRANS. 0.0 K/UL    DF MANUAL      PLATELET COMMENTS Large Platelets      RBC COMMENTS ANISOCYTOSIS  1+        RBC COMMENTS MACROCYTOSIS  1+       METABOLIC PANEL, COMPREHENSIVE    Collection Time: 07/23/19  9:43 AM   Result Value Ref Range    Sodium 133 (L) 136 - 145 mmol/L    Potassium 3.8 3.5 - 5.1 mmol/L    Chloride 99 97 - 108 mmol/L    CO2 25 21 - 32 mmol/L    Anion gap 9 5 - 15 mmol/L    Glucose 95 65 - 100 mg/dL    BUN 12 6 - 20 MG/DL    Creatinine 0.61 0.55 - 1.02 MG/DL    BUN/Creatinine ratio 20 12 - 20      GFR est AA >60 >60 ml/min/1.73m2    GFR est non-AA >60 >60 ml/min/1.73m2    Calcium 8.3 (L) 8.5 - 10.1 MG/DL    Bilirubin, total 0.5 0.2 - 1.0 MG/DL ALT (SGPT) 10 (L) 12 - 78 U/L    AST (SGOT) 12 (L) 15 - 37 U/L    Alk.  phosphatase 118 (H) 45 - 117 U/L    Protein, total 7.2 6.4 - 8.2 g/dL    Albumin 1.9 (L) 3.5 - 5.0 g/dL    Globulin 5.3 (H) 2.0 - 4.0 g/dL    A-G Ratio 0.4 (L) 1.1 - 2.2     MAGNESIUM    Collection Time: 07/23/19  9:43 AM   Result Value Ref Range    Magnesium 1.2 (L) 1.6 - 2.4 mg/dL   CANCER ANTIGEN 125    Collection Time: 07/23/19  9:43 AM   Result Value Ref Range    CA-125 348 (H) 1.5 - 35.0 U/mL   TYPE & SCREEN    Collection Time: 07/23/19  1:55 PM   Result Value Ref Range    Crossmatch Expiration 07/26/2019     ABO/Rh(D) Trishwallace Pantoja NEGATIVE     Antibody screen NEG    IRON PROFILE    Collection Time: 07/23/19  1:55 PM   Result Value Ref Range    Iron 25 (L) 35 - 150 ug/dL    TIBC 159 (L) 250 - 450 ug/dL    Iron % saturation 16 (L) 20 - 50 %   VITAMIN B12    Collection Time: 07/23/19  1:55 PM   Result Value Ref Range    Vitamin B12 531 193 - 986 pg/mL   FOLATE    Collection Time: 07/23/19  1:55 PM   Result Value Ref Range    Folate 16.9 5.0 - 21.0 ng/mL

## 2019-07-23 NOTE — PROGRESS NOTES
Gynecologic Oncology - [facility]  [address] [facility], [address], [address]  P [phone]  F [phone]       Patient: Nasir Villalpando  Admit Date: 7/23/2019  Admit Dx: [facility] carboplatin infusion  Primary Dx: stage IV UPSC    Subjective:     Patient seen today in [facility] following discharge from hospital for partial SBO, delayed GI transit. She was debating hospice care on discharge. She was seen in f/u today by Dr. Lizbeth To, feeling better overall. She has some nausea, controlled with compazine at home. She did not bring her compazine today, currently nauseated. No emesis, eating modest meals w/o needing to vent her PEG. She states she lost 5lbs in the last week. +BMs/flatus. Her pain is controlled with dilaudid, usually related to her back. No F/C, SOB/CP.  +fatigue/weak. No bloody stools, hematuria, dysuria. She lives with her , does requires some assistance with meal prep and some chores, but performs all other self care. She estimates she spends 50% of her day in bed. Objective:       Physical Exam  /70   Pulse (!) 120   Temp 98.2 °F (36.8 °C)   Resp 16   Ht 5' 5.98\" (1.676 m)   Wt 161 lb (73 kg)   BMI 26.00 kg/m²      General:  alert, cooperative, no distress     Cardiac:  Tachy, reg rhythm        Lungs:  clear to auscultation bilaterally  Abdomen:  soft, nondistended, nontender       Wound:  clean, dry PEG site   Extremity: extremities normal, atraumatic, no cyanosis or edema    Wt Readings from Last 3 Encounters:   07/23/19 161 lb (73 kg)   07/23/19 161 lb (73 kg)   07/13/19 167 lb 9.6 oz (76 kg)         Data Review  Lab Results   Component Value Date/Time    WBC 5.5 07/23/2019 09:43 AM    ABS.  NEUTROPHILS 4.6 07/23/2019 09:43 AM    HGB 6.2 (L) 07/23/2019 09:43 AM    HCT 20.1 (L) 07/23/2019 09:43 AM    MCV 96.6 07/23/2019 09:43 AM    MCH 29.8 07/23/2019 09:43 AM    PLATELET 268 34/97/4435 09:43 AM     Lab Results   Component Value Date/Time    Sodium 133 (L) 07/23/2019 09:43 AM    Potassium 3.8 2019 09:43 AM    Chloride 99 2019 09:43 AM    CO2 25 2019 09:43 AM    Glucose 95 2019 09:43 AM    BUN 12 2019 09:43 AM    Creatinine 0.61 2019 09:43 AM    Calcium 8.3 (L) 2019 09:43 AM    Albumin 1.9 (L) 2019 09:43 AM    Bilirubin, total 0.5 2019 09:43 AM    AST (SGOT) 12 (L) 2019 09:43 AM    ALT (SGPT) 10 (L) 2019 09:43 AM    Alk. phosphatase 118 (H) 2019 09:43 AM         Assessment/Plan:     Admitted for palliative chemotherapy infusion for progressive uterine serous cancer, carcinomatosis, pSBO. ECO    Patient Active Problem List   Diagnosis Code    Endometrial cancer ([facility] [address] C54.1    Early satiety R68.81    Mediastinal lymphadenopathy R59.0    Weight loss, non-intentional R63.4    Cancer related pain G89.3    Abdominal pain R10.9    Tachycardia R00.0    Nausea & vomiting R11.2    Ascites R18.8    SIRS (systemic inflammatory response syndrome) (HCC) R65.10    On antineoplastic chemotherapy Z79.899    Carcinomatosis (HCC) C80.0    Small bowel obstruction ([facility] [address] K56.609    Unspecified severe protein-calorie malnutrition ([facility] [address] E43       Onc: s/p 2 cycles carbo, last during inpatient. Cycle 3 today  Anemia: Hemodynamically, tachy. Chronic anemia d/t multifactor- disease, chemo, possible nutritional. Multivit rec. Set up for blood transfusion. Followup Fe, B12, folate  FEN/GI:   PSBO/carcinomatosis/delayed GI function: venting PEG PRN, flushing/tube care discussed. Flush BID minimum. She only takes Reglan intermittently. Advised she take it as Rx'd. Nausea: d/t disease/above. Compazine works. Continue PRN dosing. IV Compazine now. Hyponatremia: mild, not on add'l diuretics. Asx. Advised dietary intake   Hypomagnesemia: IV Mg today, Rx for home   Moderate protein calorie malnutrition, wt loss. Continue with supplements, diet as able. Pain d/t disease: Dilaudid PRN.  Refill provided by Dr. Carl Marsh today.  Disposition: stable at home. Type/screen for transfusion in the next 3 days. Discussed findings with patient/ present. Questions answered. 25 minutes spent with the patient and >50% allotted to direct counseling, discussion and answering questions related to the above.     PHILIP [place]

## 2019-07-25 NOTE — PROGRESS NOTES
[address] [facility] [place] [facility] VISIT NOTE    7642 Patient arrives for Blood Transfusion without acute problems. Please see connect care for complete assessment and education provided. All central lines follow the [facility]. Vital signs stable throughout and prior to discharge, Pt. Tolerated treatment well and discharged without incident. Patient/spouse is aware of next [facility] appointment on 08/08/2019. Appointment card given to patient/spouse. Medications Verified by [address] Angella Micromedex:  1. PRBC's  2 units   2. Zofran 8 mg IV  3.  NS 10 ml IV flush via port  4. Heparin 500 units IV flush via port    VITAL SIGNS   Patient Vitals for the past 12 hrs:   Temp Pulse Resp BP SpO2   07/25/19 1603 98.6 °F (37 °C) (!) 111 18 142/83    07/25/19 1509 98.5 °F (36.9 °C) (!) 111 18 152/88    07/25/19 1409 98.6 °F (37 °C) (!) 105 18 144/77    07/25/19 1339 98.4 °F (36.9 °C) (!) 103 18 146/79    07/25/19 1324 98.6 °F (37 °C) (!) 106 18 146/84    07/25/19 1302 98.5 °F (36.9 °C) (!) 106 18 146/71    07/25/19 1243 98.6 °F (37 °C) (!) 103 18 144/85    07/25/19 1148 98.4 °F (36.9 °C) (!) 102 18 132/81    07/25/19 1048 97.9 °F (36.6 °C) (!) 105 18 120/72    07/25/19 1018 97.7 °F (36.5 °C) (!) 103 18 116/72    07/25/19 1003 97.7 °F (36.5 °C) (!) 102 18 112/70    07/25/19 0944 97.5 °F (36.4 °C) (!) 101 18 125/74    07/25/19 0917 97.5 °F (36.4 °C) (!) 107 18 126/66 97 %       Patient & spouse did wait the 1 hour post transfusion per [place], Discharge Instructions provided to patient, patient verbalized understanding with a copy of d/c instructions sent home with patient.

## 2019-08-08 NOTE — TELEPHONE ENCOUNTER
Message left on answering machine to call me back as she did not show for her 10am pre chemo appt this morning.

## 2019-08-13 PROBLEM — D50.9 IRON DEFICIENCY ANEMIA: Status: ACTIVE | Noted: 2019-01-01

## 2019-08-13 NOTE — TELEPHONE ENCOUNTER
Pt called back and will have chemotherapy on 8/13/19 and will have Yue PALACIOS see her at [facility].

## 2019-08-13 NOTE — PROGRESS NOTES
Gynecologic Oncology - [facility]  [address] [facility], [address], [address]  P [phone]  F [phone]       Patient: Marimar Dickens  Admit Date: 8/13/2019  Admit Dx: [facility] carboplatin infusion  Primary Dx: stage IV UPSC    Subjective:     Patient seen today in [facility] for resumption of chemo following discharge from hospital for partial SBO, delayed GI transit. She was debating hospice care on discharge. She has since done quite well, tolerating cycle 3 of her carboplatin. She received 2u PRBCs noting increase in well being. She has not had to use her Gtube but for cleaning. She eats mostly what she wants, notes cycles of bloating at which time she reduces intake and switches to liquids. She tolerates small, frequent meals. Continues on senna with BM every 1-3 days, though very firm if it takes 3 days. She has stopped the reglan of her own accord, but continues with compazine for nausea about 3x/day. She doesn't know why she stopped the reglan. She is using dilaudid 4mg PO about every 6-8 hours for abd discomfort and chronic back pain, which makes her groggy/sleepy. No F/C, SOB/CP, leg swelling.  +fatigue/weak. No bloody stools, hematuria, dysuria. She lives with her , does requires some assistance with meal prep and some chores, but performs all other self care. She estimates she spends 50% of her day in bed. Objective:       Physical Exam  /72 (BP 1 Location: Left arm, BP Patient Position: Sitting)   Pulse (!) 119   Temp 98.4 °F (36.9 °C)      General:  alert, cooperative, no distress     Cardiac:  Tachy, reg rhythm        Lungs:  clear to auscultation bilaterally  Abdomen:  Soft, mildly protuberant, TTP left abd w/o rebound/guarding.         Wound:  clean, dry PEG site   Extremity: extremities normal, atraumatic, no cyanosis or edema    Wt Readings from Last 3 Encounters:   07/23/19 161 lb (73 kg)   07/23/19 161 lb (73 kg)   07/13/19 167 lb 9.6 oz (76 kg)         Data Review  Lab Results   Component Value Date/Time    WBC 5.5 2019 09:43 AM    ABS. NEUTROPHILS 4.6 2019 09:43 AM    HGB 6.2 (L) 2019 09:43 AM    HCT 20.1 (L) 2019 09:43 AM    MCV 96.6 2019 09:43 AM    MCH 29.8 2019 09:43 AM    PLATELET 505  09:43 AM     Lab Results   Component Value Date/Time    Sodium 133 (L) 2019 09:43 AM    Potassium 3.8 2019 09:43 AM    Chloride 99 2019 09:43 AM    CO2 25 2019 09:43 AM    Glucose 95 2019 09:43 AM    BUN 12 2019 09:43 AM    Creatinine 0.61 2019 09:43 AM    Calcium 8.3 (L) 2019 09:43 AM    Albumin 1.9 (L) 2019 09:43 AM    Bilirubin, total 0.5 2019 09:43 AM    AST (SGOT) 12 (L) 2019 09:43 AM    ALT (SGPT) 10 (L) 2019 09:43 AM    Alk. phosphatase 118 (H) 2019 09:43 AM         Assessment/Plan:     Admitted for palliative chemotherapy infusion for progressive uterine serous cancer, carcinomatosis, pSBO. ECO    Patient Active Problem List   Diagnosis Code    Endometrial cancer ([facility] [address] C54.1    Early satiety R68.81    Mediastinal lymphadenopathy R59.0    Weight loss, non-intentional R63.4    Cancer related pain G89.3    Abdominal pain R10.9    Tachycardia R00.0    Nausea & vomiting R11.2    Ascites R18.8    On antineoplastic chemotherapy Z79.899    Carcinomatosis (HCC) C80.0    Small bowel obstruction ([address] K56.609    Unspecified severe protein-calorie malnutrition ([address] E43    Anemia D64.9    Hypomagnesemia E83.42    Iron deficiency anemia D50.9       Onc: s/p 3 cycles carbo, last during inpatient. Cycle 4 today  Anemia: Hemodynamically, tachy. Chronic anemia d/t multifactor- disease, chemo, Fe deficient. Continue Multivit. Add Fe therapy. Stable following transfusion. FEN/GI:   PSBO/carcinomatosis/delayed GI function: venting PEG PRN, flushing/tube care reiterated.   Once again advised she take Reglan for her nausea and GI function, evidence by the bloating cycles she describes. Historically she does not take her meds as Rx'd. Nausea: d/t disease/above. Compazine works. Continue PRN dosing. Does not like Zofran. Discussed limited use if she resumes her Reglan d/t extrapyramidal effects. Hyponatremia: mild, not on add'l diuretics. Asx. Advised dietary intake   Hypomagnesemia: IV Mg today, Rx for home   Moderate protein calorie malnutrition, wt loss. Continue with supplements, diet as able. Pain d/t disease: Duragesic patch supplied today for basal control. Hope to reduce drossiness and PRN needs. Described dilaudid breakthrough use. Disposition: Remains stable at home. Discussed findings above with patient/ present. Questions answered. >25 minutes spent with the patient/ and >50% allotted to direct counseling, discussion and answering questions related to the above.     PHILIP Sadler

## 2019-08-13 NOTE — PROGRESS NOTES
[facility] Progress Note    Date: 2019    Name: Uri Fleming    MRN: [id number]         : 1960      1100. Ms. Jacob Riddle Arrived ambulatory for cycle 4 day 1 (carboplatin). Assessment was completed, no acute issues at this time, no new complaints voiced. Port accessed without difficulty, positive for blood return, labs drawn and processed. Labs sent for processing. Chemotherapy Flowsheet 2019   Cycle C4   Date 2019   Drug / Regimen Carboplatin   Pre Meds given   Notes given         Ms. Gill's vitals were reviewed. Patient Vitals for the past 12 hrs:   Temp Pulse BP   19 1704 -- (!) 106 128/72   19 1703 97.5 °F (36.4 °C) (!) 111 144/80   19 1112 98.4 °F (36.9 °C) (!) 119 122/72         Lab results were obtained and reviewed. Recent Results (from the past 12 hour(s))   CBC WITH AUTOMATED DIFF    Collection Time: 19 11:15 AM   Result Value Ref Range    WBC 3.7 3.6 - 11.0 K/uL    RBC 2.69 (L) 3.80 - 5.20 M/uL    HGB 8.1 (L) 11.5 - 16.0 g/dL    HCT 26.1 (L) 35.0 - 47.0 %    MCV 97.0 80.0 - 99.0 FL    MCH 30.1 26.0 - 34.0 PG    MCHC 31.0 30.0 - 36.5 g/dL    RDW 18.9 (H) 11.5 - 14.5 %    PLATELET 304 (L) 378 - 400 K/uL    MPV 11.9 8.9 - 12.9 FL    NRBC 0.0 0  WBC    ABSOLUTE NRBC 0.00 0.00 - 0.01 K/uL    NEUTROPHILS 64 32 - 75 %    BAND NEUTROPHILS 2 0 - 6 %    LYMPHOCYTES 21 12 - 49 %    MONOCYTES 13 5 - 13 %    EOSINOPHILS 0 0 - 7 %    BASOPHILS 0 0 - 1 %    IMMATURE GRANULOCYTES 0 %    ABS. NEUTROPHILS 2.4 1.8 - 8.0 K/UL    ABS. LYMPHOCYTES 0.8 0.8 - 3.5 K/UL    ABS. MONOCYTES 0.5 0.0 - 1.0 K/UL    ABS. EOSINOPHILS 0.0 0.0 - 0.4 K/UL    ABS. BASOPHILS 0.0 0.0 - 0.1 K/UL    ABS. IMM.  GRANS. 0.0 K/UL    DF MANUAL      RBC COMMENTS ANISOCYTOSIS  1+        RBC COMMENTS HYPOCHROMIA  1+        RBC COMMENTS MACROCYTOSIS  1+        WBC COMMENTS TOXIC GRANULATION     METABOLIC PANEL, COMPREHENSIVE    Collection Time: 19 11:15 AM   Result Value Ref Range Sodium 134 (L) 136 - 145 mmol/L    Potassium 3.9 3.5 - 5.1 mmol/L    Chloride 101 97 - 108 mmol/L    CO2 24 21 - 32 mmol/L    Anion gap 9 5 - 15 mmol/L    Glucose 110 (H) 65 - 100 mg/dL    BUN 14 6 - 20 MG/DL    Creatinine 0.71 0.55 - 1.02 MG/DL    BUN/Creatinine ratio 20 12 - 20      GFR est AA >60 >60 ml/min/1.73m2    GFR est non-AA >60 >60 ml/min/1.73m2    Calcium 8.9 8.5 - 10.1 MG/DL    Bilirubin, total 0.5 0.2 - 1.0 MG/DL    ALT (SGPT) 9 (L) 12 - 78 U/L    AST (SGOT) 16 15 - 37 U/L    Alk. phosphatase 126 (H) 45 - 117 U/L    Protein, total 7.7 6.4 - 8.2 g/dL    Albumin 2.1 (L) 3.5 - 5.0 g/dL    Globulin 5.6 (H) 2.0 - 4.0 g/dL    A-G Ratio 0.4 (L) 1.1 - 2.2     MAGNESIUM    Collection Time: 08/13/19 11:15 AM   Result Value Ref Range    Magnesium 1.4 (L) 1.6 - 2.4 mg/dL   CANCER ANTIGEN 125    Collection Time: 08/13/19 11:15 AM   Result Value Ref Range    CA-125 525 (H) 1.5 - 35.0 U/mL       Pre-medications  were administered as ordered and chemotherapy was initiated.   Medications Administered     0.9% sodium chloride infusion     Admin Date  08/13/2019 Action  New Bag Dose  25 mL/hr Rate  25 mL/hr Route  IntraVENous Administered By  Lanny Leo RN          CARBOplatin (PARAPLATIN) 602 mg in 0.9% sodium chloride 250 mL, overfill volume 25 mL chemo infusion (GOG)     Admin Date  08/13/2019 Action  New Bag Dose  602 mg Rate  670.4 mL/hr Route  IntraVENous Administered By  Lanny Leo RN          dexamethasone (DECADRON) 12 mg in 0.9% sodium chloride 50 mL, overfill volume 5 mL IVPB     Admin Date  08/13/2019 Action  Given Dose  12 mg Rate  232 mL/hr Route  IntraVENous Administered By  Lanny Leo RN          diphenhydrAMINE (BENADRYL) injection 50 mg     Admin Date  08/13/2019 Action  Given Dose  50 mg Route  IntraVENous Administered By  Lanny Leo RN          fosaprepitant (EMEND) 150 mg in 0.9% sodium chloride 150 mL IVPB     Admin Date  08/13/2019 Action  Given Dose  150 mg Rate  450 mL/hr Route  IntraVENous Administered By  Lulu Hernandez RN          magnesium sulfate 2 g/50 ml IVPB (premix or compounded)     Admin Date  08/13/2019 Action  New Bag Dose  2 g Rate  50 mL/hr Route  IntraVENous Administered By  Lulu Hernandez RN           Admin Date  08/13/2019 Action  New Bag Dose  2 g Rate  50 mL/hr Route  IntraVENous Administered By  Lulu Hernandez RN          ondansetron [facility]) injection 8 mg     Admin Date  08/13/2019 Action  Given Dose  8 mg Route  IntraVENous Administered By  Lulu Hernandez RN              The patient received her CARBOplatin infusion and had no complaints. Patient received 2g IV magnesium sulfate 50mg IVBP x 2 doses. 15 minutes after hanging the second dose of mag, patient's  approached me and said she may be having a reaction. Immediately stopped the infusion, assessed pt. Pt was complaining of feeling flushed/hot, and a red rash on bilateral arms that was warm to touch. Notified pharmacist, 50mg IV benadryl given as per order for mild reaction. Pt said she felt better and the itching was going away on her arms. Pt denies any SOB or chest pain. Patient was monitored for 20 minutes post  infusion before being discharged from the [facility]. Pt did not have any complaints or questions prior to d/c.                                                        1705. Ms. Renita Arreguin  port flushed, heparinized and de accessed, patient was discharged from [place] [address] in stable condition with .       Vipul Arteaga RN  August 13, 2019

## 2019-08-23 NOTE — TELEPHONE ENCOUNTER
Message left on answering machine X2 appt with Dr. Georgette Lilly for 8/29/19 at 10:15am, and lab/C5 Carboplatin at [facility] on 9/3/19 at 8:00am, and that she must see Dr. Georgette Lilly this time in order to receive her tx, as she did not show for MD appt last cycle.

## 2019-08-28 NOTE — PROGRESS NOTES
[facility] GYNECOLOGIC ONCOLOGY  [address] [facility], [address], [address]  [phone]    Office Visit    Patient ID:  Name: Julius Rose  MRN: [id number]  : 1960/59 y.o. Visit date: 2019    Primary Diagnosis:     ICD-10-CM ICD-9-CM    1. Endometrial cancer (HCC) C54.1 182.0 CT ABD PELV W CONT      HYDROmorphone (DILAUDID) 4 mg tablet   2. Cancer associated pain G89.3 338.3 HYDROmorphone (DILAUDID) 4 mg tablet        Problem List:    Patient Active Problem List   Diagnosis Code    Endometrial cancer ([facility] [address] C54.1    Early satiety R68.81    Mediastinal lymphadenopathy R59.0    Weight loss, non-intentional R63.4    Cancer related pain G89.3    Abdominal pain R10.9    Tachycardia R00.0    Nausea & vomiting R11.2    Ascites R18.8    On antineoplastic chemotherapy Z79.899    Carcinomatosis ([address] C80.0    Small bowel obstruction ([address] K56.609    Unspecified severe protein-calorie malnutrition ([address] E43    Anemia D64.9    Hypomagnesemia E83.42    Iron deficiency anemia D50.9       INTERVAL HISTORY:  Julius Rose is a  female with a diagnosis of stage IV UPSC. She underwent a TLH, BSO, with lymphadenectomy in 2014. She was found to have evidence of intraperitoneal disease with implants in the cul de sac, but no evidence of tyler metastases. She was treated with adjuvant chemotherapy with Taxol and Carboplatin. She then received pelvic radiation therapy. In the summer of  she was noted to have an elevated CA-125. She had not been seen in the office for a couple of years prior to that visit. I ordered a CT of the chest/abdomen/pelvis to evaluate for recurrence. It demonstrated peritoneal carcinomatosis. I then sent her for a PET/CT to better evaluate. I recommended combination chemotherapy with Doxil/Avastin. She completed 8 cycles of the Doxil, but refused further Avastin after the second cycle.   She had a lot of vague complaints at that time and was convinced it was the Avastin causing it. She has been juicing and trying some alternative therapies as well. Her last CT in January 2019 again demonstrated stable disease on Doxil. Her CA-125 at that time was climbing. She did not show up for her last scheduled chemotherapy and wanted to take some time off. She did not discuss that with me before making the decision. She presented in mid-May stating that she was feeling better and wanted to discuss chemotherapy again. I sent her for a CT scan to reassess her disease. Her scan demonstrated worsening disease in multiple locations. I recommend starting back on chemotherapy at this time. I suggested single-agent Carboplatin since it had been about 4.5 years since she was last treated with a platinum regimen. Following her first cycle of Carboplatin she was admitted to [facility] on two occasions with small bowel obstruction. During the second admission we decided to give her the second cycle of Carboplatin in the hopes that if her disease responded that her obstructive symptoms would improve. We also placed a palliative PEG tube during that admission. She did begin to improve and was discharged home to follow up in the office. We did discuss hospice during the admission, but she had yet to make a decision. She was feeling much better when I saw her back in the office. Her nausea had improved. She was really not even having to use the PEG tube. She was continuing to have regular bowel movements. She wished to continue with chemotherapy following that admission. I agreed with her that it was a good idea since she was looking so much better. She has now completed 4 cycles of single agent Carboplatin. She is tolerating well and feels like she is doing better. She is not having to use her PEG tube for venting at this point and is having regular BMs.       Tumor testing ([facility])  ER + (90%)  [place] + (50%)  MSI indeterminate  Tumor mutational burden indeterminate  Mismatch repair proficient  PDL1 negative      PMH:  Past Medical History:   Diagnosis Date    Arthritis     Cancer ([facility] [address]     uterine    Chronic pain     LOWER BACK    Depression     Nausea & vomiting     PMB (postmenopausal bleeding) 2014     PSH:  Past Surgical History:   Procedure Laterality Date    HX COLONOSCOPY      HX GYN  14    TLH/BSO/lymph node dissection    HX ORTHOPAEDIC Right      LITTLE toe sx    HX VASCULAR ACCESS Left     PORTACATH - LEFT CHEST    IR INSERT GASTROSTOMY TUBE PERC  7/3/2019     SOC:  Social History     Socioeconomic History    Marital status:      Spouse name: Not on file    Number of children: Not on file    Years of education: Not on file    Highest education level: Not on file   Occupational History    Not on file   Social Needs    Financial resource strain: Not on file    Food insecurity:     Worry: Not on file     Inability: Not on file    Transportation needs:     Medical: Not on file     Non-medical: Not on file   Tobacco Use    Smoking status: Former Smoker     Packs/day: 0.50     Years: 7.00     Pack years: 3.50     Types: Cigarettes     Last attempt to quit: 1999     Years since quittin.0    Smokeless tobacco: Never Used   Substance and Sexual Activity    Alcohol use:  Yes     Alcohol/week: 1.0 standard drinks     Types: 1 Glasses of wine per week     Comment: rarely    Drug use: No    Sexual activity: Never   Lifestyle    Physical activity:     Days per week: Not on file     Minutes per session: Not on file    Stress: Not on file   Relationships    Social connections:     Talks on phone: Not on file     Gets together: Not on file     Attends Restorationism service: Not on file     Active member of club or organization: Not on file     Attends meetings of clubs or organizations: Not on file     Relationship status: Not on file    Intimate partner violence:     Fear of current or ex partner: Not on file     Emotionally abused: Not on file     Physically abused: Not on file     Forced sexual activity: Not on file   Other Topics Concern    Not on file   Social History Narrative    Not on file     Family History  Family History   Problem Relation Age of Onset    Cancer Mother 61        liver metastatic disease, unknown primary    Stroke Mother         BRAIN ANEURYSM    Cancer Maternal Uncle 61        bone    Cancer Maternal Grandmother 61        unknown primary    Anesth Problems Neg Hx      Medications: (reviewed)  Current Outpatient Medications on File Prior to Visit   Medication Sig Dispense Refill    ferrous sulfate (IRON) 325 mg (65 mg iron) tablet Take 1 Tab by mouth Before breakfast and dinner. Indications: anemia from inadequate iron 60 Tab 1    prochlorperazine (COMPAZINE) 10 mg tablet Take 5 mg by mouth every six (6) hours as needed.  magnesium oxide (MAG-OX) 400 mg tablet Take 1 Tab by mouth three (3) times daily. 60 Tab 1    pantoprazole (PROTONIX) 40 mg tablet Take 1 Tab by mouth two (2) times a day. Indications: heartburn 60 Tab 3    metoclopramide (REGLAN) 5 mg/5 mL syrup Take 10 mL by mouth Before breakfast, lunch, dinner and at bedtime. 300 mL 3    aluminum & magnesium hydroxide-simethicone (MYLANTA II) 400-400-40 mg/5 mL suspension Take 30 mL by mouth every eight (8) hours as needed for Indigestion. 150 mL 0    lidocaine-prilocaine (EMLA) topical cream Apply small amount over port area one hour before chemo treatment and cover with a Band-Aid 30 g 0    fentaNYL (DURAGESIC) 25 mcg/hr PATCH 1 Patch by TransDERmal route every seventy-two (72) hours for 30 days. Max Daily Amount: 1 Patch. 10 Patch 0    ondansetron (ZOFRAN ODT) 4 mg disintegrating tablet Take 1 Tab by mouth every six (6) hours as needed for Nausea. Indications: Nausea and Vomiting caused by Cancer Drugs 60 Tab 3    polyethylene glycol (MIRALAX) 17 gram packet Take 1 Packet by mouth daily.  27 Packet 3    senna-docusate (PERICOLACE) 8.6-50 mg per tablet Take 2 Tabs by mouth daily. 60 Tab 3    haloperidol (HALDOL) 2 mg tablet Take 1 Tab by mouth three (3) times daily. 90 Tab 3    naloxone (NARCAN) 4 mg/actuation nasal spray Use 1 spray intranasally, then discard. Repeat with new spray every 2 min as needed for opioid overdose symptoms, alternating nostrils. 1 Each 3     No current facility-administered medications on file prior to visit. Allergies: (reviewed)  Allergies   Allergen Reactions    Hydrocodone Nausea Only    Oxycodone Nausea and Vomiting       ROS:  Negative     OBJECTIVE:    PHYSICAL EXAM  VITAL SIGNS: Vitals:    08/29/19 1020   BP: 115/72   Pulse: (!) 115   Weight: 148 lb 6.4 oz (67.3 kg)   Height: 5' 7.01\" (1.702 m)     Body mass index is 23.24 kg/m². GENERAL FLOWER: Conversant, alert, oriented. NAD   HEENT: Normocephalic. Oropharynx clear. Neck: Trachea midline, no JVD, no thyroid enlargement   RESPIRATORY: Lung fields clear to auscultation and percussion. Adequate respiratory effort   CARDIOVASC: RRR without murmur   GASTROINT: soft, non-tender, distended with ascites   MUSCULOSKEL: FROM. No focal tenderness. EXTREMITIES: extremities normal, atraumatic, no cyanosis or edema. Pulses appreciated. PELVIC: Deferred   RECTAL: Deferred   DOMINGUEZ SURVEY: Negative throughout---neck, axillae, inguina. NEURO: Grossly intact, no acute deficit. Oriented. Not depressed. Not agitated.      Lab Results   Component Value Date/Time    WBC 3.7 08/13/2019 11:15 AM    HGB 8.1 (L) 08/13/2019 11:15 AM    HCT 26.1 (L) 08/13/2019 11:15 AM    PLATELET 801 (L) 75/52/0469 11:15 AM    MCV 97.0 08/13/2019 11:15 AM     Lab Results   Component Value Date/Time    Sodium 134 (L) 08/13/2019 11:15 AM    Potassium 3.9 08/13/2019 11:15 AM    Chloride 101 08/13/2019 11:15 AM    CO2 24 08/13/2019 11:15 AM    Anion gap 9 08/13/2019 11:15 AM    Glucose 110 (H) 08/13/2019 11:15 AM    BUN 14 08/13/2019 11:15 AM Creatinine 0.71 08/13/2019 11:15 AM    BUN/Creatinine ratio 20 08/13/2019 11:15 AM    GFR est AA >60 08/13/2019 11:15 AM    GFR est non-AA >60 08/13/2019 11:15 AM    Calcium 8.9 08/13/2019 11:15 AM    Bilirubin, total 0.5 08/13/2019 11:15 AM    AST (SGOT) 16 08/13/2019 11:15 AM    Alk. phosphatase 126 (H) 08/13/2019 11:15 AM    Protein, total 7.7 08/13/2019 11:15 AM    Albumin 2.1 (L) 08/13/2019 11:15 AM    Globulin 5.6 (H) 08/13/2019 11:15 AM    A-G Ratio 0.4 (L) 08/13/2019 11:15 AM    ALT (SGPT) 9 (L) 08/13/2019 11:15 AM     Lab Results   Component Value Date/Time    CA-125 525 (H) 08/13/2019 11:15 AM    Cancer Ag (CA) 125 636.0 (H) 05/16/2019 11:06 AM         CT of the chest/abdomen/pelvis (6/27/19)  LUNG BASES: Minimal left-sided pleural effusion is new compared to the June 14  exam     Cardiophrenic soft tissue nodule increasing in size. Trace pericardial effusion  is slightly increased as well. INCIDENTALLY IMAGED HEART AND MEDIASTINUM: Unchanged mediastinal  lymphadenopathy. LIVER: Hepatic hypodensities unchanged  GALLBLADDER: Unremarkable. SPLEEN: Splenic hypodensity/masses not significantly changed  PANCREAS: No mass or ductal dilatation. ADRENALS: 3.9 x 2.3 cm right adrenal mass, not significantly changed. KIDNEYS: No mass, calculus, or hydronephrosis. STOMACH: Not significantly distended  SMALL BOWEL: There is small bowel distention in the left lower quadrant. Transition to diminished caliber in the deep pelvis. COLON: Fecal stasis. APPENDIX: Unremarkable. PERITONEUM: Nodular peritoneal implants. Minimal ascites. RETROPERITONEUM: Retroperitoneal adenopathy  REPRODUCTIVE ORGANS: Status post hysterectomy. URINARY BLADDER: Decompressed  BONES: Canal stenosis at L5-S1. Foraminal stenosis at L5-S1. Canal stenosis at  L2-3 as well. Degenerative disc disease at L2-3 and L5-S1. ADDITIONAL COMMENTS: N/A     IMPRESSION:  Small bowel distention with transition point in the deep pelvis.  There is no  significant degree of gastric distention. Imaging findings are consistent with  early/partial small bowel obstruction. Transition point in the deep pelvis. Ascites with diffuse nodular peritoneal enhancement, compatible with peritoneal  carcinomatosis     Mediastinal and retroperitoneal lymphadenopathy. Splenic, adrenal and hepatic mass lesions. IMPRESSION AND PLAN:  Emily Hammond has a diagnosis of recurrent uterine papillary serous carcinoma. She has been receiving single agent Carboplatin for recurrent disease. She is doing much better since her hospitalization for a small bowel obstruction. Her GI symptoms have significantly improved. She wished to continue with chemotherapy following that admission. I agreed with her that it was a good idea since she was looking so much better. She has now completed a total of 4 cycles. We will plan on reimaging after this cycle. We also discussed her [facility] report. She is strongly ER and W[place] positive, suggesting that hormonal therapy might be an option for her after completion of chemotherapy.         Karyn Staton MD  8/29/2019/2:54 PM

## 2019-09-03 NOTE — PROGRESS NOTES
[facility] Progress Note    Date: September 3, 2019    Name: Buddy Mayers    MRN: [id number]         : 1960    Ms. Dang Flaherty Arrived ambulatory and in no distress for cycle 5 day 1 of Carboplatin regimen. Assessment was completed, she reports she is nauseated this morning and forgot to take her home medications for nausea prior to arrival. [facility] accessed without difficulty, labs drawn and processed. Due to lab results, specifically Platelets of 31,072 Carboplatin was held today. Patient was given 8mg Zofran IVP for nausea. Her chemo appointment will be rescheduled with [place]. Port was deaccessed and heparinized per protocol. She was seen leaving the department ambulatory with all belongings and significant other at [id number]. Chemotherapy Flowsheet 9/3/2019   Cycle C5D1   Date 9/3/2019   Drug / Regimen carboplatin   Dosage 573mg   Pre Meds gibn   Notes given         Ms. Gill's vitals were reviewed. Patient Vitals for the past 12 hrs:   Temp Pulse Resp BP SpO2   19 0839 -- -- -- -- 97 %   19 0817 96.7 °F (35.9 °C) (!) 125 18 132/77 97 %         Lab results were obtained and reviewed. Recent Results (from the past 12 hour(s))   CBC WITH AUTOMATED DIFF    Collection Time: 19  8:34 AM   Result Value Ref Range    WBC 3.7 3.6 - 11.0 K/uL    RBC 2.41 (L) 3.80 - 5.20 M/uL    HGB 7.7 (L) 11.5 - 16.0 g/dL    HCT 24.5 (L) 35.0 - 47.0 %    .7 (H) 80.0 - 99.0 FL    MCH 32.0 26.0 - 34.0 PG    MCHC 31.4 30.0 - 36.5 g/dL    RDW 20.2 (H) 11.5 - 14.5 %    PLATELET 55 (L) 401 - 400 K/uL    MPV 11.3 8.9 - 12.9 FL    NRBC 0.0 0  WBC    ABSOLUTE NRBC 0.00 0.00 - 0.01 K/uL    NEUTROPHILS 68 32 - 75 %    BAND NEUTROPHILS 2 0 - 6 %    LYMPHOCYTES 19 12 - 49 %    MONOCYTES 11 5 - 13 %    EOSINOPHILS 0 0 - 7 %    BASOPHILS 0 0 - 1 %    IMMATURE GRANULOCYTES 0 %    ABS. NEUTROPHILS 2.6 1.8 - 8.0 K/UL    ABS. LYMPHOCYTES 0.7 (L) 0.8 - 3.5 K/UL    ABS. MONOCYTES 0.4 0.0 - 1.0 K/UL    ABS.  EOSINOPHILS 0.0 0.0 - 0.4 K/UL    ABS. BASOPHILS 0.0 0.0 - 0.1 K/UL    ABS. IMM. GRANS. 0.0 K/UL    DF MANUAL      PLATELET COMMENTS Large Platelets      RBC COMMENTS ANISOCYTOSIS  2+        RBC COMMENTS MACROCYTOSIS  1+        WBC COMMENTS TOXIC GRANULATION     MAGNESIUM    Collection Time: 09/03/19  8:34 AM   Result Value Ref Range    Magnesium 1.4 (L) 1.6 - 2.4 mg/dL   METABOLIC PANEL, COMPREHENSIVE    Collection Time: 09/03/19  8:34 AM   Result Value Ref Range    Sodium 133 (L) 136 - 145 mmol/L    Potassium 3.6 3.5 - 5.1 mmol/L    Chloride 100 97 - 108 mmol/L    CO2 25 21 - 32 mmol/L    Anion gap 8 5 - 15 mmol/L    Glucose 114 (H) 65 - 100 mg/dL    BUN 18 6 - 20 MG/DL    Creatinine 0.64 0.55 - 1.02 MG/DL    BUN/Creatinine ratio 28 (H) 12 - 20      GFR est AA >60 >60 ml/min/1.73m2    GFR est non-AA >60 >60 ml/min/1.73m2    Calcium 8.6 8.5 - 10.1 MG/DL    Bilirubin, total 0.3 0.2 - 1.0 MG/DL    ALT (SGPT) 12 12 - 78 U/L    AST (SGOT) 20 15 - 37 U/L    Alk. phosphatase 136 (H) 45 - 117 U/L    Protein, total 7.7 6.4 - 8.2 g/dL    Albumin 2.1 (L) 3.5 - 5.0 g/dL    Globulin 5.6 (H) 2.0 - 4.0 g/dL    A-G Ratio 0.4 (L) 1.1 - 2.2         Pre-medications  were administered as ordered and chemotherapy was initiated.   Medications Administered     heparin (porcine) pf 300-500 Units     Admin Date  09/03/2019 Action  Given Dose  500 Units Route  InterCATHeter Administered By  ePatientFinder Marj          ondansetron [facility]) injection 8 mg     Admin Date  09/03/2019 Action  Given Dose  8 mg Route  IntraVENous Administered By  Eagle Maher, TULIO          saline peripheral flush soln 10 mL     Admin Date  09/03/2019 Action  Given Dose  10 mL Route  InterCATHeter Administered By  ePatientFinder Marj                      Future Appointments   Date Time Provider Karlee Gold   9/20/2019 11:00 AM [facility]    9/24/2019  1:30 PM Oralia Farooq MD [facility]  September 3, 2019

## 2019-09-16 NOTE — TELEPHONE ENCOUNTER
Ian Mendez with [facility] PSYCHIATRIC [facility] , pt's platelets were [id number], per Dr. Siobhan Schmidt, hold for one week.   Ian Mendez to advise patient and our office to call once Hernandez Cortez returns to office

## 2019-09-16 NOTE — PROGRESS NOTES
[facility] Progress Note    Date: 2019    Name: Opal Minor    MRN: [id number]         : 1960    Ms. Mirella Burris Arrived 0800 and in no distress for cycle 5 D1 . Assessment was completed, no acute issues at this time, no new complaints voiced. Port accessed with positive blood return. Labs drawn and sent for processing. Port flushed and [place] accessed. Chemotherapy Flowsheet 2019   Cycle C5D1   Date 2019   Drug / Regimen Carboplatin   Pre Meds -   Notes held due to labs     Chemo on Hold due to low platelet count (49,751)    Ms. Gill's vitals were reviewed. Patient Vitals for the past 12 hrs:   Temp Pulse Resp BP   19 1003  100  142/80   19 0759 98 °F (36.7 °C) (!) 125 18 142/80         Lab results were obtained and reviewed. Recent Results (from the past 12 hour(s))   CBC WITH AUTOMATED DIFF    Collection Time: 19  8:06 AM   Result Value Ref Range    WBC 3.0 (L) 3.6 - 11.0 K/uL    RBC 2.43 (L) 3.80 - 5.20 M/uL    HGB 7.9 (L) 11.5 - 16.0 g/dL    HCT 25.4 (L) 35.0 - 47.0 %    .5 (H) 80.0 - 99.0 FL    MCH 32.5 26.0 - 34.0 PG    MCHC 31.1 30.0 - 36.5 g/dL    RDW 20.6 (H) 11.5 - 14.5 %    PLATELET 82 (L) 445 - 400 K/uL    MPV 12.3 8.9 - 12.9 FL    NRBC 0.0 0  WBC    ABSOLUTE NRBC 0.00 0.00 - 0.01 K/uL    NEUTROPHILS PENDING %    LYMPHOCYTES PENDING %    MONOCYTES PENDING %    EOSINOPHILS PENDING %    BASOPHILS PENDING %    IMMATURE GRANULOCYTES PENDING %    ABS. NEUTROPHILS PENDING K/UL    ABS. LYMPHOCYTES PENDING K/UL    ABS. MONOCYTES PENDING K/UL    ABS. EOSINOPHILS PENDING K/UL    ABS. BASOPHILS PENDING K/UL    ABS. IMM. GRANS.  PENDING K/UL    DF PENDING    MAGNESIUM    Collection Time: 19  8:06 AM   Result Value Ref Range    Magnesium 1.9 1.6 - 2.4 mg/dL   METABOLIC PANEL, COMPREHENSIVE    Collection Time: 19  8:06 AM   Result Value Ref Range    Sodium 130 (L) 136 - 145 mmol/L    Potassium 4.0 3.5 - 5.1 mmol/L    Chloride 97 97 - 108 mmol/L    CO2 26 21 - 32 mmol/L    Anion gap 7 5 - 15 mmol/L    Glucose 114 (H) 65 - 100 mg/dL    BUN 21 (H) 6 - 20 MG/DL    Creatinine 0.80 0.55 - 1.02 MG/DL    BUN/Creatinine ratio 26 (H) 12 - 20      GFR est AA >60 >60 ml/min/1.73m2    GFR est non-AA >60 >60 ml/min/1.73m2    Calcium 8.4 (L) 8.5 - 10.1 MG/DL    Bilirubin, total 0.3 0.2 - 1.0 MG/DL    ALT (SGPT) 16 12 - 78 U/L    AST (SGOT) 30 15 - 37 U/L    Alk. phosphatase 159 (H) 45 - 117 U/L    Protein, total 7.8 6.4 - 8.2 g/dL    Albumin 2.0 (L) 3.5 - 5.0 g/dL    Globulin 5.8 (H) 2.0 - 4.0 g/dL    A-G Ratio 0.3 (L) 1.1 - 2.2     CANCER ANTIGEN 125    Collection Time: 09/16/19  8:06 AM   Result Value Ref Range    CA-125 900 (H) 1.5 - 35.0 U/mL       Pre-medications  were administered as ordered and chemotherapy was initiated. Ms. Narciso Thompson  Patient instructed to wait for a call from [facility] to make another appointment in one week for Carboplatin. Port flushed, heparinized and de accessed per protocol. Patient was discharged from [place] [id number] in stable condition at 1000.      Future Appointments   Date Time Provider Karlee Gold   9/20/2019 11:00 AM [facility] PSYCHIATRIC [place] CT ER 1 [facility]CT . [facility]    9/24/2019  1:30 PM Russ Damon MD [address], RN  September 16, 2019

## 2019-09-23 NOTE — TELEPHONE ENCOUNTER
Several messages have been left with pt and her 's #'s with no response. Pt has not r/s chemotherapy and did not have CT scan on 9/20.

## 2019-10-03 NOTE — TELEPHONE ENCOUNTER
Pt , she states she is having extreme fatigue where she has 3 steps to her house and she cannot walk up them, she cannot get off of the commode, \"my body is breaking down\", per Dr. Lennox Anders, she can schedule a visit with him or go to the ER, pt has scheduled a visit with Dr. Lennox Anders on 10/9/18 at 1:30 pm.  If she feels like she needs to go to the ER before then she will.

## 2019-10-03 NOTE — TELEPHONE ENCOUNTER
Per Dr. Trent Franco, I'm ok with refilling it, we just can't call it in.   rx was printed and signed by Dr. Trent Franco, pt was called to advise it was ready for pick and at the

## 2019-10-04 PROBLEM — T85.528A DISLODGED GASTROSTOMY TUBE: Status: ACTIVE | Noted: 2019-01-01

## 2019-10-04 PROBLEM — C78.7 HEPATIC METASTASES (HCC): Status: ACTIVE | Noted: 2019-01-01

## 2019-10-04 PROBLEM — E87.1 DEHYDRATION WITH HYPONATREMIA: Status: ACTIVE | Noted: 2019-01-01

## 2019-10-04 PROBLEM — E86.0 DEHYDRATION WITH HYPONATREMIA: Status: ACTIVE | Noted: 2019-01-01

## 2019-10-04 NOTE — PROGRESS NOTES
TRANSFER - IN REPORT: 
 
Verbal report received from [facility] (name) on [employer]  being received from ED(unit) for routine progression of care Report consisted of patients Situation, Background, Assessment and  
Recommendations(SBAR). Information from the following report(s) SBAR, Kardex and ED Summary was reviewed with the receiving nurse. Opportunity for questions and clarification was provided. Assessment completed upon patients arrival to unit and care assumed.

## 2019-10-04 NOTE — H&P
[address] [facility], [address] [place], [address] [place] 
P [phone]  F [phone] Ashok Green       [id number]  1960 Admitted 10/3/2019 Date 10/4/2019 Admit dx: Endometrial cancer ([facility] [address] [C54.1] Small bowel obstruction ([facility] [address] [E26.790] HPI: 
Lashell Guerrero a 60 y.o.  female with a diagnosis of stage IV UPSC.  She underwent a TLH, BSO, with lymphadenectomy in 2014.  She was found to have evidence of intraperitoneal disease with implants in the cul de sac, but no evidence of tyler metastases.  She was treated with adjuvant chemotherapy with Taxol and Carboplatin.  She then received pelvic radiation therapy. She recurred in the summer of  with peritoneal carcinomatosis. She was subsequently treated with Doxil with planned Avastin therapy, but she refused Avastin after the first cycle due to what she felt were side effects caused by the Avastin. She did not show up for appts following 7 cycles of Doxil. When she did represent, she was found to have progressive disease. She was recommended carboplatin therapy.  
Following her first cycle of Carboplatin she was admitted to [facility] on two occasions with small bowel obstruction.  During the second admission we decided to give her the second cycle of Carboplatin in the hopes that if her disease responded that her obstructive symptoms would improve.  We also placed a palliative PEG tube during that admission.  She did begin to improve and was discharged home to follow up in the office. Chelsea Alonso did discuss hospice during the admission, but she had yet to make a decision.  She was feeling much better when I saw her back in the office.  Her nausea had improved.  She was really not even having to use the PEG tube.  She was continuing to have regular bowel movements.  She wished to continue with chemotherapy following that admission.  I agreed with her that it was a good idea since she was looking so much better. Velia Anthony has now completed 4 cycles of single agent Carboplatin. Interval Hx:  
She failed to present for her most recent chemo scheduled last week. We called her yesterday, reports she fell at home trying to use the bathroom with progressive weakness and has had persistent N/V since last Thursday. She fell on her stomach, but did not hit head/lose consciousness. She did not call because she states she felt too weak. Her recall of events over the last 2 weeks changes, reporting at first she has not used the Gtube, only flushed it once last week, then reporting she would try the Gtube, but it did nothing to stop her N/V and no contents returned. She has not had leaking around the tube. She is not sure of any flatus recently. She reports a BM last week that was normal, none since. Emesis is at times dry heaving, other times productive liquid contents. She denies any appetite. She is too weak now to perform any self care, requiring her  to assist in all care. She has a hospital bed at home. She has not used Fentanyl patches because of abuses of drug she has seen on television. She denies any F/C. She reports she is ready for hospice, previously discussed. States her pain and nausea are well controlled currently on PCA, reglan and decadron. She feels better \"than I have in 2 months. \"  We have seen her in the infusion center where she was looking quite well. Asked about our visits there, she reports she has had good days and bad days. CT is reviewed with the radiologist.  The PEG tube previously placed by IR on July 3rd was verified to be in the stomach. Her stomach is well away from the abdominal wall now, nondistended. There is no free air. Her small bowel is very distended with presumed transition point in the pelvis. There is progression of hepatic disease, no ascites. There is small pleural effusion. Patient Active Problem List  
Diagnosis Code  Endometrial cancer ([facility] [address] C54.1  Early satiety R68.81  
 Mediastinal lymphadenopathy R59.0  Weight loss, non-intentional R63.4  Cancer related pain G89.3  Abdominal pain R10.9  Tachycardia R00.0  Nausea & vomiting R11.2  Ascites R18.8  On antineoplastic chemotherapy Z79.899  Carcinomatosis ([facility] [address] C80.0  Small bowel obstruction ([facility] [address] V44.252  Unspecified severe protein-calorie malnutrition ([facility] [address] E43  Anemia D64.9  Hypomagnesemia E83.42  
 Iron deficiency anemia D50.9 Past Medical History:  
Diagnosis Date  Arthritis  Cancer [facility])   
 uterine  Chronic pain LOWER BACK  Depression  Nausea & vomiting  PMB (postmenopausal bleeding) 2014 Past Surgical History:  
Procedure Laterality Date  HX COLONOSCOPY    
 HX GYN  14 TLH/BSO/lymph node dissection  HX ORTHOPAEDIC Right [id number] LITTLE toe sx  HX VASCULAR ACCESS Left  PORTACATH - LEFT CHEST  
 IR INSERT GASTROSTOMY TUBE [facility]  7/3/2019 Social History Tobacco Use  Smoking status: Former Smoker Packs/day: 0.50 Years: 7.00 Pack years: 3.50 Types: Cigarettes Last attempt to quit: 1999 Years since quittin.1  Smokeless tobacco: Never Used Substance Use Topics  Alcohol use: Yes Alcohol/week: 1.0 standard drinks Types: 1 Glasses of wine per week Comment: rarely Family History Problem Relation Age of Onset  Cancer Mother 61  
     liver metastatic disease, unknown primary  Stroke Mother BRAIN ANEURYSM  
 Cancer Maternal Uncle 60  
     bone  Cancer Maternal Grandmother 61  
     unknown primary  Anesth Problems Neg Hx Current Facility-Administered Medications Medication Dose Route Frequency  sodium chloride (NS) flush 5-40 mL  5-40 mL IntraVENous Q8H  
 sodium chloride (NS) flush 5-40 mL  5-40 mL IntraVENous PRN  
  0.9% sodium chloride infusion  125 mL/hr IntraVENous CONTINUOUS  
 diphenhydrAMINE (BENADRYL) injection 12.5 mg  12.5 mg IntraVENous Q4H PRN  
 ondansetron (ZOFRAN) injection 8 mg  8 mg IntraVENous Q8H PRN  
 dexamethasone (DECADRON) 4 mg/mL injection 4 mg  4 mg IntraVENous Q12H  
 metoclopramide HCl (REGLAN) injection 5 mg  5 mg IntraVENous Q8H  
 HYDROmorphone (PF) 25 mg/50 mL (DILAUDID) PCA   IntraVENous CONTINUOUS  
 pantoprazole (PROTONIX) injection 40 mg  40 mg IntraVENous DAILY Allergies Allergen Reactions  Hydrocodone Nausea Only  Oxycodone Nausea and Vomiting Review of Systems A comprehensive review of systems was negative except for that written in the History of Present Illness. , 10 point ROS OBJECTIVE: 
 
Physical Exam 
Visit Vitals /70 (BP 1 Location: Right arm, BP Patient Position: At rest) Pulse (!) 114 Temp 97 °F (36.1 °C) Resp 16 Ht 5' 7\" (1.702 m) Wt 145 lb (65.8 kg) SpO2 98% BMI 22.71 kg/m² General appearance: alert, cooperative, no distress, mild facial wasting since last admit. Eyes: conjunctivae/corneas clear. PER, EOM's intact. Back: symmetric, no curvature. ROM normal. No CVA tenderness. Lungs: clear to auscultation bilaterally. She was two weak to sit up on her own, required assistance. Heart: tachy rate, sinus rhythm Abdomen: firm, distended, hypoactive BS. Nontender, no rebound Pelvic: deferred Extremities: edema 3+ pedal/leg. Warm, no cyanosis Skin: Skin color, texture, turgor normal. Ecchymotic patches on arms Lymph nodes: Cervical, supraclavicular, and inguinal nodes normal. 
Neurologic: Grossly normal 
 
Wt Readings from Last 3 Encounters:  
10/03/19 145 lb (65.8 kg) 09/16/19 146 lb (66.2 kg) 09/03/19 145 lb 3.2 oz (65.9 kg) Data Review Recent Results (from the past 24 hour(s)) EKG, 12 LEAD, INITIAL Collection Time: 10/03/19  7:23 PM  
Result Value Ref Range  Ventricular Rate 142 BPM  
 Atrial Rate 142 BPM  
 P-R Interval 126 ms  
 QRS Duration 78 ms Q-T Interval 290 ms QTC Calculation (Bezet) 446 ms Calculated P Axis 75 degrees Calculated R Axis 73 degrees Calculated T Axis 45 degrees Diagnosis Sinus tachycardia Biatrial enlargement Nonspecific ST abnormality When compared with ECG of 27-JUN-2019 01:59, No significant change was found SAMPLES BEING HELD Collection Time: 10/03/19  7:26 PM  
Result Value Ref Range SAMPLES BEING HELD 1BLU,1LAV,1PST,1RED COMMENT Add-on orders for these samples will be processed based on acceptable specimen integrity and analyte stability, which may vary by analyte. CBC WITH AUTOMATED DIFF Collection Time: 10/03/19  7:26 PM  
Result Value Ref Range WBC 4.5 3.6 - 11.0 K/uL  
 RBC 2.91 (L) 3.80 - 5.20 M/uL  
 HGB 10.0 (L) 11.5 - 16.0 g/dL HCT 31.3 (L) 35.0 - 47.0 % .6 (H) 80.0 - 99.0 FL  
 MCH 34.4 (H) 26.0 - 34.0 PG  
 MCHC 31.9 30.0 - 36.5 g/dL  
 RDW 19.8 (H) 11.5 - 14.5 % PLATELET 744 879 - 202 K/uL MPV 12.6 8.9 - 12.9 FL  
 NRBC 0.0 0  WBC ABSOLUTE NRBC 0.00 0.00 - 0.01 K/uL NEUTROPHILS 75 32 - 75 % BAND NEUTROPHILS 4 0 - 6 % LYMPHOCYTES 7 (L) 12 - 49 % MONOCYTES 14 (H) 5 - 13 % EOSINOPHILS 0 0 - 7 % BASOPHILS 0 0 - 1 % IMMATURE GRANULOCYTES 0 %  
 ABS. NEUTROPHILS 3.6 1.8 - 8.0 K/UL  
 ABS. LYMPHOCYTES 0.3 (L) 0.8 - 3.5 K/UL  
 ABS. MONOCYTES 0.6 0.0 - 1.0 K/UL  
 ABS. EOSINOPHILS 0.0 0.0 - 0.4 K/UL  
 ABS. BASOPHILS 0.0 0.0 - 0.1 K/UL  
 ABS. IMM. GRANS. 0.0 K/UL  
 DF MANUAL    
 RBC COMMENTS NORMOCYTIC, NORMOCHROMIC METABOLIC PANEL, COMPREHENSIVE Collection Time: 10/03/19  7:26 PM  
Result Value Ref Range Sodium 130 (L) 136 - 145 mmol/L Potassium 5.1 3.5 - 5.1 mmol/L Chloride 94 (L) 97 - 108 mmol/L  
 CO2 24 21 - 32 mmol/L Anion gap 12 5 - 15 mmol/L Glucose 120 (H) 65 - 100 mg/dL  BUN 33 (H) 6 - 20 MG/DL  
 Creatinine 0.99 0.55 - 1.02 MG/DL  
 BUN/Creatinine ratio 33 (H) 12 - 20 GFR est AA >60 >60 ml/min/1.73m2 GFR est non-AA 57 (L) >60 ml/min/1.73m2 Calcium 9.1 8.5 - 10.1 MG/DL Bilirubin, total 0.5 0.2 - 1.0 MG/DL  
 ALT (SGPT) 24 12 - 78 U/L  
 AST (SGOT) 41 (H) 15 - 37 U/L Alk. phosphatase 189 (H) 45 - 117 U/L Protein, total 8.5 (H) 6.4 - 8.2 g/dL Albumin 2.1 (L) 3.5 - 5.0 g/dL Globulin 6.4 (H) 2.0 - 4.0 g/dL A-G Ratio 0.3 (L) 1.1 - 2.2 NT-PRO BNP Collection Time: 10/03/19  7:26 PM  
Result Value Ref Range NT pro- (H) <125 PG/ML  
TROPONIN I Collection Time: 10/03/19  7:26 PM  
Result Value Ref Range Troponin-I, Qt. <0.05 <0.05 ng/mL Imaging CT Results (most recent): 
Results from Hospital Encounter encounter on 10/03/19 CT ABD PELV W CONT Narrative EXAM: CT ABD PELV W CONT INDICATION: Abdominal pain, fall. Hysterectomy, gastrostomy tube placement in 
July, 2019. Leukopenia, elevated alkaline phosphatase. COMPARISON: CT abdomen/pelvis on 6/27/2019 CONTRAST: 100 mL of Isovue-370. TECHNIQUE:  
Following the uneventful intravenous administration of contrast, thin axial 
images were obtained through the abdomen and pelvis. Coronal and sagittal 
reconstructions were generated. Oral contrast was not administered. CT dose 
reduction was achieved through use of a standardized protocol tailored for this 
examination and automatic exposure control for dose modulation. Adaptive 
statistical iterative reconstruction (ASIR) was utilized. FINDINGS:  
LUNG BASES: Small left pleural effusion is increased. No pneumonia. INCIDENTALLY IMAGED HEART AND MEDIASTINUM: Normal cardiac size. Small 
sliding-type hiatal hernia. LIVER: Hypoenhancing mass in segment 8 of the liver is new and measures 2.3 cm. Possible mass in the left hepatic lobe adjacent to the falciform ligament. Right 
hepatic lobe cyst is unchanged. GALLBLADDER: Unremarkable. SPLEEN: Heterogeneous enhancement may be chronic. PANCREAS: No mass or ductal dilatation. ADRENALS: Right adrenal adenoma is unchanged. Left adrenal nodule is unchanged. KIDNEYS: Renal cysts are unchanged. No solid renal mass or hydronephrosis. STOMACH: Partial distention. Percutaneous gastrostomy tube is in the peritoneal 
cavity, not within the stomach. SMALL BOWEL: Dilated small bowel measures 5 cm in diameter. In addition, there 
is mural thickening of multiple jejunal loops. Transition to nondistended ileum 
is in the anterior mid pelvis, likely due to adhesion. COLON: Nondistended. APPENDIX: Small versus resected. PERITONEUM: Small volume of ascites measures 16 Hounsfield units. No 
pneumoperitoneum. RETROPERITONEUM: Aorta is atherosclerotic without aneurysm. REPRODUCTIVE ORGANS: Hysterectomy. Gas is in the vagina. Small bowel transition 
point is near the vaginal cuff. URINARY BLADDER: Mural thickening, incomplete distention. BONES: No destructive bone lesion. ADDITIONAL COMMENTS: Retroperitoneal lymphadenopathy is unchanged. Impression IMPRESSION: 
 
1. Small bowel obstruction with transition point due to suspected adhesions near 
the vaginal cuff in the pelvis. Consider enteric tube placement. 2. Percutaneous gastrostomy tube terminates in the peritoneum, not in the 
stomach. 3. Small volume of ascites. No abscess at this time. 4. Increased small left pleural effusion. 5. New segment 8 hepatic mass. IMPRESSION/PLAN: 
 
Patient Active Problem List  
Diagnosis Code  Endometrial cancer ([address] C54.1  Early satiety R68.81  
 Mediastinal lymphadenopathy R59.0  Weight loss, non-intentional R63.4  Cancer related pain G89.3  Abdominal pain R10.9  Tachycardia R00.0  Nausea & vomiting R11.2  Ascites R18.8  On antineoplastic chemotherapy Z79.899  Carcinomatosis ([address] C80.0  Small bowel obstruction ([address] O48.058  Unspecified severe protein-calorie malnutrition ([facility] [address] E43  Anemia D64.9  Hypomagnesemia E83.42  
 Iron deficiency anemia D50.9 I reviewed with Esther England her medical records, physical exam, and review of symptoms. ECOG 4 
 
60 y.o. female with UPSC, stage IV at diagnosis in 2014 s/p recurrence and further salvage chemo. Refractory to treatment. Currently preformance status ECOG4, becoming nearly dependent on  for basic care. Even in healthy state, few good options to try. Now with recurrent SBO and PEG tube out of stomach. Timing of dislodging is uncertain. If she flushed it last week, she would likely have peritonitis by now. Suspect this happened acutely, but we nor radiology can understand how the stomach is not adhered to the abdominal wall by now. Her historical recall is confusing at times and she has often not been compliant with recommendations. Nevertheless, we have a nonfunctional Gtube. Removing would create an abdominal wall portal to the peritoneal cavity that probably would not close readily given her poor nutritional state. She is asx with the tube in place. We will not flush the tube or access it. Attempt at surgical mgmt likely moot as she appears ready for hospice this time. Discussed with Dr. Tyrell Chapman in radiology, feel they cannot replace the tube with IR. She is not nauseated, there is no gastric distention, no free air. She very much does not want to have an NGT. She wants to drink. We discussed she could become septic if she has a gastrostomy open to the abdomen and rationale for NPO. She could become septic regardless with her bowel obstruction. If she becomes nauseated, we can place an NGT for comfort. She is open and ready for hospice, has many questions around the transition. We discussed in great detail.    
We can monitor for sepsis/peritonitis and treat for comfort, but otherwise can begin comfort measures and attempt to transition toward hospice discharge. Continue IVFs, follow labs, serial exams for now. Hold on NGT for now Purewic or levy for comfort Hospice consult Previous DNR, reconfirmed Signed By: PHILIP Larkin   
 10/4/2019/8:20 AM

## 2019-10-04 NOTE — PROGRESS NOTES
NUTRITION 
 
RD PLAN OF CARE:  
Malnutrition Screening Risk trigger for weight loss and poor appetite. Significant and unplanned wt loss 63# (30%) x past year comparing wt 208# (10/5/2018) to admit 145# (10/3/19); Net loss 78# (35%) x past 15 months. Although PEG tube previously placed 7/19, unable to use now, not in stomach and abdomen distended. NPO with no plan to use PEG. Chart reviewed, discussed with Nursing. Hospice consult pending Diet: NPO Pt is admitted with Endometrial cancer ([facility] [address] [C54.1]; Hx chemo however, not chemo candidate now. Small bowel obstruction ([facility] [address] [K56.609]. RD available to provide palliative PO feedings ie. Ensure Clear, liquids if considered and medically able with SBO. Not TPN candidate; No aggressive nutrition intervention is not indicated at this time. Will follow and assist as needed.  
 
Yolanda Chan RD

## 2019-10-04 NOTE — ROUTINE PROCESS
TRANSFER - OUT REPORT: 
 
Verbal report given to TULIO Reynoso(name) on Sloane Clifton  being transferred to 325(unit) for routine progression of care Report consisted of patients Situation, Background, Assessment and  
Recommendations(SBAR). Information from the following report(s) SBAR, Kardex, ED Summary, [facility] and Recent Results was reviewed with the receiving nurse. Lines:  
Venous Access Device 07/23/19 (Active) Opportunity for questions and clarification was provided. Patient transported with: 
 [facility]

## 2019-10-04 NOTE — ED PROVIDER NOTES
HPI  
 
  58y F with hx of endometrial CA here s/p fall and generalized weakness. Reports decreased PO intake over the past few days. Some vomiting as well. Today she had bent down in the bathroom and fell to the ground. Did not hit her head or have LOC but felt too weak to stand up. Called EMS who got her up and then  got her into the car and came to the ED. No reports of fever. Reports ongoing abdominal pain over the past few days. No rash. Weakness is generalized and non-focal. Normal mental status per family. Past Medical History:  
Diagnosis Date  Arthritis  Cancer [facility]) 2014  
 uterine  Chronic pain LOWER BACK  Depression  Nausea & vomiting  PMB (postmenopausal bleeding) 4/2014 Past Surgical History:  
Procedure Laterality Date  HX COLONOSCOPY    
 HX GYN  8/22/14 TLH/BSO/lymph node dissection  HX ORTHOPAEDIC Right [id number] LITTLE toe sx  HX VASCULAR ACCESS Left 2014 PORTACATH - LEFT CHEST  
 IR INSERT GASTROSTOMY TUBE [facility]  7/3/2019 Family History:  
Problem Relation Age of Onset  Cancer Mother 61  
     liver metastatic disease, unknown primary  Stroke Mother BRAIN ANEURYSM  
 Cancer Maternal Uncle 60  
     bone  Cancer Maternal Grandmother 61  
     unknown primary  Anesth Problems Neg Hx Social History Socioeconomic History  Marital status:  Spouse name: Not on file  Number of children: Not on file  Years of education: Not on file  Highest education level: Not on file Occupational History  Not on file Social Needs  Financial resource strain: Not on file  Food insecurity:  
  Worry: Not on file Inability: Not on file  Transportation needs:  
  Medical: Not on file Non-medical: Not on file Tobacco Use  Smoking status: Former Smoker Packs/day: 0.50 Years: 7.00 Pack years: 3.50 Types: Cigarettes Last attempt to quit: 8/19/1999 Years since quittin.1  Smokeless tobacco: Never Used Substance and Sexual Activity  Alcohol use: Yes Alcohol/week: 1.0 standard drinks Types: 1 Glasses of wine per week Comment: rarely  Drug use: No  
 Sexual activity: Never Lifestyle  Physical activity:  
  Days per week: Not on file Minutes per session: Not on file  Stress: Not on file Relationships  Social connections:  
  Talks on phone: Not on file Gets together: Not on file Attends Yarsani service: Not on file Active member of club or organization: Not on file Attends meetings of clubs or organizations: Not on file Relationship status: Not on file  Intimate partner violence:  
  Fear of current or ex partner: Not on file Emotionally abused: Not on file Physically abused: Not on file Forced sexual activity: Not on file Other Topics Concern  Not on file Social History Narrative  Not on file ALLERGIES: Hydrocodone and Oxycodone Review of Systems Review of Systems Constitutional: (-) weight loss. HEENT: (-) stiff neck Eyes: (-) discharge. Respiratory: (-) cough. Cardiovascular: (-) syncope. Gastrointestinal: (-) blood in stool. Genitourinary: (-) hematuria. Musculoskeletal: (-) myalgias. Neurological: (-) seizure. Skin: (-) petechiae Lymph/Immunologic: (-) enlarged lymph nodes All other systems reviewed and are negative. Vitals:  
 10/03/19 1831 10/03/19 1935 10/03/19 2115 BP:  101/73 (!) 160/99 Pulse: (!) 147 (!) 144 (!) 141 Resp:  17 18 Temp:  97.8 °F (36.6 °C) 97.6 °F (36.4 °C) SpO2: 97% 100% 98% Weight:  65.8 kg (145 lb) Height:  5' 7\" (1.702 m) Physical Exam Nursing note and vitals reviewed. Constitutional: oriented to person, place, and time. appears well-developed and well-nourished. No distress. Head: Normocephalic and atraumatic. Sclera anicteric Nose: No rhinorrhea Mouth/Throat: Oropharynx is clear and dry. Pharynx normal 
Eyes: Conjunctivae are normal. Pupils are equal, round, and reactive to light. Right eye exhibits no discharge. Left eye exhibits no discharge. Neck: Painless normal range of motion. Neck supple. No LAD. Cardiovascular: fast rate, regular rhythm, normal heart sounds and intact distal pulses. Exam reveals no gallop and no friction rub. No murmur heard. Pulmonary/Chest:  No respiratory distress. No wheezes. No rales. No rhonchi. No increased work of breathing. No accessory muscle use. Good air exchange throughout. Abdominal: soft, non-tender, no rebound or guarding, slightly distended No hepatosplenomegaly. Normal bowel sounds throughout. Back: no tenderness to palpation, no deformities, no CVA tenderness Extremities/Musculoskeletal: Normal range of motion. no tenderness. No edema. Distal extremities are neurovasc intact. Lymphadenopathy:   No adenopathy. Neurological:  Alert and oriented to person, place, and time. Coordination normal. CN 2-12 intact. Motor and sensory function intact. Skin: Skin is warm and dry. No rash noted. No pallor. MDM 59y F here with weakness and fall. Hx of endometrial CA. CT shows SBO. Getting more IVF. Labs ok. Spoke with Dr. Jeniffer Ramirez who will admit. Tachy on arrival but improving with IVF - suspect component of dehydration. Procedures ED EKG interpretation: 
Rhythm: sinus tachycardia; and regular . Rate (approx.): 140; Axis: normal; P wave: normal; QRS interval: normal ; ST/T wave: normal;  This EKG was interpreted by Tsering Dimas MD,ED Provider. Total critical care time (excluding procedures): 35 min

## 2019-10-04 NOTE — PROGRESS NOTES
Problem: Falls - Risk of 
Goal: *Absence of Falls Description Document Lucas Janette Fall Risk and appropriate interventions in the flowsheet. Outcome: Progressing Towards Goal 
Note:  
Fall Risk Interventions: 
Mobility Interventions: Communicate number of staff needed for ambulation/transfer Medication Interventions: Patient to call before getting OOB Elimination Interventions: Call light in reach History of Falls Interventions: Investigate reason for fall

## 2019-10-04 NOTE — H&P
[address] [facility], [address] [place], [address] 
P [phone]  F [phone] History and Physical - Admission Note Mriza Mccurdy       [id number]  1960 Admitted 10/3/2019 Date 10/3/2019 History from Dr. Rory Thorpe clinic note on 19: 
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 She underwent a TLH, BSO, with lymphadenectomy in 2014. She was found to have evidence of intraperitoneal disease with implants in the cul de sac, but no evidence of tyler metastases. She was treated with adjuvant chemotherapy with Taxol and Carboplatin. She then received pelvic radiation therapy. 
  
In the summer of  she was noted to have an elevated CA-125. She had not been seen in the office for a couple of years prior to that visit. I ordered a CT of the chest/abdomen/pelvis to evaluate for recurrence. It demonstrated peritoneal carcinomatosis. I then sent her for a PET/CT to better evaluate. I recommended combination chemotherapy with Doxil/Avastin. She completed 8 cycles of the Doxil, but refused further Avastin after the second cycle. She had a lot of vague complaints at that time and was convinced it was the Avastin causing it. She has been juicing and trying some alternative therapies as well. Her last CT in 2019 again demonstrated stable disease on Doxil. Her CA-125 at that time was climbing. She did not show up for her last scheduled chemotherapy and wanted to take some time off. She did not discuss that with me before making the decision. She presented in mid-May stating that she was feeling better and wanted to discuss chemotherapy again. I sent her for a CT scan to reassess her disease. Her scan demonstrated worsening disease in multiple locations. I recommend starting back on chemotherapy at this time.   I suggested single-agent Carboplatin since it had been about 4.5 years since she was last treated with a platinum regimen.   
  
Following her first cycle of Carboplatin she was admitted to [facility] on two occasions with small bowel obstruction. During the second admission we decided to give her the second cycle of Carboplatin in the hopes that if her disease responded that her obstructive symptoms would improve. We also placed a palliative PEG tube during that admission. She did begin to improve and was discharged home to follow up in the office. We did discuss hospice during the admission, but she had yet to make a decision. She was feeling much better when I saw her back in the office. Her nausea had improved. She was really not even having to use the PEG tube. She was continuing to have regular bowel movements. She wished to continue with chemotherapy following that admission. I agreed with her that it was a good idea since she was looking so much better. She has now completed 4 cycles of single agent Carboplatin. She is tolerating well and feels like she is doing better. She is not having to use her PEG tube for venting at this point and is having regular BMs.   
  
Tumor testing ([facility]) ER + (90%) TX + (50%) MSI indeterminate Tumor mutational burden indeterminate Mismatch repair proficient PDL1 negative HPI: 
Ms. Wong Herrmann presented to the ER on 10/3/19 with complaints of falling in her bathroom when reaching over to pick something up off the floor. Per report she did not hit her head. She has been having episodes of nausea and vomiting over the last few days and has felt somewhat lightheaded. Nausea controlled in the ED with anti-emetics. Abdominal pain improved in ER with home dose of dilaudid. CT A/P 10/3/19 in ED with small bowel obstruction with transition point in distal ileum. PEG tube is displaced, but no evidence of pneumoperitoneum. While tachycardic in the ER, normal sinus rhythm on EKG. Dr. Elizabeth Perez is her managing Gyn Onc. Last received chemotherapy (cycle 5 single-agent carboplatin) on 19. Past Medical History:  
Diagnosis Date  Arthritis  Cancer [facility])   
 uterine  Chronic pain LOWER BACK  Depression  Nausea & vomiting  PMB (postmenopausal bleeding) 2014 Past Surgical History:  
Procedure Laterality Date  HX COLONOSCOPY    
 HX GYN  14 TLH/BSO/lymph node dissection  HX ORTHOPAEDIC Right [id number] LITTLE toe sx  HX VASCULAR ACCESS Left  PORTACATH - LEFT CHEST  
 IR INSERT GASTROSTOMY TUBE [facility]  7/3/2019 Social History Tobacco Use  Smoking status: Former Smoker Packs/day: 0.50 Years: 7.00 Pack years: 3.50 Types: Cigarettes Last attempt to quit: 1999 Years since quittin.1  Smokeless tobacco: Never Used Substance Use Topics  Alcohol use: Yes Alcohol/week: 1.0 standard drinks Types: 1 Glasses of wine per week Comment: rarely Family History Problem Relation Age of Onset  Cancer Mother 61  
     liver metastatic disease, unknown primary  Stroke Mother BRAIN ANEURYSM  
 Cancer Maternal Uncle 60  
     bone  Cancer Maternal Grandmother 61  
     unknown primary  Anesth Problems Neg Hx No current facility-administered medications for this encounter. Current Outpatient Medications Medication Sig  
 HYDROmorphone (DILAUDID) 4 mg tablet Take 1 Tab by mouth every three (3) hours as needed for Pain for up to 30 days. Max Daily Amount: 32 mg.  ferrous sulfate (IRON) 325 mg (65 mg iron) tablet Take 1 Tab by mouth Before breakfast and dinner. Indications: anemia from inadequate iron  prochlorperazine (COMPAZINE) 10 mg tablet Take 5 mg by mouth every six (6) hours as needed.  magnesium oxide (MAG-OX) 400 mg tablet Take 1 Tab by mouth three (3) times daily.   
 ondansetron (ZOFRAN ODT) 4 mg disintegrating tablet Take 1 Tab by mouth every six (6) hours as needed for Nausea. Indications: Nausea and Vomiting caused by Cancer Drugs  pantoprazole (PROTONIX) 40 mg tablet Take 1 Tab by mouth two (2) times a day. Indications: heartburn  polyethylene glycol (MIRALAX) 17 gram packet Take 1 Packet by mouth daily.  senna-docusate (PERICOLACE) 8.6-50 mg per tablet Take 2 Tabs by mouth daily.  metoclopramide (REGLAN) 5 mg/5 mL syrup Take 10 mL by mouth Before breakfast, lunch, dinner and at bedtime.  haloperidol (HALDOL) 2 mg tablet Take 1 Tab by mouth three (3) times daily.  naloxone (NARCAN) 4 mg/actuation nasal spray Use 1 spray intranasally, then discard. Repeat with new spray every 2 min as needed for opioid overdose symptoms, alternating nostrils.  aluminum & magnesium hydroxide-simethicone (MYLANTA II) 400-400-40 mg/5 mL suspension Take 30 mL by mouth every eight (8) hours as needed for Indigestion.  lidocaine-prilocaine (EMLA) topical cream Apply small amount over port area one hour before chemo treatment and cover with a Band-Aid Allergies Allergen Reactions  Hydrocodone Nausea Only  Oxycodone Nausea and Vomiting Review of Systems A comprehensive review of systems was negative except for that written in the History of Present Illness. , 10 point ROS OBJECTIVE: 
 
Physical Exam 
Visit Vitals BP (!) 160/99 (BP 1 Location: Right arm, BP Patient Position: At rest) Pulse (!) 141 Temp 97.6 °F (36.4 °C) Resp 18 Ht 5' 7\" (1.702 m) Wt 145 lb (65.8 kg) SpO2 98% BMI 22.71 kg/m² Will complete physical exam on morning rounds. Full exam report to follow. Data Review Recent Results (from the past 24 hour(s)) EKG, 12 LEAD, INITIAL Collection Time: 10/03/19  7:23 PM  
Result Value Ref Range Ventricular Rate 142 BPM  
 Atrial Rate 142 BPM  
 P-R Interval 126 ms  
 QRS Duration 78 ms Q-T Interval 290 ms QTC Calculation (Bezet) 446 ms Calculated P Axis 75 degrees Calculated R Axis 73 degrees Calculated T Axis 45 degrees Diagnosis Sinus tachycardia Biatrial enlargement Nonspecific ST abnormality When compared with ECG of 27-JUN-2019 01:59, No significant change was found SAMPLES BEING HELD Collection Time: 10/03/19  7:26 PM  
Result Value Ref Range SAMPLES BEING HELD 1BLU,1LAV,1PST,1RED COMMENT Add-on orders for these samples will be processed based on acceptable specimen integrity and analyte stability, which may vary by analyte. CBC WITH AUTOMATED DIFF Collection Time: 10/03/19  7:26 PM  
Result Value Ref Range WBC 4.5 3.6 - 11.0 K/uL  
 RBC 2.91 (L) 3.80 - 5.20 M/uL  
 HGB 10.0 (L) 11.5 - 16.0 g/dL HCT 31.3 (L) 35.0 - 47.0 % .6 (H) 80.0 - 99.0 FL  
 MCH 34.4 (H) 26.0 - 34.0 PG  
 MCHC 31.9 30.0 - 36.5 g/dL  
 RDW 19.8 (H) 11.5 - 14.5 % PLATELET 379 577 - 235 K/uL MPV 12.6 8.9 - 12.9 FL  
 NRBC 0.0 0  WBC ABSOLUTE NRBC 0.00 0.00 - 0.01 K/uL NEUTROPHILS 75 32 - 75 % BAND NEUTROPHILS 4 0 - 6 % LYMPHOCYTES 7 (L) 12 - 49 % MONOCYTES 14 (H) 5 - 13 % EOSINOPHILS 0 0 - 7 % BASOPHILS 0 0 - 1 % IMMATURE GRANULOCYTES 0 %  
 ABS. NEUTROPHILS 3.6 1.8 - 8.0 K/UL  
 ABS. LYMPHOCYTES 0.3 (L) 0.8 - 3.5 K/UL  
 ABS. MONOCYTES 0.6 0.0 - 1.0 K/UL  
 ABS. EOSINOPHILS 0.0 0.0 - 0.4 K/UL  
 ABS. BASOPHILS 0.0 0.0 - 0.1 K/UL  
 ABS. IMM. GRANS. 0.0 K/UL  
 DF MANUAL    
 RBC COMMENTS NORMOCYTIC, NORMOCHROMIC METABOLIC PANEL, COMPREHENSIVE Collection Time: 10/03/19  7:26 PM  
Result Value Ref Range Sodium 130 (L) 136 - 145 mmol/L Potassium 5.1 3.5 - 5.1 mmol/L Chloride 94 (L) 97 - 108 mmol/L  
 CO2 24 21 - 32 mmol/L Anion gap 12 5 - 15 mmol/L Glucose 120 (H) 65 - 100 mg/dL BUN 33 (H) 6 - 20 MG/DL Creatinine 0.99 0.55 - 1.02 MG/DL  
 BUN/Creatinine ratio 33 (H) 12 - 20 GFR est AA >60 >60 ml/min/1.73m2 GFR est non-AA 57 (L) >60 ml/min/1.73m2 Calcium 9.1 8.5 - 10.1 MG/DL Bilirubin, total 0.5 0.2 - 1.0 MG/DL  
 ALT (SGPT) 24 12 - 78 U/L  
 AST (SGOT) 41 (H) 15 - 37 U/L Alk. phosphatase 189 (H) 45 - 117 U/L Protein, total 8.5 (H) 6.4 - 8.2 g/dL Albumin 2.1 (L) 3.5 - 5.0 g/dL Globulin 6.4 (H) 2.0 - 4.0 g/dL A-G Ratio 0.3 (L) 1.1 - 2.2 NT-PRO BNP Collection Time: 10/03/19  7:26 PM  
Result Value Ref Range NT pro- (H) <125 PG/ML  
TROPONIN I Collection Time: 10/03/19  7:26 PM  
Result Value Ref Range Troponin-I, Qt. <0.05 <0.05 ng/mL Imaging Review: 
CT A/P 10/3/19; 
FINDINGS:  
LUNG BASES: Small left pleural effusion is increased. No pneumonia. INCIDENTALLY IMAGED HEART AND MEDIASTINUM: Normal cardiac size. Small 
sliding-type hiatal hernia. LIVER: Hypoenhancing mass in segment 8 of the liver is new and measures 2.3 cm. Possible mass in the left hepatic lobe adjacent to the falciform ligament. Right 
hepatic lobe cyst is unchanged. GALLBLADDER: Unremarkable. SPLEEN: Heterogeneous enhancement may be chronic. PANCREAS: No mass or ductal dilatation. ADRENALS: Right adrenal adenoma is unchanged. Left adrenal nodule is unchanged. KIDNEYS: Renal cysts are unchanged. No solid renal mass or hydronephrosis. STOMACH: Partial distention. Percutaneous gastrostomy tube is in the peritoneal 
cavity, not within the stomach. SMALL BOWEL: Dilated small bowel measures 5 cm in diameter. In addition, there 
is mural thickening of multiple jejunal loops. Transition to nondistended ileum 
is in the anterior mid pelvis, likely due to adhesion. COLON: Nondistended. APPENDIX: Small versus resected. PERITONEUM: Small volume of ascites measures 16 Hounsfield units. No 
pneumoperitoneum. RETROPERITONEUM: Aorta is atherosclerotic without aneurysm. REPRODUCTIVE ORGANS: Hysterectomy. Gas is in the vagina. Small bowel transition 
point is near the vaginal cuff. URINARY BLADDER: Mural thickening, incomplete distention. BONES: No destructive bone lesion. ADDITIONAL COMMENTS: Retroperitoneal lymphadenopathy is unchanged. IMPRESSION IMPRESSION: 
1. Small bowel obstruction with transition point due to suspected adhesions near 
the vaginal cuff in the pelvis. Consider enteric tube placement. 2. Percutaneous gastrostomy tube terminates in the peritoneum, not in the 
stomach. 3. Small volume of ascites. No abscess at this time. 4. Increased small left pleural effusion. 5. New segment 8 hepatic mass. IMPRESSION/PLAN: 
 
Patient Active Problem List  
Diagnosis Code  Endometrial cancer ([address] C54.1  Early satiety R68.81  
 Mediastinal lymphadenopathy R59.0  Weight loss, non-intentional R63.4  Cancer related pain G89.3  Abdominal pain R10.9  Tachycardia R00.0  Nausea & vomiting R11.2  Ascites R18.8  On antineoplastic chemotherapy Z79.899  Carcinomatosis ([address] C80.0  Small bowel obstruction ([address] J90.683  Unspecified severe protein-calorie malnutrition ([address] E43  Anemia D64.9  Hypomagnesemia E83.42  
 Iron deficiency anemia D50.9 Ms. Alfonso Keyes is a 60 y.o. female with advanced-progressive uterine papillary serous carcinoma. Received cycle 5 single-agent carboplatin on 9/16/19. Presented with nausea, vomiting, abdominal pain, and fall (did not head head, and no LoC). CT 10/3/19 with partial SBO and dislodged PEG tube. -Oncology: Restarted single-agent carboplatin on 6/11/19, s/p cycle 5 on 9/16/19. Concerns for progression of disease on CT A/P in ED today 10/3/19. Will continue goals of care and treatment discussion while inpatient. -GI/SBO: Admit to inpatient for bowel reset (NPO) and IVFs. Will hold off on NGT for now, unless emesis continues. IV zofran and reglan, as patient uses Reglan at home. Given some edema within the jejunum, will start decadron 4mg IV BID.  Will hold on oral bowel regimen at this time until no further emesis. PCA and toradol for pain control while NPO. In regard to dislodged PEG tube, will likely plan for IR to replace while inpatient. Will not access overnight. No evidence of pneumoperitoneum on imaging, so this may have occurred a while back, or could have occurred during the patient's fall today. -Renal: monitor UOP. Appropriate BMP. Mild rise in creatinine to 0.9 secondary to n/v over the last few days. Continue IVFs 
 
-Heme/CV: Appropriate CBC. Normal WBC. Tachycardia improved and at baseline after IVFs in ER. Will continue to monitor.  
 
-Prophylaxis: SCDs and Lovenox 
 
-Disposition: Requires inpatient admission and continued supportive care. Will complete full exam and H&P on morning rounds.  
 
April Yoon MD

## 2019-10-04 NOTE — ED NOTES
Pt called back to triage for reassessment due to length of wait. Pt's HR persists at 140. Will advise Charge Nurse. Pt does advise her HR is always elevated, but unsure of what number is normal for her. Pt is aware of need of urine specimen, but cannot provide at this time. Pt normally takes Dilaudid 4mg PO every 3 hours, has not had a dose since 1500. Will speak with a provider about possibly medicating patient

## 2019-10-04 NOTE — PROGRESS NOTES
Transition Of Care Pain Control - currently on PCA Noted Hospice Referral, discussed freedom of choice with patient. [employer] is capped in zip code. Referral sent to [facility] CM will follow up based on identified needs. Reason for Admission:    Fall at home trying to use bathroom, progressive weakness and persistent nausea and vomiting RRAT Score:     23/2/0  [facility] Resources/supports as identified by patient/family:   Spouse is only support identified by patient, spouse is not at this hospital this afternoon Top Challenges facing patient (as identified by patient/family and CM): Finances/Medication cost?      No identified needs related to finances, spouse continues to work Transportation? Spouse transport to and from MD appointments Support system or lack thereof? Caregiver support, spouse is only identified support, sons live out of state Living arrangements? Lives with spouse, one story home Self-care/ADLs/Cognition? Dependent on spouse for assistance in the home, progressive weakness Current Advanced Directive/Advance Care Plan:  DDNR on chart Plan for utilizing home health: Will likely transition to hospice. Praneeth Gonsalez is a 60year old female to [facility] with complaints of weakness and fall. ED work up completed, PEG tube in place, started on PCA. CT - bowel obstruction, new hepatic mass. Orders received for Hospice, met with patient, discussed freedom of choice. [facility], spoke with liaison Yandel Veliz, they will meet with patient and spouse. Discussed with PA, anticipate she will be ready for discharge in next few days. Verified face sheet and demographics. Care Management Interventions PCP Verified by CM: No 
Palliative Care Criteria Met (RRAT>21 & CHF Dx)?: No(Palliative Care is following) Transition of Care Consult (CM Consult): Discharge Planning, Home Hospice(Consult received for home hospice, terminal endometrial cancer   23/2/0  [facility]) [facility]: No 
Reason Outside [place] [id number] Chosen: Unable to staff case(Capped in zip code of patient) MyChart Signup: No 
Discharge Durable Medical Equipment: No 
Health Maintenance Reviewed: Yes Physical Therapy Consult: Yes Occupational Therapy Consult: No 
Speech Therapy Consult: No 
Current Support Network: Lives with Spouse(Spouse works outside of the home,  two sons, both live out of state) Confirm Follow Up Transport: Family(Spouse drives to and from MD appointments, spouse does work) Plan discussed with Pt/Family/Caregiver: Yes(Met with patient in hospital room, spouse was not present. Left voice message for spouse.  ) Freedom of Choice Offered: Yes Jorge L Talbot RN, BSN, [facility] 
ED Care Management [phone]

## 2019-10-04 NOTE — PROGRESS NOTES
Bedside and Verbal shift change report given to [address] (oncoming nurse) by Princess Litten RN (offgoing nurse). Report included the following information SBAR, Kardex, ED Summary, Procedure Summary, Intake/Output and MAR.

## 2019-10-05 NOTE — PROGRESS NOTES
Patient has been accepted by [facility]. Received a call from Donna Ibrahim at [phone] who will continue to follow and provide services when ready for d/c.  
 
Darryl Sanchez RN

## 2019-10-05 NOTE — PROGRESS NOTES
Problem: Falls - Risk of 
Goal: *Absence of Falls Description Document Jennifer Telles Fall Risk and appropriate interventions in the flowsheet. Outcome: Progressing Towards Goal 
Note:  
Fall Risk Interventions: 
Mobility Interventions: Communicate number of staff needed for ambulation/transfer Medication Interventions: Evaluate medications/consider consulting pharmacy Elimination Interventions: Call light in reach History of Falls Interventions: Door open when patient unattended Problem: Nutrition Deficit Goal: *Optimize nutritional status Outcome: Progressing Towards Goal 
  
Problem: Pressure Injury - Risk of 
Goal: *Prevention of pressure injury Description Document Atul Scale and appropriate interventions in the flowsheet. Outcome: Progressing Towards Goal 
Note:  
Pressure Injury Interventions: Activity Interventions: Increase time out of bed Mobility Interventions: Pressure redistribution bed/mattress (bed type) Nutrition Interventions: Document food/fluid/supplement intake

## 2019-10-05 NOTE — PROGRESS NOTES
[address] [facility], [address] [place], [address] 
P [phone]  F [phone] Patient Name: Ania Camacho Admit Date: 10/3/2019 OR Date: * No surgery found * Visit Date: 10/5/2019 SUBJECTIVE: 
 
Feels better this morning. Pain control better with fentanyl patch. Did not use the PCA very much, as compared to the day before. Also using PO pain meds. Nausea less, but hasn't really taken in any PO. Wants ice chips. OBJECTIVE: 
 
Patient Vitals for the past 24 hrs: 
 Temp Pulse Resp BP SpO2  
10/05/19 0807 97.8 °F (36.6 °C) (!) 110 16 122/77 98 % 10/05/19 0355 97.3 °F (36.3 °C) (!) 118 16 126/81 99 % 10/04/19 2347 97.4 °F (36.3 °C) (!) 117 16 125/78 99 % 10/04/19 2042 97.4 °F (36.3 °C) (!) 110 16 121/79 98 % 10/04/19 1637 97.7 °F (36.5 °C) (!) 110 16 123/76 99 % 10/04/19 0919 97.7 °F (36.5 °C) (!) 118 16 123/79 97 % Date 10/04/19 0700 - 10/05/19 9849 10/05/19 0700 - 10/06/19 1698 Shift 1800-4602 4578-4171 24 Hour Total 0429-6010 3306-9142 24 Hour Total  
INTAKE  
P.O. 0  0     
  P. O. 0  0     
I. V.(mL/kg/hr) 1345.8(1.7)  1345.8(0.9) Volume (0.9% sodium chloride infusion) 1345.8  1345.8 Shift Total(mL/kg) 1345. 8(20.5)  1345. 8(20.5) OUTPUT Urine(mL/kg/hr) 300(0.4) 300(0.4) 600(0.4) Urine Voided 300 300 600 Urine Occurrence(s)  1 x 1 x Shift Total(mL/kg) 300(4.6) 300(4.6) 600(9.1) NET 1045.8 -300 745.8 Weight (kg) 65.8 65.8 65.8 65.8 65.8 65.8 Physical Exam 
   General:  alert, cooperative, no distress Cardiac:  Mildly tachycardic Lungs:  clear to auscultation bilaterally Abdomen:  soft, mildly distended, diffusely tender Wound:  clean, dry, no drainage Extremity: extremities normal, atraumatic, no cyanosis or edema Data Review Lab Results Component Value Date/Time WBC 4.7 10/05/2019 05:59 AM  
 ABS.  NEUTROPHILS 3.9 10/05/2019 05:59 AM  
 HGB 7.6 (L) 10/05/2019 05:59 AM  
 HCT 23.8 (L) 10/05/2019 05:59 AM  
 .7 (H) 10/05/2019 05:59 AM  
 MCH 34.1 (H) 10/05/2019 05:59 AM  
 PLATELET 382 (L) 24/78/8174 05:59 AM  
 
Lab Results Component Value Date/Time Sodium 134 (L) 10/05/2019 05:59 AM  
 Potassium 3.9 10/05/2019 05:59 AM  
 Chloride 103 10/05/2019 05:59 AM  
 CO2 22 10/05/2019 05:59 AM  
 Glucose 81 10/05/2019 05:59 AM  
 BUN 33 (H) 10/05/2019 05:59 AM  
 Creatinine 0.66 10/05/2019 05:59 AM  
 Calcium 7.7 (L) 10/05/2019 05:59 AM  
 Albumin 1.6 (L) 10/05/2019 05:59 AM  
 Bilirubin, total 0.3 10/05/2019 05:59 AM  
 AST (SGOT) 31 10/05/2019 05:59 AM  
 ALT (SGPT) 20 10/05/2019 05:59 AM  
 Alk. phosphatase 146 (H) 10/05/2019 05:59 AM  
 
IMPRESSION/PLAN: 
 
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 No plans for additional chemotherapy. Heme/CV:  Hgb down to 7.6, which is likely her baseline. The 10.0 at admission was likely due to hemoconcentration. Will hold off on transfusion. Renal:  Normal creatinine. FEN/GI:  Small bowel obstruction secondary to disease. Palliative PEG tube placed several months ago is displaced from the stomach and is non-functional.  It is unclear as to when this actually occurred. No plans to replace. Will monitor for peritonitis. Patient does not want NGT. ID/Wound:  Afebrile. Normal WBC. PPX:  Ambulation as tolerated. Dispostion:  Patient wishes to transition to hospice. Referral has been made. Case management assisting with arrangements. Will be going with [facility]. Patients states that arrangements are being made for Monday. Will plan on d/c home once everything is ready.     
 
  
Saniya Monroy MD

## 2019-10-05 NOTE — PROGRESS NOTES
Bedside and Verbal shift change report given to [address] (oncoming nurse) by Aniceto Montez RN  (offgoing nurse). Report included the following information SBAR and Kardex.

## 2019-10-05 NOTE — PROGRESS NOTES
Bedside and Verbal shift change report given to [address]  (oncoming nurse) by Katie Harvey RN  (offgoing nurse). Report included the following information SBAR and Kardex.

## 2019-10-06 NOTE — PROGRESS NOTES
[address], [address] [place], [facility] [place] 
P [phone]  F [phone] Patient Name: Ashok Green Admit Date: 10/3/2019 OR Date: * No surgery found * Visit Date: 10/6/2019 SUBJECTIVE: 
 
Feels better this morning. Pain under good control with fentanyl patch. Did not use the PCA very much, as compared to prior to starting the patch. Also using PO pain meds. Nausea less, but hasn't really taken in any PO, except for some ice chips. OBJECTIVE: 
 
Patient Vitals for the past 24 hrs: 
 Temp Pulse Resp BP SpO2  
10/06/19 0351 97.6 °F (36.4 °C) (!) 107 16 124/76 96 % 10/06/19 0009 97.9 °F (36.6 °C) (!) 109 16 130/79 95 % 10/05/19 2014 97.7 °F (36.5 °C) (!) 112 16 123/72 97 % 10/05/19 1600 98 °F (36.7 °C) (!) 119 16 115/70 98 % 10/05/19 1218 97.7 °F (36.5 °C) (!) 118 16 124/72 98 % Date 10/05/19 0700 - 10/06/19 1961 10/06/19 0700 - 10/07/19 7309 Shift 5388-7801 7735-1801 24 Hour Total 0000-0071 8538-9028 24 Hour Total  
INTAKE Shift Total(mL/kg) OUTPUT Urine(mL/kg/hr) Urine Occurrence(s) 3 x  3 x Shift Total(mL/kg) NET Weight (kg) 65.8 65.8 65.8 65.8 65.8 65.8 Physical Exam 
   General:  alert, cooperative, no distress Cardiac:  Mildly tachycardic Lungs:  clear to auscultation bilaterally Abdomen:  soft, mildly distended, diffusely tender Wound:  clean, dry, no drainage Extremity: extremities normal, atraumatic, no cyanosis or edema Data Review Lab Results Component Value Date/Time WBC 4.6 10/06/2019 03:55 AM  
 ABS. NEUTROPHILS 4.0 10/06/2019 03:55 AM  
 HGB 7.6 (L) 10/06/2019 03:55 AM  
 HCT 24.8 (L) 10/06/2019 03:55 AM  
 .7 (H) 10/06/2019 03:55 AM  
 MCH 33.6 10/06/2019 03:55 AM  
 PLATELET 036 (L) 72/42/7701 03:55 AM  
 
Lab Results Component Value Date/Time  Sodium 134 (L) 10/06/2019 03:55 AM  
 Potassium 3.8 10/06/2019 03:55 AM  
 Chloride 105 10/06/2019 03:55 AM  
 CO2 20 (L) 10/06/2019 03:55 AM  
 Glucose 76 10/06/2019 03:55 AM  
 BUN 23 (H) 10/06/2019 03:55 AM  
 Creatinine 0.55 10/06/2019 03:55 AM  
 Calcium 7.6 (L) 10/06/2019 03:55 AM  
 Albumin 1.6 (L) 10/06/2019 03:55 AM  
 Bilirubin, total 0.4 10/06/2019 03:55 AM  
 AST (SGOT) 33 10/06/2019 03:55 AM  
 ALT (SGPT) 19 10/06/2019 03:55 AM  
 Alk. phosphatase 147 (H) 10/06/2019 03:55 AM  
 
IMPRESSION/PLAN: 
 
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 No plans for additional chemotherapy. Heme/CV:  Hgb down to 7.6, which is likely her baseline. The 10.0 at admission was likely due to hemoconcentration. Will hold off on transfusion. Renal:  Normal creatinine. FEN/GI:  Small bowel obstruction secondary to disease. Palliative PEG tube placed several months ago is displaced from the stomach and is non-functional.  It is unclear as to when this actually occurred. No plans to replace. Will monitor for peritonitis. Patient does not want NGT. ID/Wound:  Afebrile. Normal WBC. PPX:  Ambulation as tolerated. Dispostion:  Patient wishes to transition to hospice. Referral has been made. Case management assisting with arrangements. Will be going with [facility]. Arrangements are being made for Monday. Will plan on d/c home once everything is ready. Sol Nino is concerned about getting into home. Will see if an ambulance transport can be arranged.    
 
  
Lana Olson MD

## 2019-10-06 NOTE — PROGRESS NOTES
Problem: Falls - Risk of 
Goal: *Absence of Falls Description Document Preeti Lewis Fall Risk and appropriate interventions in the flowsheet. Outcome: Progressing Towards Goal 
Note:  
Fall Risk Interventions: 
Mobility Interventions: Communicate number of staff needed for ambulation/transfer Medication Interventions: Patient to call before getting OOB Elimination Interventions: Call light in reach History of Falls Interventions: Door open when patient unattended Problem: Nutrition Deficit Goal: *Optimize nutritional status Outcome: Progressing Towards Goal 
  
Problem: Pain Goal: *Control of Pain Outcome: Progressing Towards Goal

## 2019-10-07 NOTE — PROGRESS NOTES
Bedside and Verbal shift change report given to BRYNN Kaur RN (oncoming nurse) by Chelsea Richards RN (offgoing nurse). Report included the following information SBAR, Kardex, Procedure Summary, Intake/Output, MAR, Accordion and Recent Results.

## 2019-10-07 NOTE — PROGRESS NOTES
Bedside and Verbal shift change report given to [address] Dana (oncoming nurse) by Imtiaz Hatch RN (offgoing nurse). Report included the following information SBAR, Kardex, OR Summary, Procedure Summary, Intake/Output and MAR.  
 
-0800 assessment complete, PCA D/C, pain 4/10 tolerable pain level to patient, patient given ice chips.  
 
-0815 case management called, waiting for call back 
 
-0825 Giovannise Moise at [facility] called, waiting for call back 
 
-0900 hospice will be coming by to see patient, currently working on setting up the home for DC 
 
-0920 Case management is waiting to connect with hospice and will arrange transportation to DC this evening. 
 
-1030 Hospice at bedside 
 
-1220 I have reviewed discharge instructions with the patient and spouse. The patient and spouse verbalized understanding. Discharge medications reviewed with patient and spouse and appropriate educational materials and side effects teaching were provided. 1330- Ambulance arrived, report given to EMT

## 2019-10-07 NOTE — PROGRESS NOTES
8:45a  CM received call from [address]. Plans for patient d/c to home today with [facility]. Ambulance transport to be arranged for evening transport. CM will contact family and hospice liaison. Continuing to follow. Nara [place]. [address], [address] [facility]

## 2019-10-07 NOTE — DISCHARGE INSTRUCTIONS
[address] [place] [address] Mathias Moritz [address], [address] [place]  P [phone]  F [phone]       Patient Discharge Instructions          Trent Pena : 1960    Admit Date: 10/3/2019 Discharge Date: 10/7/2019     Your doctor: Cristobal Alejandra MD  Diagnosis: Endometrial cancer [facility]) [C54.1]  Small bowel obstruction [facility]) [G43.455]  Date of Discharge: 10/7/2019       Take Home Medications     · All of your prescriptions will be reviewed and the Hospice care team will assist you in managing pain and palliative medicines along with updates and consents from your doctor. Please review your medication list prior to your discharge. If you have questions, please address them with your nurse or care team.    Diet    · There are no dietary restrictions, however you have a bowel obstruction as well as a potential for an open area of your stomach from the old Gtube. Any thing you take in orally may not pass from the stomach and could cause nausea and vomiting. You have tolerated liquids here and may continue, however knowing that you could develop an infection in your abdominal cavity. If you have nausea with or without vomiting, consider stopping any intake. Activity    · If possible, have someone with you at all times. If you are able to be out of bed, keep safety in mind and have someone to assist you. Causes For Concern    If any of the following occur, please speak with your hospice team's on call providers who will help you with your problem. If you are unable to get a response, you may call our office.  Increasing discomfort despite medications   Persisting nausea or vomiting   Uncontrolled anxiety   Constipation/Diarrhea   Respiratory difficulty or secretions   Wound or skin issues      Going Forward. .. The Hospice Care team will provide answers to many of your questions, but we remain very much concerned for and involved in your care.         We offer our sincerest hopes that this journey will provide you with comfort and peace. Please call our office for any concerns you may have going forward during your hospice transition and for any updates in your care. Information obtained by :  I understand that if any problems occur once I am at home I am to contact my physician. I understand and acknowledge receipt of the instructions indicated above.                                                                                                                                            Physician's or R.N.'s Signature                                                                  Date/Time                                                                                                                                              Patient or Representative Signature                                                          Date/Time

## 2019-10-07 NOTE — DISCHARGE SUMMARY
[address], [address] [place], [address] 
P [phone]  F [phone] Discharge Summary Patient ID: 
Marcello Leon [id number] 
89 y.o. 
1960 Admit date: 10/3/2019 PCP: Peterson Nathan MD 
 
Admit MD: Prabhjot Aguilera MD 
 
Discharge MD: Franc Graves MD 
 
Discharge date and time: 10/7/2019 Admission Diagnoses:  
 
Endometrial cancer ([address] [C54.1] Small bowel obstruction ([address] [H66.041] Patient Active Problem List  
Diagnosis Code  Endometrial cancer ([address] C54.1  Early satiety R68.81  
 Mediastinal lymphadenopathy R59.0  Weight loss, non-intentional R63.4  Cancer related pain G89.3  Abdominal pain R10.9  Tachycardia R00.0  Nausea & vomiting R11.2  Ascites R18.8  On antineoplastic chemotherapy Z79.899  Carcinomatosis ([address] C80.0  Small bowel obstruction ([address] M40.684  Unspecified severe protein-calorie malnutrition ([address] E43  Anemia D64.9  Hypomagnesemia E83.42  
 Iron deficiency anemia D50.9  Dislodged gastrostomy tube ([address] Z43.1  Dehydration with hyponatremia E87.1  Hepatic metastases ([address] C78.7 Discharge Diagnoses:   
Same as above Hospital Course:  
Marcello Leon is a 60 y.o.  female with a diagnosis of stage IV UPSC. She underwent a TLH, BSO, with lymphadenectomy in August 2014. She was found to have evidence of intraperitoneal disease with implants in the cul de sac, but no evidence of tyler metastases. She was treated with adjuvant chemotherapy with Taxol and Carboplatin. She then received pelvic radiation therapy. She recurred in the summer of 2018 with peritoneal carcinomatosis. She was subsequently treated with Doxil with planned Avastin therapy, but she refused Avastin after the first cycle due to what she felt were side effects caused by the Avastin.   She did not show up for appts following 7 cycles of Doxil. When she did represent, she was found to have progressive disease. She was recommended carboplatin therapy. Following her first cycle of Carboplatin she was admitted to [facility] on two occasions with small bowel obstruction. During the second admission we decided to give her the second cycle of Carboplatin in the hopes that if her disease responded that her obstructive symptoms would improve. We also placed a palliative PEG tube during that admission. She did begin to improve and was discharged home to follow up in the office. We did discuss hospice during the admission, but she had yet to make a decision. She was feeling much better when I saw her back in the office. Her nausea had improved. She was really not even having to use the PEG tube. She was continuing to have regular bowel movements. She wished to continue with chemotherapy following that admission. I agreed with her that it was a good idea since she was looking so much better. She has now completed 4 cycles of single agent Carboplatin. Interval Hx:  
She failed to present for her most recent chemo scheduled last week. We called her yesterday, reports she fell at home trying to use the bathroom with progressive weakness and has had persistent N/V since last Thursday. She fell on her stomach, but did not hit head/lose consciousness. She did not call because she states she felt too weak. Her recall of events over the last 2 weeks changes, reporting at first she has not used the Gtube, only flushed it once last week, then reporting she would try the Gtube, but it did nothing to stop her N/V and no contents returned. She has not had leaking around the tube. She is not sure of any flatus recently. She reports a BM last week that was normal, none since. Emesis is at times dry heaving, other times productive liquid contents. She denies any appetite.   She is too weak now to perform any self care, requiring her  to assist in all care. She has a hospital bed at home. She has not used Fentanyl patches because of abuses of drug she has seen on television. She denies any F/C. She reports she is ready for hospice, previously discussed. States her pain and nausea are well controlled currently on PCA, reglan and decadron. She feels better \"than I have in 2 months. \"  We have seen her in the infusion center where she was looking quite well. Asked about our visits there, she reports she has had good days and bad days. CT is reviewed with the radiologist.  The PEG tube previously placed by IR on July 3rd was verified to be in the stomach. Her stomach is well away from the abdominal wall now, nondistended. There is no free air. Her small bowel is very distended with presumed transition point in the pelvis. There is progression of hepatic disease, no ascites. There is small pleural effusion. She was admitted and monitored, doing well with adequate pain control on PCA converted to transdermal/IV medication. She and her  Anthony Wolfe agreed it was time for hospice. They met with consulted hospice service and readily accepted with transfer to home on HD 4. She was tolerating small amounts of liquid without complication. All questions were answered with them and they were comfortable moving forward. Pathology: none Consults: Hospice Significant Diagnostic Studies: 
Lab Results Component Value Date/Time WBC 4.6 10/06/2019 03:55 AM  
 ABS. NEUTROPHILS 4.0 10/06/2019 03:55 AM  
 HGB 7.6 (L) 10/06/2019 03:55 AM  
 HCT 24.8 (L) 10/06/2019 03:55 AM  
 .7 (H) 10/06/2019 03:55 AM  
 MCH 33.6 10/06/2019 03:55 AM  
 PLATELET 787 (L) 74/92/0032 03:55 AM  
 
Lab Results Component Value Date/Time  Sodium 134 (L) 10/06/2019 03:55 AM  
 Potassium 3.8 10/06/2019 03:55 AM  
 Chloride 105 10/06/2019 03:55 AM  
 CO2 20 (L) 10/06/2019 03:55 AM  
 Glucose 76 10/06/2019 03:55 AM  
 BUN 23 (H) 10/06/2019 03:55 AM  
 Creatinine 0.55 10/06/2019 03:55 AM  
 Calcium 7.6 (L) 10/06/2019 03:55 AM  
 Albumin 1.6 (L) 10/06/2019 03:55 AM  
 Bilirubin, total 0.4 10/06/2019 03:55 AM  
 AST (SGOT) 33 10/06/2019 03:55 AM  
 ALT (SGPT) 19 10/06/2019 03:55 AM  
 Alk. phosphatase 147 (H) 10/06/2019 03:55 AM  
 
 
Condition on Discharge: good Disposition: home Patient Instructions:  
Current Discharge Medication List  
  
START taking these medications Details  
dexAMETHasone (DECADRON) 4 mg tablet Take 1 tab by mouth twice daily. Qty: 30 Tab, Refills: 10  
  
fentaNYL (DURAGESIC) 12 mcg/hr patch 1 Patch by TransDERmal route every seventy-two (72) hours for 30 days. Max Daily Amount: 1 Patch. Qty: 10 Patch, Refills: 0 Associated Diagnoses: Small bowel obstruction due to adhesions ([address] Endometrial cancer ([address] Cancer related pain; Generalized abdominal pain; Hepatic metastases ([address] fentaNYL (DURAGESIC) 25 mcg/hr PATCH 1 Patch by TransDERmal route every seventy-two (72) hours for 30 days. Max Daily Amount: 1 Patch. Qty: 10 Patch, Refills: 0 Associated Diagnoses: Small bowel obstruction due to adhesions ([address] Endometrial cancer ([address] Cancer related pain; Generalized abdominal pain; Hepatic metastases ([address] CONTINUE these medications which have NOT CHANGED Details HYDROmorphone (DILAUDID) 4 mg tablet Take 1 Tab by mouth every three (3) hours as needed for Pain for up to 30 days. Max Daily Amount: 32 mg. Qty: 100 Tab, Refills: 0 Associated Diagnoses: Endometrial cancer ([address] Cancer associated pain  
  
pantoprazole (PROTONIX) 40 mg tablet Take 1 Tab by mouth two (2) times a day. Indications: heartburn 
Qty: 60 Tab, Refills: 3  
  
metoclopramide (REGLAN) 5 mg/5 mL syrup Take 10 mL by mouth Before breakfast, lunch, dinner and at bedtime.  
Qty: 300 mL, Refills: 3  
  
ondansetron (ZOFRAN ODT) 4 mg disintegrating tablet Take 1 Tab by mouth every six (6) hours as needed for Nausea. Indications: Nausea and Vomiting caused by Cancer Drugs Qty: 60 Tab, Refills: 3 Associated Diagnoses: Endometrial cancer ([address]  
  
haloperidol (HALDOL) 2 mg tablet Take 1 Tab by mouth three (3) times daily. Qty: 90 Tab, Refills: 3 STOP taking these medications  
  
 prochlorperazine (COMPAZINE) 10 mg tablet Comments:  
Reason for Stopping:   
   
 ferrous sulfate (IRON) 325 mg (65 mg iron) tablet Comments:  
Reason for Stopping:   
   
 magnesium oxide (MAG-OX) 400 mg tablet Comments:  
Reason for Stopping:   
   
 polyethylene glycol (MIRALAX) 17 gram packet Comments:  
Reason for Stopping:   
   
 senna-docusate (PERICOLACE) 8.6-50 mg per tablet Comments:  
Reason for Stopping:   
   
 naloxone (NARCAN) 4 mg/actuation nasal spray Comments:  
Reason for Stopping:   
   
 aluminum & magnesium hydroxide-simethicone (MYLANTA II) 400-400-40 mg/5 mL suspension Comments:  
Reason for Stopping:   
   
 lidocaine-prilocaine (EMLA) topical cream Comments:  
Reason for Stopping:   
   
  
 
 
Activity: no driving Activity with assistance only as able Diet: liquids as tolerated Wound Care: Gtube care and IV portacath care Follow-up with Fabiana Montano MD office as needed for questions, concerns.  
 
 
Signed: 
PHILIP Bermudez 
10/7/2019/12:10 PM

## 2019-10-07 NOTE — PROGRESS NOTES
[address] [facility] [address] [place], [address] [place] 
P [phone]  F [phone] Patient Name: Rafaela See Admit Date: 10/3/2019 OR Date: * No surgery found * Visit Date: 10/7/2019 SUBJECTIVE: 
Pain remains well controlled. 15 PCA uses in last 9 hours. Wakes not due to pain, then uses PCA, she states due to concern about pain and some due to pain. No N/V, F/C. SOB. Up to commode, two person assist.  Tolerating sips of water/ice chips. States +flatus. No BM. Concerned about  managing assist to commode. He works nights, so she won't be able to go by herself. She remains otherwise bedridden. OBJECTIVE: 
 
Patient Vitals for the past 24 hrs: 
 Temp Pulse Resp BP SpO2  
10/07/19 0529 97.7 °F (36.5 °C) (!) 116 16 122/80 97 % 10/07/19 0130 97.7 °F (36.5 °C) (!) 116 16 126/74 95 % 10/06/19 2123 98 °F (36.7 °C) (!) 114 16 137/76 97 % 10/06/19 1730 97.9 °F (36.6 °C) (!) 110 16 148/83 98 % 10/06/19 0945 97.8 °F (36.6 °C) (!) 106 16 121/70 96 % Date 10/06/19 0700 - 10/07/19 6509 10/07/19 0700 - 10/08/19 5907 Shift 4490-5749 6037-7426 24 Hour Total 1485-8369 0328-1204 24 Hour Total  
INTAKE  
I.V.(mL/kg/hr) 0(0) 2029.2(2.6) 2029.2(1.3) Volume (0.9% sodium chloride infusion) 0 2029.2 2029.2 Shift Total(mL/kg) 0(0) 2029. 2(30.9) 2029. 2(30.9) OUTPUT Urine(mL/kg/hr) Urine Occurrence(s) 2 x 1 x 3 x Shift Total(mL/kg) NET 0 2029.2 2029. 2 Weight (kg) 65.8 65.8 65.8 65.8 65.8 65.8 Physical Exam 
   General:  alert, cooperative, no distress Cardiac:  Mildly tachycardic Lungs:  clear to auscultation bilaterally Abdomen:  soft, mildly distended, nontender throughout. Wound:  clean, dry, no drainage Extremity: extremities normal, atraumatic, no cyanosis or edema Data Review Lab Results Component Value Date/Time  WBC 4.6 10/06/2019 03:55 AM  
 ABS. NEUTROPHILS 4.0 10/06/2019 03:55 AM  
 HGB 7.6 (L) 10/06/2019 03:55 AM  
 HCT 24.8 (L) 10/06/2019 03:55 AM  
 .7 (H) 10/06/2019 03:55 AM  
 MCH 33.6 10/06/2019 03:55 AM  
 PLATELET 122 (L) 35/29/3608 03:55 AM  
 
Lab Results Component Value Date/Time Sodium 134 (L) 10/06/2019 03:55 AM  
 Potassium 3.8 10/06/2019 03:55 AM  
 Chloride 105 10/06/2019 03:55 AM  
 CO2 20 (L) 10/06/2019 03:55 AM  
 Glucose 76 10/06/2019 03:55 AM  
 BUN 23 (H) 10/06/2019 03:55 AM  
 Creatinine 0.55 10/06/2019 03:55 AM  
 Calcium 7.6 (L) 10/06/2019 03:55 AM  
 Albumin 1.6 (L) 10/06/2019 03:55 AM  
 Bilirubin, total 0.4 10/06/2019 03:55 AM  
 AST (SGOT) 33 10/06/2019 03:55 AM  
 ALT (SGPT) 19 10/06/2019 03:55 AM  
 Alk. phosphatase 147 (H) 10/06/2019 03:55 AM  
 
IMPRESSION/PLAN: 
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 No plans for additional chemotherapy. Heme/CV:  Hgb down to 7.6, which is likely her baseline. The 10.0 at admission was likely due to hemoconcentration. Will hold off on transfusion. Renal:  Normal creatinine. COnsidering levy catheter d/t total care/dependency. FEN/GI:  Small bowel obstruction secondary to disease. Palliative PEG tube placed several months ago is displaced from the stomach and is non-functional.  It is unclear as to when this actually occurred. No plans to replace. Will monitor for peritonitis. Since Saturday with water has done well. No change in abdomen exam. Patient does not want NGT. Will convert to PO meds. Neuro: DC PCA. PRN dilaudid. PO decadron. Increase fentanyl patch scale. ID/Wound:  Afebrile. Normal WBC. PPX:  Ambulation as tolerated. Dispostion:  Patient wishes to transition to hospice. Referral has been made. Case management assisting with arrangements. Will be going with [facility]. Arrangements are being made for Monday.   Need to address her care situation at home if  continues to work, who will help with medication administration, etc.  Will plan on d/c home once everything is ready. Stanislaw Ibarra is concerned about getting into home. Will see if an ambulance transport can be arranged. PHILIP Wells Addendum to note:  Hospice present with . Reviewed with Buddy Mayers and Jessica Cochran her disease state including advanced disease, performance status, bowel obstruction and nonfunctional Gtube and end stage symptoms with hospice input and after-hospital care. All questions answered. Decide to place levy while here d/t total care. PM alert for hospice while  at work. Hospice SW to assist with FMLA. Hospice medical director to adjust medications. Currently pain well controlled. Home with portacath accessed for potential IV use d/t obstruction. Potential for peritonitis discussed. She has done well here, advised they may continue with limited liquids and if peritonitis occurs, would be a limiting event. Aware of obstruction, refuses NGT. All questions answered and all in agreement. Hospice intake present. Discussed plan with nursing. Our office remains available for any questions. Total time today 60 minutes for direct counseling and answering questions with patient, family and floor mgmt and coordination of care with additional providers related to the above.  
 
PHILIP Wells

## 2019-10-10 NOTE — ED PROVIDER NOTES
EMERGENCY DEPARTMENT HISTORY AND PHYSICAL EXAM 
 
 
Date: 10/9/2019 Patient Name: Afia Yao History of Presenting Illness Chief Complaint Patient presents with  Altered mental status Per ems pt is coming from home. Pt was \"not acting like herself and dazing off into space. \" Reports N/V for months. Denies SOB/CP/numb/tingle. Pt has catheter placed for 4 days. Hospice care. 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 History Provided By: Patient and Patient's  HPI: Afia Yao, 60 y.o. female presents to the ED with cc of altered mental status. Pt with metastatic carcinoid. Pt was placed in hospice a couple of days ago. Pt recently diagnosed with small bowel obstruction no surgical intervention due to advanced disease and pt decision. Pt has not been able to keep anything in the past few days c/w dx of SBO.  concerned because of no intake. There has been no fever or chills. Pt had episode this evening staring off into space and was not responsive. This was preceded by pt needing to go to the bathroom. She denied any chest pain or shortness of breath. There are no other complaints, changes, or physical findings at this time. PCP: Daphnie Arreola MD 
 
No current facility-administered medications on file prior to encounter. Current Outpatient Medications on File Prior to Encounter Medication Sig Dispense Refill  dexAMETHasone (DECADRON) 4 mg tablet Take 1 tab by mouth twice daily. 30 Tab 10  
 [START ON 10/10/2019] fentaNYL (DURAGESIC) 12 mcg/hr patch 1 Patch by TransDERmal route every seventy-two (72) hours for 30 days. Max Daily Amount: 1 Patch. 10 Patch 0  
 fentaNYL (DURAGESIC) 25 mcg/hr PATCH 1 Patch by TransDERmal route every seventy-two (72) hours for 30 days. Max Daily Amount: 1 Patch.  10 Patch 0  
 HYDROmorphone (DILAUDID) 4 mg tablet Take 1 Tab by mouth every three (3) hours as needed for Pain for up to 30 days. Max Daily Amount: 32 mg. 100 Tab 0  
 ondansetron (ZOFRAN ODT) 4 mg disintegrating tablet Take 1 Tab by mouth every six (6) hours as needed for Nausea. Indications: Nausea and Vomiting caused by Cancer Drugs 60 Tab 3  pantoprazole (PROTONIX) 40 mg tablet Take 1 Tab by mouth two (2) times a day. Indications: heartburn 60 Tab 3  
 metoclopramide (REGLAN) 5 mg/5 mL syrup Take 10 mL by mouth Before breakfast, lunch, dinner and at bedtime. 300 mL 3  
 haloperidol (HALDOL) 2 mg tablet Take 1 Tab by mouth three (3) times daily. 90 Tab 3 Past History Past Medical History: 
Past Medical History:  
Diagnosis Date  Arthritis  Cancer [facility])   
 uterine  Chronic pain LOWER BACK  Depression  Nausea & vomiting  PMB (postmenopausal bleeding) 2014 Past Surgical History: 
Past Surgical History:  
Procedure Laterality Date  HX COLONOSCOPY    
 HX GYN  14 TLH/BSO/lymph node dissection  HX ORTHOPAEDIC Right [id number] LITTLE toe sx  HX VASCULAR ACCESS Left  PORTACATH - LEFT CHEST  
 IR INSERT GASTROSTOMY TUBE [facility]  7/3/2019 Family History: 
Family History Problem Relation Age of Onset  Cancer Mother 61  
     liver metastatic disease, unknown primary  Stroke Mother BRAIN ANEURYSM  
 Cancer Maternal Uncle 60  
     bone  Cancer Maternal Grandmother 61  
     unknown primary  Anesth Problems Neg Hx Social History: 
Social History Tobacco Use  Smoking status: Former Smoker Packs/day: 0.50 Years: 7.00 Pack years: 3.50 Types: Cigarettes Last attempt to quit: 1999 Years since quittin.1  Smokeless tobacco: Never Used Substance Use Topics  Alcohol use: Yes Alcohol/week: 1.0 standard drinks Types: 1 Glasses of wine per week Comment: rarely  Drug use: No  
 
 
Allergies: Allergies Allergen Reactions  Hydrocodone Nausea Only  Oxycodone Nausea and Vomiting Review of Systems Review of Systems Unable to perform ROS: Mental status change Physical Exam  
Physical Exam  
Constitutional: She is oriented to person, place, and time. She appears well-developed. Appears older than stated age, she appears ill HENT:  
Head: Normocephalic and atraumatic. Mouth/Throat: No oropharyngeal exudate. Dry mucous membranes, sunken facial features Eyes: Pupils are equal, round, and reactive to light. Conjunctivae and EOM are normal.  
Neck: Normal range of motion. Neck supple. No JVD present. No tracheal deviation present. Cardiovascular: Regular rhythm, normal heart sounds and intact distal pulses. No murmur heard. Tachycardia Pulmonary/Chest: Effort normal and breath sounds normal. No stridor. No respiratory distress. She has no wheezes. She has no rales. Abdominal: Soft. She exhibits no distension. There is no rebound. Diffuse tenderness Musculoskeletal: Normal range of motion. She exhibits no edema or tenderness. Neurological: She is alert and oriented to person, place, and time. No cranial nerve deficit. No gross motor or sensory deficits Skin: Skin is warm and dry. She is not diaphoretic. Psychiatric:  
Flat affect, depressed mood Nursing note and vitals reviewed. Diagnostic Study Results Labs - 
  
Contains abnormal data URINALYSIS W/ REFLEX CULTURE (Final result)  
 Component (Lab Inquiry) Collection Time Result Time COLOR APPRN REFSG SHIVANI PROTU  
10/09/19 22:34:00 10/09/19 23:10:45 DARK YELLOW Color Reference Range:. .. CLOUDYAbnormal  1.025 5.5 30Abnormal   
   
Collection Time Result Time GLUCU KETU BLDU UROU NADINE  
10/09/19 22:34:00 10/09/19 23:10:45 NEGATIVE  15Abnormal  NEGATIVE  1.0 NEGATIVE   
   
Collection Time Result Time LEUKU WBCU RBCU EPSU BACTU  
10/09/19 22:34:00 10/09/19 23:10:45 NEGATIVE  5-10 0-5 FEW Epithelial cell catego. ..  NEGATIVE   
   
 Collection Time Result Time Gm Farias  
10/09/19 22:34:00 10/09/19 23:10:45 URINE CULTURE ORDEREDAbnormal   
   
Final result  
   
  
  
  
   
   
BILIRUBIN, CONFIRM (Final result)  
 Component (Lab Inquiry) Collection Time Result Time ICTO  
10/09/19 22:34:00 10/09/19 22:57:26 NEGATIVE   
 
   
Final result  
   
  
  
  
   
 
   
CULTURE, URINE (Final result)  
 Component (Lab Inquiry) Collection Time Result Time SREQ CNT CULT  
10/09/19 22:34:00 10/11/19 09:42:35 NO SPECIAL REQUESTS Reflexed from [id number]  <1,000 CFU/ML NO GROWTH 1 DAY  
 
   
Final result  
   
  
  
  
   
 
   
Contains abnormal data CBC WITH AUTOMATED DIFF (Final result)  
 Component (Lab Inquiry) Collection Time Result Time WBC RBC HGB HCT MCV  
10/09/19 22:29:00 10/09/19 23:25:40 5.7 3. 04Low  10.5Low  31.1Low  102. 3High   
   
Collection Time Result Time MCH MCHC RDW PLT MPLV  
10/09/19 22:29:00 10/09/19 23:25:40 34.5High  33.8 19.0High  166 11.7  
   
Collection Time Result Time NRBC ANRBC GRANS BANDS LYMPH  
10/09/19 22:29:00 10/09/19 23:25:40 0.0 0.00 81High  11 1Low   
   
Collection Time Result Time MONOS EOS BASOS IG ABG  
10/09/19 22:29:00 10/09/19 23:25:40 7 0 0 0 5.2  
   
Collection Time Result Time ABL ABM EVERARDO ABB AIG  
10/09/19 22:29:00 10/09/19 23:25:40 0.1Low  0.4 0.0 0.0 0.0  
   
Collection Time Result Time DF RCOM  
10/09/19 22:29:00 10/09/19 23:25:40 MANUAL ANISOCYTOSIS 1+   
   
Final result  
   
  
  
  
   
   
Contains abnormal data METABOLIC PANEL, COMPREHENSIVE (Final result)  
 Component (Lab Inquiry) Collection Time Result Time NA K CL CO2 AGAP  
10/09/19 22:29:00 10/09/19 23:14:26 133Low  3.7 96Low  25 12  
   
Collection Time Result Time GLU BUN CREA BUCR GFRAA  
10/09/19 22:29:00 10/09/19 23:14:26 142High  45High  1. 36High  33High  48Low   
   
Collection Time Result Time GFRNA CA TBIL GPT SGOT  
10/09/19 22:29:00 10/09/19 23:14:26 40 Estimated GFR is calcu. Afsaneh Guzman Low  7.7Low  0.5 25 38High   
   
 Collection Time Result Time AP TP ALB GLOB AGRAT  
10/09/19 22:29:00 10/09/19 23:14:26 170High  6.4 1.5Low  4.9High  0.3Low   
  Final result  
   
  
  
  
   
   
CULTURE, BLOOD, PAIRED (Preliminary result)  
 Component (Lab Inquiry) Collection Time Result Time SREQ CULT  
10/09/19 22:29:00 10/13/19 08:42:10 NO SPECIAL REQUESTS NO GROWTH 4 DAYS  
 
   
 
   
MAGNESIUM (Final result)  
 Component (Lab Inquiry) Collection Time Result Time MG  
10/09/19 22:29:00 10/09/19 23:43:29 2. 1  
 
   
Final result  
   
  
  
  
   
 
   
Contains abnormal data LIPASE (Final result)  
 Component (Lab Inquiry) Collection Time Result Time LPSE  
10/09/19 22:29:00 10/10/19 00:20:10 14Low   
  Final result  
   
  
  
  
   
 
 
Radiologic Studies -  
XR CHEST SNGL V Final Result IMPRESSION:  
Moderate bilateral pleural effusions and associated atelectasis. XR ABD (KUB) Final Result IMPRESSION:  
Small bowel obstruction as seen on recent CT scan. No definite change compared  
to CT. CT Results  (Last 48 hours) None CXR Results  (Last 48 hours) None Medical Decision Making I am the first provider for this patient. I reviewed the vital signs, available nursing notes, past medical history, past surgical history, family history and social history. Vital Signs-Reviewed the patient's vital signs. Patient Vitals for the past 12 hrs: 
 Temp Pulse Resp BP  
10/09/19 2214 97.7 °F (36.5 °C) (!) 137 20 (!) 89/55 Records Reviewed: Nursing Notes, Old Medical Records, Ambulance Run Sheet, Previous Radiology Studies and Previous Laboratory Studies Provider Notes (Medical Decision Making): DDx- Actively dying, dehydration, electrolyte abnormality ED Course:  
Initial assessment performed.  The patients presenting problems have been discussed, and they are in agreement with the care plan formulated and outlined with them. I have encouraged them to ask questions as they arise throughout their visit. Pt enrolled in hospice within the past week. Pt with carcinoid with METS,  She was discharged from [address] [place] [address] with bowel obstruction. Pt is having vagal episodes while trying to have bowel movement due to dehydration. Discussed with Hospice who felt pt could be admitted if they wish to revoke Hospice or they could be d home. I spent time with pt and  trying to discuss dying process. I think they are both scared of the inevitable and this is all new. I spent time discussing options of admission v. Going home. Unfortunately death is imminent and there is no treatment that will change that for them. I discussed with them that Hospice nurse will be there to see them this morning and can further discuss management. Pt and her  agree with discharge. Will arrange EMS transport. Discussed with  episode that brought them to ED tonight related to vagal and orthostatic episode, and it is likely to happen again, he may be able to avoid this by keeping her in the bed using the bed pan. Critical Care Time:  
None Disposition: 
DC home hospice will follow up in the morning PLAN: 
1. No medication changes 2. Follow-up Information None Return to ED if worse Diagnosis Clinical Impression: 1. Orthostatic syncope 2. Dehydration 3. Unspecified severe protein-calorie malnutrition ([address] 4. Small bowel obstruction ([address] 5. Carcinomatosis ([address] Attestations: 
 
Phoebe Loud, DO Please note that this dictation was completed with Dhe[employer] Bolo, the computer voice recognition software. Quite often unanticipated grammatical, syntax, homophones, and other interpretive errors are inadvertently transcribed by the computer software. Please disregard these errors. Please excuse any errors that have escaped final proofreading. Thank you.

## 2019-10-10 NOTE — DISCHARGE INSTRUCTIONS
Patient Education        Orthostatic Hypotension: Care Instructions  Your Care Instructions    Orthostatic hypotension is a quick drop in blood pressure. It happens when you get up from sitting or lying down. You may feel faint, lightheaded, or dizzy. When a person sits up or stands up, the body changes the way it pumps blood. This can slow the flow of blood to the brain for a very short time. And that can make you feel lightheaded. Many medicines can cause this problem, especially in older people. Lack of fluids (dehydration) or illnesses such as diabetes or heart disease also can cause it. Follow-up care is a key part of your treatment and safety. Be sure to make and go to all appointments, and call your doctor if you are having problems. It's also a good idea to know your test results and keep a list of the medicines you take. How can you care for yourself at home? · Tell your doctor about any problems you have with your medicines. · If your doctor prescribes medicine to help prevent a low blood pressure problem, take it exactly as prescribed. Call your doctor if you think you are having a problem with your medicine. · Drink plenty of fluids, enough so that your urine is light yellow or clear like water. Choose water and other caffeine-free clear liquids. If you have kidney, heart, or liver disease and have to limit fluids, talk with your doctor before you increase the amount of fluids you drink. · Limit or avoid alcohol and caffeine. · Get up slowly from bed or after sitting for a long time. If you are in bed, roll to your side and swing your legs over the edge of the bed and onto the floor. Push your body up to a sitting position. Wait for a while before you slowly stand up. If you are dizzy or lightheaded, sit or lie down. When should you call for help? Call 911 anytime you think you may need emergency care.  For example, call if:    · You passed out (lost consciousness).    Watch closely for changes in your health, and be sure to contact your doctor if:    · You do not get better as expected. Where can you learn more? Go to http://francis-lima.info/. Enter L373 in the search box to learn more about \"Orthostatic Hypotension: Care Instructions. \"  Current as of: April 9, 2019  Content Version: 12.2  © 6317-0731 [employer]. Care instructions adapted under license by [employer] (which disclaims liability or warranty for this information). If you have questions about a medical condition or this instruction, always ask your healthcare professional. [place] [address] any warranty or liability for your use of this information.

## 2019-10-10 NOTE — ED NOTES
Dr. Marisela Machado has reviewed discharge instructions with the patient and spouse. The patient and spouse verbalized understanding. Patient discharged home in a stretcher with [facility] in no distress.

## 2019-10-11 LAB
BACTERIA SPEC CULT: NORMAL
CC UR VC: NORMAL
SERVICE CMNT-IMP: NORMAL

## 2019-10-14 LAB
BACTERIA SPEC CULT: NORMAL
SERVICE CMNT-IMP: NORMAL

## 2019-10-14 NOTE — ADT AUTH CERT NOTES
Intestinal Obstruction - Care Day 4 (10/6/2019) by Luann Melton RN  
 
   
Review Status Review Entered Completed 10/14/2019 11:41  
   
Criteria Review Care Day: 4 Care Date: 10/6/2019 Level of Care: Inpatient Floor Guideline Day 3 Level Of Care (X) Floor to discharge 10/14/2019 11:41:30 EDT by Deyvi Lance   
  surgical bed Clinical Status ( ) * Hemodynamic stability   
(X) * Nausea and vomiting absent or controlled and acceptable for next level of care 10/14/2019 11:41:30 EDT by Deyvi Lance   
  Nausea less, but hasn't really taken in any PO, except for some ice chips ( ) * Electrolytes normal or acceptable for next level of care ( ) * Oral hydration maintained ( ) * Pain absent or managed   
(X) * Surgery not indicated ( ) * Discharge plans and education understood Activity ( ) * Ambulatory [B] Routes ( ) * Oral hydration, medications, and diet * Milestone Additional Notes 10/6/19 GYN oncology progress note:   
Feels better this morning.  Pain under good control with fentanyl patch.  Did not use the PCA very much, as compared to prior to starting the patch.  Also using PO pain meds.  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  No plans for additional chemotherapy.     
Heme/CV: Hgb down to 7.6, which is likely her baseline.  The 10.0 at admission was likely due to hemoconcentration.  Will hold off on transfusion. Renal: Normal creatinine.    
FEN/GI: Small bowel obstruction secondary to disease.  Palliative PEG tube placed several months ago is displaced from the stomach and is non-functional.  It is unclear as to when this actually occurred.  No plans to replace.  Will monitor for peritonitis.  Patient does not want NGT. ID/Wound: Afebrile.  Normal WBC. PPX: Ambulation as tolerated. Dispostion: Patient wishes to transition to hospice. Josep Vickers has been made. Lili Tanner management assisting with arrangements. Buck Patel be going with [facility].  Arrangements are being made for Monday.  Will plan on d/c home once everything is ready. Vel Has is concerned about getting into home. Buck Patel see if an ambulance transport can be arranged Vitals: 98.0, , RR 16, /83, 97% on RA Abnormal labs: RBC: 2.26 (L) HGB: 7.6 (L) HCT: 24.8 (L) MCV: 109.7 (H) RDW: 19.8 (H) PLATELET: 747 (L) NEUTROPHILS: 85 (H) MONOCYTES: 0 (L) Sodium: 134 (L)   
CO2: 20 (L) BUN: 23 (H) BUN/Creatinine ratio: 42 (H) Calcium: 7.6 (L) Protein, total: 6.3 (L) Albumin: 1.6 (L) Globulin: 4.7 (H) A-G Ratio: 0.3 (L) Alk. phosphatase: 147 (H) Meds:   
Decadron 4mg IV q12h Dilaudid PCA 0.2mg with 8 minute lockout Reglan 5mg IV q8h Protonix 40mg IV daily  
   
Intestinal Obstruction - Care Day 3 (10/5/2019) by Kaveh De Dios RN  
 
   
Review Status Review Entered Completed 10/14/2019 11:36  
   
Criteria Review Care Day: 3 Care Date: 10/5/2019 Level of Care: Inpatient Floor Guideline Day 3 Level Of Care (X) Floor to discharge 10/14/2019 11:36:49 EDT by Juana Carrera surgical floor Clinical Status ( ) * Hemodynamic stability   
(X) * Nausea and vomiting absent or controlled and acceptable for next level of care 10/14/2019 11:36:49 EDT by Cristina James   
  Nausea less, but hasn't really taken in any PO   
( ) * Electrolytes normal or acceptable for next level of care ( ) * Oral hydration maintained ( ) * Pain absent or managed (X) * Surgery not indicated ( ) * Discharge plans and education understood Activity ( ) * Ambulatory [B] Routes ( ) * Oral hydration, medications, and diet * Milestone Additional Notes 10/5/19 GYN Oncology progress note:   
Feels better this morning.  Pain control better with fentanyl patch.  Did not use the PCA very much, as compared to the day before.  Also using PO pain meds.  Nausea less, but hasn't really taken in any PO.  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  No plans for additional chemotherapy.     
Heme/CV: Hgb down to 7.6, which is likely her baseline.  The 10.0 at admission was likely due to hemoconcentration.  Will hold off on transfusion. Renal: Normal creatinine. FEN/GI: Small bowel obstruction secondary to disease.  Palliative PEG tube placed several months ago is displaced from the stomach and is non-functional.  It is unclear as to when this actually occurred.  No plans to replace.  Will monitor for peritonitis.  Patient does not want NGT. ID/Wound: Afebrile.  Normal WBC. PPX: Ambulation as tolerated. Dispostion: Patient wishes to transition to hospice. April Choi has been made. Rosibel Like management assisting with arrangements. Setnhil Rivera be going with [facility].  Patients states that arrangements are being made for Monday.  Will plan on d/c home once everything is ready Vitals: 98.0, , RR 16, /70, 97% on RA Abnormal labs: RBC: 2.23 (L) HGB: 7.6 (L) HCT: 23.8 (L) MCV: 106.7 (H) MCH: 34.1 (H) RDW: 19.8 (H) PLATELET: 508 (L) NEUTROPHILS: 83 (H) MONOCYTES: 4 (L) Sodium: 134 (L) BUN: 33 (H) BUN/Creatinine ratio: 50 (H) Calcium: 7.7 (L) Protein, total: 6.3 (L) Albumin: 1.6 (L) Globulin: 4.7 (H) A-G Ratio: 0.3 (L) Alk. phosphatase: 146 (H) Meds:   
Decadron 4mg IV q12h Dilaudid PCA 0.2mg with 8 minute lockout Reglan 5mg IV q8h Protonix 40mg IV daily

## 2021-09-03 NOTE — TELEPHONE ENCOUNTER
I reached pt today and scheduled an appt with Dr. Bao Cancino for 5/16/19 to discuss future tx. Pt states she is feeling better and may not want any more chemotherapy.
Pt returned your call, please call her back
no

## 2023-12-16 NOTE — PROGRESS NOTES
Reviewed chart for transitions of care, and discussed in rounds. There are no cm orders at this time. Plan is for patient to discharge home when medically stable. CM to follow for any other discharge needs.  
 
 
Nunu Albarado, [facility] 
 
 No

## 2024-04-10 NOTE — DISCHARGE INSTRUCTIONS
OUTPATIENT INFUSION CENTER    DISCHARGE INSTRUCTIONS FOR:  BLOOD TRANSFUSION    We hope you are feeling better after your blood transfusion. Some mild tenderness or slight bruising at your IV site is normal.  Avoid lifting or heavy use of that extremity for the rest of the day. Drink plenty of fluids, eat a normal diet and get some rest.    There are some important signs that you need to watch for in case you experience a delayed reaction to the blood you have received. Call your physician immediately if you develop any of the following symptoms:    1. Severe headache or backache;    2. Fever above 100 degrees;    3. Chills;    4. Difficulty breathing;    5.  Blood or red color in urine;    6. The feeling of weakness or constant fatigue;    7. Yellowing of the whites of your eyes or skin (jaundice). If your physician is not available, call or go to the nearest emergency room, or dial 911.     Kevin Castrejon, Signature: ___________________________ 7/25/2019  Merary Nava RN
YURI Razia

## (undated) DEVICE — Device

## (undated) DEVICE — SYR 10ML LUER LOK 1/5ML GRAD --

## (undated) DEVICE — NEEDLE HYPO 25GA L1.5IN BVL ORIENTED ECLIPSE

## (undated) DEVICE — DEVON™ KNEE AND BODY STRAP 60" X 3" (1.5 M X 7.6 CM): Brand: DEVON

## (undated) DEVICE — DRAPE XR C ARM 41X74IN LF --

## (undated) DEVICE — GAUZE SPONGES,8 PLY: Brand: CURITY

## (undated) DEVICE — LIGHT HANDLE: Brand: DEVON

## (undated) DEVICE — INFECTION CONTROL KIT SYS

## (undated) DEVICE — SOLUTION IV 500ML 0.9% SOD CHL FLX CONT

## (undated) DEVICE — TRAY PREP DRY W/ PREM GLV 2 APPL 6 SPNG 2 UNDPD 1 OVERWRAP

## (undated) DEVICE — SUTURE VCRL SZ 3-0 L27IN ABSRB UD L26MM SH 1/2 CIR J416H

## (undated) DEVICE — 3M™ TEGADERM™ TRANSPARENT FILM DRESSING FRAME STYLE, 1626W, 4 IN X 4-3/4 IN (10 CM X 12 CM), 50/CT 4CT/CASE: Brand: 3M™ TEGADERM™

## (undated) DEVICE — SUTURE VCRL SZ 2-0 L27IN ABSRB UD L26MM SH 1/2 CIR J417H

## (undated) DEVICE — STERILE NEOPRENE POWDER-FREE SURGICAL GLOVES WITH NITRILE COATING: Brand: PROTEXIS

## (undated) DEVICE — DRAPE,LAPAROTOMY,T,PEDI,STERILE: Brand: MEDLINE

## (undated) DEVICE — 1200 GUARD II KIT W/5MM TUBE W/O VAC TUBE: Brand: GUARDIAN

## (undated) DEVICE — REM POLYHESIVE ADULT PATIENT RETURN ELECTRODE: Brand: VALLEYLAB

## (undated) DEVICE — 3M™ STERI-STRIP™ REINFORCED ADHESIVE SKIN CLOSURES, R1546, 1/4 IN X 4 IN (6 MM X 100 MM), 10 STRIPS/ENVELOPE: Brand: 3M™ STERI-STRIP™

## (undated) DEVICE — (D)SYR 10ML 1/5ML GRAD NSAF -- PKGING CHANGE USE ITEM 338027

## (undated) DEVICE — GLOVE SURG SZ 75 L1212IN FNGR THK138MIL BRN LTX FREE

## (undated) DEVICE — DRAPE,LAPAROTOMY,PED,STERILE: Brand: MEDLINE

## (undated) DEVICE — MASTISOL ADHESIVE LIQ 2/3ML

## (undated) DEVICE — SUTURE MCRYL SZ 4-0 L27IN ABSRB UD L19MM PS-2 1/2 CIR PRIM Y426H

## (undated) DEVICE — DERMABOND SKIN ADH 0.7ML -- DERMABOND ADVANCED 12/BX